# Patient Record
Sex: FEMALE | Race: WHITE | NOT HISPANIC OR LATINO | Employment: OTHER | URBAN - METROPOLITAN AREA
[De-identification: names, ages, dates, MRNs, and addresses within clinical notes are randomized per-mention and may not be internally consistent; named-entity substitution may affect disease eponyms.]

---

## 2017-01-04 ENCOUNTER — HOSPITAL ENCOUNTER (OUTPATIENT)
Dept: ULTRASOUND IMAGING | Facility: HOSPITAL | Age: 72
Discharge: HOME | End: 2017-01-04
Attending: SURGERY
Payer: MEDICARE

## 2017-01-04 DIAGNOSIS — E04.1 NONTOXIC SINGLE THYROID NODULE: ICD-10-CM

## 2017-01-04 PROCEDURE — 76536 US EXAM OF HEAD AND NECK: CPT

## 2017-01-09 ENCOUNTER — ALLSCRIPTS OFFICE VISIT (OUTPATIENT)
Dept: OTHER | Facility: OTHER | Age: 72
End: 2017-01-09

## 2017-01-09 ENCOUNTER — TRANSCRIBE ORDERS (OUTPATIENT)
Dept: ADMINISTRATIVE | Facility: HOSPITAL | Age: 72
End: 2017-01-09

## 2017-01-09 DIAGNOSIS — E04.1 NONTOXIC UNINODULAR GOITER: Primary | ICD-10-CM

## 2017-01-27 ENCOUNTER — APPOINTMENT (OUTPATIENT)
Dept: LAB | Age: 72
End: 2017-01-27
Payer: MEDICARE

## 2017-01-27 ENCOUNTER — TRANSCRIBE ORDERS (OUTPATIENT)
Dept: ADMINISTRATIVE | Age: 72
End: 2017-01-27

## 2017-01-27 DIAGNOSIS — C54.1 MALIGNANT NEOPLASM OF ENDOMETRIUM (HCC): ICD-10-CM

## 2017-01-27 LAB
BUN SERPL-MCNC: 13 MG/DL (ref 5–25)
CREAT SERPL-MCNC: 0.75 MG/DL (ref 0.6–1.3)
GFR SERPL CREATININE-BSD FRML MDRD: >60 ML/MIN/1.73SQ M

## 2017-01-27 PROCEDURE — 84520 ASSAY OF UREA NITROGEN: CPT

## 2017-01-27 PROCEDURE — 36415 COLL VENOUS BLD VENIPUNCTURE: CPT

## 2017-01-27 PROCEDURE — 82565 ASSAY OF CREATININE: CPT

## 2017-02-01 ENCOUNTER — HOSPITAL ENCOUNTER (OUTPATIENT)
Dept: CT IMAGING | Facility: HOSPITAL | Age: 72
Discharge: HOME | End: 2017-02-01
Attending: OBSTETRICS & GYNECOLOGY
Payer: MEDICARE

## 2017-02-01 DIAGNOSIS — C54.1 MALIGNANT NEOPLASM OF ENDOMETRIUM (HCC): ICD-10-CM

## 2017-02-01 PROCEDURE — 74177 CT ABD & PELVIS W/CONTRAST: CPT

## 2017-02-01 PROCEDURE — 71260 CT THORAX DX C+: CPT

## 2017-02-01 RX ADMIN — IOHEXOL 100 ML: 350 INJECTION, SOLUTION INTRAVENOUS at 10:21

## 2017-02-08 ENCOUNTER — GENERIC CONVERSION - ENCOUNTER (OUTPATIENT)
Dept: OTHER | Facility: OTHER | Age: 72
End: 2017-02-08

## 2017-02-21 ENCOUNTER — GENERIC CONVERSION - ENCOUNTER (OUTPATIENT)
Dept: OTHER | Facility: OTHER | Age: 72
End: 2017-02-21

## 2017-03-09 ENCOUNTER — ALLSCRIPTS OFFICE VISIT (OUTPATIENT)
Dept: OTHER | Facility: OTHER | Age: 72
End: 2017-03-09

## 2017-03-21 ENCOUNTER — GENERIC CONVERSION - ENCOUNTER (OUTPATIENT)
Dept: OTHER | Facility: OTHER | Age: 72
End: 2017-03-21

## 2017-03-31 ENCOUNTER — GENERIC CONVERSION - ENCOUNTER (OUTPATIENT)
Dept: OTHER | Facility: OTHER | Age: 72
End: 2017-03-31

## 2017-05-23 ENCOUNTER — APPOINTMENT (OUTPATIENT)
Dept: RADIATION ONCOLOGY | Facility: HOSPITAL | Age: 72
End: 2017-05-23
Attending: RADIOLOGY
Payer: MEDICARE

## 2017-05-23 ENCOUNTER — GENERIC CONVERSION - ENCOUNTER (OUTPATIENT)
Dept: OTHER | Facility: OTHER | Age: 72
End: 2017-05-23

## 2017-05-23 PROCEDURE — 99214 OFFICE O/P EST MOD 30 MIN: CPT | Performed by: RADIOLOGY

## 2017-08-08 ENCOUNTER — ALLSCRIPTS OFFICE VISIT (OUTPATIENT)
Dept: OTHER | Facility: OTHER | Age: 72
End: 2017-08-08

## 2017-08-08 DIAGNOSIS — E04.2 NONTOXIC MULTINODULAR GOITER: ICD-10-CM

## 2017-08-08 DIAGNOSIS — E55.9 VITAMIN D DEFICIENCY: ICD-10-CM

## 2017-08-08 DIAGNOSIS — Z00.00 ENCOUNTER FOR GENERAL ADULT MEDICAL EXAMINATION WITHOUT ABNORMAL FINDINGS: ICD-10-CM

## 2017-08-08 DIAGNOSIS — M17.10 PRIMARY OSTEOARTHRITIS OF ONE KNEE: ICD-10-CM

## 2017-08-08 DIAGNOSIS — R59.0 LOCALIZED ENLARGED LYMPH NODES: ICD-10-CM

## 2017-08-08 DIAGNOSIS — I82.409 ACUTE EMBOLISM AND THROMBOSIS OF DEEP VEIN OF LOWER EXTREMITY (HCC): ICD-10-CM

## 2017-08-08 DIAGNOSIS — Z85.42 PERSONAL HISTORY OF MALIGNANT NEOPLASM OF OTHER PARTS OF UTERUS: ICD-10-CM

## 2017-08-08 DIAGNOSIS — M16.9 OSTEOARTHRITIS OF HIP: ICD-10-CM

## 2017-08-21 ENCOUNTER — GENERIC CONVERSION - ENCOUNTER (OUTPATIENT)
Dept: OTHER | Facility: OTHER | Age: 72
End: 2017-08-21

## 2017-09-08 ENCOUNTER — ALLSCRIPTS OFFICE VISIT (OUTPATIENT)
Dept: OTHER | Facility: OTHER | Age: 72
End: 2017-09-08

## 2017-09-08 ENCOUNTER — TRANSCRIBE ORDERS (OUTPATIENT)
Dept: ADMINISTRATIVE | Facility: HOSPITAL | Age: 72
End: 2017-09-08

## 2017-09-08 DIAGNOSIS — C54.1 ENDOMETRIAL SARCOMA (HCC): Primary | ICD-10-CM

## 2017-09-20 ENCOUNTER — TRANSCRIBE ORDERS (OUTPATIENT)
Dept: ADMINISTRATIVE | Age: 72
End: 2017-09-20

## 2017-09-20 ENCOUNTER — APPOINTMENT (OUTPATIENT)
Dept: LAB | Age: 72
End: 2017-09-20
Payer: MEDICARE

## 2017-09-20 DIAGNOSIS — Z00.00 ENCOUNTER FOR GENERAL ADULT MEDICAL EXAMINATION WITHOUT ABNORMAL FINDINGS: ICD-10-CM

## 2017-09-20 DIAGNOSIS — R59.0 LOCALIZED ENLARGED LYMPH NODES: ICD-10-CM

## 2017-09-20 DIAGNOSIS — M17.10 PRIMARY OSTEOARTHRITIS OF ONE KNEE: ICD-10-CM

## 2017-09-20 DIAGNOSIS — E55.9 VITAMIN D DEFICIENCY: ICD-10-CM

## 2017-09-20 DIAGNOSIS — I82.409 ACUTE EMBOLISM AND THROMBOSIS OF DEEP VEIN OF LOWER EXTREMITY (HCC): ICD-10-CM

## 2017-09-20 DIAGNOSIS — E04.2 NONTOXIC MULTINODULAR GOITER: ICD-10-CM

## 2017-09-20 DIAGNOSIS — Z85.42 PERSONAL HISTORY OF MALIGNANT NEOPLASM OF OTHER PARTS OF UTERUS: ICD-10-CM

## 2017-09-20 DIAGNOSIS — M16.9 OSTEOARTHRITIS OF HIP: ICD-10-CM

## 2017-09-20 LAB
25(OH)D3 SERPL-MCNC: 30.4 NG/ML (ref 30–100)
ALBUMIN SERPL BCP-MCNC: 3.7 G/DL (ref 3.5–5)
ALP SERPL-CCNC: 81 U/L (ref 46–116)
ALT SERPL W P-5'-P-CCNC: 17 U/L (ref 12–78)
ANION GAP SERPL CALCULATED.3IONS-SCNC: 7 MMOL/L (ref 4–13)
AST SERPL W P-5'-P-CCNC: 18 U/L (ref 5–45)
BASOPHILS # BLD AUTO: 0.01 THOUSANDS/ΜL (ref 0–0.1)
BASOPHILS NFR BLD AUTO: 0 % (ref 0–1)
BILIRUB SERPL-MCNC: 0.66 MG/DL (ref 0.2–1)
BUN SERPL-MCNC: 16 MG/DL (ref 5–25)
CALCIUM SERPL-MCNC: 9 MG/DL (ref 8.3–10.1)
CHLORIDE SERPL-SCNC: 99 MMOL/L (ref 100–108)
CO2 SERPL-SCNC: 28 MMOL/L (ref 21–32)
CREAT SERPL-MCNC: 0.66 MG/DL (ref 0.6–1.3)
EOSINOPHIL # BLD AUTO: 0.13 THOUSAND/ΜL (ref 0–0.61)
EOSINOPHIL NFR BLD AUTO: 2 % (ref 0–6)
ERYTHROCYTE [DISTWIDTH] IN BLOOD BY AUTOMATED COUNT: 13.1 % (ref 11.6–15.1)
GFR SERPL CREATININE-BSD FRML MDRD: 87 ML/MIN/1.73SQ M
GLUCOSE P FAST SERPL-MCNC: 90 MG/DL (ref 65–99)
HCT VFR BLD AUTO: 40.9 % (ref 34.8–46.1)
HGB BLD-MCNC: 13.6 G/DL (ref 11.5–15.4)
LYMPHOCYTES # BLD AUTO: 2.01 THOUSANDS/ΜL (ref 0.6–4.47)
LYMPHOCYTES NFR BLD AUTO: 34 % (ref 14–44)
MCH RBC QN AUTO: 29.8 PG (ref 26.8–34.3)
MCHC RBC AUTO-ENTMCNC: 33.3 G/DL (ref 31.4–37.4)
MCV RBC AUTO: 90 FL (ref 82–98)
MONOCYTES # BLD AUTO: 0.63 THOUSAND/ΜL (ref 0.17–1.22)
MONOCYTES NFR BLD AUTO: 11 % (ref 4–12)
NEUTROPHILS # BLD AUTO: 3.11 THOUSANDS/ΜL (ref 1.85–7.62)
NEUTS SEG NFR BLD AUTO: 53 % (ref 43–75)
NRBC BLD AUTO-RTO: 0 /100 WBCS
PLATELET # BLD AUTO: 254 THOUSANDS/UL (ref 149–390)
PMV BLD AUTO: 11 FL (ref 8.9–12.7)
POTASSIUM SERPL-SCNC: 4.2 MMOL/L (ref 3.5–5.3)
PROT SERPL-MCNC: 7.2 G/DL (ref 6.4–8.2)
RBC # BLD AUTO: 4.57 MILLION/UL (ref 3.81–5.12)
SODIUM SERPL-SCNC: 134 MMOL/L (ref 136–145)
TSH SERPL DL<=0.05 MIU/L-ACNC: 1.18 UIU/ML (ref 0.36–3.74)
WBC # BLD AUTO: 5.9 THOUSAND/UL (ref 4.31–10.16)

## 2017-09-20 PROCEDURE — 84443 ASSAY THYROID STIM HORMONE: CPT

## 2017-09-20 PROCEDURE — 80053 COMPREHEN METABOLIC PANEL: CPT

## 2017-09-20 PROCEDURE — 82306 VITAMIN D 25 HYDROXY: CPT

## 2017-09-20 PROCEDURE — 85025 COMPLETE CBC W/AUTO DIFF WBC: CPT

## 2017-09-20 PROCEDURE — 36415 COLL VENOUS BLD VENIPUNCTURE: CPT

## 2017-09-21 ENCOUNTER — GENERIC CONVERSION - ENCOUNTER (OUTPATIENT)
Dept: OTHER | Facility: OTHER | Age: 72
End: 2017-09-21

## 2017-09-21 DIAGNOSIS — E87.1 HYPO-OSMOLALITY AND HYPONATREMIA: ICD-10-CM

## 2017-09-21 DIAGNOSIS — Z12.31 ENCOUNTER FOR SCREENING MAMMOGRAM FOR MALIGNANT NEOPLASM OF BREAST: ICD-10-CM

## 2017-10-23 ENCOUNTER — GENERIC CONVERSION - ENCOUNTER (OUTPATIENT)
Dept: OTHER | Facility: OTHER | Age: 72
End: 2017-10-23

## 2017-11-03 ENCOUNTER — GENERIC CONVERSION - ENCOUNTER (OUTPATIENT)
Dept: OTHER | Facility: OTHER | Age: 72
End: 2017-11-03

## 2018-01-10 NOTE — RESULT NOTES
Message  Shyla Osorio I have enclosed a copy of your recent labs  All results are normal except for mildly decreased sodium at 134  I would recommend repeating this in 1 month  Verified Results  (1) CBC/PLT/DIFF [id number] 09:04AM [facility]: [id number]     Test Name Result Flag Reference   WBC COUNT 5 90 Thousand/uL  4 31-10 16   RBC COUNT 4 57 Million/uL  3 81-5 12   HEMOGLOBIN 13 6 g/dL  11 5-15 4   HEMATOCRIT 40 9 %  34 8-46  1   MCV 90 fL  82-98   MCH 29 8 pg  26 8-34 3   MCHC 33 3 g/dL  31 4-37 4   RDW 13 1 %  11 6-15 1   MPV 11 0 fL  8 9-12 7   PLATELET COUNT 827 Thousands/uL  149-390   nRBC AUTOMATED 0 /100 WBCs     NEUTROPHILS RELATIVE PERCENT 53 %  43-75   LYMPHOCYTES RELATIVE PERCENT 34 %  14-44   MONOCYTES RELATIVE PERCENT 11 %  4-12   EOSINOPHILS RELATIVE PERCENT 2 %  0-6   BASOPHILS RELATIVE PERCENT 0 %  0-1   NEUTROPHILS ABSOLUTE COUNT 3 11 Thousands/? ??L  1 85-7 62   LYMPHOCYTES ABSOLUTE COUNT 2 01 Thousands/? ??L  0 60-4 47   MONOCYTES ABSOLUTE COUNT 0 63 Thousand/? ??L  0 17-1 22   EOSINOPHILS ABSOLUTE COUNT 0 13 Thousand/? ??L  0 00-0 61   BASOPHILS ABSOLUTE COUNT 0 01 Thousands/? ??L  0 00-0 10     (1) COMPREHENSIVE METABOLIC PANEL [id number] 98:10II [facility] Order Number: [id number]     Test Name Result Flag Reference   SODIUM 134 mmol/L L 136-145   POTASSIUM 4 2 mmol/L  3 5-5 3   CHLORIDE 99 mmol/L L 100-108   CARBON DIOXIDE 28 mmol/L  21-32   ANION GAP (CALC) 7 mmol/L  4-13   BLOOD UREA NITROGEN 16 mg/dL  5-25   CREATININE 0 66 mg/dL  0 60-1 30   Standardized to IDMS reference method   CALCIUM 9 0 mg/dL  8 3-10 1   BILI, TOTAL 0 66 mg/dL  0 20-1 00   ALK PHOSPHATAS 81 U/L     ALT (SGPT) 17 U/L  12-78   Specimen collection should occur prior to Sulfasalazine and/or Sulfapyridine administration due to the potential for falsely depressed results     AST(SGOT) 18 U/L  5-45   Specimen collection should occur prior to Sulfasalazine administration due to the potential for falsely depressed results  ALBUMIN 3 7 g/dL  3 5-5 0   TOTAL PROTEIN 7 2 g/dL  6 4-8 2   eGFR 87 ml/min/1 73sq m     National Kidney Disease Education Program recommendations are as follows:  GFR calculation is accurate only with a steady state creatinine  Chronic Kidney disease less than 60 ml/min/1 73 sq  meters  Kidney failure less than 15 ml/min/1 73 sq  meters  GLUCOSE FASTING 90 mg/dL  65-99   Specimen collection should occur prior to Sulfasalazine administration due to the potential for falsely depressed results  Specimen collection should occur prior to Sulfapyridine administration due to the potential for falsely elevated results  (1) TSH 20Sep2017 09:04AM Ysabel Whitaker Order Number: [id number]     Test Name Result Flag Reference   TSH 1 180 uIU/mL  0 358-3 740   Patients undergoing fluorescein dye angiography may retain small amounts of fluorescein in the body for 48-72 hours post procedure  Samples containing fluorescein can produce falsely depressed TSH values  If the patient had this procedure,a specimen should be resubmitted post fluorescein clearance  The recommended reference ranges for TSH during pregnancy are as follows:  First trimester 0 1 to 2 5 uIU/mL  Second trimester  0 2 to 3 0 uIU/mL  Third trimester 0 3 to 3 0 uIU/m     (1) VITAMIN D 25-HYDROXY 20Sep2017 09:04AM Alexpo Luis Angel    Order Number: [id number]     Test Name Result Flag Reference   VIT D 25-HYDROX 30 4 ng/mL  30 0-100 0   This assay is a certified procedure of the CDC Vitamin D Standardization Certification Program (VDSCP)     Deficiency <20ng/ml   Insufficiency 20-30ng/ml   Sufficient  ng/ml     *Patients undergoing fluorescein dye angiography may retain small amounts of fluorescein in the body for 48-72 hours post procedure  Samples containing fluorescein can produce falsely elevated Vitamin D values   If the patient had this procedure, a specimen should be resubmitted post fluorescein clearance  Plan  Hyponatremia    · (1) BASIC METABOLIC PROFILE; Status:Active;  Requested [facility]:[id number];     Signatures   Electronically signed by : ANDREIA Tran ; Sep 21 2017  5:43PM EST                       (Author)

## 2018-01-12 VITALS
HEART RATE: 75 BPM | WEIGHT: 225 LBS | DIASTOLIC BLOOD PRESSURE: 80 MMHG | HEIGHT: 65 IN | RESPIRATION RATE: 16 BRPM | OXYGEN SATURATION: 96 % | TEMPERATURE: 97.6 F | BODY MASS INDEX: 37.49 KG/M2 | SYSTOLIC BLOOD PRESSURE: 138 MMHG

## 2018-01-12 NOTE — RESULT NOTES
Message  Leon Marino I have enclosed a copy of your recent labs  genetic markers normal  I would continue with Aspirin 81 mg daily        Results/Data     (1) HOMOCYSTEINE   HOMOCYSTEINE: 5 1 umol/L Reference Range 3 7-11 2  (1) PROTHROMBIN 11894PB MUTATION ANALYSIS   PROTHROMBIN C16571U MUTATION: Comment   ADDITIONAL INFO: Comment   (1) FACTOR V LEIDEN MUTATION DNA ANALYSIS   FACTOR V LEIDEN: Comment   FACTOR V LEIDEN INTERPRETATION: Comment     Signatures   Electronically signed by : ANDREIA Kinney ; Aug 23 2016  6:50AM EST                       (Author)

## 2018-01-12 NOTE — PROGRESS NOTES
Assessment    1  Encounter for preventive health examination (V70 0) (Z00 00)   2  Thyroid nodule (241 0) (E04 1)   3  Deep vein thrombosis (453 40) (I82 409)   4  Osteoarthritis of knee (715 36) (M17 9)   5  Vitamin D deficiency (268 9) (E55 9)   6  Endometrial cancer (182 0) (C54 1)   7  Inguinal lymphadenopathy (785 6) (R59 0)    Plan  Deep vein thrombosis, Endometrial cancer, Osteoarthritis of knee, Thyroid nodule,  Vitamin D deficiency    · (1) CBC/PLT/DIFF; Status:Active; Requested BWF:[id number];    · (1) COMPREHENSIVE METABOLIC PANEL; Status:Active; Requested for:16Oof8507;    · (1) FACTOR V LEIDEN MUTATION DNA ANALYSIS; Status:Active; Requested  NAF:[id number];    · (1) HOMOCYSTEINE; Status:Active; Requested for:85Apk5459;    · (1) PROTHROMBIN 48196FI MUTATION ANALYSIS; Status:Active; Requested  for:94Zaa7997;    · (1) TSH; Status:Active; Requested for:00Mwh2916;    · (1) VITAMIN D 25-HYDROXY; Status:Active; Requested for:55Bso9269;   Need for prophylactic vaccination against Streptococcus pneumoniae (pneumococcus)    · Prevnar 13 Intramuscular Suspension  Need for shingles vaccine    · Zostavax 76475 UNT/0 65ML Subcutaneous Solution Reconstituted (Zostavax  50993 UNT/0 65ML Subcutaneous Solution Reconstituted); INJECT 0 65 ML Once  Screening for genitourinary condition    · *VB-Urinary Incontinence Screen (Dx V81 6 Screen for UI); Status:Complete;   Done:  02DXQ9416 07:59AM    Discussion/Summary    Labs  I included testing for Factor V Leiden, pro thrombin mutation gene and homocysteine with history of DVT  prescription for compression stockings  ASA 81 mg daily  Prevnar 13 today  script for Zostavax  yearly flu vaccine  Impression: Subsequent Annual Wellness Visit, with preventive exam as well as age and risk appropriate counseling completed       Cardiovascular screening and counseling: screening not indicated, counseling was given on maintaining a healthy diet, counseling was given on maintaining a healthy weight and due for a lipid panel  Diabetes screening and counseling: due for blood glucose  Colorectal cancer screening and counseling: screening is current  Breast cancer screening and counseling: screening is current  Cervical cancer screening and counseling: screening not indicated  Osteoporosis screening and counseling: screening is current  Abdominal aortic aneurysm screening and counseling: screening not indicated  Glaucoma screening and counseling: screening is current  Immunizations: influenza vaccine is up to date this year, influenza vaccination is recommended annually, the patient declines the pneumococcal vaccination, pneumococcal vaccine due today, the risks and benefits of the Zostavax vaccine were discussed with the patient and the patient declines the Zostavax vaccine  Advice and education were given regarding increasing physical activity, nutrition (non-diabetic) and weight loss  She was referred to a GYN oncology surgeon  Patient Discussion: follow-up visit needed in 1 year  History of Present Illness  HPI: follow up last seen 06/2014  active problems and PMH see chart  medications none  OTCs see chart  labs 06/2015 see note  labs 6/2013 CBC normal  Hemoglobin 13 4  Chemistry profile  FBS 98, sodium 140, potassium 4 3, chloride 105, bicarbonate 30, creatinine 0 71, calcium 9 0  LFTs normal  TSH 1 927  Lipid profile cholesterol 151, triglycerides 116, HDL 54, LDL 74  Welcome to [employer] and Wellness Visits: The patient is being seen for the subsequent annual wellness visit  Medicare Screening and Risk Factors   Hospitalizations: she has been previously hospitalizied  Once per lifetime medicare screening tests: ECG (11/2014)  Medicare Screening Tests Risk Questions   Abdominal aortic aneurysm risk assessment: none indicated  Osteoporosis risk assessment: , female gender and over 48years of age  Drug and Alcohol Use:  The patient has never smoked cigarettes  The patient reports occasional alcohol use  Diet and Physical Activity: Current diet includes well balanced meals  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  Mood Disorder and Cognitive Impairment Screening: She denies feeling down, depressed, or hopeless over the past two weeks  She denies feeling little interest or pleasure in doing things over the past two weeks  Cognitive impairment screening: denies difficulty learning/retaining new information, denies difficulty handling complex tasks, denies difficulty with reasoning, denies difficulty with spatial ability and orientation, denies difficulty with language and denies difficulty with behavior  Functional Ability/Level of Safety: Hearing is normal bilaterally  The patient is currently able to do activities of daily living without limitations and able to do instrumental activities of daily living without limitations  Fall risk factors:  mobility impairment, but no polypharmacy, no alcohol use, no antidepressant use, no deconditioning, no postural hypotension, no sedative use, no visual impairment, no urinary incontinence, no antihypertensive use, no cognitive impairment and no previous fall  Advance Directives: Advance directives: living will  Co-Managers and Medical Equipment/Suppliers: See Patient Care Team      Patient Care Team    Care Team Member Role Specialty Office Number   Dagoberto Barnes MD  Obstetrics/Gynecology [phone]   Francisco Will  Gynecological Oncology [phone]   Paulette Abad MD  Surgical Oncology [phone]   Tc PORTILLO  Radiation Oncology [phone]     Review of Systems    Constitutional: 11 lb weight gain from 06/2015  Eyes: wears glasses  current with eye exam, but no blurred vision  ENT: history of thyroid nodule with prior biopsy no cancer  , but no hoarseness  Cardiovascular: s/p DVT left leg  she completed 9 months of Xarelto  , but no chest pain, no palpitations and no lower extremity edema  Respiratory: no shortness of breath, no wheezing and no cough  Gastrointestinal: colonoscopy 09/2014  1 polyp  severe diverticulosis  , but no abdominal pain, no nausea, no diarrhea, no constipation, no bright red blood per rectum and no melena  Genitourinary: no UI  endometrial CA s/p ROSETTE, BSO  s/p chemotherapy  07/2016 CT scan chest, abdomen and pelvis no recurrence  , but no dysuria  Musculoskeletal: joint stiffness and OA knees  x rays knees 6/2013 moderately severe OA right knee  moderate OA left knee  using prn Tylenol  she completed PT with improvement  pain with prolonged standing  no swelling  no giving way or locking  Integumentary and Breasts: no rashes and no skin lesions  Neurological: no headache and no dizziness  Psychiatric: no anxiety and no depression  Endocrine: DEXA scan 10/2013 normal    Hematologic and Lymphatic: history of left inguinal adenopathy biopsy no cancer  Active Problems    1  Deep vein thrombosis (453 40) (I82 409)   2  Endometrial cancer (182 0) (C54 1)   3  H/O malignant neoplasm of endometrium (V10 42) (Z85 42)   4  Inguinal lymphadenopathy (785 6) (R59 0)   5  Insomnia (780 52) (G47 00)   6  Need for prophylactic vaccination against Streptococcus pneumoniae (pneumococcus)   (V03 82) (Z23)   7  Obesity (278 00) (E66 9)   8  Osteoarthritis of hip (715 95) (M16 9)   9  Osteoarthritis of knee (715 36) (M17 9)   10  Screening for genitourinary condition (V81 6) (Z13 89)   11  Thyroid nodule (241 0) (E04 1)   12  Tinnitus, unspecified laterality (388 30) (H93 19)   13   Vitamin D deficiency (268 9) (E55 9)    Past Medical History    · History of Acute venous embolism and thrombosis of deep vessels of distal lower  extremity (453 42) (I82 4Z9)   · History of Benign Polyps Of The Large Intestine (V12 72)   · History of Diverticulosis (562 10) (K57 90)   · History of Encounter for antineoplastic chemotherapy (V58 11) (Z51 11) · History of Fibrocystic breast (610 1) (N60 19)   · History of  3 (V22 2) (Z33 1)   · History of Impaired fasting glucose (790 21) (R73 01)   · History of Postmenopausal bleeding (627 1) (N95 0)   · History of Thrombophlebitis (V12 52)   · History of Visit for screening mammogram (V76 12) (Z12 31)    Surgical History    · History of Tonsillectomy With Adenoidectomy   · History of Total Abdominal Hysterectomy With Removal Of Both Ovaries    Family History  Mother    · Family history of Hypothyroidism  Maternal Grandfather    · Family history of Diabetes Mellitus (V18 0)    Social History    · Denied: History of Home environment domestic violence   · Never A Smoker   · No alcohol use    Current Meds   1  Centrum Silver Oral Tablet; Therapy: (Recorded:75Zae9420) to Recorded   2  Magnesium 500 MG Oral Capsule; Therapy: (Recorded:55Wub7354) to Recorded   3  Vitamin B Complex TABS; Therapy: (Recorded:48Qey5178) to Recorded   4  Vitamin D3 2000 UNIT Oral Capsule; 1 tablet daily; Therapy: (Recorded:80Vbz5219) to Recorded    Allergies    1  No Known Drug Allergies    2  No Known Environmental Allergies   3  No Known Food Allergies    Vitals  Signs    Systolic: 289  Diastolic: 76  Heart Rate: 80  Respiration: 20  Temperature: 97 8 F  Height: 5 ft 4 5 in  Weight: 224 lb 2 08 oz  BMI Calculated: 37 88  BSA Calculated: 2 06    Physical Exam    Constitutional   General appearance: No acute distress, well appearing and well nourished  Eyes   Conjunctiva and lids: No swelling, erythema or discharge  Pupils and irises: Equal, round, reactive to light   fundi not seen  Ears, Nose, Mouth, and Throat   Otoscopic examination: Tympanic membranes translucent with normal light reflex  Canals patent without erythema  Oropharynx: Normal with no erythema, edema, exudate or lesions  Neck   Neck: Supple, symmetric, trachea midline, no masses  Thyroid: Normal, no thyromegaly  There were no thyroid nodules  Pulmonary   Auscultation of lungs: Clear to auscultation  Cardiovascular   Auscultation of heart: Normal rate and rhythm, normal S1 and S2, no murmurs  Heart sounds: no gallop heard  Carotid pulses: 2+ bilaterally  no bruit heard over the right carotid and no bruit heard over the left carotid  Abdominal aorta: Normal   Abdominal aorta: no bruit heard  Examination of extremities for edema and/or varicosities: Abnormal   no edema  varicosities noted bilaterally  no calf tenderness or Homans sign  Abdomen   Abdomen: Non-tender, no masses  Liver and spleen: No hepatomegaly or splenomegaly  Lymphatic   Palpation of lymph nodes in neck: No lymphadenopathy  no anterior cervical node enlargement, no posterior cervical node enlargement, no submandibular node enlargement and no supraclavicular node enlargement  Musculoskeletal   Joints, bones, and muscles: Abnormal   + bony enlargement and crepitus knees  no joint line tenderness or effusion  Range of motion: Abnormal   Range of Motion: Left Knee: Full  restricted ROM right knee lacks 5 to 10 degrees full extension  Stability: Normal   negative anterior drawer, negative posterior drawer and negative lachman test    Skin   Skin and subcutaneous tissue: Abnormal   SKs upper back and right temple area  Psychiatric   Recent and remote memory: Intact      Mood and affect: Normal        Results/Data  *VB-Urinary Incontinence Screen (Dx V81 6 Screen for UI) 25Kha1425 07:59AM Murtaugh Starling     Test Name Result Flag Reference   Urinary Incontinence Assessment [id number]       Falls Risk Assessment (Dx V80 09 Screen for Neurologic Disorder) 96ZUS7440 07:58AM User, Ahs     Test Name Result Flag Reference   Falls Risk      No falls in the past year     * CT CHEST ABDOMEN PELVIS W CONTRAST [id number] 10:24AM Anthony Haney Order Number: [id number]   Performing Comments: Scan at [address] [place] [address] on Monday 7/18/169 at 10:45am  Arrive 15mins early to noted  These lesions are too small to accurately characterize, but are statistically most likely to represent benign cortical renal cyst(s)  According to the    guidelines published in the [facility] Paper of the ACR Incidental Findings Committee (Radiology 2010), no further workup of these lesions is recommended  STOMACH AND BOWEL: There is colonic diverticulosis without evidence of acute diverticulitis  No bowel obstruction  Small hiatal hernia  APPENDIX: No findings to suggest appendicitis  ABDOMINOPELVIC CAVITY: No ascites or free intraperitoneal air  No lymphadenopathy  VESSELS: Unremarkable for patient's age  PELVIS     REPRODUCTIVE ORGANS: Patient is status post hysterectomy  URINARY BLADDER: Unremarkable  ABDOMINAL WALL/INGUINAL REGIONS: Stable post surgical changes of the left inguinal region  Again noted are multiple prominent lymph nodes in the bilateral inguinal regions with low attenuation centrally  These measure up to 11 mm short axis diameter    in the left inguinal region and 10 mm short axis diameter in the right inguinal region and are without significant change from previous examinations  OSSEOUS STRUCTURES: No acute fracture or destructive osseous lesion  There is grade 1 anterolisthesis of L4 on L5  IMPRESSION:   1  Stable prominent bilateral inguinal lymph nodes without significant change in size or number from prior examinations  2  Stable 2 mm left upper lobe nodule for which continued CT follow-up is recommended  According to guidelines by the Fleischner Society patients with a known malignancy should be followed based on the propensity of primary malignancy a resulting    pulmonary metastatic disease  3  Stable right posterior thyroid nodule         Workstation performed: [id number]     Signed by:   Kimmie Haque MD   7/19/16     VAS LOWER LIMB VENOUS DUPLEX STUDY, COMPLETE BILATERAL [id number] 01:53PM Tremayne Luna Order Number: [id number] Test Name Result Flag Reference   VAS LOWER LIMB VENOUS DUPLEX STUDY, COMPLETE BILATERAL (Report)     [facility] REPORT   CLINICAL:   Indications: Venous Embolism and Thrombosis of Unspecified Deep Vessels of   Lower Extremity [I82 409]  Patient presents with chronic LLE swelling s/p DVT   diagnosis 1 year ago  She now c/o right leg pain x 6 weeks  Operative History:   No cardiovascular surgeries   Risk Factors: The patient has history of obesity, DVT and malignancy  The   patient current BMI is 38 45, Weight is 224 lb and Height is 64 in  Clinical: There is no edema or tenderness  FINDINGS:      Left    Impression                 Popliteal E1  Non Occlusive Thrombus (Chronic)             CONCLUSION:      Impression:   RIGHT LOWER LIMB:   No evidence of acute or chronic deep vein thrombosis  No evidence of superficial thrombophlebitis noted  Doppler evaluation shows a normal response to augmentation maneuvers  Popliteal, posterior tibial and anterior tibial arterial Doppler waveforms are   triphasic  LEFT LOWER LIMB:   There is known chronic thrombus in the popliteal vein  No evidence of acute deep vein thrombosis  No evidence of superficial thrombophlebitis noted  Doppler evaluation shows a normal response to augmentation maneuvers  Popliteal, posterior tibial and anterior tibial arterial Doppler waveforms are   triphasic  Tech note: technically difficult study secondary to patient body habitus  SIGNATURE:   Electronically Signed by: Colin Chiang on 2016-05-04 03:19:51 PM     (1) CBC/PLT/DIFF [id number] 09:01AM Mili Mosquera     Test Name Result Flag Reference   DIFFERENTIAL METHOD Automated     WBC COUNT 5 10 Thousand/uL  4 31-10 16   RBC COUNT 3 85 Million/uL  3 81-5 12   HEMOGLOBIN 12 0 g/dL  11 5-15 4   HEMATOCRIT 35 2 %  34 8-46  1   MCV 91 fL  82-98   MCH 31 2 pg  26 8-34 3   MCHC 34 1 g/dL  31 4-37 4   RDW 13 4 %  11 6-15 1   MPV 9 4 fL  8 9-12 7 PLATELET COUNT 036 Thousand/uL  149-390   nRBC AUTOMATED 0 /100 WBC     NEUTROPHILS RELATIVE PERCENT 57 %  43-75   LYMPHOCYTES RELATIVE PERCENT 29 %  14-44   MONOCYTES RELATIVE PERCENT 12 %  4-12   EOSINOPHILS RELATIVE PERCENT 2 %  0-6   NEUTROPHILS ABSOLUTE COUNT 2 91 Thousand/uL  1 85-7 62   LYMPHOCYTES ABSOLUTE COUNT 1 48 Thousand/uL  0 60-4 47   MONOCYTES ABSOLUTE COUNT 0 61 Thousand/uL  0 17-1 22   EOSINOPHILS ABSOLUTE COUNT 0 10 Thousand/uL  0 00-0 61   BASOPHILS ABSOLUTE COUNT 0 01 Thousand/uL  0 00-0 10     (1) COMPREHENSIVE METABOLIC PANEL [id number] 07:05RH [place]     Test Name Result Flag Reference   SODIUM 138 mmol/L  136-145   POTASSIUM 4 0 mmol/L  3 5-5 3   CHLORIDE 104 mmol/L  100-108   CARBON DIOXIDE 29 mmol/L  23-33   ANION GAP (CALC) 5 mmol/L  4-13   BILI, TOTAL 0 39 mg/dL  0 20-1 00   TOTAL PROTEIN 7 2 g/dL  6 4-8 2   ALK PHOSPHATAS 85 U/L     ALT (SGPT) 21 U/L  14-59   AST(SGOT) 19 U/L  0-45   GLUCOSE,RANDM 108 mg/dL     If patient is fasting, the ADA then defines impaired fasting glucose as  >100 mg/dl and diabetes as  >or equal to 126 mg/dl  ALBUMIN 3 5 g/dL  3 5-5 0   BLOOD UREA NITROGEN 12 mg/dL  5-25   CALCIUM 9 3 mg/dL  8 3-10 1   CREATININE 0 54 mg/dL L 0 60-1 30   Standardized to IDMS reference method   eGFR African American >60 ml/min/1 73sq m     [facility] Disease Education Program recommendations are as  follows:  GFR calculation is accurate only with a steady state creatinine  Chronic Kidney disease less than 60 ml/min/1 73 sq  meters  Kidney failure less than 15 ml/min/1 73 sq  meters     eGFR Non-African American >60 ml/min/1 73sq m       IR PATHOLOGY REPORT (N/C) 23Apr2015 12:49PM [place]     Test Name Result Flag Reference   IR PATHOLOGY REPORT (N/C) (Report)     [address];;[place];[address]   04/23/2015 0815   04/23/2015 0855   N/A     5/7/2015       Dear Dr Alexandro Blum-     Thank you for the referral of the above named patient for inguinal   lymph node biopsy  As you know, the procedure was performed on   4/23/2015  We have received the final pathology report  You should   have received a copy of the report, but if not, you may access it   through the Physician's Web Portal or by contacting the lab office at   [phone]  In summary, the biopsy demonstrated benign tissue negative for   malignancy  If you have any questions, please do not hesitate to contact me at   [phone]  Sincerely,     Virgilio Noel MD       Transcribed on- [id number]     - JULIANNE Hathaway MD   Reading Radiologist- JULIANNE Hathaway MD   Electronically Signed- JULIANNE Hathaway MD   Released Date Time- 05/07/15 1649   ------------------------------------------------------------------------------   39271^WILFRED SWEET   22362^WILFRED SWEET     * Dexa Scan Axial Skel (Hip, Pelvis, Spine) [id number] 08:01AM Frederick Patiño     Test Name Result Flag Reference   Dexa Scan (Report)     10/10/2013 0805  10/10/2013 0830  N/A    Central DXA Bone Densitometry    HISTORY-   77year old postmenopausal  woman  Risk factors  include a history of degenerative arthritis  Family history of  osteoporosis  History of height loss  Prior estrogen use and Actonel  use  COMPARISON-  Several, most recent May 14, 2008    RESULTS-  The patient underwent a central DXA scan on a [facility] [place]  C unit  Visual inspection of the images reveal degenerative changes of  the lumbar spine and hip  This will falsely elevate the bone mineral  densities in these regions       LUMBAR SPINE L1-4 (average) -   BMD 0 952 gm/sq-cm  T-score is -0 9   Z-score is 1 3     TOTAL LEFT HIP -    BMD 0 989 gm/sq-cm,  T-score is 0 4   Z-score is 1 9     FEMORAL NECK LEFT HIP -  BMD 0 893 gm/sq-cm,  T-score is 0 4  Z-score is 2 2    LEFT FOREARM -  BMD 0 766 gm/sq-cm,  T-score is 1 4  Z-score is 3 6          T score = number of standard deviations patient's BMD is above or below  the average BMD of young adult  gender-matched reference  population  Z score = number of SD patient's BMD is above or below age/race/gender  - matched mean reference value          IMPRESSION-    NORMAL bone mineral density based on the WHO classification  The  patient is not at increased risk for fracture  When compared to the prior examination, there has been a 5 % DECREASE  in the total bone mineral density of the lumbar spine and an 8 %  DECREASE in the total bone mineral density of the left hip  Non prescription measures, such as calcium supplementation, daily  consumption of vitamin D, and weight bearing exercise, are appropriate  as general preventive measures  WHO criteria-    Osteopenia - Bone mineral density (BMD) between 1 and 2 5 standard  deviations below young adult peak average BMD     Osteoporosis - Bone mineral density (BMD) that is 2 5 or more standard  deviations below young adult peak BMD     The WHO criteria for osteoporosis was based on post-menopausal   females and extension to other groups may not be appropriate  If Z-score is <= -2, or if clinical suspicion, further evaluation for   secondary causes contributing to bone loss may be indicated    The risk of osteoporotic fracture increases approximately 2x for each  1 0 SD decrease in T score  Low bone density is not the only risk factor for fracture& also^ also  consider factors such as patient's age, risk of falling and injury,  previous fragility fractures, and family history of osteoporosis          Transcribed on- [facility], RAD DO  Reading Radiologist- JULIANNE Fletcher DO  Releasing Radiologist- JULIANNE Fletcher DO  Released Date Time- 10/10/13 0918  ------------------------------------------------------------------------------  47133^[place] FISHER^JULIANNE DO  86883^[place] FISHER^JULIANNE SUH Future Appointments    Date/Time Provider Specialty Site   08/08/2017 09:00 AM ANDREIA Marino   Family Medicine [facility]   01/09/2017 09:30 AM Zak Lang MD Surgical Oncology [facility]   11/10/2016 09:45 AM Leslie Antoine MD Gynecological Oncology [facility] Beata Rama   Electronically signed by : ANDREIA Rinaldi ; Aug  4 2016  9:39AM EST                       (Author)

## 2018-01-14 VITALS
OXYGEN SATURATION: 98 % | TEMPERATURE: 97.7 F | HEART RATE: 59 BPM | RESPIRATION RATE: 16 BRPM | WEIGHT: 221.13 LBS | HEIGHT: 65 IN | BODY MASS INDEX: 36.84 KG/M2 | SYSTOLIC BLOOD PRESSURE: 114 MMHG | DIASTOLIC BLOOD PRESSURE: 72 MMHG

## 2018-01-14 VITALS
RESPIRATION RATE: 16 BRPM | TEMPERATURE: 97.9 F | HEIGHT: 65 IN | DIASTOLIC BLOOD PRESSURE: 80 MMHG | SYSTOLIC BLOOD PRESSURE: 126 MMHG | WEIGHT: 222.19 LBS | BODY MASS INDEX: 37.02 KG/M2 | HEART RATE: 72 BPM

## 2018-01-14 NOTE — PROGRESS NOTES
Assessment    1  Encounter for preventive health examination (V70 0) (Z00 00)   2  Thyroid nodule (241 0) (E04 1)   3  Deep vein thrombosis (453 40) (I82 409)   4  Osteoarthritis of knee (715 36) (M17 9)   5  Vitamin D deficiency (268 9) (E55 9)   6  Endometrial cancer (182 0) (C54 1)   7  Inguinal lymphadenopathy (785 6) (R59 0)    Plan  Deep vein thrombosis, Endometrial cancer, Osteoarthritis of knee, Thyroid nodule,  Vitamin D deficiency    · (1) CBC/PLT/DIFF; Status:Active; Requested DBJ:83RIV7159;    · (1) COMPREHENSIVE METABOLIC PANEL; Status:Active; Requested for:33Sqo8958;    · (1) FACTOR V LEIDEN MUTATION DNA ANALYSIS; Status:Active; Requested  GCT:85FQG0437;    · (1) HOMOCYSTEINE; Status:Active; Requested for:07Itb5672;    · (1) PROTHROMBIN 53062HS MUTATION ANALYSIS; Status:Active; Requested  for:59Lkc4115;    · (1) TSH; Status:Active; Requested for:06Gfa8678;    · (1) VITAMIN D 25-HYDROXY; Status:Active; Requested for:89Ygf7519;   Need for prophylactic vaccination against Streptococcus pneumoniae (pneumococcus)    · Prevnar 13 Intramuscular Suspension  Need for shingles vaccine    · Zostavax 73736 UNT/0 65ML Subcutaneous Solution Reconstituted (Zostavax  48459 UNT/0 65ML Subcutaneous Solution Reconstituted); INJECT 0 65 ML Once  Screening for genitourinary condition    · *VB-Urinary Incontinence Screen (Dx V81 6 Screen for UI); Status:Complete;   Done:  31VTU3115 07:59AM    Discussion/Summary    Labs  I included testing for Factor V Leiden, pro thrombin mutation gene and homocysteine with history of DVT  prescription for compression stockings  ASA 81 mg daily  Prevnar 13 today  script for Zostavax  yearly flu vaccine  History of Present Illness  follow up last seen 06/2014  active problems and PMH see chart  medications none  OTCs see chart  labs 06/2015 see note  labs 6/2013 CBC normal  Hemoglobin 13 4  Chemistry profile   FBS 98, sodium 140, potassium 4 3, chloride 105, bicarbonate 30, creatinine 0 71, calcium 9 0  LFTs normal  TSH 1 927  Lipid profile cholesterol 151, triglycerides 116, HDL 54, LDL 74  Review of Systems    Constitutional: 11 lb weight gain from 06/2015  Eyes: wears glasses  current with eye exam, but no blurred vision  ENT: history of thyroid nodule with prior biopsy no cancer  , but no hoarseness  Cardiovascular: s/p DVT left leg  she completed 9 months of Xarelto  , but no chest pain, no palpitations and no lower extremity edema  Respiratory: no shortness of breath, no wheezing and no cough  Gastrointestinal: colonoscopy 09/2014  1 polyp  severe diverticulosis  , but no abdominal pain, no nausea, no diarrhea, no constipation, no bright red blood per rectum and no melena  Genitourinary: no UI  endometrial CA s/p ROSETTE, BSO  s/p chemotherapy  07/2016 CT scan chest, abdomen and pelvis no recurrence  , but no dysuria  Musculoskeletal: joint stiffness and OA knees  x rays knees 6/2013 moderately severe OA right knee  moderate OA left knee  using prn Tylenol  she completed PT with improvement  pain with prolonged standing  no swelling  no giving way or locking  Integumentary and Breasts: no rashes and no skin lesions  Neurological: no headache and no dizziness  Psychiatric: no anxiety and no depression  Endocrine: DEXA scan 10/2013 normal    Hematologic and Lymphatic: history of left inguinal adenopathy biopsy no cancer  Active Problems    1  Deep vein thrombosis (453 40) (I82 409)   2  Endometrial cancer (182 0) (C54 1)   3  H/O malignant neoplasm of endometrium (V10 42) (Z85 42)   4  Inguinal lymphadenopathy (785 6) (R59 0)   5  Insomnia (780 52) (G47 00)   6  Need for prophylactic vaccination against Streptococcus pneumoniae (pneumococcus)   (V03 82) (Z23)   7  Need for shingles vaccine (V04 89) (Z23)   8  Obesity (278 00) (E66 9)   9  Osteoarthritis of hip (715 95) (M16 9)   10  Osteoarthritis of knee (715 36) (M17 9)   11   Screening for genitourinary condition (V81 6) (Z13 89)   12  Thyroid nodule (241 0) (E04 1)   13  Tinnitus, unspecified laterality (388 30) (H93 19)   14  Vitamin D deficiency (268 9) (E55 9)    Past Medical History    1  History of Acute venous embolism and thrombosis of deep vessels of distal lower   extremity (453 42) (I82 4Z9)   2  History of Benign Polyps Of The Large Intestine (V12 72)   3  History of Diverticulosis (562 10) (K57 90)   4  History of Encounter for antineoplastic chemotherapy (V58 11) (Z51 11)   5  History of Fibrocystic breast (610 1) (N60 19)   6  History of  3 (V22 2) (Z33 1)   7  History of Impaired fasting glucose (790 21) (R73 01)   8  History of Postmenopausal bleeding (627 1) (N95 0)   9  History of Thrombophlebitis (V12 52)   10  History of Visit for screening mammogram (V76 12) (Z12 31)    Surgical History    1  History of Tonsillectomy With Adenoidectomy   2  History of Total Abdominal Hysterectomy With Removal Of Both Ovaries    Family History  Mother    1  Family history of Hypothyroidism  Maternal Grandfather    2  Family history of Diabetes Mellitus (V18 0)    Social History    · Denied: History of Home environment domestic violence   · Never A Smoker   · Never smoked cigarettes (V49 89) (Z78 9)   · No alcohol use    Current Meds   1  Centrum Silver Oral Tablet; Therapy: (Recorded:36Qwz4026) to Recorded   2  Magnesium 500 MG Oral Capsule; Therapy: (Recorded:42Jof8066) to Recorded   3  Vitamin B Complex TABS; Therapy: (Recorded:01Vgx6650) to Recorded   4  Vitamin D3 2000 UNIT Oral Capsule; 1 tablet daily; Therapy: (Recorded:54Qij9705) to Recorded    Allergies    1  No Known Drug Allergies    2  No Known Environmental Allergies   3   No Known Food Allergies    Vitals  Vital Signs    Recorded: 95YJP2241 62:39NS   Systolic 893   Diastolic 76   Heart Rate 80   Respiration 20   Temperature 97 8 F   Height 5 ft 4 5 in   Weight 224 lb 2 08 oz   BMI Calculated 37 88   BSA Calculated 2 06     Physical Exam    Constitutional   General appearance: No acute distress, well appearing and well nourished  Eyes   Conjunctiva and lids: No swelling, erythema or discharge  Pupils and irises: Equal, round, reactive to light   fundi not seen  Ears, Nose, Mouth, and Throat   Otoscopic examination: Tympanic membranes translucent with normal light reflex  Canals patent without erythema  Oropharynx: Normal with no erythema, edema, exudate or lesions  Neck   Neck: Supple, symmetric, trachea midline, no masses  Thyroid: Normal, no thyromegaly  There were no thyroid nodules  Pulmonary   Auscultation of lungs: Clear to auscultation  Cardiovascular   Auscultation of heart: Normal rate and rhythm, normal S1 and S2, no murmurs  Heart sounds: no gallop heard  Carotid pulses: 2+ bilaterally  no bruit heard over the right carotid and no bruit heard over the left carotid  Abdominal aorta: Normal   Abdominal aorta: no bruit heard  Examination of extremities for edema and/or varicosities: Abnormal   no edema  varicosities noted bilaterally  no calf tenderness or Homans sign  Abdomen   Abdomen: Non-tender, no masses  Liver and spleen: No hepatomegaly or splenomegaly  Lymphatic   Palpation of lymph nodes in neck: No lymphadenopathy  no anterior cervical node enlargement, no posterior cervical node enlargement, no submandibular node enlargement and no supraclavicular node enlargement  Musculoskeletal   Joints, bones, and muscles: Abnormal   + bony enlargement and crepitus knees  no joint line tenderness or effusion  Range of motion: Abnormal   Range of Motion: Left Knee: Full  restricted ROM right knee lacks 5 to 10 degrees full extension  Stability: Normal   negative anterior drawer, negative posterior drawer and negative lachman test    Skin   Skin and subcutaneous tissue: Abnormal   SKs upper back and right temple area  Psychiatric   Recent and remote memory: Intact      Mood and affect: Normal  Results/Data  *VB-Urinary Incontinence Screen (Dx V81 6 Screen for UI) 86Qlc4986 07:59AM Jovannysamina Gamino     Test Name Result Flag Reference   Urinary Incontinence Assessment [id number]       Falls Risk Assessment (Dx V80 09 Screen for Neurologic Disorder) 20GPJ0292 07:58AM User, Ahs     Test Name Result Flag Reference   Falls Risk      No falls in the past year     * CT CHEST ABDOMEN PELVIS W CONTRAST 31GNS8425 10:24AM Veronica Wesley Order Number: [id number]   Performing Comments: Scan at [address] [place] [address] on Monday 7/18/169 at 10:45am  Arrive 15mins early to register  FOLLOW BARIUM INSTRUCTIONS ATTACHED TO THIS ORDER    - Patient Instructions: To schedule this appointment, please contact Central Scheduling at [phone]  Test Name Result Flag Reference   CT CHEST ABDOMEN PELVIS W CONTRAST (Report)     CT CHEST, ABDOMEN AND PELVIS WITH IV CONTRAST     INDICATION: Follow-up, cervical cancer  COMPARISON: CT abdomen pelvis 1/25/2016, CT chest abdomen pelvis 10/26/2015     TECHNIQUE: CT examination of the chest, abdomen and pelvis was performed  This examination, like all CT scans performed in the [facility], was performed utilizing techniques to minimize radiation dose exposure, including the use of    iterative reconstruction and automated exposure control  Axial, sagittal, and coronal reformatted images were submitted for interpretation  100 cc of intravenous Omnipaque 350 was administered for this examination  Enteric contrast was administered  FINDINGS:     CHEST     LUNGS: Lungs are clear  There is no tracheal or endobronchial lesion  There is bibasilar dependent atelectasis  Stable 2 mm pulmonary nodule in the lateral left upper lobe (series 3 image 23) unchanged from prior exams  No new pulmonary nodule     PLEURA: Unremarkable  HEART/GREAT VESSELS: There are atherosclerotic changes of the coronary arteries and thoracic aorta   The heart is normal in size without pericardial mass or effusion  MEDIASTINUM AND ALVAREZ: Unremarkable  CHEST WALL AND LOWER NECK: Stable heterogeneous nodule arising from the lower pole right lobe of the thyroid measuring 2 7 x 1 7 cm (stable from 4/2015 when measured in a similar fashion)  ABDOMEN     LIVER/BILIARY TREE: Unremarkable  GALLBLADDER: No calcified gallstones  No pericholecystic inflammatory change  SPLEEN: Unremarkable  PANCREAS: Unremarkable  ADRENAL GLANDS: Unremarkable  KIDNEYS/URETERS: One or more sharply circumscribed subcentimeter renal hypodensities are noted  These lesions are too small to accurately characterize, but are statistically most likely to represent benign cortical renal cyst(s)  According to the    guidelines published in the [facility] Paper of the ACR Incidental Findings Committee (Radiology 2010), no further workup of these lesions is recommended  STOMACH AND BOWEL: There is colonic diverticulosis without evidence of acute diverticulitis  No bowel obstruction  Small hiatal hernia  APPENDIX: No findings to suggest appendicitis  ABDOMINOPELVIC CAVITY: No ascites or free intraperitoneal air  No lymphadenopathy  VESSELS: Unremarkable for patient's age  PELVIS     REPRODUCTIVE ORGANS: Patient is status post hysterectomy  URINARY BLADDER: Unremarkable  ABDOMINAL WALL/INGUINAL REGIONS: Stable post surgical changes of the left inguinal region  Again noted are multiple prominent lymph nodes in the bilateral inguinal regions with low attenuation centrally  These measure up to 11 mm short axis diameter    in the left inguinal region and 10 mm short axis diameter in the right inguinal region and are without significant change from previous examinations  OSSEOUS STRUCTURES: No acute fracture or destructive osseous lesion  There is grade 1 anterolisthesis of L4 on L5         IMPRESSION:   1  Stable prominent bilateral inguinal lymph nodes without significant change in size or number from prior examinations  2  Stable 2 mm left upper lobe nodule for which continued CT follow-up is recommended  According to guidelines by the Fleischner Society patients with a known malignancy should be followed based on the propensity of primary malignancy a resulting    pulmonary metastatic disease  3  Stable right posterior thyroid nodule  Workstation performed: [id number]     Signed by:   Angie Riojas MD   7/19/16     VAS LOWER LIMB VENOUS DUPLEX STUDY, COMPLETE BILATERAL [id number] 01:53PM Tawana Fraction Order Number: [id number]     Test Name Result Flag Reference   VAS LOWER LIMB VENOUS DUPLEX STUDY, COMPLETE BILATERAL (Report)     [facility] REPORT   CLINICAL:   Indications: Venous Embolism and Thrombosis of Unspecified Deep Vessels of   Lower Extremity [I82 409]  Patient presents with chronic LLE swelling s/p DVT   diagnosis 1 year ago  She now c/o right leg pain x 6 weeks  Operative History:   No cardiovascular surgeries   Risk Factors: The patient has history of obesity, DVT and malignancy  The   patient current BMI is 38 45, Weight is 224 lb and Height is 64 in  Clinical: There is no edema or tenderness  FINDINGS:      Left    Impression                 Popliteal E1  Non Occlusive Thrombus (Chronic)             CONCLUSION:      Impression:   RIGHT LOWER LIMB:   No evidence of acute or chronic deep vein thrombosis  No evidence of superficial thrombophlebitis noted  Doppler evaluation shows a normal response to augmentation maneuvers  Popliteal, posterior tibial and anterior tibial arterial Doppler waveforms are   triphasic  LEFT LOWER LIMB:   There is known chronic thrombus in the popliteal vein  No evidence of acute deep vein thrombosis  No evidence of superficial thrombophlebitis noted  Doppler evaluation shows a normal response to augmentation maneuvers     Popliteal, posterior tibial and anterior tibial arterial Doppler waveforms are   triphasic  Tech note: technically difficult study secondary to patient body habitus  SIGNATURE:   Electronically Signed by: Dell Borden on 2016-05-04 03:19:51 PM     DIGTL Conesus Binta W/CAD [id number] 08:24AM Tabby Humphrey     Test Name Result Flag Reference   DIGTL SCREEN MAMMO W/CAD (Report)     [facility];[address];;[place];[address]   10/21/2015 0845   10/21/2015 0905   N/A     Patient History-   Patient is postmenopausal and has history of other cancer at age    68  Family history of breast cancer in maternal cousin at age 70  Took estrogen for 1 year  Took progesterone for 1 year  Patient has never smoked  Patient's BMI is 37 3  Reason for exam- screening (asymptomatic)  Digital Bilateral Screening Mammogram   Bilateral CC and MLO view(s) were taken  Technologist- Lissette Castle RT(R)(M)   Prior study comparison- October 15, 2014, digital bilateral    screening mammogram performed at [address] [place] [address]  October 10, 2013, digital bilateral screening mammogram performed   at [address] [place] [address]  September 28, 2011, digital    bilateral screening mammogram performed at [facility]  September 22, 2010, digital bilateral screening mammogram   performed at [address] [place] [address]  March 24, 2009,    bilateral SLNBIC digtl timothy scrn mammo performed at [address] [place] [address]  March 19, 2008, bilateral digital screening    mammo w/CAD performed at [address] [place] [address]  There are scattered fibroglandular densities  The parenchymal pattern appears stable  No dominant soft tissue    mass or suspicious calcifications are noted  The skin and nipple   contours are within normal limits  IMPRESSION-  No mammographic evidence of malignancy  No    significant changes when compared with prior studies  ASSESSMENT- BiRad-1 - Negative     Recommendation-   Routine screening mammogram in 1 year   A reminder letter will be   scheduled  Analyzed by CAD   8-10% of cancers will be missed on mammography  Management of a    palpable abnormality must be based on clinical grounds  Patients   will be notified of their results via letter from our facility  Accredited by [employer] of Radiology and FDA  Transcription Location- [place] [address])-   Nini-Amina 10 Year- 3 379%, Tyrer-Irajck Lifetime- 4 536%,    Myriad Table- 1 5%, BRUCE 5 Year- 1 4%, NCI Lifetime- 3 6%     - JULIANNE [place] MD   Reading Radiologist- JULIANNE Ashley MD   Electronically Signed- JULIANNE [place] MD   Released Date Time- 10/21/15 0920   ------------------------------------------------------------------------------   373^JEANIE Bridges   373^JEANIE TO     (1) CBC/PLT/DIFF [id number] 09:01AM Fosubo     Test Name Result Flag Reference   DIFFERENTIAL METHOD Automated     WBC COUNT 5 10 Thousand/uL  4 31-10 16   RBC COUNT 3 85 Million/uL  3 81-5 12   HEMOGLOBIN 12 0 g/dL  11 5-15 4   HEMATOCRIT 35 2 %  34 8-46  1   MCV 91 fL  82-98   MCH 31 2 pg  26 8-34 3   MCHC 34 1 g/dL  31 4-37 4   RDW 13 4 %  11 6-15 1   MPV 9 4 fL  8 9-12 7   PLATELET COUNT 401 Thousand/uL  149-390   nRBC AUTOMATED 0 /100 WBC     NEUTROPHILS RELATIVE PERCENT 57 %  43-75   LYMPHOCYTES RELATIVE PERCENT 29 %  14-44   MONOCYTES RELATIVE PERCENT 12 %  4-12   EOSINOPHILS RELATIVE PERCENT 2 %  0-6   NEUTROPHILS ABSOLUTE COUNT 2 91 Thousand/uL  1 85-7 62   LYMPHOCYTES ABSOLUTE COUNT 1 48 Thousand/uL  0 60-4 47   MONOCYTES ABSOLUTE COUNT 0 61 Thousand/uL  0 17-1 22   EOSINOPHILS ABSOLUTE COUNT 0 10 Thousand/uL  0 00-0 61   BASOPHILS ABSOLUTE COUNT 0 01 Thousand/uL  0 00-0 10     (1) COMPREHENSIVE METABOLIC PANEL [id number] 46:19XH [facility]     Test Name Result Flag Reference   SODIUM 138 mmol/L  136-145   POTASSIUM 4 0 mmol/L  3 5-5 3   CHLORIDE 104 mmol/L  100-108   CARBON DIOXIDE 29 mmol/L  23-33   ANION GAP (CALC) 5 mmol/L  4-13   BILI, TOTAL 0 39 mg/dL  0 20-1 00   TOTAL PROTEIN 7 2 g/dL  6 4-8 2   ALK PHOSPHATAS 85 U/L     ALT (SGPT) 21 U/L  14-59   AST(SGOT) 19 U/L  0-45   GLUCOSE,RANDM 108 mg/dL     If patient is fasting, the ADA then defines impaired fasting glucose as  >100 mg/dl and diabetes as  >or equal to 126 mg/dl  ALBUMIN 3 5 g/dL  3 5-5 0   BLOOD UREA NITROGEN 12 mg/dL  5-25   CALCIUM 9 3 mg/dL  8 3-10 1   CREATININE 0 54 mg/dL L 0 60-1 30   Standardized to IDMS reference method   eGFR African American >60 ml/min/1 73sq m     [facility] Disease Education Program recommendations are as  follows:  GFR calculation is accurate only with a steady state creatinine  Chronic Kidney disease less than 60 ml/min/1 73 sq  meters  Kidney failure less than 15 ml/min/1 73 sq  meters  eGFR Non-African American >60 ml/min/1 73sq m       IR PATHOLOGY REPORT (N/C) 23Apr2015 12:49PM Elfrieda Oas     Test Name Result Flag Reference   IR PATHOLOGY REPORT (N/C) (Report)     [address] [place];;[place];[address]   04/23/2015 0815   04/23/2015 0855   N/A     5/7/2015       Dear Dr Phoebe Loomis-     Thank you for the referral of the above named patient for inguinal   lymph node biopsy  As you know, the procedure was performed on   4/23/2015  We have received the final pathology report  You should   have received a copy of the report, but if not, you may access it   through the Physician's Web Portal or by contacting the lab office at   [phone]  In summary, the biopsy demonstrated benign tissue negative for   malignancy  If you have any questions, please do not hesitate to contact me at   [phone]       Sincerely,     Joao Jewell MD       Transcribed on- [id number]     - JULIANNE Barakat MD   Reading Radiologist- JULIANNE Barakat MD   Electronically Signed- JULIANNE Barakat MD   Released Date Time- 05/07/15 1649   ------------------------------------------------------------------------------   [address]^WILFRED SWEET   [address]^WILFRED SWEET     US PATHOLOGY REPORT (N/C) [id number] 11:20AM Dominic Richmond     Test Name Result Flag Reference   US  PATHOLOGY REPORT (N/C) (Report)     [address] [facility] [address] [place] [address];;[place];[address]  12/31/2014 1428  12/31/2014 6650      Dear Dr Nacho Morales -    Thank you for referring [facility]ort[facility] for ultrasound guided fine  needle aspiration biopsy of the thyroid   I have reviewed the  cytology report provided by Dr Swathi Garland   You should have received a  copy of the report, but if not, you may access the report through the  [de-identified] Web Portal or by contacting the lab office at [phone]  The biopsy samples are reported as benign  The following is an excerpt from the cytology report- Findings  consistent with benign follicular nodule  Benign (Patterson category 2)    These results are concordant with imaging  If there are any questions or concerns, please do not hesitate to  contact me at [phone]  Sincerely,    LINDSEY Navarrete  Transcribed on- [id number]    [address] [place] [address], RAD DO  Reading Radiologist- JULIANNE Olivo DO  Electronically Signed- JULIANNE Olivo DO  Released Date Time- 01/02/15 1349  ------------------------------------------------------------------------------  [address]^[place] A PAPARO  [address]^[place] A PAPARO     U/S Head and Neck ( Soft Tissue) [id number] 01:52PM Khadijahla Seton     Test Name Result Flag Reference   U/S Head and Neck (Report)     [address] [place];;[place];[address]  11/25/2014 1347  11/25/2014 1417  N/A    THYROID ULTRASOUND    INDICATION- Right thyroid nodule      COMPARISON- CT chest 11/18/2014    TECHNIQUE-  Ultrasound of the thyroid was performed with a high  frequency linear transducer in transverse and sagittal planes including  volumetric imaging sweeps as well as traditional still imaging  technique  FINDINGS-    Right gland- 4 6 x 2 4 x 2 2 cm  Left gland- 4 5 x 1 6 x 1 5 cm  The isthmus is 0 3 cm in AP dimension  Mildly heterogeneous parenchymal echotexture  RIGHT-  5 x 3 x 3 mm upper pole nodule  In the lower pole there is a 2 8 x 2 5 x 1 7 cm nodule with solid and  cystic components  LEFT-  No nodules  ISTHMUS-   No nodules  IMPRESSION-    Dominant 2 8 cm lower pole right thyroid nodule  FNA biopsy may be  considered  Transcribed on- [id number]    [address] [place] [address], RAD DO  Electronically Signed- R Shawna Ferris 75, RAD DO  Released Date Time- 11/25/14 1500  ------------------------------------------------------------------------------  [address]^KELSEY [place]  [address]^KELSEY MA     (1) TSH [id number] 12:05PM Charmaine Cotto     Test Name Result Flag Reference   TSH 1 180 uIU/ML  0 550-4 78   *Patients undergoing fluorescein dye angiography may retain small  amounts of fluorescein in the body for 48-72 hours post procedure  Samples containing fluorescein can produce falsely depressed TSH   values  If the patient had this procedure, a specimen should be  resubmitted post fluorescein clearance  The recommended reference ranges for TSH during pregnancy are as  follows:  First trimester 0 1 to 2 5 uIU/mL  Second trimester  0 2 to 3 0 uIU/mL  Third trimester 0 3 to 3 0 uIU/mL     * Dexa Scan Axial Skel (Hip, Pelvis, Spine) [id number] 08:01AM Tabby Humphrey     Test Name Result Flag Reference   Dexa Scan (Report)     10/10/2013 0805  10/10/2013 0830  N/A    Central DXA Bone Densitometry    HISTORY-   77year old postmenopausal  woman  Risk factors  include a history of degenerative arthritis  Family history of  osteoporosis  History of height loss  Prior estrogen use and Actonel  use      COMPARISON-  Several, most recent May 14, 2008    RESULTS-  The patient underwent a central DXA scan on a [employer]  C unit  Visual inspection of the images reveal degenerative changes of  the lumbar spine and hip  This will falsely elevate the bone mineral  densities in these regions  LUMBAR SPINE L1-4 (average) -   BMD 0 952 gm/sq-cm  T-score is -0 9   Z-score is 1 3     TOTAL LEFT HIP -    BMD 0 989 gm/sq-cm,  T-score is 0 4   Z-score is 1 9     FEMORAL NECK LEFT HIP -  BMD 0 893 gm/sq-cm,  T-score is 0 4  Z-score is 2 2    LEFT FOREARM -  BMD 0 766 gm/sq-cm,  T-score is 1 4  Z-score is 3 6          T score = number of standard deviations patient's BMD is above or below  the average BMD of young adult  gender-matched reference  population  Z score = number of SD patient's BMD is above or below age/race/gender  - matched mean reference value          IMPRESSION-    NORMAL bone mineral density based on the WHO classification  The  patient is not at increased risk for fracture  When compared to the prior examination, there has been a 5 % DECREASE  in the total bone mineral density of the lumbar spine and an 8 %  DECREASE in the total bone mineral density of the left hip  Non prescription measures, such as calcium supplementation, daily  consumption of vitamin D, and weight bearing exercise, are appropriate  as general preventive measures  WHO criteria-    Osteopenia - Bone mineral density (BMD) between 1 and 2 5 standard  deviations below young adult peak average BMD     Osteoporosis - Bone mineral density (BMD) that is 2 5 or more standard  deviations below young adult peak BMD     The WHO criteria for osteoporosis was based on post-menopausal   females and extension to other groups may not be appropriate      If Z-score is <= -2, or if clinical suspicion, further evaluation for   secondary causes contributing to bone loss may be indicated    The risk of osteoporotic fracture increases approximately 2x for each  1 0 SD decrease in T score  Low bone density is not the only risk factor for fracture& also^ also  consider factors such as patient's age, risk of falling and injury,  previous fragility fractures, and family history of osteoporosis  Transcribed on- [facility], RAD DO  Reading Radiologist- Sabiha Kiran, RAD DO  Releasing Radiologist- Sabiha Kiran, RAD DO  Released Date Time- 10/10/13 3255  ------------------------------------------------------------------------------  [id number]^[place] FISHER^RAD DO  [id number]^[place] FISHER^RAD DO     Future Appointments    Date/Time Provider Specialty Site   08/08/2017 09:00 AM ANDREIA Moreira   Family [facility]3]   01/09/2017 09:30 AM Moira Mccain MD Surgical Oncology [facility]   11/10/2016 09:45 AM Eliu Jin MD Gynecological Oncology [facility] [place]   Electronically signed by : ANDREIA Ray ; Aug  4 2016  9:42AM EST                       (Author)

## 2018-01-15 VITALS
DIASTOLIC BLOOD PRESSURE: 72 MMHG | HEIGHT: 65 IN | BODY MASS INDEX: 36.74 KG/M2 | RESPIRATION RATE: 14 BRPM | WEIGHT: 220.5 LBS | SYSTOLIC BLOOD PRESSURE: 128 MMHG | TEMPERATURE: 98.7 F | HEART RATE: 74 BPM

## 2018-01-15 NOTE — RESULT NOTES
Message  Monica Haney I have enclosed a copy of your recent labs  all results are NORMAL except for vitamin D  you should be on vitamin D at least 2,000 IU daily  Results/Data     (1) HOMOCYSTEINE   HOMOCYSTEINE: 5 1 umol/L Reference Range 3 7-11 2  (1) COMPREHENSIVE METABOLIC PANEL   SODIUM: 357 mmol/L Reference Range 136-145  POTASSIUM: 4 1 mmol/L Reference Range 3 5-5 3  CHLORIDE: 103 mmol/L Reference Range 100-108  CARBON DIOXIDE: 28 mmol/L Reference Range 21-32  ANION GAP (CALC): 8 mmol/L Reference Range 4-13  BLOOD UREA NITROGEN: 13 mg/dL Reference Range 5-25  CREATININE: 0 66 mg/dL Reference Range 0 60-1 30  GLUCOSE,RANDM: 88 mg/dL Reference Range   CALCIUM: 8 9 mg/dL Reference Range 8 3-10 1  AST(SGOT): 15 U/L Reference Range 5-45  ALT (SGPT): 20 U/L Reference Range 12-78  ALK PHOSPHATAS: 75 U/L Reference Range   TOTAL PROTEIN: 6 9 g/dL Reference Range 6 4-8 2  ALBUMIN: 3 4 g/dL Abnormal Low Reference Range 3 5-5 0  BILI, TOTAL: 0 57 mg/dL Reference Range 0 20-1 00  eGFR Non-: >60 0 ml/min/1 73sq m  (1) CBC/PLT/DIFF   WBC COUNT: 5 45 Thousand/uL Reference Range 4 31-10 16  RBC COUNT: 4 51 Million/uL Reference Range 3 81-5 12  HEMOGLOBIN: 13 6 g/dL Reference Range 11 5-15 4  HEMATOCRIT: 39 8 % Reference Range 34 8-46  1  MCV: 88 fL Reference Range 82-98  MCH: 30 2 pg Reference Range 26 8-34 3  MCHC: 34 2 g/dL Reference Range 31 4-37 4  RDW: 12 9 % Reference Range 11 6-15 1  MPV: 10 4 fL Reference Range 8 9-12 7  PLATELET COUNT: 805 Thousands/uL Reference Range 149-390  nRBC AUTOMATED: 0 /100 WBCs  NEUTROPHILS RELATIVE PERCENT: 55 % Reference Range 43-75  LYMPHOCYTES RELATIVE PERCENT: 30 % Reference Range 14-44  MONOCYTES RELATIVE PERCENT: 11 % Reference Range 4-12  EOSINOPHILS RELATIVE PERCENT: 4 % Reference Range 0-6  BASOPHILS RELATIVE PERCENT: 0 % Reference Range 0-1  NEUTROPHILS ABSOLUTE COUNT: 2 97 Thousands/?L Reference Range 1 85-7 62  LYMPHOCYTES ABSOLUTE COUNT: 1 65 Thousands/?L Reference Range 0 60-4 47  MONOCYTES ABSOLUTE COUNT: 0 60 Thousand/?L Reference Range 0 17-1 22  EOSINOPHILS ABSOLUTE COUNT: 0 22 Thousand/?L Reference Range 0 00-0 61  BASOPHILS ABSOLUTE COUNT: 0 01 Thousands/?L Reference Range 0 00-0 10  (1) TSH   TSH: 1 480 uIU/mL Reference Range 0 358-3 740  (1) VITAMIN D 25-HYDROXY   VIT D 25-HYDROX: 28 6 ng/mL Abnormal Low Reference Range 30 0-100 0    Signatures   Electronically signed by : Emilia Fothergill, M D ; Aug 19 2016  7:21AM EST                       (Author)

## 2018-01-16 NOTE — PROGRESS NOTES
Assessment    1  Endometrial cancer (182 0) (C54 1)    This is a 15-year-old with a history of stage IA, grade 3 endometrial cancer  She has persistent and stable inguinal lymphadenopathy, as identified on recent CT imaging in February  She is clinically without evidence of disease recurrence  She has a chronic nonocclusive left lower extremity thrombus and is off therapeutic anticoagulation since April 2016  Her performance status is zero  Plan   The patient is 2 years from completion of chemotherapy  She will therefore return to the office in 6 months for continued endometrial cancer surveillance  Follow-up visit in 6 months Evaluation and Treatment  Follow-up  Status: Hold For - Scheduling  Requested for: [id number]  Ordered; For: Endometrial cancer;  Ordered By: Cathie Cloud  Performed:   Due: [id number]     Chief Complaint  Chief Complaint Free Text Note Form: Endometrial Cancer Surveillance  History of Present Illness  Problem [facility]:   Stage IA, Grade 3 Endometrial Cancer  Inguinal lymphadenopathy  DVT      Previous Therapy:   1  Robotic-assisted total laparoscopic hysterectomy, bilateral salpingo-oophorectomy, pelvic and periaortic lymph node dissections on December 4, 2014 (pathology reviewed at [facility])  A  FIGO grade 3, 4 5 cm tumor  B  Less than 50% myometrial invasion, 7 5 of 16 mm  C  Foci suspicious for LVSI, 19 negative lymph nodes  D  IHC for Epstein- all proteins expressed  2  Chemotherapy with paclitaxel 175 mg/m2 and carboplatin AUC 6  She received 4 cycles completed on March 12, 2015  3  Vaginal brachytherapy  4  Needle biopsy of left inguinal lymph node negative for malignancy on April 23, 2015  5  Left superficial inguinal lymphadenectomy on May 22nd 2015  A  Negative lymph nodes  6  Xarelto, started 4/2015 for DVT, stopped April 2016        Free Text HPI: This is a 15-year-old who was last evaluated on November 10, 2016   She presents to the office for continued endometrial cancer surveillance  The patient is without acute complaints  She is voiding and moving her bowels without difficulty  She denies abdominal or pelvic pain  Her appetite is appropriate  She denies vaginal bleeding or discharge  Her lower extremity edema is stable  She underwent CT imaging in February without evidence of disease recurrence  She is ambulatory  Review of Systems  Complete-Female Gyn Onc:   Constitutional: No fever, no recent weight gain, no chills, not feeling tired and no recent weight loss  Eyes: No complaints of eye pain, no red eyes, no eyesight problems, no discharge, no dry eyes, no itching of eyes  ENT: no nose bleeds  Cardiovascular: as noted in HPI  Respiratory: No shortness of breath  Gastrointestinal: No abdominal pain, no constipation, no nausea or vomiting, no bloody stools  Genitourinary: No complaints of dysuria, no incontinence, no pelvic pain, no dysmenorrhea, no vaginal discharge or bleeding  Musculoskeletal: No limb swelling  Integumentary: No skin lesions  Neurological: No headache, no neuropathy  Psychiatric: Not suicidal, no sleep disturbance, no anxiety or depression, no change in personality, no emotional problems  Endocrine: No complaints of proptosis, no hot flashes, no muscle weakness, no deepening of the voice, no feelings of weakness  Hematologic/Lymphatic: No complaints of swollen glands, no swollen glands in the neck, does not bleed easily, does not bruise easily  Active Problems    1  Endometrial cancer (182 0) (C54 1)   2  Inguinal lymphadenopathy (785 6) (R59 0)   3  Insomnia (780 52) (G47 00)   4  Multinodular thyroid (241 1) (E04 2)   5  Obesity (278 00) (E66 9)   6  Osteoarthritis of hip (715 95) (M16 9)   7  Osteoarthritis of knee (715 36) (M17 9)   8  Thyroid nodule (241 0) (E04 1)   9  Vitamin D deficiency (268 9) (E55 9)    Past Medical History    1   History of Acute venous embolism and thrombosis of deep vessels of distal lower   extremity (453 42) (I82 4Z9)   2  History of Benign Polyps Of The Large Intestine (V12 72)   3  History of Diverticulosis (562 10) (K57 90)   4  History of Encounter for antineoplastic chemotherapy (V58 11) (Z51 11)   5  History of Fibrocystic breast (610 1) (N60 19)   6  History of  3 (V22 2) (Z33 1)   7  History of Impaired fasting glucose (790 21) (R73 01)   8  History of Postmenopausal bleeding (627 1) (N95 0)   9  History of Thrombophlebitis (V12 52)   10  History of Visit for screening mammogram (V76 12) (Z12 31)    Surgical History    1  History of Tonsillectomy With Adenoidectomy   2  History of Total Abdominal Hysterectomy With Removal Of Both Ovaries    Family History  Mother    1  Family history of Hypothyroidism  Maternal Grandfather    2  Family history of Diabetes Mellitus (V18 0)    Social History    · Denied: History of Home environment domestic violence   · Never A Smoker   · Never smoked cigarettes (V49 89) (Z78 9)   · No alcohol use    Current Meds   1  Aspirin 81 MG TABS; TAKE 1 TABLET DAILY; Therapy: (Recorded:45Hbs1411) to Recorded   2  Centrum Silver Oral Tablet; TAKE 1 TABLET DAILY; Therapy: (Recorded:26Frw0497) to Recorded   3  Vitamin D3 2000 UNIT Oral Capsule; 1 tablet daily; Therapy: (Recorded:12Wgv0372) to Recorded  Medication List Reviewed: The medication list was reviewed and updated today  Allergies    1  No Known Drug Allergies    2  No Known Environmental Allergies   3  No Known Food Allergies    Vitals  Vital Signs    Recorded: 95FZC6756 08:22AM   Temperature 97 7 F, Oral   Heart Rate 68, R Radial   Pulse Quality Norm   Respiration Quality Norm   Respiration 14   Systolic 672, RUE, Sitting   Diastolic 82, RUE, Sitting   Height 5 ft 4 5 in   Weight 221 lb 4 96 oz   BMI Calculated 37 4   BSA Calculated 2 05     Physical Exam    Constitutional   General appearance: No acute distress, well appearing and well nourished      Neck   Neck: Normal, supple, trachea midline, no masses  Thyroid: Normal, no thyromegaly  Pulmonary   Respiratory effort: No increased work of breathing or signs of respiratory distress  Genitourinary   External genitalia: Normal and no lesions appreciated  Vagina: Normal, no lesions or dryness appreciated  No masses, lesions, bleeding or discharge  Radiation changes present  Urethra: Normal     Urethral meatus: Normal     Bladder: Normal, soft, non-tender and no prolapse or masses appreciated  Cervix: Surgically absent  Uterus: Surgically absent  Adnexa/parametria: Surgically absent  No masses, fullness or parametrial disease  Anus, perineum, and rectum: Normal sphincter tone, no masses, and no prolapse  No disease in the rectovaginal septum  Abdomen   Abdomen: Normal, soft, non-tender, and no organomegaly noted  Examination for hernias: No hernias appreciated  Lymphatic   Palpation of lymph nodes in neck, axillae, groin and/or other locations: No lymphadenopathy or masses noted  Skin   Skin and subcutaneous tissue: Normal skin turgor and no rashes  Psychiatric   Orientation to person, place, and time: Normal     Mood and affect: Normal     GOG performance status is: 0      Results/Data  * CT CHEST ABDOMEN PELVIS W CONTRAST [id number] 10:05AM "[facility]" Order Number: [id number]    - Patient Instructions: To be done at [address] [place] [address] on 2/2/17 at 9:45am      Test Name Result Flag Reference   CT CHEST ABDOMEN PELVIS W CONTRAST (Report)     CT CHEST, ABDOMEN AND PELVIS WITH IV CONTRAST     INDICATION: History of endometrial cancer, evaluate for metastasis     COMPARISON: 7/18/2016     TECHNIQUE: CT examination of the chest, abdomen and pelvis was performed  Axial, sagittal and coronal reformatted projections were created   This examination, like all CT scans performed in the [facility], was performed utilizing    techniques to minimize radiation dose exposure, including the use of iterative reconstruction and automated exposure control  IV Contrast: iohexol (OMNIPAQUE) 350 MG/ML injection (MULTI-DOSE) 100 mL Note: (SINGLE DOSE/MULTI DOSE) information refers to the container from which the contrast was acquired  Contrast was injected one time intravenously without immediate    complication  Enteric Contrast: Enteric contrast was administered  FINDINGS:     CHEST     LUNGS: 2 mm left upper lobe lung nodule is stable on series 4, image 21  There is no tracheal or endobronchial lesion  PLEURA: Unremarkable  HEART/GREAT VESSELS: Unremarkable for patient's age  MEDIASTINUM AND ALVAREZ: Unremarkable  CHEST WALL AND LOWER NECK: Again seen is a right thyroid nodule measuring 1 4 x 2 4 cm, stable  ABDOMEN     LIVER/BILIARY TREE: Unremarkable  GALLBLADDER: No calcified gallstones  No pericholecystic inflammatory change  SPLEEN: Unremarkable  PANCREAS: Unremarkable  ADRENAL GLANDS: Unremarkable  KIDNEYS/URETERS: Unremarkable  No hydronephrosis  STOMACH AND BOWEL: There is diverticulosis coli  APPENDIX: No findings to suggest appendicitis  ABDOMINOPELVIC CAVITY: No ascites or free intraperitoneal air  No lymphadenopathy  VESSELS: Unremarkable for patient's age  PELVIS     REPRODUCTIVE ORGANS: Patient is status post hysterectomy  URINARY BLADDER: Unremarkable  ABDOMINAL WALL/INGUINAL REGIONS: Again seen is bilateral inguinal lymphadenopathy  A left-sided inguinal node measures 1 0 x 1 5 cm, previously 1 1 x 1 4 cm  A left-sided node more medially measures 1 3 x 1 2 cm, previously 1 1 x 1 4 cm  A    right-sided node measures 1 2 x 1 3 cm, previously 1 1 x 1 2 cm  A more medial right-sided node measures 1 0 x 1 1 cm, previously 1 0 x 1 1 cm      OSSEOUS STRUCTURES: No acute fracture or destructive osseous lesion  IMPRESSION:     1  Stable bilateral inguinal lymphadenopathy       2  Otherwise no evidence of metastatic disease in the chest, abdomen, or pelvis  3  2 mm left lung nodule is stable  Workstation performed: [id number]     Signed by:   Jeffery Falcon MD   2/2/17     Future Appointments    Date/Time Provider Specialty Site   08/08/2017 09:00 AM ANDREIA Celeste   Family Medicine [facility] FAMILY PRACTICE   09/08/2017 08:30 AM Cathie Cloud, [address] [place] [address] GYN ONCOLOGY     Signatures   Electronically signed by : Mini Cheney [facility]; Mar  9 2017  8:37AM EST                       (Author)    Electronically signed by : Shania Partida MD; Mar 12 2017  2:25PM EST                       (Author)

## 2018-01-22 VITALS
BODY MASS INDEX: 36.87 KG/M2 | TEMPERATURE: 97.7 F | SYSTOLIC BLOOD PRESSURE: 134 MMHG | HEIGHT: 65 IN | DIASTOLIC BLOOD PRESSURE: 82 MMHG | RESPIRATION RATE: 14 BRPM | HEART RATE: 68 BPM | WEIGHT: 221.31 LBS

## 2018-02-21 ENCOUNTER — TRANSCRIBE ORDERS (OUTPATIENT)
Dept: ADMINISTRATIVE | Age: 73
End: 2018-02-21

## 2018-02-21 ENCOUNTER — APPOINTMENT (OUTPATIENT)
Dept: LAB | Age: 73
End: 2018-02-21
Payer: MEDICARE

## 2018-02-21 DIAGNOSIS — E87.1 HYPO-OSMOLALITY AND HYPONATREMIA: ICD-10-CM

## 2018-02-21 DIAGNOSIS — C54.1 MALIGNANT NEOPLASM OF ENDOMETRIUM (HCC): ICD-10-CM

## 2018-02-21 LAB
BUN SERPL-MCNC: 15 MG/DL (ref 5–25)
CREAT SERPL-MCNC: 0.62 MG/DL (ref 0.6–1.3)
GFR SERPL CREATININE-BSD FRML MDRD: 89 ML/MIN/1.73SQ M

## 2018-02-21 PROCEDURE — 84520 ASSAY OF UREA NITROGEN: CPT

## 2018-02-21 PROCEDURE — 82565 ASSAY OF CREATININE: CPT

## 2018-02-21 PROCEDURE — 36415 COLL VENOUS BLD VENIPUNCTURE: CPT

## 2018-02-26 ENCOUNTER — HOSPITAL ENCOUNTER (OUTPATIENT)
Dept: CT IMAGING | Facility: HOSPITAL | Age: 73
Discharge: HOME | End: 2018-02-26
Payer: MEDICARE

## 2018-02-26 DIAGNOSIS — C54.1 MALIGNANT NEOPLASM OF ENDOMETRIUM (HCC): ICD-10-CM

## 2018-02-26 PROCEDURE — 74177 CT ABD & PELVIS W/CONTRAST: CPT

## 2018-02-26 PROCEDURE — 71260 CT THORAX DX C+: CPT

## 2018-02-26 RX ADMIN — IOHEXOL 100 ML: 350 INJECTION, SOLUTION INTRAVENOUS at 09:41

## 2018-02-27 DIAGNOSIS — C54.1 MALIGNANT NEOPLASM OF ENDOMETRIUM (HCC): ICD-10-CM

## 2018-03-14 PROBLEM — C54.1 ENDOMETRIAL CANCER (HCC): Status: ACTIVE | Noted: 2018-03-14

## 2018-03-15 ENCOUNTER — OFFICE VISIT (OUTPATIENT)
Dept: GYNECOLOGIC ONCOLOGY | Facility: CLINIC | Age: 73
End: 2018-03-15
Payer: MEDICARE

## 2018-03-15 VITALS
SYSTOLIC BLOOD PRESSURE: 126 MMHG | BODY MASS INDEX: 36.28 KG/M2 | WEIGHT: 212.5 LBS | DIASTOLIC BLOOD PRESSURE: 82 MMHG | HEART RATE: 78 BPM | HEIGHT: 64 IN | RESPIRATION RATE: 14 BRPM

## 2018-03-15 DIAGNOSIS — C54.1 ENDOMETRIAL CANCER (HCC): Primary | ICD-10-CM

## 2018-03-15 DIAGNOSIS — Z13.820 ENCOUNTER FOR SCREENING FOR OSTEOPOROSIS: ICD-10-CM

## 2018-03-15 DIAGNOSIS — Z78.0 ASYMPTOMATIC MENOPAUSAL STATE: ICD-10-CM

## 2018-03-15 PROCEDURE — 99214 OFFICE O/P EST MOD 30 MIN: CPT | Performed by: PHYSICIAN ASSISTANT

## 2018-03-15 RX ORDER — ACETAMINOPHEN 160 MG
1 TABLET,DISINTEGRATING ORAL DAILY
Status: ON HOLD | COMMUNITY

## 2018-03-15 RX ORDER — MULTIVIT-MIN/IRON/FOLIC ACID/K 18-600-40
1 CAPSULE ORAL DAILY
Status: ON HOLD | COMMUNITY

## 2018-03-15 NOTE — PROGRESS NOTES
Assessment/Plan:    Problem List Items Addressed This Visit        Genitourinary    Endometrial cancer ([facility] [address] ) - Primary     Clinically and radiologically without evidence of disease recurrence  Return to the office in 6 months for continued cancer surveillance  Other Visit Diagnoses     Encounter for screening for osteoporosis        Relevant Orders    DXA bone density spine hip and pelvis    Asymptomatic menopausal state         Relevant Orders    DXA bone density spine hip and pelvis            CHIEF COMPLAINT:   Endometrial cancer surveillance    Problem:  Endometrial cancer ([address] ), stage IA, grade 3    Staging form: Corpus Uteri - Carcinoma, AJCC 8th Edition    - Clinical: FIGO Stage IA - Signed by Ana Luisa Duarte PA-C on 3/14/2018      Previous therapy:     Endometrial cancer [facility])     Initial Diagnosis     Endometrial cancer ([facility] [address] )         12/4/2014 Surgery     Robotic-assisted total laparoscopic hysterectomy, bilateral salpingo-oophorectomy, pelvic and periaortic lymph node dissections  A  FIGO grade 3, 4 5 cm tumor  B  Less than 50% myometrial invasion, 7 5 of 16 mm  C  Foci suspicious for LVSI, 19 negative lymph nodes  D  IHC for Epstein- all proteins expressed  E  Reviewed at [facility]          - 3/12/2015 Chemotherapy     Paclitaxel 175 mg/m2 and carboplatin AUC 6  She received 4 cycles completed          Radiation     Vaginal brachytherapy         4/23/2015 Biopsy     Needle biopsy of left inguinal lymph node   A  Negative for malignancy          5/22/2015 Surgery     Left superficial inguinal lymphadenectomy  A  Negative lymph nodes              Patient ID: Marylene Maryland is a 74 y o  female  who has no new complaints today  No vaginal bleeding, abdominal/pelvic pain  Normal bowel and bladder function  Recent CT imaging was without evidence of disease recurrence  Stable inguinal adenopathy was noted  No interval change in medical history since last visit  Quality of life is good            The following portions of the patient's history were reviewed and updated as appropriate: allergies, current medications, past medical history and problem list     Review of Systems   Constitutional: Negative  HENT: Negative  Eyes: Negative  Respiratory: Negative  Cardiovascular: Negative  Gastrointestinal: Negative  Genitourinary: Negative  Musculoskeletal: Negative  Skin: Negative  Neurological: Negative  Psychiatric/Behavioral: Negative  Current Outpatient Prescriptions   Medication Sig Dispense Refill    Ascorbic Acid (VITAMIN C) 500 MG CAPS Take 1 tablet by mouth daily      aspirin 81 MG tablet Take 1 tablet by mouth daily      Cholecalciferol (VITAMIN D3) 2000 units capsule Take 1 tablet by mouth daily      Multiple Vitamins-Minerals (CENTRUM SILVER 50+WOMEN PO) Take 1 tablet by mouth daily       No current facility-administered medications for this visit  Objective:    Blood pressure 126/82, pulse 78, resp  rate 14, height 5' 4" (1 626 m), weight 96 4 kg (212 lb 8 oz)  Body mass index is 36 48 kg/m²  Body surface area is 2 01 meters squared  Physical Exam   Constitutional: She is oriented to person, place, and time  She appears well-developed and well-nourished  HENT:   Head: Normocephalic and atraumatic  Neck: Normal range of motion  Neck supple  No thyromegaly present  Pulmonary/Chest: Effort normal    Abdominal: Soft  She exhibits no distension and no mass  There is no rebound  Genitourinary:   Genitourinary Comments: The external female genitalia is normal  The bartholin's, uretheral and skenes glands are normal  The urethral meatus is normal  Speculum exam reveals a grossly normal vagina  No masses, lesions or bleeding  Manual exam notes a surgical absent cervix, uterus and adnexal structures  No masses or fullness  Musculoskeletal: Normal range of motion  She exhibits no edema  Lymphadenopathy:     She has no cervical adenopathy  Neurological: She is alert and oriented to person, place, and time  Skin: Skin is warm and dry  No rash noted  Psychiatric: She has a normal mood and affect  Her behavior is normal    Vitals reviewed  Study Result     CT CHEST, ABDOMEN AND PELVIS WITH IV CONTRAST     INDICATION:  "f/u endometrial ca, no complaints"  C54 1: Malignant neoplasm of endometrium     COMPARISON: CT chest abdomen pelvis 2/1/2017      TECHNIQUE: CT examination of the chest, abdomen and pelvis was performed  Axial, sagittal, and coronal 2D reformatted images were created from the source data and submitted for interpretation      Radiation dose length product (DLP) for this visit:  952 mGy-cm   This examination, like all CT scans performed in the [facility], was performed utilizing techniques to minimize radiation dose exposure, including the use of iterative   reconstruction and automated exposure control      IV Contrast:  100 mL of iohexol (OMNIPAQUE)   350 Multi-dose  Enteric Contrast: Enteric contrast was administered      FINDINGS:     CHEST     LUNGS:  No definite pulmonary metastatic disease  Previously described 2 mm left upper lobe nodule is not currently well visualized  No new pulmonary findings  No endotracheal or endobronchial lesion      PLEURA:  Unremarkable      HEART/GREAT VESSELS:  Unremarkable for patient's age      MEDIASTINUM AND ALVAREZ:  Unremarkable      CHEST WALL AND LOWER NECK:   Stable 24 mm right thyroid nodule  Please see thyroid ultrasound report from 1/6/2016      ABDOMEN     LIVER/BILIARY TREE:  Unremarkable      GALLBLADDER:  No calcified gallstones  No pericholecystic inflammatory change      SPLEEN:  Unremarkable      PANCREAS:  Unremarkable      ADRENAL GLANDS:  Unremarkable      KIDNEYS/URETERS:  Unremarkable  No hydronephrosis      STOMACH AND BOWEL:  Prominent colonic stool    Distal colonic diverticulosis without acute diverticulitis      APPENDIX: Noninflamed      ABDOMINOPELVIC CAVITY:  Stable small bilateral inguinal lymph nodes  A dominant left inguinal lymph node is unchanged in appearance measuring 13 x 13 mm      No free fluid or air within the abdomen or pelvis      VESSELS:  Unremarkable for patient's age      PELVIS     REPRODUCTIVE ORGANS:  Hysterectomy      URINARY BLADDER:  Unremarkable      ABDOMINAL WALL/INGUINAL REGIONS:  Unremarkable      OSSEOUS STRUCTURES:  No acute fracture or destructive osseous lesion      IMPRESSION:     Small stable bilateral inguinal lymph nodes    A dominant left inguinal lymph node is unchanged in size measuring 13 x 13 mm      No other potential metastatic disease to the chest, abdomen or pelvis               Workstation performed: [id number]

## 2018-03-15 NOTE — ASSESSMENT & PLAN NOTE
Clinically and radiologically without evidence of disease recurrence  Return to the office in 6 months for continued cancer surveillance

## 2018-03-17 ENCOUNTER — HOSPITAL ENCOUNTER (OUTPATIENT)
Dept: RADIOLOGY | Age: 73
Discharge: HOME | End: 2018-03-17
Payer: MEDICARE

## 2018-03-17 DIAGNOSIS — Z78.0 ASYMPTOMATIC MENOPAUSAL STATE: ICD-10-CM

## 2018-03-17 DIAGNOSIS — Z13.820 ENCOUNTER FOR SCREENING FOR OSTEOPOROSIS: ICD-10-CM

## 2018-03-17 PROCEDURE — 77080 DXA BONE DENSITY AXIAL: CPT

## 2018-05-18 ENCOUNTER — RADIATION ONCOLOGY FOLLOW-UP (OUTPATIENT)
Dept: RADIATION ONCOLOGY | Facility: HOSPITAL | Age: 73
End: 2018-05-18

## 2018-05-18 ENCOUNTER — APPOINTMENT (OUTPATIENT)
Dept: RADIATION ONCOLOGY | Facility: HOSPITAL | Age: 73
End: 2018-05-18
Attending: RADIOLOGY
Payer: MEDICARE

## 2018-05-18 VITALS
RESPIRATION RATE: 18 BRPM | BODY MASS INDEX: 37.25 KG/M2 | OXYGEN SATURATION: 98 % | HEART RATE: 68 BPM | DIASTOLIC BLOOD PRESSURE: 82 MMHG | WEIGHT: 217 LBS | TEMPERATURE: 98.2 F | SYSTOLIC BLOOD PRESSURE: 128 MMHG

## 2018-05-18 DIAGNOSIS — C54.1 ENDOMETRIAL CANCER (HCC): Primary | ICD-10-CM

## 2018-05-18 PROCEDURE — 99214 OFFICE O/P EST MOD 30 MIN: CPT | Performed by: RADIOLOGY

## 2018-05-18 NOTE — PROGRESS NOTES
Follow-up - Radiation Oncology   Mk Nam 1945 74 y o  female [id number]      History of Present Illness   Cancer Staging  Endometrial cancer [facility])  Staging form: Corpus Uteri - Carcinoma, AJCC 8th Edition  - Clinical: FIGO Stage IA - Signed by Stefan Strickland PA-C on 3/14/2018      Mk Nam returns today for a routine scheduled follow-up visit  She is without complaints at this time  She continues to follow closely with gyn Oncology and recently had a pelvic exam approximately 1 month ago which was negative for evidence of disease  She did have repeat imaging with a CT chest abdomen and pelvis on 2/28/18 which was negative  She denies any abdominal pain nausea vomiting diarrhea or rectal bleeding dysuria or hematuria  Historical Information      Endometrial cancer [facility])     Initial Diagnosis     Endometrial cancer ([facility] [address] )         12/4/2014 Surgery     Robotic-assisted total laparoscopic hysterectomy, bilateral salpingo-oophorectomy, pelvic and periaortic lymph node dissections  A  FIGO grade 3, 4 5 cm tumor  B  Less than 50% myometrial invasion, 7 5 of 16 mm  C  Foci suspicious for LVSI, 19 negative lymph nodes  D  IHC for Epstein- all proteins expressed  E  Reviewed at [facility]          - 3/12/2015 Chemotherapy     Paclitaxel 175 mg/m2 and carboplatin AUC 6  She received 4 cycles completed         3/19/2015 - 4/2/2015 Radiation     Vaginal brachytherapy  adjuvant vaginal cuff brachytherapy  A 3 0 cm vaginal cylinder was utilized to deliver a dose of 3000 cGy in 5 fractions of 600 cGy per fraction prescribed to the surface of the vagina  4/23/2015 Biopsy     Needle biopsy of left inguinal lymph node   A  Negative for malignancy          5/22/2015 Surgery     Left superficial inguinal lymphadenectomy  A   Negative lymph nodes            Past Medical History:   Diagnosis Date    Diverticulosis     last assessed 05/29/13    Fibrocystic breast     History of DVT (deep vein thrombosis)     Xarelto, 4/2015 thru April 2016    Impaired fasting glucose     last assessed 06/04/14    Inguinal lymphadenopathy     Multiple benign polyps of large intestine     Obesity     Uterine cancer (HCC)      Past Surgical History:   Procedure Laterality Date    HYSTERECTOMY      LYMPH NODE BIOPSY      OOPHORECTOMY      PELVIC LAPAROSCOPY      TONSILLECTOMY      with adenoidectomy       Social History   History   Alcohol Use No     History   Drug Use No     History   Smoking Status    Never Smoker   Smokeless Tobacco    Never Used         Meds/Allergies     Current Outpatient Prescriptions:     Ascorbic Acid (VITAMIN C) 500 MG CAPS, Take 1 tablet by mouth daily, Disp: , Rfl:     aspirin 81 MG tablet, Take 1 tablet by mouth daily, Disp: , Rfl:     Cholecalciferol (VITAMIN D3) 2000 units capsule, Take 1 tablet by mouth daily, Disp: , Rfl:     Multiple Vitamins-Minerals (CENTRUM SILVER 50+WOMEN PO), Take 1 tablet by mouth daily, Disp: , Rfl:   No Known Allergies      Review of Systems Review of Systems   Constitutional: Negative  HENT: Negative  Eyes: Negative  Respiratory: Negative  Cardiovascular: Negative  Gastrointestinal: Negative  Genitourinary:        Mild incontinence, wears pad    Musculoskeletal: Positive for arthralgias (osteoarthritis in right knee)  Skin: Negative  Allergic/Immunologic: Negative  Neurological: Negative  Hematological: Negative  Psychiatric/Behavioral: Negative  Screening  Tobacco  Current tobacco user: no  If yes, brief counseling provided: No    Hypertension  Hypertension screening performed: yes  Normotensive:  no  If no, referred to PCP: yes    Depression Screening  Screened for depression using PHQ-2: yes    Screened for depression using PHQ-9:  yes  Screening positive or negative:  negative  If score >4, was any of the following actions taken?    Additional evaluation for depression, suicide risk assesment, referral to PCP or psychiatry, medication started:  no    Advanced Care Planning for Patients >65 years  Advanced Care Planning Discussed:  yes  Patient named surrogate decision maker or care plan in chart: yes        OBJECTIVE:   /82 (BP Location: Right arm)   Pulse 68   Temp 98 2 °F (36 8 °C) (Temporal)   Resp 18   Wt 98 4 kg (217 lb)   SpO2 98%   BMI 37 25 kg/m²   Pain Assessment:  0  Karnofsky: 90 - Able to carry on normal activity; minor signs or symptoms of disease     Physical Exam   The patient presents today no apparent distress  Sclera anicteric  No cervical supraclavicular lymphadenopathy  Normal S1-S2 regular rate and rhythm  Lungs clear to auscultation bilaterally  Normal speech  Normal affect  She is wearing compression stockings  RESULTS    Lab Results: No results found for this or any previous visit (from the past 672 hour(s))  Imaging Studies:No results found  Assessment/Plan:  No orders of the defined types were placed in this encounter  Jaylyn Pedroza has done well in follow-up, now over 3 years out from completion of treatment  She has no clinical evidence of disease  She will continue follow closely with gyn Oncology and will return to our department on an as-needed basis  Rachelle Maldonado MD  5/18/2018,12:30 PM    Portions of the record may have been created with voice recognition software   Occasional wrong word or "sound a like" substitutions may have occurred due to the inherent limitations of voice recognition software   Read the chart carefully and recognize, using context, where substitutions have occurred
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
## 2018-09-12 ENCOUNTER — OFFICE VISIT (OUTPATIENT)
Dept: FAMILY MEDICINE CLINIC | Facility: CLINIC | Age: 73
End: 2018-09-12
Payer: MEDICARE

## 2018-09-12 ENCOUNTER — TELEPHONE (OUTPATIENT)
Dept: FAMILY MEDICINE CLINIC | Facility: CLINIC | Age: 73
End: 2018-09-12

## 2018-09-12 VITALS
HEIGHT: 64 IN | BODY MASS INDEX: 36.19 KG/M2 | TEMPERATURE: 97.3 F | RESPIRATION RATE: 16 BRPM | SYSTOLIC BLOOD PRESSURE: 118 MMHG | WEIGHT: 212 LBS | DIASTOLIC BLOOD PRESSURE: 66 MMHG | HEART RATE: 80 BPM

## 2018-09-12 DIAGNOSIS — Z00.00 MEDICARE ANNUAL WELLNESS VISIT, SUBSEQUENT: ICD-10-CM

## 2018-09-12 DIAGNOSIS — M17.0 PRIMARY OSTEOARTHRITIS OF BOTH KNEES: ICD-10-CM

## 2018-09-12 DIAGNOSIS — M85.852 OSTEOPENIA OF LEFT HIP: ICD-10-CM

## 2018-09-12 DIAGNOSIS — E55.9 VITAMIN D DEFICIENCY: ICD-10-CM

## 2018-09-12 DIAGNOSIS — E04.1 THYROID NODULE: ICD-10-CM

## 2018-09-12 DIAGNOSIS — C54.1 ENDOMETRIAL CANCER (HCC): ICD-10-CM

## 2018-09-12 DIAGNOSIS — E87.1 HYPONATREMIA: Primary | ICD-10-CM

## 2018-09-12 DIAGNOSIS — Z23 NEED FOR SHINGLES VACCINE: ICD-10-CM

## 2018-09-12 PROCEDURE — 99214 OFFICE O/P EST MOD 30 MIN: CPT | Performed by: FAMILY MEDICINE

## 2018-09-12 PROCEDURE — G0439 PPPS, SUBSEQ VISIT: HCPCS | Performed by: FAMILY MEDICINE

## 2018-09-12 NOTE — PROGRESS NOTES
Assessment and Plan:    Problem List Items Addressed This Visit     Endometrial cancer ([facility] [address] )    Relevant Orders    CBC and differential    Comprehensive metabolic panel      Other Visit Diagnoses     Hyponatremia    -  Primary    Primary osteoarthritis of both knees        Thyroid nodule        Relevant Orders    TSH, 3rd generation with Free T4 reflex    Osteopenia of left hip        Vitamin D deficiency        Relevant Orders    Vitamin D 25 hydroxy    Medicare annual wellness visit, subsequent        Need for shingles vaccine        Relevant Medications    Zoster Vac Recomb Adjuvanted 50 [place] Maintenance Due   Topic Date Due    Depression Screening PHQ  1945    CRC Screening: Colonoscopy  1945    DTaP,Tdap,and Td Vaccines (1 - Tdap) 04/28/1964    Fall Risk  04/28/2008    INFLUENZA VACCINE  09/01/2018         HPI:  Denisa Barton is a 74 y o  female here for her Subsequent Wellness Visit      Patient Active Problem List   Diagnosis    Endometrial cancer [facility])     Past Medical History:   Diagnosis Date    Diverticulosis     last assessed 05/29/13    Fibrocystic breast     History of DVT (deep vein thrombosis)     Xarelto, 4/2015 thru April 2016    Impaired fasting glucose     last assessed 06/04/14    Inguinal lymphadenopathy     Multiple benign polyps of large intestine     Obesity     Uterine cancer ([facility] [address] )      Past Surgical History:   Procedure Laterality Date    HYSTERECTOMY      LYMPH NODE BIOPSY      OOPHORECTOMY      PELVIC LAPAROSCOPY      TONSILLECTOMY      with adenoidectomy     Family History   Problem Relation Age of Onset    Hypothyroidism Mother     Diabetes Maternal Grandfather      History   Smoking Status    Never Smoker   Smokeless Tobacco    Never Used     History   Alcohol Use No      History   Drug Use No       Current Outpatient Prescriptions   Medication Sig Dispense Refill    Ascorbic Acid (VITAMIN C) 500 MG CAPS Take 1 tablet by mouth daily      aspirin 81 MG tablet Take 1 tablet by mouth daily      Calcium Carb-Cholecalciferol (CALCIUM 500/D) 500-400 MG-UNIT CHEW Chew 1 tablet daily      Cholecalciferol (VITAMIN D3) 2000 units capsule Take 1 tablet by mouth daily      Multiple Vitamins-Minerals (CENTRUM SILVER 50+WOMEN PO) Take 1 tablet by mouth daily      Zoster Vac Recomb Adjuvanted 50 MCG SUSR Inject 50 mcg into a muscle once for 1 dose 1 each 1     No current facility-administered medications for this visit  No Known Allergies  Immunization History   Administered Date(s) Administered    Influenza Split High Dose Preservative Free IM 11/04/2016    Pneumococcal Conjugate 13-Valent 08/04/2016    Pneumococcal Polysaccharide PPV23 08/08/2017       Patient Care Team:  Harpreet Holder MD as PCP - General  Donald Mays MD (Obstetrics and Gynecology)  PRISCA Choudhary MD (Surgical Oncology)  Lili Castaneda MD (Radiation Oncology)    Medicare Screening Tests and Risk Assessments:  Huyen Taveras is here for her Subsequent Wellness visit  Last Medicare Wellness visit information reviewed, patient interviewed, no change since last AWV  Health Risk Assessment:  Patient rates overall health as very good  Patient feels that their physical health rating is Slightly better  Eyesight was rated as Slightly worse  Hearing was rated as Same  Patient feels that their emotional and mental health rating is Same  Pain experienced by patient in the last 7 days has been None  Patient states that she has experienced no weight loss or gain in last 6 months  Emotional/Mental Health:  Patient has been feeling nervous/anxious  PHQ-9 Depression Screening:    Frequency of the following problems over the past two weeks:      1  Little interest or pleasure in doing things: 0 - not at all      2  Feeling down, depressed, or hopeless: 0 - not at all  PHQ-2 Score: 0          Broken Bones/Falls:     Fall Risk Assessment:    In the past year, patient has experienced: No history of falling in past year          Bladder/Bowel:  Patient has leaked urine accidently in the last six months  Patient reports no loss of bowel control  Immunizations:  Patient has had a flu vaccination within the last year  Patient has received a pneumonia shot  Patient has not received a shingles shot  Patient has not received tetanus/diphtheria shot  Home Safety:  Patient does not have trouble with stairs inside or outside of their home  Patient currently reports that there are no safety hazards present in home,     Preventative Screenings:   Breast cancer screening performed, colon cancer screen completed, cholesterol screen completed, glaucoma eye exam completed,     Nutrition:  Current diet: Regular with servings of the following:    Medications:  Patient is currently taking over-the-counter supplements  Patient is able to manage medications  (Additional Comments: Ca++ with vitamin D, MVI, vitamin C and ASA 81 mg daily )Lifestyle Choices:  Patient reports no tobacco use  Patient has not smoked or used tobacco in the past   Patient reports no alcohol use  Patient drives a vehicle  Patient wears seat belt  Activities of Daily Living:  Can get out of bed by his or her self, able to dress self, able to make own meals, able to do own shopping, able to bathe self, can do own laundry/housekeeping, can manage own money, pay bills and track expenses    Previous Hospitalizations:  No hospitalization or ED visit in past 12 months        Advanced Directives:  Patient has decided on a power of   Patient has spoken to designated power of     Social Support: [employer] [place]     Preventative Screening/Counseling:      Cardiovascular:      General: Screening Not Indicated      Counseling: Healthy Diet and Healthy Weight          Diabetes:      Due for labs: Blood Glucose          Colorectal Cancer:      General: Screening Current Breast Cancer:      General: Screening Current          Cervical Cancer:      General: Screening Not Indicated          Osteoporosis:      General: Screening Current          AAA:      General: Screening Not Indicated          Glaucoma:      General: Screening Current          HIV:      General: Screening Not Indicated          Hepatitis C:      General: Risks and Benefits Discussed        Advanced Directives:   Patient has living will for healthcare, has durable POA for healthcare, patient has an advanced directive  Immunizations:      Influenza: Influenza Recommended Annually      Pneumococcal: Lifetime Vaccine Completed      Shingrix: Risks & Benefits Discussed      TD: Risks & Benefits Discussed and Patient Declines      Other Preventative Counseling (Non-Medicare): Fall Prevention, Increase physical activity, Nutrition Counseling and Weight reduction discussed      Referrals:   Additional Comments: GYN oncology

## 2018-09-12 NOTE — PROGRESS NOTES
Assessment/Plan:       Diagnoses and all orders for this visit:    Hyponatremia    Primary osteoarthritis of both knees     Thyroid nodule  -     TSH, 3rd generation with Free T4 reflex    Endometrial cancer (HCC)  -     CBC and differential  -     Comprehensive metabolic panel    Osteopenia of left hip    Vitamin D deficiency  -     Vitamin D 25 hydroxy    Medicare annual wellness visit, subsequent    Need for shingles vaccine  -     Zoster Vac Recomb Adjuvanted 50 MCG SUSR; Inject 50 mcg into a muscle once for 1 dose    Other orders  -     Calcium Carb-Cholecalciferol (CALCIUM 500/D) 500-400 MG-UNIT CHEW; Chew 1 tablet daily        Repeat labs  Diet, weight loss and exercise  Follow up visit with GYN oncology  Flu vaccine in the fall  Up to date with pneumococcal vaccines  Script for shingles vaccine  Continue with Ca++ and vitamin D supplement  OV one year      Patient ID: Kylee Batista is a 74 y o  female  Follow-up visit  Active problems and past medical history see chart  History of endometrial CA stage I grade 3 status post total hysterectomy 12/2014 she completed chemotherapy and radiation therapy  Left inguinal lymph node biopsy 04/2015 no cancer  02/2018 no evidence of metastatic disease  Stable 24 mm right thyroid nodule  Distal colonic diverticulosis  Stable small bilateral inguinal lymph nodes  A dominant left inguinal lymph node is unchanged  Labs 09/2017 TSH 1  180 chemistry profile  FBS 90  Electrolytes normal except for sodium 134 and chloride 99  creatinine 0 66  LFTs normal   CBC normal   Hemoglobin 13 6  The following portions of the patient's history were reviewed and updated as appropriate: allergies, current medications, past family history, past medical history, past social history, past surgical history and problem list     Review of Systems   Constitutional: Negative for appetite change, chills, fatigue, fever and unexpected weight change          12 lb weight loss from 2016 with dieting    HENT: Negative for congestion, ear pain, hearing loss, postnasal drip, rhinorrhea, sore throat, trouble swallowing and voice change  History of thyroid nodule with previous biopsies no cancer   Eyes: Negative for visual disturbance  Respiratory: Negative for cough, shortness of breath and wheezing  Cardiovascular: Negative for chest pain, palpitations and leg swelling  Status post DVT left leg patient completed 9 months of Xarelto  08/2016 prothrombin mutation gene, factor 5 Leiden and homocystine levels normal   She is currently on aspirin 81 mg daily  Gastrointestinal: Negative for abdominal pain, blood in stool, constipation, diarrhea, nausea and vomiting          09/2014 colonoscopy 1 polyp  Severe diverticulosis   Endocrine:         03/2018 DEXA scan T-score -1 5 lumbar spine consistent with low bone mineral density  Compared to prior examination has been a 5% decrease in the BMD of lumbar spine at 12% decrease in the total BMD of left hip   09/2017 vitamin-D 30 4   Genitourinary: Negative for difficulty urinating, dysuria, frequency and hematuria  Musculoskeletal: Positive for arthralgias  Negative for myalgias  OA knees x-rays knees 06/2013 moderately severe osteoarthritis right knee  Moderate osteoarthritis left knee  She completed a course of physical therapy with improvement  Asymptomatic at present  On no pain medications  Skin: Negative for rash  Neurological: Negative for dizziness and headaches  Hematological: Negative for adenopathy  Does not bruise/bleed easily  Psychiatric/Behavioral: Negative for dysphoric mood and sleep disturbance  Objective:      /66   Pulse 80   Temp (!) 97 3 °F (36 3 °C)   Resp 16   Ht 5' 4" (1 626 m)   Wt 96 2 kg (212 lb)   BMI 36 39 kg/m²          Physical Exam   Constitutional: She is oriented to person, place, and time  She appears well-developed and well-nourished     HENT: Right Ear: External ear normal    Left Ear: External ear normal    Mouth/Throat: Oropharynx is clear and moist  No oropharyngeal exudate  Eyes: Conjunctivae and EOM are normal  Pupils are equal, round, and reactive to light  No scleral icterus  Neck: Normal range of motion  No JVD present  No tracheal deviation present  No thyromegaly present  Cardiovascular: Normal rate, regular rhythm, normal heart sounds and intact distal pulses  Exam reveals no gallop  No murmur heard  Pulmonary/Chest: Effort normal and breath sounds normal  No respiratory distress  She has no wheezes  She has no rales  Abdominal: Soft  Bowel sounds are normal  She exhibits no distension and no mass  There is no tenderness  There is no rebound and no guarding  Musculoskeletal: Normal range of motion  She exhibits no edema, tenderness or deformity  Lymphadenopathy:     She has no cervical adenopathy  Neurological: She is alert and oriented to person, place, and time  She has normal reflexes  No cranial nerve deficit  Skin: Skin is warm and dry  No rash noted  Psychiatric: She has a normal mood and affect  Nursing note and vitals reviewed

## 2018-09-12 NOTE — TELEPHONE ENCOUNTER
Patient called  She saw you earlier today, she had asked for a script for compression pantyhose and by the time she left, she forgot about it   Can you please order and I will mail them to her

## 2018-09-12 NOTE — PROGRESS NOTES
Assessment/Plan:    No problem-specific Assessment & Plan notes found for this encounter  {Assess/PlanSmartLinks:66408}      Subjective:      Patient ID: Noel Stanford is a 74 y o  female      HPI    {Common ambulatory SmartLinks:82918}    Review of Systems      Objective:      /66   Pulse 80   Temp (!) 97 3 °F (36 3 °C)   Resp 16   Ht 5' 4" (1 626 m)   Wt 96 2 kg (212 lb)   BMI 36 39 kg/m²          Physical Exam

## 2018-09-20 ENCOUNTER — OFFICE VISIT (OUTPATIENT)
Dept: GYNECOLOGIC ONCOLOGY | Facility: CLINIC | Age: 73
End: 2018-09-20
Payer: MEDICARE

## 2018-09-20 VITALS
HEIGHT: 64 IN | HEART RATE: 64 BPM | DIASTOLIC BLOOD PRESSURE: 78 MMHG | BODY MASS INDEX: 36.19 KG/M2 | RESPIRATION RATE: 16 BRPM | TEMPERATURE: 97.9 F | WEIGHT: 212 LBS | SYSTOLIC BLOOD PRESSURE: 142 MMHG

## 2018-09-20 DIAGNOSIS — C54.1 ENDOMETRIAL CANCER (HCC): Primary | ICD-10-CM

## 2018-09-20 PROCEDURE — 99214 OFFICE O/P EST MOD 30 MIN: CPT | Performed by: PHYSICIAN ASSISTANT

## 2018-09-20 NOTE — ASSESSMENT & PLAN NOTE
Stage IA, grade 3, who completed adjuvant treatment in April 2015  She is clinically without evidence of disease recurrence  Return to the office in 6 months for continued surveillance

## 2018-09-20 NOTE — PROGRESS NOTES
Assessment/Plan:    Problem List Items Addressed This Visit        Genitourinary    Endometrial cancer ([facility] [address] ) - Primary     Stage IA, grade 3, who completed adjuvant treatment in April 2015  She is clinically without evidence of disease recurrence  Return to the office in 6 months for continued surveillance  CHIEF COMPLAINT:   Endometrial cancer surveillance    Problem:  Cancer Staging  Endometrial cancer [facility])  Staging form: Corpus Uteri - Carcinoma, AJCC 8th Edition  - Clinical: FIGO Stage IA - Signed by Won Smallwood PA-C on 3/14/2018        Previous therapy:     Endometrial cancer [facility])     Initial Diagnosis     Endometrial cancer ([facility] [address] )         12/4/2014 Surgery     Robotic-assisted total laparoscopic hysterectomy, bilateral salpingo-oophorectomy, pelvic and periaortic lymph node dissections  A  FIGO grade 3, 4 5 cm tumor  B  Less than 50% myometrial invasion, 7 5 of 16 mm  C  Foci suspicious for LVSI, 19 negative lymph nodes  D  IHC for Epstein- all proteins expressed  E  Reviewed at [facility]          - 3/12/2015 Chemotherapy     Paclitaxel 175 mg/m2 and carboplatin AUC 6  She received 4 cycles completed         3/19/2015 - 4/2/2015 Radiation     Vaginal brachytherapy  adjuvant vaginal cuff brachytherapy  A 3 0 cm vaginal cylinder was utilized to deliver a dose of 3000 cGy in 5 fractions of 600 cGy per fraction prescribed to the surface of the vagina  4/23/2015 Biopsy     Needle biopsy of left inguinal lymph node   A  Negative for malignancy          5/22/2015 Surgery     Left superficial inguinal lymphadenectomy  A  Negative lymph nodes              Patient ID: Derian Smith is a 74 y o  female  who has no new complaints today  No vaginal bleeding, abdominal/pelvic pain  Normal bowel and bladder function  No interval change in medical history since last visit  Quality of life is good          The following portions of the patient's history were reviewed and updated as appropriate: allergies, current medications, past medical history and problem list     Review of Systems   Constitutional: Negative  HENT: Negative  Eyes: Negative  Respiratory: Negative  Cardiovascular: Negative  Gastrointestinal: Negative  Genitourinary: Negative  Musculoskeletal: Negative  Skin: Negative  Neurological: Negative  Psychiatric/Behavioral: Negative  Current Outpatient Prescriptions   Medication Sig Dispense Refill    Ascorbic Acid (VITAMIN C) 500 MG CAPS Take 1 tablet by mouth daily      aspirin 81 MG tablet Take 1 tablet by mouth daily      Calcium Carb-Cholecalciferol (CALCIUM 500/D) 500-400 MG-UNIT CHEW Chew 1 tablet daily      Cholecalciferol (VITAMIN D3) 2000 units capsule Take 1 tablet by mouth daily      Multiple Vitamins-Minerals (CENTRUM SILVER 50+WOMEN PO) Take 1 tablet by mouth daily      vitamin B-12 (CYANOCOBALAMIN) 250 MCG tablet Take 250 mcg by mouth daily       No current facility-administered medications for this visit  Objective:    Blood pressure 142/78, pulse 64, temperature 97 9 °F (36 6 °C), temperature source Oral, resp  rate 16, height 5' 4" (1 626 m), weight 96 2 kg (212 lb)  Body mass index is 36 39 kg/m²  Body surface area is 2 01 meters squared  Physical Exam   Constitutional: She is oriented to person, place, and time  She appears well-developed and well-nourished  HENT:   Head: Normocephalic and atraumatic  Neck: Normal range of motion  Neck supple  No thyromegaly present  Pulmonary/Chest: Effort normal    Abdominal: Soft  She exhibits no distension and no mass  There is no rebound  Genitourinary:   Genitourinary Comments: The external female genitalia is normal  The bartholin's, uretheral and skenes glands are normal  The urethral meatus is normal  Speculum exam reveals a grossly normal vagina  No masses, lesions or bleeding  Manual exam notes a surgical absent cervix, uterus and adnexal structures   No masses or fullness  Musculoskeletal: Normal range of motion  She exhibits no edema  Lymphadenopathy:     She has no cervical adenopathy  Neurological: She is alert and oriented to person, place, and time  Skin: Skin is warm and dry  No rash noted  Psychiatric: She has a normal mood and affect  Her behavior is normal    Vitals reviewed

## 2018-10-02 ENCOUNTER — APPOINTMENT (OUTPATIENT)
Dept: LAB | Age: 73
End: 2018-10-02
Payer: MEDICARE

## 2018-10-02 LAB
25(OH)D3 SERPL-MCNC: 25 NG/ML (ref 30–100)
ALBUMIN SERPL BCP-MCNC: 3.6 G/DL (ref 3.5–5)
ALP SERPL-CCNC: 75 U/L (ref 46–116)
ALT SERPL W P-5'-P-CCNC: 21 U/L (ref 12–78)
ANION GAP SERPL CALCULATED.3IONS-SCNC: 7 MMOL/L (ref 4–13)
AST SERPL W P-5'-P-CCNC: 21 U/L (ref 5–45)
BASOPHILS # BLD AUTO: 0.02 THOUSANDS/ΜL (ref 0–0.1)
BASOPHILS NFR BLD AUTO: 0 % (ref 0–1)
BILIRUB SERPL-MCNC: 0.66 MG/DL (ref 0.2–1)
BUN SERPL-MCNC: 14 MG/DL (ref 5–25)
CALCIUM SERPL-MCNC: 9 MG/DL (ref 8.3–10.1)
CHLORIDE SERPL-SCNC: 103 MMOL/L (ref 100–108)
CO2 SERPL-SCNC: 29 MMOL/L (ref 21–32)
CREAT SERPL-MCNC: 0.62 MG/DL (ref 0.6–1.3)
EOSINOPHIL # BLD AUTO: 0.14 THOUSAND/ΜL (ref 0–0.61)
EOSINOPHIL NFR BLD AUTO: 2 % (ref 0–6)
ERYTHROCYTE [DISTWIDTH] IN BLOOD BY AUTOMATED COUNT: 12.9 % (ref 11.6–15.1)
GFR SERPL CREATININE-BSD FRML MDRD: 88 ML/MIN/1.73SQ M
GLUCOSE P FAST SERPL-MCNC: 92 MG/DL (ref 65–99)
HCT VFR BLD AUTO: 39.9 % (ref 34.8–46.1)
HGB BLD-MCNC: 13.1 G/DL (ref 11.5–15.4)
IMM GRANULOCYTES # BLD AUTO: 0.02 THOUSAND/UL (ref 0–0.2)
IMM GRANULOCYTES NFR BLD AUTO: 0 % (ref 0–2)
LYMPHOCYTES # BLD AUTO: 2 THOUSANDS/ΜL (ref 0.6–4.47)
LYMPHOCYTES NFR BLD AUTO: 32 % (ref 14–44)
MCH RBC QN AUTO: 30.2 PG (ref 26.8–34.3)
MCHC RBC AUTO-ENTMCNC: 32.8 G/DL (ref 31.4–37.4)
MCV RBC AUTO: 92 FL (ref 82–98)
MONOCYTES # BLD AUTO: 0.56 THOUSAND/ΜL (ref 0.17–1.22)
MONOCYTES NFR BLD AUTO: 9 % (ref 4–12)
NEUTROPHILS # BLD AUTO: 3.54 THOUSANDS/ΜL (ref 1.85–7.62)
NEUTS SEG NFR BLD AUTO: 57 % (ref 43–75)
NRBC BLD AUTO-RTO: 0 /100 WBCS
PLATELET # BLD AUTO: 244 THOUSANDS/UL (ref 149–390)
PMV BLD AUTO: 11.1 FL (ref 8.9–12.7)
POTASSIUM SERPL-SCNC: 4.3 MMOL/L (ref 3.5–5.3)
PROT SERPL-MCNC: 6.9 G/DL (ref 6.4–8.2)
RBC # BLD AUTO: 4.34 MILLION/UL (ref 3.81–5.12)
SODIUM SERPL-SCNC: 139 MMOL/L (ref 136–145)
TSH SERPL DL<=0.05 MIU/L-ACNC: 1.32 UIU/ML (ref 0.36–3.74)
WBC # BLD AUTO: 6.28 THOUSAND/UL (ref 4.31–10.16)

## 2018-10-02 PROCEDURE — 36415 COLL VENOUS BLD VENIPUNCTURE: CPT | Performed by: FAMILY MEDICINE

## 2018-10-02 PROCEDURE — 80053 COMPREHEN METABOLIC PANEL: CPT | Performed by: FAMILY MEDICINE

## 2018-10-02 PROCEDURE — 82306 VITAMIN D 25 HYDROXY: CPT | Performed by: FAMILY MEDICINE

## 2018-10-02 PROCEDURE — 85025 COMPLETE CBC W/AUTO DIFF WBC: CPT | Performed by: FAMILY MEDICINE

## 2018-10-02 PROCEDURE — 84443 ASSAY THYROID STIM HORMONE: CPT | Performed by: FAMILY MEDICINE

## 2019-01-02 ENCOUNTER — HOSPITAL ENCOUNTER (OUTPATIENT)
Dept: ULTRASOUND IMAGING | Facility: HOSPITAL | Age: 74
Discharge: HOME | End: 2019-01-02
Attending: SURGERY
Payer: MEDICARE

## 2019-01-02 DIAGNOSIS — E04.1 NONTOXIC SINGLE THYROID NODULE: ICD-10-CM

## 2019-01-02 PROCEDURE — 76536 US EXAM OF HEAD AND NECK: CPT

## 2019-03-21 ENCOUNTER — OFFICE VISIT (OUTPATIENT)
Dept: GYNECOLOGIC ONCOLOGY | Facility: CLINIC | Age: 74
End: 2019-03-21
Payer: MEDICARE

## 2019-03-21 VITALS
HEART RATE: 66 BPM | RESPIRATION RATE: 16 BRPM | DIASTOLIC BLOOD PRESSURE: 88 MMHG | SYSTOLIC BLOOD PRESSURE: 142 MMHG | WEIGHT: 206 LBS | BODY MASS INDEX: 34.32 KG/M2 | HEIGHT: 65 IN

## 2019-03-21 DIAGNOSIS — Z85.42 ENCOUNTER FOR FOLLOW-UP SURVEILLANCE OF ENDOMETRIAL CANCER: Primary | ICD-10-CM

## 2019-03-21 DIAGNOSIS — Z12.31 ENCOUNTER FOR SCREENING MAMMOGRAM FOR BREAST CANCER: ICD-10-CM

## 2019-03-21 DIAGNOSIS — Z08 ENCOUNTER FOR FOLLOW-UP SURVEILLANCE OF ENDOMETRIAL CANCER: Primary | ICD-10-CM

## 2019-03-21 DIAGNOSIS — Z85.42 HISTORY OF ENDOMETRIAL CANCER: ICD-10-CM

## 2019-03-21 PROCEDURE — 99213 OFFICE O/P EST LOW 20 MIN: CPT | Performed by: PHYSICIAN ASSISTANT

## 2019-03-21 NOTE — PROGRESS NOTES
Assessment/Plan:    Problem List Items Addressed This Visit        Genitourinary    Encounter for follow-up surveillance of endometrial cancer - Primary       Other    History of endometrial cancer     Stage IA, grade 3 s/p completion of adjuvant chemotherapy and radiation in April 2015  She is clinically without evidence of disease recurrence  Return to the office in 1 year for continued cancer surveillance  Other Visit Diagnoses     Encounter for screening mammogram for breast cancer        Relevant Orders    Mammo screening bilateral w cad            CHIEF COMPLAINT:   Endometrial cancer surveillance    Problem:  Cancer Staging  History of endometrial cancer  Staging form: Corpus Uteri - Carcinoma, AJCC 8th Edition  - Clinical: FIGO Stage IA - Signed by Tom Mondragon PA-C on 3/14/2018        Previous therapy:     History of endometrial cancer     Initial Diagnosis     Endometrial cancer ([facility] [address] )         12/4/2014 Surgery     Robotic-assisted total laparoscopic hysterectomy, bilateral salpingo-oophorectomy, pelvic and periaortic lymph node dissections  A  FIGO grade 3, 4 5 cm tumor  B  Less than 50% myometrial invasion, 7 5 of 16 mm  C  Foci suspicious for LVSI, 19 negative lymph nodes  D  IHC for Epstein- all proteins expressed  E  Reviewed at [facility]          - 3/12/2015 Chemotherapy     Paclitaxel 175 mg/m2 and carboplatin AUC 6  She received 4 cycles completed         3/19/2015 - 4/2/2015 Radiation     Vaginal brachytherapy  adjuvant vaginal cuff brachytherapy  A 3 0 cm vaginal cylinder was utilized to deliver a dose of 3000 cGy in 5 fractions of 600 cGy per fraction prescribed to the surface of the vagina  4/23/2015 Biopsy     Needle biopsy of left inguinal lymph node   A  Negative for malignancy          5/22/2015 Surgery     Left superficial inguinal lymphadenectomy  A  Negative lymph nodes              Patient ID: Kareem Martinez is a 74 y o  female  who has no new complaints today  No vaginal bleeding, abdominal/pelvic pain  Normal bowel and bladder function  No interval change in medical history since last visit  Quality of life is good  The following portions of the patient's history were reviewed and updated as appropriate: allergies, current medications, past medical history and problem list     Review of Systems   Constitutional: Negative  HENT: Negative  Eyes: Negative  Respiratory: Negative  Cardiovascular: Negative  Gastrointestinal: Negative  Genitourinary: Negative  Musculoskeletal: Negative  Skin: Negative  Neurological: Negative  Psychiatric/Behavioral: Negative  Current Outpatient Medications   Medication Sig Dispense Refill    Ascorbic Acid (VITAMIN C) 500 MG CAPS Take 1 tablet by mouth daily      aspirin 81 MG tablet Take 1 tablet by mouth daily      Calcium Carb-Cholecalciferol (CALCIUM 500/D) 500-400 MG-UNIT CHEW Chew 1 tablet daily      Cholecalciferol (VITAMIN D3) 2000 units capsule Take 1 tablet by mouth daily      Multiple Vitamins-Minerals (CENTRUM SILVER 50+WOMEN PO) Take 1 tablet by mouth daily      vitamin B-12 (CYANOCOBALAMIN) 250 MCG tablet Take 250 mcg by mouth daily       No current facility-administered medications for this visit  Objective:    Blood pressure 142/88, pulse 66, resp  rate 16, height 5' 4 75" (1 645 m), weight 93 4 kg (206 lb)  Body mass index is 34 55 kg/m²  Body surface area is 2 meters squared  Physical Exam   Constitutional: She is oriented to person, place, and time  She appears well-developed and well-nourished  HENT:   Head: Normocephalic and atraumatic  Neck: Normal range of motion  Neck supple  No thyromegaly present  Pulmonary/Chest: Effort normal    Abdominal: Soft  She exhibits no distension and no mass  There is no rebound  Genitourinary:   Genitourinary Comments:  The external female genitalia is normal  The bartholin's, uretheral and skenes glands are normal  The urethral meatus is normal (midline with no lesions)  Anus without fissure or lesion  Speculum exam reveals a grossly normal vagina  No masses, lesions, discharge or bleeding  No significant cystocele or rectocele noted  Bimanual exam notes a surgical absent cervix, uterus and adnexal structures  No masses or fullness  Bladder is without fullness, mass or tenderness  Musculoskeletal: Normal range of motion  She exhibits no edema  Lymphadenopathy:     She has no cervical adenopathy  Neurological: She is alert and oriented to person, place, and time  Skin: Skin is warm and dry  No rash noted  Psychiatric: She has a normal mood and affect  Her behavior is normal    Vitals reviewed

## 2019-03-21 NOTE — ASSESSMENT & PLAN NOTE
Stage IA, grade 3 s/p completion of adjuvant chemotherapy and radiation in April 2015  She is clinically without evidence of disease recurrence  Return to the office in 1 year for continued cancer surveillance

## 2019-05-22 ENCOUNTER — OFFICE VISIT (OUTPATIENT)
Dept: FAMILY MEDICINE CLINIC | Facility: CLINIC | Age: 74
End: 2019-05-22
Payer: MEDICARE

## 2019-05-22 VITALS
TEMPERATURE: 98 F | BODY MASS INDEX: 33.11 KG/M2 | DIASTOLIC BLOOD PRESSURE: 82 MMHG | WEIGHT: 206 LBS | HEIGHT: 66 IN | HEART RATE: 70 BPM | SYSTOLIC BLOOD PRESSURE: 122 MMHG

## 2019-05-22 DIAGNOSIS — F32.0 CURRENT MILD EPISODE OF MAJOR DEPRESSIVE DISORDER WITHOUT PRIOR EPISODE (HCC): Primary | ICD-10-CM

## 2019-05-22 PROBLEM — E04.2 MULTINODULAR THYROID: Status: ACTIVE | Noted: 2017-01-09

## 2019-05-22 PROCEDURE — 99213 OFFICE O/P EST LOW 20 MIN: CPT | Performed by: FAMILY MEDICINE

## 2019-07-02 ENCOUNTER — OFFICE VISIT (OUTPATIENT)
Dept: FAMILY MEDICINE CLINIC | Facility: CLINIC | Age: 74
End: 2019-07-02
Payer: MEDICARE

## 2019-07-02 VITALS
DIASTOLIC BLOOD PRESSURE: 74 MMHG | HEIGHT: 66 IN | BODY MASS INDEX: 32.47 KG/M2 | TEMPERATURE: 99.6 F | HEART RATE: 72 BPM | SYSTOLIC BLOOD PRESSURE: 122 MMHG | RESPIRATION RATE: 16 BRPM | WEIGHT: 202 LBS

## 2019-07-02 DIAGNOSIS — F33.42 RECURRENT MAJOR DEPRESSIVE DISORDER, IN FULL REMISSION (HCC): Primary | ICD-10-CM

## 2019-07-02 PROCEDURE — 99213 OFFICE O/P EST LOW 20 MIN: CPT | Performed by: FAMILY MEDICINE

## 2019-07-02 NOTE — PROGRESS NOTES
Assessment/Plan:         Diagnoses and all orders for this visit:    Recurrent major depressive disorder, in full remission ([address] )        Continue with current dose of Zoloft  Advised to call if any changes  Patient ID: Jennifer Pedraza is a 66 y o  female  Follow up visit for depression  Patient was started on Zoloft 50 mg daily 05/2019 Overall she reports improvement  No side effects  No sleep problems  Appetite normal  4 lb weight loss from 05/2019 with dieting  No suicidal ideations  On vitamin D supplement  Labs 10/2018 see note  Multiple stressors  being treated for metastatic prostate CA  He has problems with urinary incontinence  This limits their ability to travel or go on vacation  Daughter going through a divorce  Patient tries to stay busy  Volunteers one a day a week  Attends a cancer support group  FH bipolar disorder daughter  SH non smoker  No alcohol  The following portions of the patient's history were reviewed and updated as appropriate: allergies, current medications, past family history, past medical history, past social history, past surgical history and problem list     Review of Systems   Constitutional: Negative for appetite change and fatigue  See HPI   Gastrointestinal: Negative for constipation, diarrhea and nausea  Skin: Negative for rash  Neurological: Negative for dizziness and headaches  Objective:      /74   Pulse 72   Temp 99 6 °F (37 6 °C)   Resp 16   Ht 5' 6" (1 676 m)   Wt 91 6 kg (202 lb)   BMI 32 60 kg/m²          Physical Exam   Constitutional: She appears well-developed and well-nourished  No distress  Psychiatric: She has a normal mood and affect   Her speech is normal and behavior is normal  Judgment and thought content normal  Cognition and memory are normal          Recent Results (from the past 6720 hour(s))   CBC and differential    Collection Time: 10/02/18  9:13 AM   Result Value Ref Range    WBC 6 28 4 31 - 10 16 Thousand/uL    RBC 4 34 3 81 - 5 12 Million/uL    Hemoglobin 13 1 11 5 - 15 4 g/dL    Hematocrit 39 9 34 8 - 46 1 %    MCV 92 82 - 98 fL    MCH 30 2 26 8 - 34 3 pg    MCHC 32 8 31 4 - 37 4 g/dL    RDW 12 9 11 6 - 15 1 %    MPV 11 1 8 9 - 12 7 fL    Platelets 583 913 - 799 Thousands/uL    nRBC 0 /100 WBCs    Neutrophils Relative 57 43 - 75 %    Immat GRANS % 0 0 - 2 %    Lymphocytes Relative 32 14 - 44 %    Monocytes Relative 9 4 - 12 %    Eosinophils Relative 2 0 - 6 %    Basophils Relative 0 0 - 1 %    Neutrophils Absolute 3 54 1 85 - 7 62 Thousands/µL    Immature Grans Absolute 0 02 0 00 - 0 20 Thousand/uL    Lymphocytes Absolute 2 00 0 60 - 4 47 Thousands/µL    Monocytes Absolute 0 56 0 17 - 1 22 Thousand/µL    Eosinophils Absolute 0 14 0 00 - 0 61 Thousand/µL    Basophils Absolute 0 02 0 00 - 0 10 Thousands/µL   Comprehensive metabolic panel    Collection Time: 10/02/18  9:13 AM   Result Value Ref Range    Sodium 139 136 - 145 mmol/L    Potassium 4 3 3 5 - 5 3 mmol/L    Chloride 103 100 - 108 mmol/L    CO2 29 21 - 32 mmol/L    ANION GAP 7 4 - 13 mmol/L    BUN 14 5 - 25 mg/dL    Creatinine 0 62 0 60 - 1 30 mg/dL    Glucose, Fasting 92 65 - 99 mg/dL    Calcium 9 0 8 3 - 10 1 mg/dL    AST 21 5 - 45 U/L    ALT 21 12 - 78 U/L    Alkaline Phosphatase 75 46 - 116 U/L    Total Protein 6 9 6 4 - 8 2 g/dL    Albumin 3 6 3 5 - 5 0 g/dL    Total Bilirubin 0 66 0 20 - 1 00 mg/dL    eGFR 88 ml/min/1 73sq m   TSH, 3rd generation with Free T4 reflex    Collection Time: 10/02/18  9:13 AM   Result Value Ref Range    TSH 3RD GENERATON 1 320 0 358 - 3 740 uIU/mL   Vitamin D 25 hydroxy    Collection Time: 10/02/18  9:13 AM   Result Value Ref Range    Vit D, 25-Hydroxy 25 0 (L) 30 0 - 100 0 ng/mL

## 2019-07-27 DIAGNOSIS — F32.0 CURRENT MILD EPISODE OF MAJOR DEPRESSIVE DISORDER WITHOUT PRIOR EPISODE (HCC): ICD-10-CM

## 2019-09-16 NOTE — PROGRESS NOTES
Assessment/Plan:     Diagnoses and all orders for this visit:    Elevated blood pressure reading    Multinodular thyroid    Endometrial cancer ([address] )  -     CBC and differential  -     Comprehensive metabolic panel    Primary osteoarthritis of both knees    Osteopenia of lumbar spine    Vitamin D deficiency  -     Vitamin D 25 hydroxy    Medicare annual wellness visit, subsequent    Need for influenza vaccination  -     influenza vaccine, 1158-2309, high-dose, PF 0 5 mL (FLUZONE HIGH-DOSE)        Repeat labs  Flu vaccine today  She is not interested in a shingles vaccine  Monitor blood pressures at home  OV one year  Patient ID: Jose L Noel is a 66 y o  female  Follow-up visit  Active problems and past medical history see chart  Labs 10/2018 chemistry profile  FBS 92  Electrolytes normal creatinine 0 62  LFTs normal   CBC normal   Hemoglobin 13 1       The following portions of the patient's history were reviewed and updated as appropriate: allergies, current medications, past family history, past medical history, past social history, past surgical history and problem list     Review of Systems   Constitutional: Negative for appetite change, chills, fatigue, fever and unexpected weight change  HENT: Negative for congestion, ear pain, hearing loss, postnasal drip, rhinorrhea, sore throat, trouble swallowing and voice change  History of thyroid nodule with previous biopsies no cancer  01/2019 thyroid ultrasound stable right lower pole nodule with previous non malignant biopsy results  Nodule has been stable for greater than 5 years 10/2018 TSH 1 320   Eyes: Negative for visual disturbance  Respiratory: Negative for cough, shortness of breath and wheezing  Cardiovascular: Negative for chest pain, palpitations and leg swelling  Status post DVT left leg patient completed 9 months of Xarelto    08/2016 prothrombin mutation gene, factor 5 Leiden and homocystine levels normal   She is currently on aspirin 81 mg daily  Gastrointestinal: Negative for abdominal pain, blood in stool, constipation, diarrhea, nausea and vomiting          09/2014 colonoscopy 1 polyp  Severe diverticulosis   Endocrine:         03/2018 DEXA scan T-score -1 5 lumbar spine consistent with low bone mineral density  Compared to prior examination has been a 5% decrease in the BMD of lumbar spine at 12% decrease in the total BMD of left hip   10/2018 vitamin-D 25   Genitourinary: Negative for difficulty urinating, dysuria, frequency and hematuria  History of endometrial CA stage I grade 3 status post total hysterectomy 12/2014 she completed chemotherapy and radiation therapy  Left inguinal lymph node biopsy 04/2015 no cancer  02/2018 no evidence of metastatic disease  Stable 24 mm right thyroid nodule  Distal colonic diverticulosis  Stable small bilateral inguinal lymph nodes  A dominant left inguinal lymph node is unchanged  Musculoskeletal: Negative for arthralgias and myalgias  OA knees x-rays knees 06/2013 moderately severe osteoarthritis right knee  Moderate osteoarthritis left knee  She completed a course of physical therapy with improvement  Asymptomatic at present  On no pain medications  Skin: Negative for rash  Neurological: Negative for dizziness and headaches  Hematological: Negative for adenopathy  Does not bruise/bleed easily  Psychiatric/Behavioral: Negative for dysphoric mood and sleep disturbance  Objective:      /82   Pulse 68   Temp (!) 97 °F (36 1 °C)   Resp 16   Ht 5' 6" (1 676 m)   Wt 90 7 kg (200 lb)   BMI 32 28 kg/m²          Physical Exam   Constitutional: She is oriented to person, place, and time  She appears well-developed and well-nourished  No distress  HENT:   Mouth/Throat: Oropharynx is clear and moist and mucous membranes are normal  No oral lesions  Normal dentition  Eyes: Pupils are equal, round, and reactive to light  Conjunctivae and EOM are normal  No scleral icterus  Neck: No JVD present  Carotid bruit is not present  No tracheal deviation present  No thyroid mass and no thyromegaly present  Cardiovascular: Normal rate, regular rhythm and normal heart sounds  Exam reveals no gallop  No murmur heard  Pulmonary/Chest: Effort normal and breath sounds normal  No respiratory distress  She has no wheezes  She has no rales  Abdominal: Soft  Bowel sounds are normal  She exhibits no distension, no abdominal bruit and no mass  There is no hepatosplenomegaly  There is no tenderness  There is no rebound and no guarding  Musculoskeletal: Normal range of motion  She exhibits no edema  Lymphadenopathy:     She has no cervical adenopathy  Neurological: She is alert and oriented to person, place, and time  No cranial nerve deficit  Skin: No rash noted  No cyanosis  Nails show no clubbing  Psychiatric: She has a normal mood and affect  Nursing note and vitals reviewed          Recent Results (from the past 42257 hour(s))   CBC and differential    Collection Time: 10/02/18  9:13 AM   Result Value Ref Range    WBC 6 28 4 31 - 10 16 Thousand/uL    RBC 4 34 3 81 - 5 12 Million/uL    Hemoglobin 13 1 11 5 - 15 4 g/dL    Hematocrit 39 9 34 8 - 46 1 %    MCV 92 82 - 98 fL    MCH 30 2 26 8 - 34 3 pg    MCHC 32 8 31 4 - 37 4 g/dL    RDW 12 9 11 6 - 15 1 %    MPV 11 1 8 9 - 12 7 fL    Platelets 078 130 - 532 Thousands/uL    nRBC 0 /100 WBCs    Neutrophils Relative 57 43 - 75 %    Immat GRANS % 0 0 - 2 %    Lymphocytes Relative 32 14 - 44 %    Monocytes Relative 9 4 - 12 %    Eosinophils Relative 2 0 - 6 %    Basophils Relative 0 0 - 1 %    Neutrophils Absolute 3 54 1 85 - 7 62 Thousands/µL    Immature Grans Absolute 0 02 0 00 - 0 20 Thousand/uL    Lymphocytes Absolute 2 00 0 60 - 4 47 Thousands/µL    Monocytes Absolute 0 56 0 17 - 1 22 Thousand/µL    Eosinophils Absolute 0 14 0 00 - 0 61 Thousand/µL    Basophils Absolute 0 02 0 00 - 0 10 Thousands/µL   Comprehensive metabolic panel    Collection Time: 10/02/18  9:13 AM   Result Value Ref Range    Sodium 139 136 - 145 mmol/L    Potassium 4 3 3 5 - 5 3 mmol/L    Chloride 103 100 - 108 mmol/L    CO2 29 21 - 32 mmol/L    ANION GAP 7 4 - 13 mmol/L    BUN 14 5 - 25 mg/dL    Creatinine 0 62 0 60 - 1 30 mg/dL    Glucose, Fasting 92 65 - 99 mg/dL    Calcium 9 0 8 3 - 10 1 mg/dL    AST 21 5 - 45 U/L    ALT 21 12 - 78 U/L    Alkaline Phosphatase 75 46 - 116 U/L    Total Protein 6 9 6 4 - 8 2 g/dL    Albumin 3 6 3 5 - 5 0 g/dL    Total Bilirubin 0 66 0 20 - 1 00 mg/dL    eGFR 88 ml/min/1 73sq m   TSH, 3rd generation with Free T4 reflex    Collection Time: 10/02/18  9:13 AM   Result Value Ref Range    TSH 3RD GENERATON 1 320 0 358 - 3 740 uIU/mL   Vitamin D 25 hydroxy    Collection Time: 10/02/18  9:13 AM   Result Value Ref Range    Vit D, 25-Hydroxy 25 0 (L) 30 0 - 100 0 ng/mL

## 2019-09-17 ENCOUNTER — OFFICE VISIT (OUTPATIENT)
Dept: FAMILY MEDICINE CLINIC | Facility: CLINIC | Age: 74
End: 2019-09-17
Payer: MEDICARE

## 2019-09-17 VITALS
HEIGHT: 66 IN | WEIGHT: 200 LBS | TEMPERATURE: 97 F | RESPIRATION RATE: 16 BRPM | HEART RATE: 68 BPM | SYSTOLIC BLOOD PRESSURE: 134 MMHG | DIASTOLIC BLOOD PRESSURE: 82 MMHG | BODY MASS INDEX: 32.14 KG/M2

## 2019-09-17 DIAGNOSIS — E55.9 VITAMIN D DEFICIENCY: ICD-10-CM

## 2019-09-17 DIAGNOSIS — R03.0 ELEVATED BLOOD PRESSURE READING: Primary | ICD-10-CM

## 2019-09-17 DIAGNOSIS — M17.0 PRIMARY OSTEOARTHRITIS OF BOTH KNEES: ICD-10-CM

## 2019-09-17 DIAGNOSIS — Z00.00 MEDICARE ANNUAL WELLNESS VISIT, SUBSEQUENT: ICD-10-CM

## 2019-09-17 DIAGNOSIS — Z23 NEED FOR INFLUENZA VACCINATION: ICD-10-CM

## 2019-09-17 DIAGNOSIS — M85.88 OSTEOPENIA OF LUMBAR SPINE: ICD-10-CM

## 2019-09-17 DIAGNOSIS — C54.1 ENDOMETRIAL CANCER (HCC): ICD-10-CM

## 2019-09-17 DIAGNOSIS — E04.2 MULTINODULAR THYROID: ICD-10-CM

## 2019-09-17 PROBLEM — Z08 ENCOUNTER FOR FOLLOW-UP SURVEILLANCE OF ENDOMETRIAL CANCER: Status: RESOLVED | Noted: 2019-03-21 | Resolved: 2019-09-17

## 2019-09-17 PROBLEM — Z85.42 ENCOUNTER FOR FOLLOW-UP SURVEILLANCE OF ENDOMETRIAL CANCER: Status: RESOLVED | Noted: 2019-03-21 | Resolved: 2019-09-17

## 2019-09-17 PROCEDURE — 90662 IIV NO PRSV INCREASED AG IM: CPT

## 2019-09-17 PROCEDURE — G0439 PPPS, SUBSEQ VISIT: HCPCS | Performed by: FAMILY MEDICINE

## 2019-09-17 PROCEDURE — G0008 ADMIN INFLUENZA VIRUS VAC: HCPCS

## 2019-09-17 PROCEDURE — 99214 OFFICE O/P EST MOD 30 MIN: CPT | Performed by: FAMILY MEDICINE

## 2019-09-17 NOTE — PROGRESS NOTES
Assessment and Plan:     Problem List Items Addressed This Visit        Endocrine    Multinodular thyroid       Musculoskeletal and Integument    Osteoarthritis of knee    Osteopenia of lumbar spine       Genitourinary    Endometrial cancer ([facility] [address] )    Relevant Orders    CBC and differential    Comprehensive metabolic panel       Other    Vitamin D deficiency    Relevant Orders    Vitamin D 25 hydroxy      Other Visit Diagnoses     Elevated blood pressure reading    -  Primary    Medicare annual wellness visit, subsequent        Need for influenza vaccination        Relevant Orders    influenza vaccine, 9813-1281, high-dose, PF 0 5 mL (FLUZONE HIGH-DOSE) (Completed)        BMI Counseling: Body mass index is 32 28 kg/m²  The BMI is above normal  Nutrition recommendations include decreasing portion sizes, consuming healthier snacks, moderation in carbohydrate intake, reducing intake of saturated and trans fat and reducing intake of cholesterol  Exercise recommendations include exercising 3-5 times per week  No pharmacotherapy was ordered  Preventive health issues were discussed with patient, and age appropriate screening tests were ordered as noted in patient's After Visit Summary  Personalized health advice and appropriate referrals for health education or preventive services given if needed, as noted in patient's After Visit Summary             History of Present Illness:     Patient presents for Medicare Annual Wellness visit    Patient Care Team:  Eladio Verdugo MD as PCP - Reji Edouard MD (Obstetrics and Gynecology)  PRISCA Musa MD (Surgical Oncology)  Angélica Shah MD (Radiation Oncology)     Problem List:     Patient Active Problem List   Diagnosis    History of endometrial cancer    Endometrial cancer [facility])    Inguinal lymphadenopathy    Multinodular thyroid    Obesity    Osteoarthritis of hip    Osteoarthritis of knee    Vitamin D deficiency    Osteopenia of lumbar spine      Past Medical and Surgical History:     Past Medical History:   Diagnosis Date    Deep vein thrombosis ([facility] ) 1/28/2016    Diverticulosis     last assessed 05/29/13    Fibrocystic breast     History of DVT (deep vein thrombosis)     Xarelto, 4/2015 thru April 2016    Impaired fasting glucose     last assessed 06/04/14    Inguinal lymphadenopathy     Multiple benign polyps of large intestine     Obesity     Uterine cancer ([facility] )      Past Surgical History:   Procedure Laterality Date    HYSTERECTOMY      LYMPH NODE BIOPSY      OOPHORECTOMY      PELVIC LAPAROSCOPY      TONSILLECTOMY      with adenoidectomy      Family History:     Family History   Problem Relation Age of Onset    Hypothyroidism Mother     Diabetes Maternal Grandfather       Social History:     Social History     Socioeconomic History    Marital status: /Civil Union     Spouse name: None    Number of children: None    Years of education: None    Highest education level: None   Occupational History    None   Social Needs    Financial resource strain: None    Food insecurity:     Worry: None     Inability: None    Transportation needs:     Medical: None     Non-medical: None   Tobacco Use    Smoking status: Never Smoker    Smokeless tobacco: Never Used   Substance and Sexual Activity    Alcohol use: No    Drug use: No    Sexual activity: None   Lifestyle    Physical activity:     Days per week: None     Minutes per session: None    Stress: None   Relationships    Social connections:     Talks on phone: None     Gets together: None     Attends [employer] service: None     Active member of club or organization: None     Attends meetings of clubs or organizations: None     Relationship status: None    Intimate partner violence:     Fear of current or ex partner: None     Emotionally abused: None     Physically abused: None     Forced sexual activity: None   Other Topics Concern    None Social History Narrative    Denied: Home environment Domestic violence       Medications and Allergies:     Current Outpatient Medications   Medication Sig Dispense Refill    Ascorbic Acid (VITAMIN C) 500 MG CAPS Take 1 tablet by mouth daily      Calcium Carb-Cholecalciferol (CALCIUM 500/D) 500-400 MG-UNIT CHEW Chew 1 tablet daily      Cholecalciferol (VITAMIN D3) 2000 units capsule Take 1 tablet by mouth daily      Multiple Vitamins-Minerals (CENTRUM SILVER 50+WOMEN PO) Take 1 tablet by mouth daily      sertraline (ZOLOFT) 50 mg tablet TAKE 1/2 (ONE-HALF) TABLET BY MOUTH IN THE MORNING WITH FOOD FOR 7 DAYS, THEN TAKE 1 TABLET ONCE DAILY 30 tablet 3     No current facility-administered medications for this visit  No Known Allergies   Immunizations:     Immunization History   Administered Date(s) Administered    Influenza Split High Dose Preservative Free IM 11/04/2016    Influenza, high dose seasonal 0 5 mL 09/17/2019    Pneumococcal Conjugate 13-Valent 08/04/2016    Pneumococcal Polysaccharide PPV23 08/08/2017      Health Maintenance:         Topic Date Due    CRC Screening: Colonoscopy  09/03/2019     There are no preventive care reminders to display for this patient  Medicare Health Risk Assessment:     /82   Pulse 68   Temp (!) 97 °F (36 1 °C)   Resp 16   Ht 5' 6" (1 676 m)   Wt 90 7 kg (200 lb)   BMI 32 28 kg/m²      Alie Romero is here for her Subsequent Wellness visit  Last Medicare Wellness visit information reviewed, patient interviewed and updates made to the record today  Health Risk Assessment:   Patient rates overall health as good  Patient feels that their physical health rating is same  Eyesight was rated as slightly worse  Hearing was rated as slightly worse  Patient feels that their emotional and mental health rating is same  Pain experienced in the last 7 days has been none  Patient states that she has experienced no weight loss or gain in last 6 months       Fall Risk Screening: In the past year, patient has experienced: no history of falling in past year      Urinary Incontinence Screening:   Patient has leaked urine accidently in the last six months  Home Safety:  Patient does not have trouble with stairs inside or outside of their home  Patient has working smoke alarms and has working carbon monoxide detector  Home safety hazards include: none  Nutrition:   Current diet is Regular and Limited junk food  Medications:   Patient is currently taking over-the-counter supplements  OTC medications include: see medication list  Patient is able to manage medications  Activities of Daily Living (ADLs)/Instrumental Activities of Daily Living (IADLs):   Walk and transfer into and out of bed and chair?: Yes  Dress and groom yourself?: Yes    Bathe or shower yourself?: Yes    Feed yourself? Yes  Do your laundry/housekeeping?: Yes  Manage your money, pay your bills and track your expenses?: Yes  Make your own meals?: Yes    Do your own shopping?: Yes    Previous Hospitalizations:   Any hospitalizations or ED visits within the last 12 months?: No      Advance Care Planning:   Living will: Yes    Durable POA for healthcare:  Yes    Advanced directive: Yes      Cognitive Screening:   Provider or family/friend/caregiver concerned regarding cognition?: No    PREVENTIVE SCREENINGS      Cardiovascular Screening:    General: Screening Not Indicated      Diabetes Screening:     General: Screening Current      Colorectal Cancer Screening:     General: Screening Current      Breast Cancer Screening:     General: Screening Current      Cervical Cancer Screening:    General: Screening Not Indicated      Abdominal Aortic Aneurysm (AAA) Screening:        General: Screening Not Indicated      Lung Cancer Screening:     General: Screening Not Indicated      Hepatitis C Screening:    General: Screening Not Indicated    Other Counseling Topics:   Regular weightbearing exercise and calcium and vitamin D intake         Sherry Beck MD

## 2019-09-17 NOTE — PATIENT INSTRUCTIONS
Medicare Preventive Visit Patient Instructions  Thank you for completing your Welcome to Medicare Visit or Medicare Annual Wellness Visit today  Your next wellness visit will be due in one year (9/17/2020)  The screening/preventive services that you may require over the next 5-10 years are detailed below  Some tests may not apply to you based off risk factors and/or age  Screening tests ordered at today's visit but not completed yet may show as past due  Also, please note that scanned in results may not display below  Preventive Screenings:  Service Recommendations Previous Testing/Comments   Colorectal Cancer Screening  * Colonoscopy    * Fecal Occult Blood Test (FOBT)/Fecal Immunochemical Test (FIT)  * Fecal DNA/Cologuard Test  * Flexible Sigmoidoscopy Age: 54-65 years old   Colonoscopy: every 10 years (may be performed more frequently if at higher risk)  OR  FOBT/FIT: every 1 year  OR  Cologuard: every 3 years  OR  Sigmoidoscopy: every 5 years  Screening may be recommended earlier than age 48 if at higher risk for colorectal cancer  Also, an individualized decision between you and your healthcare provider will decide whether screening between the ages of 74-80 would be appropriate  Colonoscopy: 09/03/2014  FOBT/FIT: Not on file  Cologuard: Not on file  Sigmoidoscopy: Not on file    Screening Current     Breast Cancer Screening Age: 36 years old  Frequency: every 1-2 years  Not required if history of left and right mastectomy Mammogram: 11/07/2018    Screening Current   Cervical Cancer Screening Between the ages of 21-29, pap smear recommended once every 3 years  Between the ages of 33-67, can perform pap smear with HPV co-testing every 5 years     Recommendations may differ for women with a history of total hysterectomy, cervical cancer, or abnormal pap smears in past  Pap Smear: Not on file    Screening Not Indicated   Hepatitis C Screening Once for adults born between 1945 and 1965  More frequently in patients at high risk for Hepatitis C Hep C Antibody: Not on file       Diabetes Screening 1-2 times per year if you're at risk for diabetes or have pre-diabetes Fasting glucose: 92 mg/dL   A1C: No results in last 5 years    Screening Current   Cholesterol Screening Once every 5 years if you don't have a lipid disorder  May order more often based on risk factors  Lipid panel: Not on file         Other Preventive Screenings Covered by Medicare:  1  Abdominal Aortic Aneurysm (AAA) Screening: covered once if your at risk  You're considered to be at risk if you have a family history of AAA  2  Lung Cancer Screening: covers low dose CT scan once per year if you meet all of the following conditions: (1) Age 50-69; (2) No signs or symptoms of lung cancer; (3) Current smoker or have quit smoking within the last 15 years; (4) You have a tobacco smoking history of at least 30 pack years (packs per day multiplied by number of years you smoked); (5) You get a written order from a healthcare provider  3  Glaucoma Screening: covered annually if you're considered high risk: (1) You have diabetes OR (2) Family history of glaucoma OR (3)  aged 48 and older OR (3)  American aged 72 and older  3  Osteoporosis Screening: covered every 2 years if you meet one of the following conditions: (1) You're estrogen deficient and at risk for osteoporosis based off medical history and other findings; (2) Have a vertebral abnormality; (3) On glucocorticoid therapy for more than 3 months; (4) Have primary hyperparathyroidism; (5) On osteoporosis medications and need to assess response to drug therapy  · Last bone density test (DXA Scan): 03/17/2018  5  HIV Screening: covered annually if you're between the age of 12-76  Also covered annually if you are younger than 13 and older than 72 with risk factors for HIV infection  For pregnant patients, it is covered up to 3 times per pregnancy      Immunizations:  Immunization Recommendations   Influenza Vaccine Annual influenza vaccination during flu season is recommended for all persons aged >= 6 months who do not have contraindications   Pneumococcal Vaccine (Prevnar and Pneumovax)  * Prevnar = PCV13  * Pneumovax = PPSV23   Adults 25-60 years old: 1-3 doses may be recommended based on certain risk factors  Adults 72 years old: Prevnar (PCV13) vaccine recommended followed by Pneumovax (PPSV23) vaccine  If already received PPSV23 since turning 65, then PCV13 recommended at least one year after PPSV23 dose  Hepatitis B Vaccine 3 dose series if at intermediate or high risk (ex: diabetes, end stage renal disease, liver disease)   Tetanus (Td) Vaccine - COST NOT COVERED BY MEDICARE PART B Following completion of primary series, a booster dose should be given every 10 years to maintain immunity against tetanus  Td may also be given as tetanus wound prophylaxis  Tdap Vaccine - COST NOT COVERED BY MEDICARE PART B Recommended at least once for all adults  For pregnant patients, recommended with each pregnancy  Shingles Vaccine (Shingrix) - COST NOT COVERED BY MEDICARE PART B  2 shot series recommended in those aged 48 and above     Health Maintenance Due:      Topic Date Due    CRC Screening: Colonoscopy  09/03/2019     Immunizations Due:  There are no preventive care reminders to display for this patient  Advance Directives   What are advance directives? Advance directives are legal documents that state your wishes and plans for medical care  These plans are made ahead of time in case you lose your ability to make decisions for yourself  Advance directives can apply to any medical decision, such as the treatments you want, and if you want to donate organs  What are the types of advance directives? There are many types of advance directives, and each state has rules about how to use them  You may choose a combination of any of the following:  · Living will:   This is a written record of the treatment you want  You can also choose which treatments you do not want, which to limit, and which to stop at a certain time  This includes surgery, medicine, IV fluid, and tube feedings  · Durable power of  for healthcare [facility]): This is a written record that states who you want to make healthcare choices for you when you are unable to make them for yourself  This person, called a proxy, is usually a family member or a friend  You may choose more than 1 proxy  · Do not resuscitate (DNR) order:  A DNR order is used in case your heart stops beating or you stop breathing  It is a request not to have certain forms of treatment, such as CPR  A DNR order may be included in other types of advance directives  · Medical directive: This covers the care that you want if you are in a coma, near death, or unable to make decisions for yourself  You can list the treatments you want for each condition  Treatment may include pain medicine, surgery, blood transfusions, dialysis, IV or tube feedings, and a ventilator (breathing machine)  · Values history: This document has questions about your views, beliefs, and how you feel and think about life  This information can help others choose the care that you would choose  Why are advance directives important? An advance directive helps you control your care  Although spoken wishes may be used, it is better to have your wishes written down  Spoken wishes can be misunderstood, or not followed  Treatments may be given even if you do not want them  An advance directive may make it easier for your family to make difficult choices about your care  Urinary Incontinence   Urinary incontinence (UI)  is when you lose control of your bladder  UI develops because your bladder cannot store or empty urine properly  The 3 most common types of UI are stress incontinence, urge incontinence, or both  Medicines:   · May be given to help strengthen your bladder control   Report any side effects of medication to your healthcare provider  Do pelvic muscle exercises often:  Your pelvic muscles help you stop urinating  Squeeze these muscles tight for 5 seconds, then relax for 5 seconds  Gradually work up to squeezing for 10 seconds  Do 3 sets of 15 repetitions a day, or as directed  This will help strengthen your pelvic muscles and improve bladder control  Train your bladder:  Go to the bathroom at set times, such as every 2 hours, even if you do not feel the urge to go  You can also try to hold your urine when you feel the urge to go  For example, hold your urine for 5 minutes when you feel the urge to go  As that becomes easier, hold your urine for 10 minutes  Self-care:   · Keep a UI record  Write down how often you leak urine and how much you leak  Make a note of what you were doing when you leaked urine  · Drink liquids as directed  You may need to limit the amount of liquid you drink to help control your urine leakage  Do not drink any liquid right before you go to bed  Limit or do not have drinks that contain caffeine or alcohol  · Prevent constipation  Eat a variety of high-fiber foods  Good examples are high-fiber cereals, beans, vegetables, and whole-grain breads  Walking is the best way to trigger your intestines to have a bowel movement  · Exercise regularly and maintain a healthy weight  Weight loss and exercise will decrease pressure on your bladder and help you control your leakage  · Use a catheter as directed  to help empty your bladder  A catheter is a tiny, plastic tube that is put into your bladder to drain your urine  · Go to behavior therapy as directed  Behavior therapy may be used to help you learn to control your urge to urinate  Weight Management   Why it is important to manage your weight:  Being overweight increases your risk of health conditions such as heart disease, high blood pressure, type 2 diabetes, and certain types of cancer   It can also increase your risk for osteoarthritis, sleep apnea, and other respiratory problems  Aim for a slow, steady weight loss  Even a small amount of weight loss can lower your risk of health problems  How to lose weight safely:  A safe and healthy way to lose weight is to eat fewer calories and get regular exercise  You can lose up about 1 pound a week by decreasing the number of calories you eat by 500 calories each day  Healthy meal plan for weight management:  A healthy meal plan includes a variety of foods, contains fewer calories, and helps you stay healthy  A healthy meal plan includes the following:  · Eat whole-grain foods more often  A healthy meal plan should contain fiber  Fiber is the part of grains, fruits, and vegetables that is not broken down by your body  Whole-grain foods are healthy and provide extra fiber in your diet  Some examples of whole-grain foods are whole-wheat breads and pastas, oatmeal, brown rice, and bulgur  · Eat a variety of vegetables every day  Include dark, leafy greens such as spinach, kale, chato greens, and mustard greens  Eat yellow and orange vegetables such as carrots, sweet potatoes, and winter squash  · Eat a variety of fruits every day  Choose fresh or canned fruit (canned in its own juice or light syrup) instead of juice  Fruit juice has very little or no fiber  · Eat low-fat dairy foods  Drink fat-free (skim) milk or 1% milk  Eat fat-free yogurt and low-fat cottage cheese  Try low-fat cheeses such as mozzarella and other reduced-fat cheeses  · Choose meat and other protein foods that are low in fat  Choose beans or other legumes such as split peas or lentils  Choose fish, skinless poultry (chicken or turkey), or lean cuts of red meat (beef or pork)  Before you cook meat or poultry, cut off any visible fat  · Use less fat and oil  Try baking foods instead of frying them   Add less fat, such as margarine, sour cream, regular salad dressing and mayonnaise to foods  Eat fewer high-fat foods  Some examples of high-fat foods include french fries, doughnuts, ice cream, and cakes  · Eat fewer sweets  Limit foods and drinks that are high in sugar  This includes candy, cookies, regular soda, and sweetened drinks  Exercise:  Exercise at least 30 minutes per day on most days of the week  Some examples of exercise include walking, biking, dancing, and swimming  You can also fit in more physical activity by taking the stairs instead of the elevator or parking farther away from stores  Ask your healthcare provider about the best exercise plan for you  © Copyright [employer] 2018 Information is for End User's use only and may not be sold, redistributed or otherwise used for commercial purposes   All illustrations and images included in CareNotes® are the copyrighted property of [employer]  or [address] [place], [place] [address]pa[facility]

## 2019-09-27 ENCOUNTER — APPOINTMENT (OUTPATIENT)
Dept: LAB | Age: 74
End: 2019-09-27
Payer: MEDICARE

## 2019-09-27 ENCOUNTER — TRANSCRIBE ORDERS (OUTPATIENT)
Dept: ADMINISTRATIVE | Age: 74
End: 2019-09-27

## 2019-09-27 LAB
25(OH)D3 SERPL-MCNC: 28.3 NG/ML (ref 30–100)
ALBUMIN SERPL BCP-MCNC: 4.1 G/DL (ref 3.5–5)
ALP SERPL-CCNC: 80 U/L (ref 46–116)
ALT SERPL W P-5'-P-CCNC: 20 U/L (ref 12–78)
ANION GAP SERPL CALCULATED.3IONS-SCNC: 6 MMOL/L (ref 4–13)
AST SERPL W P-5'-P-CCNC: 19 U/L (ref 5–45)
BASOPHILS # BLD AUTO: 0.04 THOUSANDS/ΜL (ref 0–0.1)
BASOPHILS NFR BLD AUTO: 1 % (ref 0–1)
BILIRUB SERPL-MCNC: 0.64 MG/DL (ref 0.2–1)
BUN SERPL-MCNC: 12 MG/DL (ref 5–25)
CALCIUM SERPL-MCNC: 9.2 MG/DL (ref 8.3–10.1)
CHLORIDE SERPL-SCNC: 106 MMOL/L (ref 100–108)
CO2 SERPL-SCNC: 27 MMOL/L (ref 21–32)
CREAT SERPL-MCNC: 0.69 MG/DL (ref 0.6–1.3)
EOSINOPHIL # BLD AUTO: 0.19 THOUSAND/ΜL (ref 0–0.61)
EOSINOPHIL NFR BLD AUTO: 3 % (ref 0–6)
ERYTHROCYTE [DISTWIDTH] IN BLOOD BY AUTOMATED COUNT: 13 % (ref 11.6–15.1)
GFR SERPL CREATININE-BSD FRML MDRD: 85 ML/MIN/1.73SQ M
GLUCOSE P FAST SERPL-MCNC: 91 MG/DL (ref 65–99)
HCT VFR BLD AUTO: 42.8 % (ref 34.8–46.1)
HGB BLD-MCNC: 13.6 G/DL (ref 11.5–15.4)
IMM GRANULOCYTES # BLD AUTO: 0.01 THOUSAND/UL (ref 0–0.2)
IMM GRANULOCYTES NFR BLD AUTO: 0 % (ref 0–2)
LYMPHOCYTES # BLD AUTO: 1.92 THOUSANDS/ΜL (ref 0.6–4.47)
LYMPHOCYTES NFR BLD AUTO: 30 % (ref 14–44)
MCH RBC QN AUTO: 28.9 PG (ref 26.8–34.3)
MCHC RBC AUTO-ENTMCNC: 31.8 G/DL (ref 31.4–37.4)
MCV RBC AUTO: 91 FL (ref 82–98)
MONOCYTES # BLD AUTO: 0.57 THOUSAND/ΜL (ref 0.17–1.22)
MONOCYTES NFR BLD AUTO: 9 % (ref 4–12)
NEUTROPHILS # BLD AUTO: 3.75 THOUSANDS/ΜL (ref 1.85–7.62)
NEUTS SEG NFR BLD AUTO: 57 % (ref 43–75)
NRBC BLD AUTO-RTO: 0 /100 WBCS
PLATELET # BLD AUTO: 218 THOUSANDS/UL (ref 149–390)
PMV BLD AUTO: 10.8 FL (ref 8.9–12.7)
POTASSIUM SERPL-SCNC: 4.2 MMOL/L (ref 3.5–5.3)
PROT SERPL-MCNC: 7.3 G/DL (ref 6.4–8.2)
RBC # BLD AUTO: 4.7 MILLION/UL (ref 3.81–5.12)
SODIUM SERPL-SCNC: 139 MMOL/L (ref 136–145)
WBC # BLD AUTO: 6.48 THOUSAND/UL (ref 4.31–10.16)

## 2019-09-27 PROCEDURE — 80053 COMPREHEN METABOLIC PANEL: CPT | Performed by: FAMILY MEDICINE

## 2019-09-27 PROCEDURE — 36415 COLL VENOUS BLD VENIPUNCTURE: CPT | Performed by: FAMILY MEDICINE

## 2019-09-27 PROCEDURE — 85025 COMPLETE CBC W/AUTO DIFF WBC: CPT | Performed by: FAMILY MEDICINE

## 2019-09-27 PROCEDURE — 82306 VITAMIN D 25 HYDROXY: CPT | Performed by: FAMILY MEDICINE

## 2019-10-03 NOTE — RESULT ENCOUNTER NOTE
Charito Villa, all of your labs are normal except for a low vitamin-D level at 28 3  Normal range 30 to 100   Dose of vitamin-D 9222-3001 units daily

## 2019-10-16 ENCOUNTER — TELEPHONE (OUTPATIENT)
Dept: FAMILY MEDICINE CLINIC | Facility: CLINIC | Age: 74
End: 2019-10-16

## 2019-10-16 DIAGNOSIS — F32.0 CURRENT MILD EPISODE OF MAJOR DEPRESSIVE DISORDER WITHOUT PRIOR EPISODE (HCC): ICD-10-CM

## 2019-10-16 NOTE — TELEPHONE ENCOUNTER
Call she can increase her dose of Sertraline to 50 mg 1 and 1/2 tablet daily ( 75 mg/day) I sent in a new script

## 2019-10-16 NOTE — TELEPHONE ENCOUNTER
Patient is currently taking SERTRALINE 50 mg & is requesting an increase to  her dose  Her  was recently diagnosed with cancer & she has increased stress & anxiety  She needs something to help get her through  Please advise if you are agreeable to increasing dose      [address] [phone]

## 2020-03-23 DIAGNOSIS — F32.0 CURRENT MILD EPISODE OF MAJOR DEPRESSIVE DISORDER WITHOUT PRIOR EPISODE (HCC): ICD-10-CM

## 2020-06-05 ENCOUNTER — OFFICE VISIT (OUTPATIENT)
Dept: GYNECOLOGIC ONCOLOGY | Facility: CLINIC | Age: 75
End: 2020-06-05

## 2020-06-05 VITALS
SYSTOLIC BLOOD PRESSURE: 102 MMHG | RESPIRATION RATE: 16 BRPM | HEIGHT: 66 IN | HEART RATE: 80 BPM | WEIGHT: 203 LBS | BODY MASS INDEX: 32.62 KG/M2 | DIASTOLIC BLOOD PRESSURE: 74 MMHG | TEMPERATURE: 98.7 F

## 2020-06-05 DIAGNOSIS — Z85.42 HISTORY OF ENDOMETRIAL CANCER: Primary | ICD-10-CM

## 2020-06-05 DIAGNOSIS — Z08 ENCOUNTER FOR FOLLOW-UP SURVEILLANCE OF ENDOMETRIAL CANCER: ICD-10-CM

## 2020-06-05 DIAGNOSIS — Z85.42 ENCOUNTER FOR FOLLOW-UP SURVEILLANCE OF ENDOMETRIAL CANCER: ICD-10-CM

## 2020-06-05 PROCEDURE — 4040F PNEUMOC VAC/ADMIN/RCVD: CPT | Performed by: PHYSICIAN ASSISTANT

## 2020-06-05 PROCEDURE — 1160F RVW MEDS BY RX/DR IN RCRD: CPT | Performed by: PHYSICIAN ASSISTANT

## 2020-06-05 PROCEDURE — 3008F BODY MASS INDEX DOCD: CPT | Performed by: PHYSICIAN ASSISTANT

## 2020-06-05 PROCEDURE — 99212 OFFICE O/P EST SF 10 MIN: CPT | Performed by: PHYSICIAN ASSISTANT

## 2020-06-05 PROCEDURE — 1036F TOBACCO NON-USER: CPT | Performed by: PHYSICIAN ASSISTANT

## 2020-09-30 ENCOUNTER — OFFICE VISIT (OUTPATIENT)
Dept: FAMILY MEDICINE CLINIC | Facility: CLINIC | Age: 75
End: 2020-09-30
Payer: MEDICARE

## 2020-09-30 VITALS
WEIGHT: 210 LBS | DIASTOLIC BLOOD PRESSURE: 86 MMHG | RESPIRATION RATE: 16 BRPM | SYSTOLIC BLOOD PRESSURE: 138 MMHG | HEART RATE: 66 BPM | BODY MASS INDEX: 33.75 KG/M2 | HEIGHT: 66 IN | TEMPERATURE: 97.8 F

## 2020-09-30 DIAGNOSIS — R41.3 MEMORY CHANGES: ICD-10-CM

## 2020-09-30 DIAGNOSIS — M85.88 OSTEOPENIA OF LUMBAR SPINE: ICD-10-CM

## 2020-09-30 DIAGNOSIS — E55.9 VITAMIN D DEFICIENCY: ICD-10-CM

## 2020-09-30 DIAGNOSIS — Z00.00 MEDICARE ANNUAL WELLNESS VISIT, SUBSEQUENT: ICD-10-CM

## 2020-09-30 DIAGNOSIS — M17.0 PRIMARY OSTEOARTHRITIS OF BOTH KNEES: ICD-10-CM

## 2020-09-30 DIAGNOSIS — C54.1 ENDOMETRIAL CANCER (HCC): ICD-10-CM

## 2020-09-30 DIAGNOSIS — E78.5 DYSLIPIDEMIA: ICD-10-CM

## 2020-09-30 DIAGNOSIS — E04.2 MULTINODULAR THYROID: Primary | ICD-10-CM

## 2020-09-30 DIAGNOSIS — F33.42 RECURRENT MAJOR DEPRESSIVE DISORDER, IN FULL REMISSION (HCC): ICD-10-CM

## 2020-09-30 DIAGNOSIS — Z23 NEED FOR INFLUENZA VACCINATION: ICD-10-CM

## 2020-09-30 PROBLEM — Z85.42 ENCOUNTER FOR FOLLOW-UP SURVEILLANCE OF ENDOMETRIAL CANCER: Status: RESOLVED | Noted: 2019-03-21 | Resolved: 2020-09-30

## 2020-09-30 PROBLEM — Z08 ENCOUNTER FOR FOLLOW-UP SURVEILLANCE OF ENDOMETRIAL CANCER: Status: RESOLVED | Noted: 2019-03-21 | Resolved: 2020-09-30

## 2020-09-30 PROCEDURE — 90662 IIV NO PRSV INCREASED AG IM: CPT | Performed by: FAMILY MEDICINE

## 2020-09-30 PROCEDURE — G0438 PPPS, INITIAL VISIT: HCPCS | Performed by: FAMILY MEDICINE

## 2020-09-30 PROCEDURE — 99214 OFFICE O/P EST MOD 30 MIN: CPT | Performed by: FAMILY MEDICINE

## 2020-09-30 PROCEDURE — G0008 ADMIN INFLUENZA VIRUS VAC: HCPCS | Performed by: FAMILY MEDICINE

## 2020-09-30 RX ORDER — SERTRALINE HYDROCHLORIDE 100 MG/1
100 TABLET, FILM COATED ORAL DAILY
Qty: 90 TABLET | Refills: 1 | Status: SHIPPED | OUTPATIENT
Start: 2020-09-30 | End: 2021-03-18 | Stop reason: SDUPTHER

## 2020-09-30 NOTE — PATIENT INSTRUCTIONS
Medicare Preventive Visit Patient Instructions  Thank you for completing your Welcome to Medicare Visit or Medicare Annual Wellness Visit today  Your next wellness visit will be due in one year (9/30/2021)  The screening/preventive services that you may require over the next 5-10 years are detailed below  Some tests may not apply to you based off risk factors and/or age  Screening tests ordered at today's visit but not completed yet may show as past due  Also, please note that scanned in results may not display below  Preventive Screenings:  Service Recommendations Previous Testing/Comments   Colorectal Cancer Screening  * Colonoscopy    * Fecal Occult Blood Test (FOBT)/Fecal Immunochemical Test (FIT)  * Fecal DNA/Cologuard Test  * Flexible Sigmoidoscopy Age: 54-65 years old   Colonoscopy: every 10 years (may be performed more frequently if at higher risk)  OR  FOBT/FIT: every 1 year  OR  Cologuard: every 3 years  OR  Sigmoidoscopy: every 5 years  Screening may be recommended earlier than age 48 if at higher risk for colorectal cancer  Also, an individualized decision between you and your healthcare provider will decide whether screening between the ages of 74-80 would be appropriate  Colonoscopy: 09/03/2014  FOBT/FIT: Not on file  Cologuard: Not on file  Sigmoidoscopy: Not on file         Breast Cancer Screening Age: 36 years old  Frequency: every 1-2 years  Not required if history of left and right mastectomy Mammogram: 11/08/2019    Screening Current   Cervical Cancer Screening Between the ages of 21-29, pap smear recommended once every 3 years  Between the ages of 33-67, can perform pap smear with HPV co-testing every 5 years     Recommendations may differ for women with a history of total hysterectomy, cervical cancer, or abnormal pap smears in past  Pap Smear: Not on file    Screening Not Indicated   Hepatitis C Screening Once for adults born between 1945 and 1965  More frequently in patients at high risk for Hepatitis C Hep C Antibody: Not on file       Diabetes Screening 1-2 times per year if you're at risk for diabetes or have pre-diabetes Fasting glucose: 91 mg/dL   A1C: No results in last 5 years       Cholesterol Screening Once every 5 years if you don't have a lipid disorder  May order more often based on risk factors  Lipid panel: Not on file         Other Preventive Screenings Covered by Medicare:  1  Abdominal Aortic Aneurysm (AAA) Screening: covered once if your at risk  You're considered to be at risk if you have a family history of AAA  2  Lung Cancer Screening: covers low dose CT scan once per year if you meet all of the following conditions: (1) Age 50-69; (2) No signs or symptoms of lung cancer; (3) Current smoker or have quit smoking within the last 15 years; (4) You have a tobacco smoking history of at least 30 pack years (packs per day multiplied by number of years you smoked); (5) You get a written order from a healthcare provider  3  Glaucoma Screening: covered annually if you're considered high risk: (1) You have diabetes OR (2) Family history of glaucoma OR (3)  aged 48 and older OR (3)  American aged 72 and older  3  Osteoporosis Screening: covered every 2 years if you meet one of the following conditions: (1) You're estrogen deficient and at risk for osteoporosis based off medical history and other findings; (2) Have a vertebral abnormality; (3) On glucocorticoid therapy for more than 3 months; (4) Have primary hyperparathyroidism; (5) On osteoporosis medications and need to assess response to drug therapy  · Last bone density test (DXA Scan): 03/17/2018  5  HIV Screening: covered annually if you're between the age of 12-76  Also covered annually if you are younger than 13 and older than 72 with risk factors for HIV infection  For pregnant patients, it is covered up to 3 times per pregnancy      Immunizations:  Immunization Recommendations   Influenza Vaccine Annual influenza vaccination during flu season is recommended for all persons aged >= 6 months who do not have contraindications   Pneumococcal Vaccine (Prevnar and Pneumovax)  * Prevnar = PCV13  * Pneumovax = PPSV23   Adults 25-60 years old: 1-3 doses may be recommended based on certain risk factors  Adults 72 years old: Prevnar (PCV13) vaccine recommended followed by Pneumovax (PPSV23) vaccine  If already received PPSV23 since turning 65, then PCV13 recommended at least one year after PPSV23 dose  Hepatitis B Vaccine 3 dose series if at intermediate or high risk (ex: diabetes, end stage renal disease, liver disease)   Tetanus (Td) Vaccine - COST NOT COVERED BY MEDICARE PART B Following completion of primary series, a booster dose should be given every 10 years to maintain immunity against tetanus  Td may also be given as tetanus wound prophylaxis  Tdap Vaccine - COST NOT COVERED BY MEDICARE PART B Recommended at least once for all adults  For pregnant patients, recommended with each pregnancy  Shingles Vaccine (Shingrix) - COST NOT COVERED BY MEDICARE PART B  2 shot series recommended in those aged 48 and above     Health Maintenance Due:  There are no preventive care reminders to display for this patient  Immunizations Due:      Topic Date Due    DTaP,Tdap,and Td Vaccines (1 - Tdap) 04/28/1966    Influenza Vaccine  07/01/2020     Advance Directives   What are advance directives? Advance directives are legal documents that state your wishes and plans for medical care  These plans are made ahead of time in case you lose your ability to make decisions for yourself  Advance directives can apply to any medical decision, such as the treatments you want, and if you want to donate organs  What are the types of advance directives? There are many types of advance directives, and each state has rules about how to use them  You may choose a combination of any of the following:  · Living will:   This is a written record of the treatment you want  You can also choose which treatments you do not want, which to limit, and which to stop at a certain time  This includes surgery, medicine, IV fluid, and tube feedings  · Durable power of  for healthcare [facility]): This is a written record that states who you want to make healthcare choices for you when you are unable to make them for yourself  This person, called a proxy, is usually a family member or a friend  You may choose more than 1 proxy  · Do not resuscitate (DNR) order:  A DNR order is used in case your heart stops beating or you stop breathing  It is a request not to have certain forms of treatment, such as CPR  A DNR order may be included in other types of advance directives  · Medical directive: This covers the care that you want if you are in a coma, near death, or unable to make decisions for yourself  You can list the treatments you want for each condition  Treatment may include pain medicine, surgery, blood transfusions, dialysis, IV or tube feedings, and a ventilator (breathing machine)  · Values history: This document has questions about your views, beliefs, and how you feel and think about life  This information can help others choose the care that you would choose  Why are advance directives important? An advance directive helps you control your care  Although spoken wishes may be used, it is better to have your wishes written down  Spoken wishes can be misunderstood, or not followed  Treatments may be given even if you do not want them  An advance directive may make it easier for your family to make difficult choices about your care  Weight Management   Why it is important to manage your weight:  Being overweight increases your risk of health conditions such as heart disease, high blood pressure, type 2 diabetes, and certain types of cancer  It can also increase your risk for osteoarthritis, sleep apnea, and other respiratory problems   Aim for a slow, steady weight loss  Even a small amount of weight loss can lower your risk of health problems  How to lose weight safely:  A safe and healthy way to lose weight is to eat fewer calories and get regular exercise  You can lose up about 1 pound a week by decreasing the number of calories you eat by 500 calories each day  Healthy meal plan for weight management:  A healthy meal plan includes a variety of foods, contains fewer calories, and helps you stay healthy  A healthy meal plan includes the following:  · Eat whole-grain foods more often  A healthy meal plan should contain fiber  Fiber is the part of grains, fruits, and vegetables that is not broken down by your body  Whole-grain foods are healthy and provide extra fiber in your diet  Some examples of whole-grain foods are whole-wheat breads and pastas, oatmeal, brown rice, and bulgur  · Eat a variety of vegetables every day  Include dark, leafy greens such as spinach, kale, chato greens, and mustard greens  Eat yellow and orange vegetables such as carrots, sweet potatoes, and winter squash  · Eat a variety of fruits every day  Choose fresh or canned fruit (canned in its own juice or light syrup) instead of juice  Fruit juice has very little or no fiber  · Eat low-fat dairy foods  Drink fat-free (skim) milk or 1% milk  Eat fat-free yogurt and low-fat cottage cheese  Try low-fat cheeses such as mozzarella and other reduced-fat cheeses  · Choose meat and other protein foods that are low in fat  Choose beans or other legumes such as split peas or lentils  Choose fish, skinless poultry (chicken or turkey), or lean cuts of red meat (beef or pork)  Before you cook meat or poultry, cut off any visible fat  · Use less fat and oil  Try baking foods instead of frying them  Add less fat, such as margarine, sour cream, regular salad dressing and mayonnaise to foods  Eat fewer high-fat foods   Some examples of high-fat foods include french fries, doughnuts, ice cream, and cakes  · Eat fewer sweets  Limit foods and drinks that are high in sugar  This includes candy, cookies, regular soda, and sweetened drinks  Exercise:  Exercise at least 30 minutes per day on most days of the week  Some examples of exercise include walking, biking, dancing, and swimming  You can also fit in more physical activity by taking the stairs instead of the elevator or parking farther away from stores  Ask your healthcare provider about the best exercise plan for you  © Copyright [employer] 2018 Information is for End User's use only and may not be sold, redistributed or otherwise used for commercial purposes   All illustrations and images included in CareNotes® are the copyrighted property of [employer]  or [address] [place], [place] [address]pe

## 2020-09-30 NOTE — PROGRESS NOTES
Assessment/Plan:     Diagnoses and all orders for this visit:    Multinodular thyroid  -     TSH, 3rd generation with Free T4 reflex    Dyslipidemia  -     Lipid panel    Osteopenia of lumbar spine  -     DXA bone density spine hip and pelvis; Future    Vitamin D deficiency  -     Vitamin D 25 hydroxy    Endometrial cancer (HCC)  -     Comprehensive metabolic panel  -     CBC and differential    Primary osteoarthritis of both knees    Memory changes  -     Vitamin B12  -     Folate    Recurrent major depressive disorder, in full remission (HCC)  -     sertraline (ZOLOFT) 100 mg tablet; Take 1 tablet (100 mg total) by mouth daily    Medicare annual wellness visit, subsequent    Need for influenza vaccination  -     influenza vaccine, high-dose, PF 0 7 mL (FLUZONE HIGH-DOSE)          Repeat labs  Monitor BP at home  Patient is requesting an increase in dose of Sertraline  Dose changed to 100 mg daily  No treatment needed at this time for OA knees  Could consider visco supplementation  Repeat DEXA scan  BMI Counseling: Body mass index is 33 89 kg/m²  The BMI is above normal  Nutrition recommendations include reducing portion sizes, decreasing overall calorie intake, consuming healthier snacks, moderation in carbohydrate intake, reducing intake of saturated fat and trans fat and reducing intake of cholesterol  Exercise recommendations include exercising 3-5 times per week  Patient ID: Roshan Hoyt is a 66 y o  female  Follow-up visit  Medications reviewed  History of thyroid nodule with previous biopsies no cancer  01/2019 thyroid ultrasound stable right lower pole nodule with previous non malignant biopsy results  Nodule has been stable for greater than 5 years  10/2018 TSH 1 320  Labs  09/2019  FBS 91  Electrolytes normal  Creatinine 0 69  LFTs normal    Hemoglobin 13 6   Mammogram 11/2019     Recent Results (from the past 30295 hour(s))   CBC and differential    Collection Time: 09/27/19  8:41 AM Result Value Ref Range    WBC 6 48 4 31 - 10 16 Thousand/uL    RBC 4 70 3 81 - 5 12 Million/uL    Hemoglobin 13 6 11 5 - 15 4 g/dL    Hematocrit 42 8 34 8 - 46 1 %    MCV 91 82 - 98 fL    MCH 28 9 26 8 - 34 3 pg    MCHC 31 8 31 4 - 37 4 g/dL    RDW 13 0 11 6 - 15 1 %    MPV 10 8 8 9 - 12 7 fL    Platelets 840 559 - 950 Thousands/uL    nRBC 0 /100 WBCs    Neutrophils Relative 57 43 - 75 %    Immat GRANS % 0 0 - 2 %    Lymphocytes Relative 30 14 - 44 %    Monocytes Relative 9 4 - 12 %    Eosinophils Relative 3 0 - 6 %    Basophils Relative 1 0 - 1 %    Neutrophils Absolute 3 75 1 85 - 7 62 Thousands/µL    Immature Grans Absolute 0 01 0 00 - 0 20 Thousand/uL    Lymphocytes Absolute 1 92 0 60 - 4 47 Thousands/µL    Monocytes Absolute 0 57 0 17 - 1 22 Thousand/µL    Eosinophils Absolute 0 19 0 00 - 0 61 Thousand/µL    Basophils Absolute 0 04 0 00 - 0 10 Thousands/µL   Comprehensive metabolic panel    Collection Time: 09/27/19  8:41 AM   Result Value Ref Range    Sodium 139 136 - 145 mmol/L    Potassium 4 2 3 5 - 5 3 mmol/L    Chloride 106 100 - 108 mmol/L    CO2 27 21 - 32 mmol/L    ANION GAP 6 4 - 13 mmol/L    BUN 12 5 - 25 mg/dL    Creatinine 0 69 0 60 - 1 30 mg/dL    Glucose, Fasting 91 65 - 99 mg/dL    Calcium 9 2 8 3 - 10 1 mg/dL    AST 19 5 - 45 U/L    ALT 20 12 - 78 U/L    Alkaline Phosphatase 80 46 - 116 U/L    Total Protein 7 3 6 4 - 8 2 g/dL    Albumin 4 1 3 5 - 5 0 g/dL    Total Bilirubin 0 64 0 20 - 1 00 mg/dL    eGFR 85 ml/min/1 73sq m   Vitamin D 25 hydroxy    Collection Time: 09/27/19  8:41 AM   Result Value Ref Range    Vit D, 25-Hydroxy 28 3 (L) 30 0 - 100 0 ng/mL       Lab Results   Component Value Date    [id number] 1 320 10/02/2018         The following portions of the patient's history were reviewed and updated as appropriate: allergies, current medications, past family history, past medical history, past social history, past surgical history and problem list     Review of Systems Constitutional: Positive for unexpected weight change (10 lb weight gain from 09/2019  )  Negative for appetite change, chills, fatigue and fever  HENT: Negative for congestion, ear pain, hearing loss, rhinorrhea, sore throat, trouble swallowing and voice change  See HPI  Eyes: Negative for visual disturbance  Respiratory: Negative for cough, shortness of breath and wheezing  Cardiovascular: Negative for chest pain, palpitations and leg swelling  Status post DVT left leg patient completed 9 months of Xarelto  08/2016 prothrombin mutation gene, factor 5 Leiden and homocystine levels normal   She is currently on Aspirin 81 mg daily  Gastrointestinal: Negative for abdominal pain, blood in stool, constipation, diarrhea, nausea and vomiting          09/2014 colonoscopy 1 polyp  Severe diverticulosis   Endocrine:         03/2018 DEXA scan T-score -1 5 lumbar spine consistent with low bone mineral density  Compared to prior examination has been a 5% decrease in the BMD of lumbar spine at 12% decrease in the total BMD of left hip   10/2018 vitamin-D 25   Genitourinary: Negative for difficulty urinating, dysuria, frequency and hematuria  History of endometrial CA stage I grade 3 status post total hysterectomy 12/2014 she completed chemotherapy and radiation therapy  Left inguinal lymph node biopsy 04/2015 no cancer  02/2018 CT scan chest,abdomen/pelvis- no evidence of metastatic disease  Stable 24 mm right thyroid nodule  Distal colonic diverticulosis  Stable small bilateral inguinal lymph nodes  A dominant left inguinal lymph node is unchanged  Musculoskeletal: Negative for arthralgias and myalgias  OA knees x-rays knees 06/2013 moderately severe osteoarthritis right knee  Moderate osteoarthritis left knee  She completed a course of physical therapy with improvement  Asymptomatic at present  On no pain medications  Skin: Negative for rash     Neurological: Negative for dizziness and headaches  At times forgetful  Increase stress at home  Her  has been having treatment for metastatic prostate CA    Hematological: Negative for adenopathy  Does not bruise/bleed easily  Psychiatric/Behavioral: Positive for dysphoric mood  Negative for sleep disturbance and suicidal ideas  On Sertraline 50 mg 1 and 1/2 tablets daily  Objective:      /86 (BP Location: Left arm, Patient Position: Sitting, Cuff Size: Large)   Pulse 66   Temp 97 8 °F (36 6 °C)   Resp 16   Ht 5' 6" (1 676 m)   Wt 95 3 kg (210 lb)   BMI 33 89 kg/m²       BP Readings from Last 3 Encounters:   09/30/20 138/86   06/05/20 102/74   09/17/19 134/82       Wt Readings from Last 3 Encounters:   09/30/20 95 3 kg (210 lb)   06/05/20 92 1 kg (203 lb)   09/17/19 90 7 kg (200 lb)        Physical Exam  Vitals signs and nursing note reviewed  Constitutional:       General: She is not in acute distress  Appearance: She is well-developed  HENT:      Mouth/Throat:      Mouth: No oral lesions  Dentition: Normal dentition  Eyes:      General: No scleral icterus  Conjunctiva/sclera: Conjunctivae normal       Pupils: Pupils are equal, round, and reactive to light  Neck:      Thyroid: No thyroid mass or thyromegaly  Vascular: No carotid bruit or JVD  Trachea: No tracheal deviation  Cardiovascular:      Rate and Rhythm: Normal rate and regular rhythm  Heart sounds: Normal heart sounds  No murmur  No gallop  Pulmonary:      Effort: Pulmonary effort is normal  No respiratory distress  Breath sounds: Normal breath sounds  No wheezing or rales  Abdominal:      General: Bowel sounds are normal  There is no distension or abdominal bruit  Palpations: Abdomen is soft  There is no mass  Tenderness: There is no abdominal tenderness  There is no guarding or rebound  Musculoskeletal: Normal range of motion  General: No deformity  Lymphadenopathy:      Cervical: No cervical adenopathy  Skin:     Findings: No rash  Nails: There is no clubbing  Neurological:      Mental Status: She is alert and oriented to person, place, and time  Cranial Nerves: No cranial nerve deficit        Deep Tendon Reflexes: Reflexes normal

## 2020-09-30 NOTE — PROGRESS NOTES
Assessment and Plan:     Problem List Items Addressed This Visit        Endocrine    Multinodular thyroid - Primary    Relevant Orders    TSH, 3rd generation with Free T4 reflex       Musculoskeletal and Integument    Osteoarthritis of knee    Osteopenia of lumbar spine    Relevant Orders    DXA bone density spine hip and pelvis       Genitourinary    Endometrial cancer (HCC)    Relevant Orders    Comprehensive metabolic panel    CBC and differential       Other    Vitamin D deficiency    Relevant Orders    Vitamin D 25 hydroxy      Other Visit Diagnoses     Dyslipidemia        Relevant Orders    Lipid panel    Memory changes        Relevant Orders    Vitamin B12    Folate    Recurrent major depressive disorder, in full remission ([facility] [address] )        Relevant Medications    sertraline (ZOLOFT) 100 mg tablet    Medicare annual wellness visit, subsequent        Need for influenza vaccination        Relevant Orders    influenza vaccine, high-dose, PF 0 7 mL (FLUZONE HIGH-DOSE) (Completed)        BMI Counseling: Body mass index is 33 89 kg/m²  The BMI is above normal  Nutrition recommendations include decreasing portion sizes, consuming healthier snacks, moderation in carbohydrate intake, reducing intake of saturated and trans fat and reducing intake of cholesterol  Exercise recommendations include exercising 3-5 times per week  No pharmacotherapy was ordered  Preventive health issues were discussed with patient, and age appropriate screening tests were ordered as noted in patient's After Visit Summary  Personalized health advice and appropriate referrals for health education or preventive services given if needed, as noted in patient's After Visit Summary       History of Present Illness:     Patient presents for Medicare Annual Wellness visit    Patient Care Team:  Robert Hernandez MD as PCP - General  Paula Rice MD (Obstetrics and Gynecology)  PRISCA Anderson MD (Surgical Oncology)  Regina Bland Dayana Gusman MD (Radiation Oncology)     Problem List:     Patient Active Problem List   Diagnosis    History of endometrial cancer    Endometrial cancer ([facility] )    Inguinal lymphadenopathy    Multinodular thyroid    Obesity    Osteoarthritis of hip    Osteoarthritis of knee    Vitamin D deficiency    Osteopenia of lumbar spine      Past Medical and Surgical History:     Past Medical History:   Diagnosis Date    Deep vein thrombosis ([facility] ) 1/28/2016    Diverticulosis     last assessed 05/29/13    Fibrocystic breast     History of DVT (deep vein thrombosis)     Xarelto, 4/2015 thru April 2016    Impaired fasting glucose     last assessed 06/04/14    Inguinal lymphadenopathy     Multiple benign polyps of large intestine     Obesity     Uterine cancer ([place] [id number] )      Past Surgical History:   Procedure Laterality Date    HYSTERECTOMY      LYMPH NODE BIOPSY      OOPHORECTOMY      PELVIC LAPAROSCOPY      TONSILLECTOMY      with adenoidectomy      Family History:     Family History   Problem Relation Age of Onset    Hypothyroidism Mother     Diabetes Maternal Grandfather       Social History:        Social History     Socioeconomic History    Marital status: /Civil Union     Spouse name: None    Number of children: None    Years of education: None    Highest education level: None   Occupational History    None   Social Needs    Financial resource strain: None    Food insecurity     Worry: None     Inability: None    Transportation needs     Medical: None     Non-medical: None   Tobacco Use    Smoking status: Never Smoker    Smokeless tobacco: Never Used   Substance and Sexual Activity    Alcohol use: No    Drug use: No    Sexual activity: None   Lifestyle    Physical activity     Days per week: None     Minutes per session: None    Stress: None   Relationships    Social connections     Talks on phone: None     Gets together: None     Attends Christian service: None     Active member of club or organization: None     Attends meetings of clubs or organizations: None     Relationship status: None    Intimate partner violence     Fear of current or ex partner: None     Emotionally abused: None     Physically abused: None     Forced sexual activity: None   Other Topics Concern    None   Social History Narrative    Denied: Home environment Domestic violence      Medications and Allergies:     Current Outpatient Medications   Medication Sig Dispense Refill    Ascorbic Acid (VITAMIN C) 500 MG CAPS Take 1 tablet by mouth daily      Calcium Carb-Cholecalciferol (CALCIUM 500/D) 500-400 MG-UNIT CHEW Chew 1 tablet daily      Cholecalciferol (VITAMIN D3) 2000 units capsule Take 1 tablet by mouth daily      Multiple Vitamins-Minerals (CENTRUM SILVER 50+WOMEN PO) Take 1 tablet by mouth daily      sertraline (ZOLOFT) 100 mg tablet Take 1 tablet (100 mg total) by mouth daily 90 tablet 1     No current facility-administered medications for this visit  No Known Allergies   Immunizations:     Immunization History   Administered Date(s) Administered    Influenza Split High Dose Preservative Free IM 11/04/2016    Influenza, high dose seasonal 0 7 mL 09/17/2019, 09/30/2020    Pneumococcal Conjugate 13-Valent 08/04/2016    Pneumococcal Polysaccharide PPV23 08/08/2017      Health Maintenance: There are no preventive care reminders to display for this patient  There are no preventive care reminders to display for this patient  Medicare Health Risk Assessment:     /86 (BP Location: Left arm, Patient Position: Sitting, Cuff Size: Large)   Pulse 66   Temp 97 8 °F (36 6 °C)   Resp 16   Ht 5' 6" (1 676 m)   Wt 95 3 kg (210 lb)   BMI 33 89 kg/m²      Alban Huynh is here for her Subsequent Wellness visit  Last Medicare Wellness visit information reviewed, patient interviewed and updates made to the record today  Health Risk Assessment:   Patient rates overall health as good   Patient feels that their physical health rating is same  Eyesight was rated as same  Hearing was rated as slightly worse  Patient feels that their emotional and mental health rating is slightly worse  Pain experienced in the last 7 days has been none  Patient states that she has experienced no weight loss or gain in last 6 months  Depression Screening:   PHQ-2 Score: 2      Fall Risk Screening: In the past year, patient has experienced: no history of falling in past year      Urinary Incontinence Screening:   Patient has not leaked urine accidently in the last six months  Home Safety:  Patient does not have trouble with stairs inside or outside of their home  Patient has working smoke alarms and has working carbon monoxide detector  Home safety hazards include: none  Nutrition:   Current diet is Regular and Limited junk food  Medications:   Patient is currently taking over-the-counter supplements  OTC medications include: see medication list  Patient is able to manage medications  Activities of Daily Living (ADLs)/Instrumental Activities of Daily Living (IADLs):   Walk and transfer into and out of bed and chair?: Yes  Dress and groom yourself?: Yes    Bathe or shower yourself?: Yes    Feed yourself?  Yes  Do your laundry/housekeeping?: Yes  Manage your money, pay your bills and track your expenses?: Yes  Make your own meals?: Yes    Do your own shopping?: Yes    Previous Hospitalizations:   Any hospitalizations or ED visits within the last 12 months?: No      Advance Care Planning:   Living will: Yes    Advanced directive: Yes      Cognitive Screening:   Provider or family/friend/caregiver concerned regarding cognition?: No    PREVENTIVE SCREENINGS      Cardiovascular Screening:      Due for: Lipid Panel      Diabetes Screening:       Due for: Blood Glucose      Colorectal Cancer Screening:     General: Risks and Benefits Discussed and Patient Declines      Breast Cancer Screening:     General: Risks and Benefits Discussed    Due for: Mammogram        Cervical Cancer Screening:    General: Screening Not Indicated      Osteoporosis Screening:    General: Screening Current      Lung Cancer Screening:     General: Screening Not Indicated      Hepatitis C Screening:    General: Screening Not Indicated    Other Counseling Topics:   Calcium and vitamin D intake and regular weightbearing exercise         Berlin Krabbe, MD

## 2020-10-29 ENCOUNTER — TELEPHONE (OUTPATIENT)
Dept: FAMILY MEDICINE CLINIC | Facility: CLINIC | Age: 75
End: 2020-10-29

## 2020-10-30 DIAGNOSIS — Z83.3 FH: TYPE 2 DIABETES: ICD-10-CM

## 2020-10-30 DIAGNOSIS — E78.5 DYSLIPIDEMIA: Primary | ICD-10-CM

## 2020-11-11 ENCOUNTER — LAB (OUTPATIENT)
Dept: LAB | Age: 75
End: 2020-11-11
Payer: MEDICARE

## 2020-11-11 LAB
25(OH)D3 SERPL-MCNC: 27.1 NG/ML (ref 30–100)
ALBUMIN SERPL BCP-MCNC: 3.9 G/DL (ref 3.5–5)
ALP SERPL-CCNC: 86 U/L (ref 46–116)
ALT SERPL W P-5'-P-CCNC: 22 U/L (ref 12–78)
ANION GAP SERPL CALCULATED.3IONS-SCNC: 6 MMOL/L (ref 4–13)
AST SERPL W P-5'-P-CCNC: 16 U/L (ref 5–45)
BASOPHILS # BLD AUTO: 0.03 THOUSANDS/ΜL (ref 0–0.1)
BASOPHILS NFR BLD AUTO: 0 % (ref 0–1)
BILIRUB SERPL-MCNC: 0.59 MG/DL (ref 0.2–1)
BUN SERPL-MCNC: 15 MG/DL (ref 5–25)
CALCIUM SERPL-MCNC: 9.5 MG/DL (ref 8.3–10.1)
CHLORIDE SERPL-SCNC: 109 MMOL/L (ref 100–108)
CHOLEST SERPL-MCNC: 186 MG/DL (ref 50–200)
CO2 SERPL-SCNC: 28 MMOL/L (ref 21–32)
CREAT SERPL-MCNC: 0.63 MG/DL (ref 0.6–1.3)
EOSINOPHIL # BLD AUTO: 0.28 THOUSAND/ΜL (ref 0–0.61)
EOSINOPHIL NFR BLD AUTO: 4 % (ref 0–6)
ERYTHROCYTE [DISTWIDTH] IN BLOOD BY AUTOMATED COUNT: 13.2 % (ref 11.6–15.1)
EST. AVERAGE GLUCOSE BLD GHB EST-MCNC: 108 MG/DL
FOLATE SERPL-MCNC: 12 NG/ML (ref 3.1–17.5)
GFR SERPL CREATININE-BSD FRML MDRD: 87 ML/MIN/1.73SQ M
GLUCOSE P FAST SERPL-MCNC: 90 MG/DL (ref 65–99)
HBA1C MFR BLD: 5.4 %
HCT VFR BLD AUTO: 43.1 % (ref 34.8–46.1)
HDLC SERPL-MCNC: 72 MG/DL
HGB BLD-MCNC: 14 G/DL (ref 11.5–15.4)
IMM GRANULOCYTES # BLD AUTO: 0.01 THOUSAND/UL (ref 0–0.2)
IMM GRANULOCYTES NFR BLD AUTO: 0 % (ref 0–2)
LDLC SERPL CALC-MCNC: 99 MG/DL (ref 0–100)
LYMPHOCYTES # BLD AUTO: 2.07 THOUSANDS/ΜL (ref 0.6–4.47)
LYMPHOCYTES NFR BLD AUTO: 29 % (ref 14–44)
MCH RBC QN AUTO: 29.4 PG (ref 26.8–34.3)
MCHC RBC AUTO-ENTMCNC: 32.5 G/DL (ref 31.4–37.4)
MCV RBC AUTO: 90 FL (ref 82–98)
MONOCYTES # BLD AUTO: 0.65 THOUSAND/ΜL (ref 0.17–1.22)
MONOCYTES NFR BLD AUTO: 9 % (ref 4–12)
NEUTROPHILS # BLD AUTO: 4.17 THOUSANDS/ΜL (ref 1.85–7.62)
NEUTS SEG NFR BLD AUTO: 58 % (ref 43–75)
NONHDLC SERPL-MCNC: 114 MG/DL
NRBC BLD AUTO-RTO: 0 /100 WBCS
PLATELET # BLD AUTO: 242 THOUSANDS/UL (ref 149–390)
PMV BLD AUTO: 10.5 FL (ref 8.9–12.7)
POTASSIUM SERPL-SCNC: 4.3 MMOL/L (ref 3.5–5.3)
PROT SERPL-MCNC: 7.2 G/DL (ref 6.4–8.2)
RBC # BLD AUTO: 4.77 MILLION/UL (ref 3.81–5.12)
SODIUM SERPL-SCNC: 143 MMOL/L (ref 136–145)
TRIGL SERPL-MCNC: 76 MG/DL
TSH SERPL DL<=0.05 MIU/L-ACNC: 1.29 UIU/ML (ref 0.36–3.74)
VIT B12 SERPL-MCNC: 673 PG/ML (ref 100–900)
WBC # BLD AUTO: 7.21 THOUSAND/UL (ref 4.31–10.16)

## 2020-11-11 PROCEDURE — 83036 HEMOGLOBIN GLYCOSYLATED A1C: CPT | Performed by: FAMILY MEDICINE

## 2020-11-11 PROCEDURE — 84443 ASSAY THYROID STIM HORMONE: CPT | Performed by: FAMILY MEDICINE

## 2020-11-11 PROCEDURE — 82607 VITAMIN B-12: CPT | Performed by: FAMILY MEDICINE

## 2020-11-11 PROCEDURE — 36415 COLL VENOUS BLD VENIPUNCTURE: CPT | Performed by: FAMILY MEDICINE

## 2020-11-11 PROCEDURE — 82306 VITAMIN D 25 HYDROXY: CPT | Performed by: FAMILY MEDICINE

## 2020-11-11 PROCEDURE — 85025 COMPLETE CBC W/AUTO DIFF WBC: CPT | Performed by: FAMILY MEDICINE

## 2020-11-11 PROCEDURE — 80061 LIPID PANEL: CPT | Performed by: FAMILY MEDICINE

## 2020-11-11 PROCEDURE — 80053 COMPREHEN METABOLIC PANEL: CPT | Performed by: FAMILY MEDICINE

## 2020-11-11 PROCEDURE — 82746 ASSAY OF FOLIC ACID SERUM: CPT | Performed by: FAMILY MEDICINE

## 2020-11-11 NOTE — RESULT ENCOUNTER NOTE
Julsia-I reviewed your labs  All tests are normal except for vitamin-D at 27 1  Range   I would recommend vitamin-D 2,000 to 4,000 units a day    Both your fasting blood sugar and A1c are normal   Cholesterol 186 less than 200 normal        Current Outpatient Medications:     Ascorbic Acid (VITAMIN C) 500 MG CAPS, Take 1 tablet by mouth daily, Disp: , Rfl:     Calcium Carb-Cholecalciferol (CALCIUM 500/D) 500-400 MG-UNIT CHEW, Chew 1 tablet daily, Disp: , Rfl:     Cholecalciferol (VITAMIN D3) 2000 units capsule, Take 1 tablet by mouth daily, Disp: , Rfl:     Multiple Vitamins-Minerals (CENTRUM SILVER 50+WOMEN PO), Take 1 tablet by mouth daily, Disp: , Rfl:     sertraline (ZOLOFT) 100 mg tablet, Take 1 tablet (100 mg total) by mouth daily, Disp: 90 tablet, Rfl: 1

## 2020-12-04 DIAGNOSIS — Z12.31 ENCOUNTER FOR SCREENING MAMMOGRAM FOR BREAST CANCER: ICD-10-CM

## 2021-02-12 DIAGNOSIS — Z23 ENCOUNTER FOR IMMUNIZATION: ICD-10-CM

## 2021-02-26 ENCOUNTER — TRANSCRIBE ORDERS (OUTPATIENT)
Dept: ADMINISTRATIVE | Age: 76
End: 2021-02-26

## 2021-03-18 DIAGNOSIS — F33.42 RECURRENT MAJOR DEPRESSIVE DISORDER, IN FULL REMISSION (HCC): ICD-10-CM

## 2021-03-18 RX ORDER — SERTRALINE HYDROCHLORIDE 100 MG/1
100 TABLET, FILM COATED ORAL DAILY
Qty: 90 TABLET | Refills: 1 | Status: SHIPPED | OUTPATIENT
Start: 2021-03-18 | End: 2021-08-10 | Stop reason: SDUPTHER

## 2021-03-29 ENCOUNTER — OFFICE VISIT (OUTPATIENT)
Dept: FAMILY MEDICINE CLINIC | Facility: CLINIC | Age: 76
End: 2021-03-29
Payer: MEDICARE

## 2021-03-29 VITALS
TEMPERATURE: 96.7 F | SYSTOLIC BLOOD PRESSURE: 160 MMHG | DIASTOLIC BLOOD PRESSURE: 100 MMHG | BODY MASS INDEX: 30.7 KG/M2 | HEART RATE: 78 BPM | RESPIRATION RATE: 14 BRPM | WEIGHT: 191 LBS | HEIGHT: 66 IN

## 2021-03-29 DIAGNOSIS — M54.50 ACUTE BILATERAL LOW BACK PAIN WITHOUT SCIATICA: Primary | ICD-10-CM

## 2021-03-29 DIAGNOSIS — S39.012A LUMBAR STRAIN, INITIAL ENCOUNTER: ICD-10-CM

## 2021-03-29 PROCEDURE — 99213 OFFICE O/P EST LOW 20 MIN: CPT | Performed by: FAMILY MEDICINE

## 2021-03-29 RX ORDER — METHOCARBAMOL 500 MG/1
500 TABLET, FILM COATED ORAL 4 TIMES DAILY PRN
Qty: 20 TABLET | Refills: 0 | Status: SHIPPED | OUTPATIENT
Start: 2021-03-29 | End: 2021-06-07 | Stop reason: SDUPTHER

## 2021-03-29 NOTE — PATIENT INSTRUCTIONS
Lower Back Exercises   WHAT YOU NEED TO KNOW:   Lower back exercises help heal and strengthen your back muscles to prevent another injury  Ask your healthcare provider if you need to see a physical therapist for more advanced exercises  DISCHARGE INSTRUCTIONS:   Return to the emergency department if:   · You have severe pain that prevents you from moving  Contact your healthcare provider if:   · Your pain becomes worse  · You have new pain  · You have questions or concerns about your condition or care  Do lower back exercises safely:   · Do the exercises on a mat or firm surface  (not on a bed) to support your spine and prevent low back pain  · Move slowly and smoothly  Avoid fast or jerky motions  · Breathe normally  Do not hold your breath  · Stop if you feel pain  It is normal to feel some discomfort at first  Regular exercise will help decrease your discomfort over time  Lower back exercises: Your healthcare provider may recommend that you do back exercises 10 to 30 minutes each day  He may also recommend that you do exercises 1 to 3 times each day  Ask your healthcare provider which exercises are best for you and how often to do them  · Ankle pumps:  Lie on your back  Move your foot up (with your toes pointing toward your head)  Then, move your foot down (with your toes pointing away from you)  Repeat this exercise 10 times on each side  · Heel slides:  Lie on your back  Slowly bend one leg and then straighten it  Next, bend the other leg and then straighten it  Repeat 10 times on each side  · Pelvic tilt:  Lie on your back with your knees bent and feet flat on the floor  Place your arms in a relaxed position beside your body  Tighten the muscles of your abdomen and flatten your back against the floor  Hold for 5 seconds  Repeat 5 times  · Back stretch:  Lie on your back with your hands behind your head   Bend your knees and turn the lower half of your body to one side  Hold this position for 10 seconds  Repeat 3 times on each side  · Straight leg raises:  Lie on your back with one leg straight  Bend the other knee  Tighten your abdomen and then slowly lift the straight leg up about 6 to 12 inches off the floor  Hold for 1 to 5 seconds  Lower your leg slowly  Repeat 10 times on each leg  · Knee-to-chest:  Lie on your back with your knees bent and feet flat on the floor  Pull one of your knees toward your chest and hold it there for 5 seconds  Return your leg to the starting position  Lift the other knee toward your chest and hold for 5 seconds  Do this 5 times on each side  · Cat and camel:  Place your hands and knees on the floor  Arch your back upward toward the ceiling and lower your head  Round out your spine as much as you can  Hold for 5 seconds  Lift your head upward and push your chest downward toward the floor  Hold for 5 seconds  Do 3 sets or as directed  · Wall squats:  Stand with your back against a wall  Tighten the muscles of your abdomen  Slowly lower your body until your knees are bent at a 45 degree angle  Hold this position for 5 seconds  Slowly move back up to a standing position  Repeat 10 times  · Bird dog:  Place your hands and knees on the floor  Keep your wrists directly below your shoulders and your knees directly below your hips  Pull your belly button in toward your spine  Do not flatten or arch your back  Tighten your abdominal muscles  Raise one arm straight out so that it is aligned with your head  Next, raise the leg opposite your arm  Hold this position for 15 seconds  Lower your arm and leg slowly and change sides  Do 5 sets  © Copyright [address] Information is for End User's use only and may not be sold, redistributed or otherwise used for commercial purposes   All illustrations and images included in CareNotes® are the copyrighted property of [employer] D [employer] M [employer] Inc  or [employer]  The above information is an  only  It is not intended as medical advice for individual conditions or treatments  Talk to your doctor, nurse or pharmacist before following any medical regimen to see if it is safe and effective for you  Low Back Strain   WHAT YOU NEED TO KNOW:   Low back strain is an injury to your lower back muscles or tendons  Tendons are strong tissues that connect muscles to bones  The lower back supports most of your body weight and helps you move, twist, and bend  DISCHARGE INSTRUCTIONS:   Return to the emergency department if:   · You hear or feel a pop in your lower back  · You have increased swelling or pain in your lower back  · You have trouble moving your legs  · Your legs are numb  Contact your healthcare provider if:   · You have a fever  · Your pain does not go away, even after treatment  · You have questions or concerns about your condition or care  Medicines: The following medicines may be ordered by your healthcare provider:  · Acetaminophen decreases pain and fever  It is available without a doctor's order  Ask how much to take and how often to take it  Follow directions  Acetaminophen can cause liver damage if not taken correctly  · NSAIDs , such as ibuprofen, help decrease swelling, pain, and fever  This medicine is available with or without a doctor's order  NSAIDs can cause stomach bleeding or kidney problems in certain people  If you take blood thinner medicine, always ask your healthcare provider if NSAIDs are safe for you  Always read the medicine label and follow directions  · Muscle relaxers  help decrease pain and muscle spasms  · Prescription pain medicine  may be given  Ask how to take this medicine safely  · Take your medicine as directed  Contact your healthcare provider if you think your medicine is not helping or if you have side effects  Tell him or her if you are allergic to any medicine   Keep a list of the medicines, vitamins, and herbs you take  Include the amounts, and when and why you take them  Bring the list or the pill bottles to follow-up visits  Carry your medicine list with you in case of an emergency  Self-care:   · Rest  as directed  You may need to rest in bed for a period of time after your injury  Do not lift heavy objects  · Apply ice  on your back for 15 to 20 minutes every hour or as directed  Use an ice pack, or put crushed ice in a plastic bag  Cover it with a towel  Ice helps prevent tissue damage and decreases swelling and pain  · Apply heat  on your lower back for 20 to 30 minutes every 2 hours for as many days as directed  Heat helps decrease pain and muscle spasms  · Slowly start to increase your activity  as the pain decreases, or as directed  Prevent another low back strain:   · Use correct body movements  ? Bend at the hips and knees when you  objects  Do not bend from the waist  Use your leg muscles as you lift the load  Do not use your back  Keep the object close to your chest as you lift it  Try not to twist or lift anything above your waist     ? Change your position often when you stand for long periods of time  Rest one foot on a small box or footrest, and then switch to the other foot often  ? Try not to sit for long periods of time  When you do, sit in a straight-backed chair with your feet flat on the floor  ? Never reach, pull, or push while you are sitting  · Warm up before you exercise  Do exercises that strengthen your back muscles  Ask your healthcare provider about the best exercise plan for you  · Maintain a healthy weight  Ask your healthcare provider how much you should weigh  Ask him to help you create a weight loss plan if you are overweight  © Copyright [address] Information is for End User's use only and may not be sold, redistributed or otherwise used for commercial purposes   All illustrations and images included in [address] are the copyrighted property of A D A M , Inc  or [facility] Stacy Blake   The above information is an  only  It is not intended as medical advice for individual conditions or treatments  Talk to your doctor, nurse or pharmacist before following any medical regimen to see if it is safe and effective for you

## 2021-03-29 NOTE — PROGRESS NOTES
FAMILY MEDICINE PROGRESS NOTE    Date of Service: 21  Primary Care Provider:   Linda Hancock MD       Name: Natalie Teresa       : 1945       Age:75 y o  Sex: female      MRN: [id number]      Chief Complaint:Back Pain       ASSESSMENT and PLAN:  Natalie Teresa is a 66 y o  female with:     Problem List Items Addressed This Visit     None      Visit Diagnoses     Acute bilateral low back pain without sciatica    -  Primary    Lumbar strain, initial encounter        Relevant Medications    methocarbamol (ROBAXIN) 500 mg tablet        Likely lumbar strain from lifting heavy garbage bag  No midline tenderness on exam, no radiating symptoms  Recommended ongoing use of Tylenol, trial of ibuprofen  Will prescribe Robaxin to see if this helps symptoms  Handout of back stretches and exercises provided to patient  Follow-up if symptoms do not improve  SUBJECTIVE:  Natalie Teresa is a 66 y o  female with history of depression, endometrial cancer, osteopenia, hyperlipidemia who presents today with a chief complaint of Back Pain  HPI     She reports symptoms of back pain that started on Friday  The pain is worse with bending over  The pain is bothersome with standing for prolonged periods of time, like doing the dishes  She denies any injury or trauma, though she did lift a garbage bag that was heavier than she feels than she should have lifted  She has been taking 650 mg Tylenol every 6 hours with some relief  The pain does wake her up a night when laying flat  She denies radiating pain, radicular symptoms  Review of Systems   Musculoskeletal: Positive for back pain  Neurological: Negative for weakness and numbness  I have reviewed the patient's Past Medical History      Current Outpatient Medications:     Ascorbic Acid (VITAMIN C) 500 MG CAPS, Take 1 tablet by mouth daily, Disp: , Rfl:     Calcium Carb-Cholecalciferol (CALCIUM 500/D) 500-400 MG-UNIT CHEW, Chew 1 tablet daily, Disp: , Rfl:   Cholecalciferol (VITAMIN D3) 2000 units capsule, Take 1 tablet by mouth daily, Disp: , Rfl:     Multiple Vitamins-Minerals (CENTRUM SILVER 50+WOMEN PO), Take 1 tablet by mouth daily, Disp: , Rfl:     sertraline (ZOLOFT) 100 mg tablet, Take 1 tablet (100 mg total) by mouth daily, Disp: 90 tablet, Rfl: 1    methocarbamol (ROBAXIN) 500 mg tablet, Take 1 tablet (500 mg total) by mouth 4 (four) times a day as needed for muscle spasms, Disp: 20 tablet, Rfl: 0    OBJECTIVE:  /100   Pulse 78   Temp (!) 96 7 °F (35 9 °C)   Resp 14   Ht 5' 6" (1 676 m)   Wt 86 6 kg (191 lb)   BMI 30 83 kg/m²    BP Readings from Last 3 Encounters:   03/29/21 160/100   09/30/20 138/86   06/05/20 102/74      Wt Readings from Last 3 Encounters:   03/29/21 86 6 kg (191 lb)   09/30/20 95 3 kg (210 lb)   06/05/20 92 1 kg (203 lb)      Physical Exam  Constitutional:       General: She is not in acute distress  Appearance: Normal appearance  She is normal weight  She is not ill-appearing or toxic-appearing  HENT:      Head: Normocephalic and atraumatic  Right Ear: External ear normal       Left Ear: External ear normal       Nose: Nose normal       Mouth/Throat:      Mouth: Mucous membranes are moist    Eyes:      Extraocular Movements: Extraocular movements intact  Conjunctiva/sclera: Conjunctivae normal    Neck:      Musculoskeletal: Normal range of motion and neck supple  Pulmonary:      Effort: Pulmonary effort is normal  No respiratory distress  Musculoskeletal: Normal range of motion  Cervical back: She exhibits no tenderness and no bony tenderness  Thoracic back: She exhibits no bony tenderness  Lumbar back: She exhibits pain  She exhibits normal range of motion, no tenderness, no bony tenderness and no spasm  Comments: Negative straight leg raise bilaterally   Negative Carmne testing bilaterally   Skin:     Findings: No erythema or rash     Neurological:      General: No focal deficit present  Mental Status: She is alert and oriented to person, place, and time  Motor: No weakness, tremor or abnormal muscle tone  Coordination: Coordination is intact  Gait: Gait is intact  Deep Tendon Reflexes:      Reflex Scores:       Patellar reflexes are 2+ on the right side and 2+ on the left side  Psychiatric:         Mood and Affect: Mood normal          Behavior: Behavior normal                  Return if symptoms worsen or fail to improve  Alex Rosario MD    Note: Portions of the record have been created with voice recognition software  Occasional wrong word or "sound a like" substitutions may have occurred due to the inherent limitations of voice recognition software  Read the chart carefully and recognize, using context, where substitutions have occurred

## 2021-03-31 ENCOUNTER — TELEPHONE (OUTPATIENT)
Dept: FAMILY MEDICINE CLINIC | Facility: CLINIC | Age: 76
End: 2021-03-31

## 2021-03-31 DIAGNOSIS — M54.50 ACUTE BILATERAL LOW BACK PAIN WITHOUT SCIATICA: Primary | ICD-10-CM

## 2021-03-31 NOTE — TELEPHONE ENCOUNTER
Patient saw Dr Pilo Huizar on 3/29/21 for back pain  She has been taking methocarbamol since appointment with very little relief  Can she take something with this medication? Or try something different?     Elizabeth

## 2021-03-31 NOTE — TELEPHONE ENCOUNTER
Recommend patient do PT, referral placed to [facility]       Continue Tylenol 1000 mg 3 times daily, recommend taking ibuprofen 400 mg every 6 hours as needed in between Tylenol dosing

## 2021-03-31 NOTE — TELEPHONE ENCOUNTER
Spoke with patient, gave message    Also gave contact info for [facility] as stated in the referral

## 2021-04-01 ENCOUNTER — TELEPHONE (OUTPATIENT)
Dept: PHYSICAL THERAPY | Facility: OTHER | Age: 76
End: 2021-04-01

## 2021-04-01 NOTE — TELEPHONE ENCOUNTER
Call placed to the patient per [facility] referral     Shravan answered the call and stated the patient was not home  Message left for her to call our program back  Ph# and business hrs provided  This is the 1st attempt to reach the patient  Will defer per protocol

## 2021-04-07 ENCOUNTER — TELEPHONE (OUTPATIENT)
Dept: PHYSICAL THERAPY | Facility: OTHER | Age: 76
End: 2021-04-07

## 2021-04-07 NOTE — TELEPHONE ENCOUNTER
Nurse reached out to discuss recent referral entered for  [facility] and offerings  Patient declined to participate in the program at this time  Patient is seeing physical therapist outside [address] [place]  Nurse confirmed patient has [facility] contact information and encouraged to call program back if needed in the future  Patient agreed and very appreciative of call and discussion  Nurse wished her well and referral closed per protocol

## 2021-05-21 ENCOUNTER — TELEPHONE (OUTPATIENT)
Dept: GYNECOLOGIC ONCOLOGY | Facility: CLINIC | Age: 76
End: 2021-05-21

## 2021-05-21 NOTE — TELEPHONE ENCOUNTER
Phone call to patient to reschedule appointment on 6/8 with Marlon Melara for later in the day due to provider being out of the office first thing that morning  Left message with her  Chela Conn for the patient to return my call

## 2021-06-02 ENCOUNTER — TELEPHONE (OUTPATIENT)
Dept: GYNECOLOGIC ONCOLOGY | Facility: CLINIC | Age: 76
End: 2021-06-02

## 2021-06-02 NOTE — TELEPHONE ENCOUNTER
Patient called to cancel appointment Friday 6/11 with Elida Murray  She states she is very busy dealing with her husbands illness at the time   She will call back to reschedule

## 2021-06-07 DIAGNOSIS — S39.012A LUMBAR STRAIN, INITIAL ENCOUNTER: ICD-10-CM

## 2021-06-07 RX ORDER — METHOCARBAMOL 500 MG/1
500 TABLET, FILM COATED ORAL 4 TIMES DAILY PRN
Qty: 20 TABLET | Refills: 2 | Status: SHIPPED | OUTPATIENT
Start: 2021-06-07 | End: 2021-10-07 | Stop reason: ALTCHOICE

## 2021-07-07 ENCOUNTER — TELEPHONE (OUTPATIENT)
Dept: LAB | Facility: HOSPITAL | Age: 76
End: 2021-07-07

## 2021-08-10 DIAGNOSIS — F33.42 RECURRENT MAJOR DEPRESSIVE DISORDER, IN FULL REMISSION (HCC): ICD-10-CM

## 2021-08-10 RX ORDER — SERTRALINE HYDROCHLORIDE 100 MG/1
100 TABLET, FILM COATED ORAL DAILY
Qty: 90 TABLET | Refills: 1 | Status: SHIPPED | OUTPATIENT
Start: 2021-08-10 | End: 2021-12-27 | Stop reason: ALTCHOICE

## 2021-10-07 ENCOUNTER — OFFICE VISIT (OUTPATIENT)
Dept: FAMILY MEDICINE CLINIC | Facility: CLINIC | Age: 76
End: 2021-10-07
Payer: MEDICARE

## 2021-10-07 VITALS
BODY MASS INDEX: 29.41 KG/M2 | DIASTOLIC BLOOD PRESSURE: 80 MMHG | HEART RATE: 80 BPM | RESPIRATION RATE: 16 BRPM | SYSTOLIC BLOOD PRESSURE: 142 MMHG | TEMPERATURE: 98.8 F | WEIGHT: 183 LBS | HEIGHT: 66 IN

## 2021-10-07 DIAGNOSIS — R26.9 GAIT DISTURBANCE: ICD-10-CM

## 2021-10-07 DIAGNOSIS — Z85.42 HISTORY OF ENDOMETRIAL CANCER: ICD-10-CM

## 2021-10-07 DIAGNOSIS — M17.0 PRIMARY OSTEOARTHRITIS OF BOTH KNEES: ICD-10-CM

## 2021-10-07 DIAGNOSIS — E55.9 VITAMIN D DEFICIENCY: ICD-10-CM

## 2021-10-07 DIAGNOSIS — F33.42 RECURRENT MAJOR DEPRESSIVE DISORDER, IN FULL REMISSION (HCC): ICD-10-CM

## 2021-10-07 DIAGNOSIS — E04.2 MULTINODULAR THYROID: ICD-10-CM

## 2021-10-07 DIAGNOSIS — Z23 ENCOUNTER FOR IMMUNIZATION: ICD-10-CM

## 2021-10-07 DIAGNOSIS — R63.4 WEIGHT LOSS: Primary | ICD-10-CM

## 2021-10-07 DIAGNOSIS — R41.89 COGNITIVE CHANGES: ICD-10-CM

## 2021-10-07 DIAGNOSIS — R03.0 ELEVATED BP WITHOUT DIAGNOSIS OF HYPERTENSION: ICD-10-CM

## 2021-10-07 PROCEDURE — G0008 ADMIN INFLUENZA VIRUS VAC: HCPCS

## 2021-10-07 PROCEDURE — 1123F ACP DISCUSS/DSCN MKR DOCD: CPT | Performed by: FAMILY MEDICINE

## 2021-10-07 PROCEDURE — 90662 IIV NO PRSV INCREASED AG IM: CPT

## 2021-10-07 PROCEDURE — 99214 OFFICE O/P EST MOD 30 MIN: CPT | Performed by: FAMILY MEDICINE

## 2021-10-23 ENCOUNTER — APPOINTMENT (OUTPATIENT)
Dept: LAB | Age: 76
End: 2021-10-23
Payer: MEDICARE

## 2021-10-23 LAB
25(OH)D3 SERPL-MCNC: 24.2 NG/ML (ref 30–100)
ALBUMIN SERPL BCP-MCNC: 3.5 G/DL (ref 3.5–5)
ALP SERPL-CCNC: 83 U/L (ref 46–116)
ALT SERPL W P-5'-P-CCNC: 19 U/L (ref 12–78)
ANION GAP SERPL CALCULATED.3IONS-SCNC: 4 MMOL/L (ref 4–13)
AST SERPL W P-5'-P-CCNC: 17 U/L (ref 5–45)
BASOPHILS # BLD AUTO: 0.03 THOUSANDS/ΜL (ref 0–0.1)
BASOPHILS NFR BLD AUTO: 1 % (ref 0–1)
BILIRUB SERPL-MCNC: 0.59 MG/DL (ref 0.2–1)
BUN SERPL-MCNC: 11 MG/DL (ref 5–25)
CALCIUM SERPL-MCNC: 9.6 MG/DL (ref 8.3–10.1)
CHLORIDE SERPL-SCNC: 107 MMOL/L (ref 100–108)
CO2 SERPL-SCNC: 28 MMOL/L (ref 21–32)
CREAT SERPL-MCNC: 0.6 MG/DL (ref 0.6–1.3)
EOSINOPHIL # BLD AUTO: 0.13 THOUSAND/ΜL (ref 0–0.61)
EOSINOPHIL NFR BLD AUTO: 2 % (ref 0–6)
ERYTHROCYTE [DISTWIDTH] IN BLOOD BY AUTOMATED COUNT: 13.2 % (ref 11.6–15.1)
FOLATE SERPL-MCNC: 9.3 NG/ML (ref 3.1–17.5)
GFR SERPL CREATININE-BSD FRML MDRD: 88 ML/MIN/1.73SQ M
GLUCOSE P FAST SERPL-MCNC: 89 MG/DL (ref 65–99)
HCT VFR BLD AUTO: 41.1 % (ref 34.8–46.1)
HGB BLD-MCNC: 13.2 G/DL (ref 11.5–15.4)
IMM GRANULOCYTES # BLD AUTO: 0.01 THOUSAND/UL (ref 0–0.2)
IMM GRANULOCYTES NFR BLD AUTO: 0 % (ref 0–2)
LYMPHOCYTES # BLD AUTO: 1.81 THOUSANDS/ΜL (ref 0.6–4.47)
LYMPHOCYTES NFR BLD AUTO: 31 % (ref 14–44)
MCH RBC QN AUTO: 29.3 PG (ref 26.8–34.3)
MCHC RBC AUTO-ENTMCNC: 32.1 G/DL (ref 31.4–37.4)
MCV RBC AUTO: 91 FL (ref 82–98)
MONOCYTES # BLD AUTO: 0.48 THOUSAND/ΜL (ref 0.17–1.22)
MONOCYTES NFR BLD AUTO: 8 % (ref 4–12)
NEUTROPHILS # BLD AUTO: 3.47 THOUSANDS/ΜL (ref 1.85–7.62)
NEUTS SEG NFR BLD AUTO: 58 % (ref 43–75)
NRBC BLD AUTO-RTO: 0 /100 WBCS
PLATELET # BLD AUTO: 231 THOUSANDS/UL (ref 149–390)
PMV BLD AUTO: 10.6 FL (ref 8.9–12.7)
POTASSIUM SERPL-SCNC: 3.9 MMOL/L (ref 3.5–5.3)
PROT SERPL-MCNC: 6.8 G/DL (ref 6.4–8.2)
RBC # BLD AUTO: 4.51 MILLION/UL (ref 3.81–5.12)
SODIUM SERPL-SCNC: 139 MMOL/L (ref 136–145)
TSH SERPL DL<=0.05 MIU/L-ACNC: 1.32 UIU/ML (ref 0.36–3.74)
VIT B12 SERPL-MCNC: 429 PG/ML (ref 100–900)
WBC # BLD AUTO: 5.93 THOUSAND/UL (ref 4.31–10.16)

## 2021-10-23 PROCEDURE — 82607 VITAMIN B-12: CPT | Performed by: FAMILY MEDICINE

## 2021-10-23 PROCEDURE — 82306 VITAMIN D 25 HYDROXY: CPT | Performed by: FAMILY MEDICINE

## 2021-10-23 PROCEDURE — 80053 COMPREHEN METABOLIC PANEL: CPT | Performed by: FAMILY MEDICINE

## 2021-10-23 PROCEDURE — 36415 COLL VENOUS BLD VENIPUNCTURE: CPT | Performed by: FAMILY MEDICINE

## 2021-10-23 PROCEDURE — 85025 COMPLETE CBC W/AUTO DIFF WBC: CPT | Performed by: FAMILY MEDICINE

## 2021-10-23 PROCEDURE — 82746 ASSAY OF FOLIC ACID SERUM: CPT | Performed by: FAMILY MEDICINE

## 2021-10-23 PROCEDURE — 84443 ASSAY THYROID STIM HORMONE: CPT | Performed by: FAMILY MEDICINE

## 2021-12-27 ENCOUNTER — OFFICE VISIT (OUTPATIENT)
Dept: FAMILY MEDICINE CLINIC | Facility: CLINIC | Age: 76
End: 2021-12-27
Payer: MEDICARE

## 2021-12-27 VITALS
SYSTOLIC BLOOD PRESSURE: 132 MMHG | HEIGHT: 66 IN | HEART RATE: 88 BPM | WEIGHT: 185 LBS | DIASTOLIC BLOOD PRESSURE: 80 MMHG | BODY MASS INDEX: 29.73 KG/M2 | RESPIRATION RATE: 16 BRPM | TEMPERATURE: 98.7 F

## 2021-12-27 DIAGNOSIS — G31.84 MILD COGNITIVE IMPAIRMENT WITH MEMORY LOSS: ICD-10-CM

## 2021-12-27 DIAGNOSIS — C54.1 ENDOMETRIAL CANCER (HCC): ICD-10-CM

## 2021-12-27 DIAGNOSIS — F33.0 MILD EPISODE OF RECURRENT MAJOR DEPRESSIVE DISORDER (HCC): Primary | ICD-10-CM

## 2021-12-27 DIAGNOSIS — R03.0 ELEVATED BLOOD PRESSURE READING: ICD-10-CM

## 2021-12-27 PROCEDURE — 99213 OFFICE O/P EST LOW 20 MIN: CPT | Performed by: FAMILY MEDICINE

## 2022-03-16 ENCOUNTER — OFFICE VISIT (OUTPATIENT)
Dept: FAMILY MEDICINE CLINIC | Facility: CLINIC | Age: 77
End: 2022-03-16
Payer: MEDICARE

## 2022-03-16 ENCOUNTER — TELEPHONE (OUTPATIENT)
Dept: FAMILY MEDICINE CLINIC | Facility: CLINIC | Age: 77
End: 2022-03-16

## 2022-03-16 VITALS
SYSTOLIC BLOOD PRESSURE: 122 MMHG | RESPIRATION RATE: 16 BRPM | DIASTOLIC BLOOD PRESSURE: 84 MMHG | WEIGHT: 190 LBS | TEMPERATURE: 96.5 F | HEART RATE: 96 BPM | BODY MASS INDEX: 30.53 KG/M2 | HEIGHT: 66 IN

## 2022-03-16 DIAGNOSIS — E55.9 VITAMIN D DEFICIENCY: ICD-10-CM

## 2022-03-16 DIAGNOSIS — C54.1 ENDOMETRIAL CANCER (HCC): ICD-10-CM

## 2022-03-16 DIAGNOSIS — Z00.00 MEDICARE ANNUAL WELLNESS VISIT, SUBSEQUENT: ICD-10-CM

## 2022-03-16 DIAGNOSIS — F33.0 MILD EPISODE OF RECURRENT MAJOR DEPRESSIVE DISORDER (HCC): Primary | ICD-10-CM

## 2022-03-16 DIAGNOSIS — M17.0 PRIMARY OSTEOARTHRITIS OF BOTH KNEES: ICD-10-CM

## 2022-03-16 PROCEDURE — 99213 OFFICE O/P EST LOW 20 MIN: CPT | Performed by: FAMILY MEDICINE

## 2022-03-16 PROCEDURE — G0439 PPPS, SUBSEQ VISIT: HCPCS | Performed by: FAMILY MEDICINE

## 2022-03-16 NOTE — PROGRESS NOTES
Assessment and Plan:     Problem List Items Addressed This Visit        Musculoskeletal and Integument    Osteoarthritis of knee       Genitourinary    Endometrial cancer ([address] )       Other    Vitamin D deficiency    Mild episode of recurrent major depressive disorder ([address] ) - Primary    Relevant Medications    sertraline (Zoloft) 50 mg tablet      Other Visit Diagnoses     Medicare annual wellness visit, subsequent            BMI Counseling: Body mass index is 30 67 kg/m²  The BMI is above normal  Nutrition recommendations include decreasing portion sizes, consuming healthier snacks, moderation in carbohydrate intake, reducing intake of saturated and trans fat and reducing intake of cholesterol  Exercise recommendations include exercising 3-5 times per week  No pharmacotherapy was ordered  Rationale for BMI follow-up plan is due to patient being overweight or obese  Preventive health issues were discussed with patient, and age appropriate screening tests were ordered as noted in patient's After Visit Summary  Personalized health advice and appropriate referrals for health education or preventive services given if needed, as noted in patient's After Visit Summary       History of Present Illness:     Patient presents for Medicare Annual Wellness visit    Patient Care Team:  Consuelo Bowman MD as PCP - Getachew Rodriguez MD (Obstetrics and Gynecology)  PRISCA Doan MD (Surgical Oncology)  Parmjit Davis MD (Radiation Oncology)     Problem List:     Patient Active Problem List   Diagnosis    History of endometrial cancer    Endometrial cancer [facility])    Inguinal lymphadenopathy    Multinodular thyroid    Obesity    Osteoarthritis of hip    Osteoarthritis of knee    Vitamin D deficiency    Osteopenia of lumbar spine    Mild episode of recurrent major depressive disorder [facility])      Past Medical and Surgical History:     Past Medical History:   Diagnosis Date    Deep vein thrombosis ([facility] ) 1/28/2016    Diverticulosis     last assessed 05/29/13    Fibrocystic breast     History of DVT (deep vein thrombosis)     Xarelto, 4/2015 thru April 2016    Impaired fasting glucose     last assessed 06/04/14    Inguinal lymphadenopathy     Multiple benign polyps of large intestine     Obesity     Uterine cancer ([facility] )      Past Surgical History:   Procedure Laterality Date    HYSTERECTOMY      LYMPH NODE BIOPSY      OOPHORECTOMY      PELVIC LAPAROSCOPY      TONSILLECTOMY      with adenoidectomy      Family History:     Family History   Problem Relation Age of Onset    Hypothyroidism Mother     Diabetes Maternal Grandfather       Social History:     Social History     Socioeconomic History    Marital status: /Civil Union     Spouse name: None    Number of children: None    Years of education: None    Highest education level: None   Occupational History    None   Tobacco Use    Smoking status: Never Smoker    Smokeless tobacco: Never Used   Vaping Use    Vaping Use: None   Substance and Sexual Activity    Alcohol use: No    Drug use: No    Sexual activity: None   Other Topics Concern    None   Social History Narrative    Denied: Home environment Domestic violence     Social Determinants of Health     Financial Resource Strain: Not on file   Food Insecurity: Not on file   Transportation Needs: Not on file   Physical Activity: Not on file   Stress: Not on file   Social Connections: Not on file   Intimate Partner Violence: Not on file   Housing Stability: Not on file      Medications and Allergies:     Current Outpatient Medications   Medication Sig Dispense Refill    Ascorbic Acid (VITAMIN C) 500 MG CAPS Take 1 tablet by mouth daily      Calcium Carb-Cholecalciferol (CALCIUM 500/D) 500-400 MG-UNIT CHEW Chew 1 tablet daily      Cholecalciferol (VITAMIN D3) 2000 units capsule Take 1 tablet by mouth daily      Multiple Vitamins-Minerals (CENTRUM SILVER 50+WOMEN PO) Take 1 tablet by mouth daily      sertraline (Zoloft) 50 mg tablet Take 1 5 tablets (75 mg total) by mouth daily 90 tablet 1     No current facility-administered medications for this visit  No Known Allergies   Immunizations:     Immunization History   Administered Date(s) Administered    COVID-19 MODERNA VACC 0 5 ML IM 01/08/2021, 08/24/2021    Influenza Split High Dose Preservative Free IM 11/04/2016    Influenza, high dose seasonal 0 7 mL 09/17/2019, 09/30/2020, 10/07/2021    Pneumococcal Conjugate 13-Valent 08/04/2016    Pneumococcal Polysaccharide PPV23 08/08/2017      Health Maintenance:         Topic Date Due    Breast Cancer Screening: Mammogram  11/11/2021    Colorectal Cancer Screening  10/07/2022 (Originally 4/28/1995)    Hepatitis C Screening  11/07/2023 (Originally 1945)         Topic Date Due    COVID-19 Vaccine (3 - Booster for Moderna series) 01/24/2022      Medicare Health Risk Assessment:     /84   Pulse 96   Temp (!) 96 5 °F (35 8 °C)   Resp 16   Ht 5' 6" (1 676 m)   Wt 86 2 kg (190 lb)   BMI 30 67 kg/m²      Annia Avila is here for her Subsequent Wellness visit  Last Medicare Wellness visit information reviewed, patient interviewed and updates made to the record today  Health Risk Assessment:   Patient rates overall health as good  Patient feels that their physical health rating is same  Patient is satisfied with their life  Eyesight was rated as same  Hearing was rated as slightly worse  Patient feels that their emotional and mental health rating is slightly better  Patients states they are never, rarely angry  Patient states they are never, rarely unusually tired/fatigued  Pain experienced in the last 7 days has been none  Patient states that she has experienced no weight loss or gain in last 6 months  Depression Screening:   PHQ-9 Score: 0      Fall Risk Screening:    In the past year, patient has experienced: no history of falling in past year Urinary Incontinence Screening:   Patient has not leaked urine accidently in the last six months  Home Safety:  Patient does not have trouble with stairs inside or outside of their home  Patient has working smoke alarms and has working carbon monoxide detector  Home safety hazards include: none  Nutrition:   Current diet is Regular  Medications:   Patient is currently taking over-the-counter supplements  OTC medications include: see medication list  Patient is able to manage medications  Activities of Daily Living (ADLs)/Instrumental Activities of Daily Living (IADLs):   Walk and transfer into and out of bed and chair?: Yes  Dress and groom yourself?: Yes    Bathe or shower yourself?: Yes    Feed yourself?  Yes  Do your laundry/housekeeping?: Yes  Manage your money, pay your bills and track your expenses?: Yes  Make your own meals?: Yes    Do your own shopping?: Yes    Previous Hospitalizations:   Any hospitalizations or ED visits within the last 12 months?: No      Advance Care Planning:   Living will: Yes    Advanced directive: Yes      Cognitive Screening:   Provider or family/friend/caregiver concerned regarding cognition?: No    PREVENTIVE SCREENINGS      Cardiovascular Screening:    General: Screening Current      Diabetes Screening:     General: Screening Current      Colorectal Cancer Screening:     General: Risks and Benefits Discussed and Patient Declines      Breast Cancer Screening:     General: Risks and Benefits Discussed and Patient Declines      Cervical Cancer Screening:    General: Screening Not Indicated      Osteoporosis Screening:    General: Screening Current      Abdominal Aortic Aneurysm (AAA) Screening:        General: Screening Not Indicated      Lung Cancer Screening:     General: Screening Not Indicated      Hepatitis C Screening:    General: Screening Current    Screening, Brief Intervention, and Referral to Treatment (SBIRT)    Screening  Typical number of drinks in a day: 0  Typical number of drinks in a week: 0  Interpretation: Low risk drinking behavior  Single Item Drug Screening:  How often have you used an illegal drug (including marijuana) or a prescription medication for non-medical reasons in the past year? never    Single Item Drug Screen Score: 0  Interpretation: Negative screen for possible drug use disorder    Brief Intervention  Alcohol & drug use screenings were reviewed  No concerns regarding substance use disorder identified  Other Counseling Topics:   Regular weightbearing exercise and calcium and vitamin D intake         Kailyn Hoyt MD

## 2022-03-16 NOTE — PROGRESS NOTES
Assessment/Plan:     Diagnoses and all orders for this visit:    Mild episode of recurrent major depressive disorder (HCC)  -     sertraline (Zoloft) 50 mg tablet; Take 1 5 tablets (75 mg total) by mouth daily    Primary osteoarthritis of both knees    Vitamin D deficiency    Endometrial cancer ([address] )    Medicare annual wellness visit, subsequent          Increase dose of Sertraline to 75 mg daily  No treatment at this time for OA knees-consider visco supplementation  Continue with Vitamin D supplement  OV 6 months      Patient ID: Jarrett Mosquera is a 64 y o  female  Follow-up visit  Patient was seen 10/2021 with mild weight loss  She was started on an antidepressant 12/2021 Sertraline 50 mg daily with improvement  7 lb weight gain from 10/2021  Mood improvement  Periods of forgetfulness  She opted not to pursue neuropsychological testing  Labs 102/2021 reviewed       Recent Results (from the past 4032 hour(s))   Vitamin B12    Collection Time: 10/23/21 10:00 AM   Result Value Ref Range    Vitamin B-12 429 100 - 900 pg/mL   Folate    Collection Time: 10/23/21 10:00 AM   Result Value Ref Range    Folate 9 3 3 1 - 17 5 ng/mL   Vitamin D 25 hydroxy    Collection Time: 10/23/21 10:00 AM   Result Value Ref Range    Vit D, 25-Hydroxy 24 2 (L) 30 0 - 100 0 ng/mL   CBC and differential    Collection Time: 10/23/21 10:00 AM   Result Value Ref Range    WBC 5 93 4 31 - 10 16 Thousand/uL    RBC 4 51 3 81 - 5 12 Million/uL    Hemoglobin 13 2 11 5 - 15 4 g/dL    Hematocrit 41 1 34 8 - 46 1 %    MCV 91 82 - 98 fL    MCH 29 3 26 8 - 34 3 pg    MCHC 32 1 31 4 - 37 4 g/dL    RDW 13 2 11 6 - 15 1 %    MPV 10 6 8 9 - 12 7 fL    Platelets 126 386 - 861 Thousands/uL    nRBC 0 /100 WBCs    Neutrophils Relative 58 43 - 75 %    Immat GRANS % 0 0 - 2 %    Lymphocytes Relative 31 14 - 44 %    Monocytes Relative 8 4 - 12 %    Eosinophils Relative 2 0 - 6 %    Basophils Relative 1 0 - 1 %    Neutrophils Absolute 3 47 1 85 - 7 62 Thousands/µL Immature Grans Absolute 0 01 0 00 - 0 20 Thousand/uL    Lymphocytes Absolute 1 81 0 60 - 4 47 Thousands/µL    Monocytes Absolute 0 48 0 17 - 1 22 Thousand/µL    Eosinophils Absolute 0 13 0 00 - 0 61 Thousand/µL    Basophils Absolute 0 03 0 00 - 0 10 Thousands/µL   Comprehensive metabolic panel    Collection Time: 10/23/21 10:00 AM   Result Value Ref Range    Sodium 139 136 - 145 mmol/L    Potassium 3 9 3 5 - 5 3 mmol/L    Chloride 107 100 - 108 mmol/L    CO2 28 21 - 32 mmol/L    ANION GAP 4 4 - 13 mmol/L    BUN 11 5 - 25 mg/dL    Creatinine 0 60 0 60 - 1 30 mg/dL    Glucose, Fasting 89 65 - 99 mg/dL    Calcium 9 6 8 3 - 10 1 mg/dL    AST 17 5 - 45 U/L    ALT 19 12 - 78 U/L    Alkaline Phosphatase 83 46 - 116 U/L    Total Protein 6 8 6 4 - 8 2 g/dL    Albumin 3 5 3 5 - 5 0 g/dL    Total Bilirubin 0 59 0 20 - 1 00 mg/dL    eGFR 88 ml/min/1 73sq m   TSH, 3rd generation with Free T4 reflex    Collection Time: 10/23/21 10:00 AM   Result Value Ref Range    TSH 3RD GENERATON 1 320 0 358 - 3 740 uIU/mL           The following portions of the patient's history were reviewed and updated as appropriate: allergies, current medications, past family history, past medical history, past social history, past surgical history and problem list     Review of Systems   Constitutional: Negative for appetite change and unexpected weight change  See HPI    HENT: Positive for hearing loss (hearing aid left ear )  See HPI  Respiratory: Negative for cough, shortness of breath and wheezing  Cardiovascular: Negative for chest pain, palpitations and leg swelling  Status post DVT left leg patient completed 9 months of Xarelto  08/2016 prothrombin mutation gene, factor 5 Leiden and homocystine levels normal   She is currently on Aspirin 81 mg daily  Gastrointestinal: Negative for abdominal pain, blood in stool, constipation, diarrhea, nausea and vomiting          09/2014 colonoscopy 1 polyp    Severe diverticulosis   Endocrine: Negative for polyuria          03/2018 DEXA scan T-score -1 5 lumbar spine consistent with low bone mineral density  Compared to prior examination has been a 5% decrease in the BMD of lumbar spine at 12% decrease in the total BMD of left hip  10/2021 vitamin-D 24 2-on vitamin D supplement    Genitourinary: Negative for difficulty urinating  History of endometrial CA stage I grade 3 status post total hysterectomy 12/2014 she completed chemotherapy and radiation therapy  Left inguinal lymph node biopsy 04/2015 no cancer  02/2018 CT scan chest,abdomen/pelvis- no evidence of metastatic disease  Stable 24 mm right thyroid nodule  Distal colonic diverticulosis  Stable small bilateral inguinal lymph nodes  A dominant left inguinal lymph node is unchanged  Musculoskeletal: Negative for arthralgias and myalgias  OA knees x-rays knees 06/2013 moderately severe osteoarthritis right knee  Moderate osteoarthritis left knee  She completed a course of physical therapy with improvement  Asymptomatic at present  On no pain medications  Skin: Negative for rash  Neurological: Negative for dizziness and headaches  Hematological: Negative for adenopathy  Does not bruise/bleed easily          Lab Results on MVI        Component                Value               Date                       WBC                      5 93                10/23/2021                 HGB                      13 2                10/23/2021                 HCT                      41 1                10/23/2021                 MCV                      91                  10/23/2021                 PLT                      231                 10/23/2021              Lab Results       Component                Value               Date                       [id number]               429                 10/23/2021              Lab Results       Component                Value Date                       FOLATE                   9 3                 10/23/2021             Psychiatric/Behavioral: Positive for dysphoric mood  Negative for sleep disturbance and suicidal ideas  See HPI          Objective:      /84   Pulse 96   Temp (!) 96 5 °F (35 8 °C)   Resp 16   Ht 5' 6" (1 676 m)   Wt 86 2 kg (190 lb)   BMI 30 67 kg/m²     BP Readings from Last 3 Encounters:   03/16/22 122/84   12/27/21 132/80   10/07/21 142/80     Wt Readings from Last 3 Encounters:   03/16/22 86 2 kg (190 lb)   12/27/21 83 9 kg (185 lb)   10/07/21 83 kg (183 lb)        Physical Exam  Constitutional:       General: She is not in acute distress  Neurological:      Mental Status: She is alert and oriented to person, place, and time  Psychiatric:         Mood and Affect: Mood normal          Behavior: Behavior normal          Thought Content:  Thought content normal          Judgment: Judgment normal

## 2022-03-16 NOTE — TELEPHONE ENCOUNTER
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

## 2022-03-16 NOTE — PATIENT INSTRUCTIONS
Medicare Preventive Visit Patient Instructions  Thank you for completing your Welcome to Medicare Visit or Medicare Annual Wellness Visit today  Your next wellness visit will be due in one year (3/17/2023)  The screening/preventive services that you may require over the next 5-10 years are detailed below  Some tests may not apply to you based off risk factors and/or age  Screening tests ordered at today's visit but not completed yet may show as past due  Also, please note that scanned in results may not display below  Preventive Screenings:  Service Recommendations Previous Testing/Comments   Colorectal Cancer Screening  * Colonoscopy    * Fecal Occult Blood Test (FOBT)/Fecal Immunochemical Test (FIT)  * Fecal DNA/Cologuard Test  * Flexible Sigmoidoscopy Age: 54-65 years old   Colonoscopy: every 10 years (may be performed more frequently if at higher risk)  OR  FOBT/FIT: every 1 year  OR  Cologuard: every 3 years  OR  Sigmoidoscopy: every 5 years  Screening may be recommended earlier than age 48 if at higher risk for colorectal cancer  Also, an individualized decision between you and your healthcare provider will decide whether screening between the ages of 74-80 would be appropriate  Colonoscopy: 09/03/2014  FOBT/FIT: Not on file  Cologuard: Not on file  Sigmoidoscopy: Not on file          Breast Cancer Screening Age: 36 years old  Frequency: every 1-2 years  Not required if history of left and right mastectomy Mammogram: 11/11/2020    Screening Current   Cervical Cancer Screening Between the ages of 21-29, pap smear recommended once every 3 years  Between the ages of 33-67, can perform pap smear with HPV co-testing every 5 years     Recommendations may differ for women with a history of total hysterectomy, cervical cancer, or abnormal pap smears in past  Pap Smear: Not on file    Screening Not Indicated   Hepatitis C Screening Once for adults born between 1945 and 1965  More frequently in patients at high risk for Hepatitis C Hep C Antibody: Not on file    Screening Current   Diabetes Screening 1-2 times per year if you're at risk for diabetes or have pre-diabetes Fasting glucose: 89 mg/dL   A1C: 5 4 %    Screening Current   Cholesterol Screening Once every 5 years if you don't have a lipid disorder  May order more often based on risk factors  Lipid panel: 11/11/2020    Screening Current     Other Preventive Screenings Covered by Medicare:  1  Abdominal Aortic Aneurysm (AAA) Screening: covered once if your at risk  You're considered to be at risk if you have a family history of AAA  2  Lung Cancer Screening: covers low dose CT scan once per year if you meet all of the following conditions: (1) Age 50-69; (2) No signs or symptoms of lung cancer; (3) Current smoker or have quit smoking within the last 15 years; (4) You have a tobacco smoking history of at least 30 pack years (packs per day multiplied by number of years you smoked); (5) You get a written order from a healthcare provider  3  Glaucoma Screening: covered annually if you're considered high risk: (1) You have diabetes OR (2) Family history of glaucoma OR (3)  aged 48 and older OR (3)  American aged 72 and older  3  Osteoporosis Screening: covered every 2 years if you meet one of the following conditions: (1) You're estrogen deficient and at risk for osteoporosis based off medical history and other findings; (2) Have a vertebral abnormality; (3) On glucocorticoid therapy for more than 3 months; (4) Have primary hyperparathyroidism; (5) On osteoporosis medications and need to assess response to drug therapy  · Last bone density test (DXA Scan): 03/17/2018  5  HIV Screening: covered annually if you're between the age of 12-76  Also covered annually if you are younger than 13 and older than 72 with risk factors for HIV infection  For pregnant patients, it is covered up to 3 times per pregnancy      Immunizations:  Immunization Recommendations   Influenza Vaccine Annual influenza vaccination during flu season is recommended for all persons aged >= 6 months who do not have contraindications   Pneumococcal Vaccine (Prevnar and Pneumovax)  * Prevnar = PCV13  * Pneumovax = PPSV23   Adults 25-60 years old: 1-3 doses may be recommended based on certain risk factors  Adults 72 years old: Prevnar (PCV13) vaccine recommended followed by Pneumovax (PPSV23) vaccine  If already received PPSV23 since turning 65, then PCV13 recommended at least one year after PPSV23 dose  Hepatitis B Vaccine 3 dose series if at intermediate or high risk (ex: diabetes, end stage renal disease, liver disease)   Tetanus (Td) Vaccine - COST NOT COVERED BY MEDICARE PART B Following completion of primary series, a booster dose should be given every 10 years to maintain immunity against tetanus  Td may also be given as tetanus wound prophylaxis  Tdap Vaccine - COST NOT COVERED BY MEDICARE PART B Recommended at least once for all adults  For pregnant patients, recommended with each pregnancy  Shingles Vaccine (Shingrix) - COST NOT COVERED BY MEDICARE PART B  2 shot series recommended in those aged 48 and above     Health Maintenance Due:      Topic Date Due    Breast Cancer Screening: Mammogram  11/11/2021    Colorectal Cancer Screening  10/07/2022 (Originally 4/28/1995)    Hepatitis C Screening  11/07/2023 (Originally 1945)     Immunizations Due:      Topic Date Due    COVID-19 Vaccine (3 - Booster for Moderna series) 01/24/2022     Advance Directives   What are advance directives? Advance directives are legal documents that state your wishes and plans for medical care  These plans are made ahead of time in case you lose your ability to make decisions for yourself  Advance directives can apply to any medical decision, such as the treatments you want, and if you want to donate organs  What are the types of advance directives?   There are many types of advance directives, and each state has rules about how to use them  You may choose a combination of any of the following:  · Living will: This is a written record of the treatment you want  You can also choose which treatments you do not want, which to limit, and which to stop at a certain time  This includes surgery, medicine, IV fluid, and tube feedings  · Durable power of  for healthcare [facility]): This is a written record that states who you want to make healthcare choices for you when you are unable to make them for yourself  This person, called a proxy, is usually a family member or a friend  You may choose more than 1 proxy  · Do not resuscitate (DNR) order:  A DNR order is used in case your heart stops beating or you stop breathing  It is a request not to have certain forms of treatment, such as CPR  A DNR order may be included in other types of advance directives  · Medical directive: This covers the care that you want if you are in a coma, near death, or unable to make decisions for yourself  You can list the treatments you want for each condition  Treatment may include pain medicine, surgery, blood transfusions, dialysis, IV or tube feedings, and a ventilator (breathing machine)  · Values history: This document has questions about your views, beliefs, and how you feel and think about life  This information can help others choose the care that you would choose  Why are advance directives important? An advance directive helps you control your care  Although spoken wishes may be used, it is better to have your wishes written down  Spoken wishes can be misunderstood, or not followed  Treatments may be given even if you do not want them  An advance directive may make it easier for your family to make difficult choices about your care     Weight Management   Why it is important to manage your weight:  Being overweight increases your risk of health conditions such as heart disease, high blood pressure, type 2 diabetes, and certain types of cancer  It can also increase your risk for osteoarthritis, sleep apnea, and other respiratory problems  Aim for a slow, steady weight loss  Even a small amount of weight loss can lower your risk of health problems  How to lose weight safely:  A safe and healthy way to lose weight is to eat fewer calories and get regular exercise  You can lose up about 1 pound a week by decreasing the number of calories you eat by 500 calories each day  Healthy meal plan for weight management:  A healthy meal plan includes a variety of foods, contains fewer calories, and helps you stay healthy  A healthy meal plan includes the following:  · Eat whole-grain foods more often  A healthy meal plan should contain fiber  Fiber is the part of grains, fruits, and vegetables that is not broken down by your body  Whole-grain foods are healthy and provide extra fiber in your diet  Some examples of whole-grain foods are whole-wheat breads and pastas, oatmeal, brown rice, and bulgur  · Eat a variety of vegetables every day  Include dark, leafy greens such as spinach, kale, chato greens, and mustard greens  Eat yellow and orange vegetables such as carrots, sweet potatoes, and winter squash  · Eat a variety of fruits every day  Choose fresh or canned fruit (canned in its own juice or light syrup) instead of juice  Fruit juice has very little or no fiber  · Eat low-fat dairy foods  Drink fat-free (skim) milk or 1% milk  Eat fat-free yogurt and low-fat cottage cheese  Try low-fat cheeses such as mozzarella and other reduced-fat cheeses  · Choose meat and other protein foods that are low in fat  Choose beans or other legumes such as split peas or lentils  Choose fish, skinless poultry (chicken or turkey), or lean cuts of red meat (beef or pork)  Before you cook meat or poultry, cut off any visible fat  · Use less fat and oil  Try baking foods instead of frying them   Add less fat, such as margarine, sour cream, regular salad dressing and mayonnaise to foods  Eat fewer high-fat foods  Some examples of high-fat foods include french fries, doughnuts, ice cream, and cakes  · Eat fewer sweets  Limit foods and drinks that are high in sugar  This includes candy, cookies, regular soda, and sweetened drinks  Exercise:  Exercise at least 30 minutes per day on most days of the week  Some examples of exercise include walking, biking, dancing, and swimming  You can also fit in more physical activity by taking the stairs instead of the elevator or parking farther away from stores  Ask your healthcare provider about the best exercise plan for you  © Copyright [employer] 2018 Information is for End User's use only and may not be sold, redistributed or otherwise used for commercial purposes   All illustrations and images included in CareNotes® are the copyrighted property of A D [employer] M [employer]  or [address] [place] [address]

## 2022-07-24 ENCOUNTER — APPOINTMENT (EMERGENCY)
Dept: RADIOLOGY | Facility: HOSPITAL | Age: 77
DRG: 065 | End: 2022-07-24
Payer: MEDICARE

## 2022-07-24 ENCOUNTER — HOSPITAL ENCOUNTER (INPATIENT)
Facility: HOSPITAL | Age: 77
LOS: 4 days | DRG: 065 | End: 2022-07-28
Attending: EMERGENCY MEDICINE | Admitting: INTERNAL MEDICINE
Payer: MEDICARE

## 2022-07-24 DIAGNOSIS — I63.9 CVA (CEREBRAL VASCULAR ACCIDENT) (HCC): ICD-10-CM

## 2022-07-24 DIAGNOSIS — I63.9 STROKE (HCC): Primary | ICD-10-CM

## 2022-07-24 DIAGNOSIS — I63.512 ACUTE ISCHEMIC LEFT MCA STROKE (HCC): ICD-10-CM

## 2022-07-24 DIAGNOSIS — F33.0 MILD EPISODE OF RECURRENT MAJOR DEPRESSIVE DISORDER (HCC): ICD-10-CM

## 2022-07-24 LAB
2HR DELTA HS TROPONIN: 1 NG/L
ANION GAP SERPL CALCULATED.3IONS-SCNC: 6 MMOL/L (ref 4–13)
APTT PPP: 26 SECONDS (ref 23–37)
BUN SERPL-MCNC: 20 MG/DL (ref 5–25)
CALCIUM SERPL-MCNC: 9.5 MG/DL (ref 8.3–10.1)
CARDIAC TROPONIN I PNL SERPL HS: 12 NG/L
CARDIAC TROPONIN I PNL SERPL HS: 13 NG/L
CHLORIDE SERPL-SCNC: 110 MMOL/L (ref 96–108)
CO2 SERPL-SCNC: 25 MMOL/L (ref 21–32)
CREAT SERPL-MCNC: 0.68 MG/DL (ref 0.6–1.3)
ERYTHROCYTE [DISTWIDTH] IN BLOOD BY AUTOMATED COUNT: 13.3 % (ref 11.6–15.1)
GFR SERPL CREATININE-BSD FRML MDRD: 83 ML/MIN/1.73SQ M
GLUCOSE SERPL-MCNC: 101 MG/DL (ref 65–140)
GLUCOSE SERPL-MCNC: 92 MG/DL (ref 65–140)
HCT VFR BLD AUTO: 43.5 % (ref 34.8–46.1)
HGB BLD-MCNC: 13.9 G/DL (ref 11.5–15.4)
INR PPP: 0.97 (ref 0.84–1.19)
MCH RBC QN AUTO: 29.1 PG (ref 26.8–34.3)
MCHC RBC AUTO-ENTMCNC: 32 G/DL (ref 31.4–37.4)
MCV RBC AUTO: 91 FL (ref 82–98)
PLATELET # BLD AUTO: 257 THOUSANDS/UL (ref 149–390)
PMV BLD AUTO: 10.6 FL (ref 8.9–12.7)
POTASSIUM SERPL-SCNC: 4.3 MMOL/L (ref 3.5–5.3)
PROTHROMBIN TIME: 13.1 SECONDS (ref 11.6–14.5)
RBC # BLD AUTO: 4.77 MILLION/UL (ref 3.81–5.12)
SODIUM SERPL-SCNC: 141 MMOL/L (ref 135–147)
WBC # BLD AUTO: 9.24 THOUSAND/UL (ref 4.31–10.16)

## 2022-07-24 PROCEDURE — 70496 CT ANGIOGRAPHY HEAD: CPT

## 2022-07-24 PROCEDURE — 99223 1ST HOSP IP/OBS HIGH 75: CPT | Performed by: INTERNAL MEDICINE

## 2022-07-24 PROCEDURE — 84484 ASSAY OF TROPONIN QUANT: CPT | Performed by: EMERGENCY MEDICINE

## 2022-07-24 PROCEDURE — 70498 CT ANGIOGRAPHY NECK: CPT

## 2022-07-24 PROCEDURE — 99285 EMERGENCY DEPT VISIT HI MDM: CPT

## 2022-07-24 PROCEDURE — 36415 COLL VENOUS BLD VENIPUNCTURE: CPT | Performed by: EMERGENCY MEDICINE

## 2022-07-24 PROCEDURE — 82948 REAGENT STRIP/BLOOD GLUCOSE: CPT

## 2022-07-24 PROCEDURE — 84484 ASSAY OF TROPONIN QUANT: CPT | Performed by: INTERNAL MEDICINE

## 2022-07-24 PROCEDURE — 80048 BASIC METABOLIC PNL TOTAL CA: CPT | Performed by: EMERGENCY MEDICINE

## 2022-07-24 PROCEDURE — 99285 EMERGENCY DEPT VISIT HI MDM: CPT | Performed by: EMERGENCY MEDICINE

## 2022-07-24 PROCEDURE — 93005 ELECTROCARDIOGRAM TRACING: CPT

## 2022-07-24 PROCEDURE — 85610 PROTHROMBIN TIME: CPT | Performed by: EMERGENCY MEDICINE

## 2022-07-24 PROCEDURE — 85730 THROMBOPLASTIN TIME PARTIAL: CPT | Performed by: EMERGENCY MEDICINE

## 2022-07-24 PROCEDURE — 85027 COMPLETE CBC AUTOMATED: CPT | Performed by: EMERGENCY MEDICINE

## 2022-07-24 RX ORDER — ASPIRIN 81 MG/1
324 TABLET, CHEWABLE ORAL ONCE
Status: DISCONTINUED | OUTPATIENT
Start: 2022-07-24 | End: 2022-07-24

## 2022-07-24 RX ORDER — DOCUSATE SODIUM 100 MG/1
100 CAPSULE, LIQUID FILLED ORAL 2 TIMES DAILY PRN
Status: DISCONTINUED | OUTPATIENT
Start: 2022-07-24 | End: 2022-07-28 | Stop reason: HOSPADM

## 2022-07-24 RX ORDER — ASPIRIN 300 MG/1
300 SUPPOSITORY RECTAL ONCE
Status: COMPLETED | OUTPATIENT
Start: 2022-07-24 | End: 2022-07-25

## 2022-07-24 RX ORDER — MELATONIN
2000 DAILY
Status: DISCONTINUED | OUTPATIENT
Start: 2022-07-25 | End: 2022-07-28 | Stop reason: HOSPADM

## 2022-07-24 RX ORDER — ENOXAPARIN SODIUM 100 MG/ML
40 INJECTION SUBCUTANEOUS DAILY
Status: DISCONTINUED | OUTPATIENT
Start: 2022-07-25 | End: 2022-07-28 | Stop reason: HOSPADM

## 2022-07-24 RX ORDER — ATORVASTATIN CALCIUM 40 MG/1
40 TABLET, FILM COATED ORAL EVERY EVENING
Status: DISCONTINUED | OUTPATIENT
Start: 2022-07-24 | End: 2022-07-28 | Stop reason: HOSPADM

## 2022-07-24 RX ORDER — ACETAMINOPHEN 325 MG/1
650 TABLET ORAL EVERY 6 HOURS PRN
Status: DISCONTINUED | OUTPATIENT
Start: 2022-07-24 | End: 2022-07-28 | Stop reason: HOSPADM

## 2022-07-24 RX ORDER — ASPIRIN 325 MG
325 TABLET ORAL DAILY
Status: DISCONTINUED | OUTPATIENT
Start: 2022-07-25 | End: 2022-07-25

## 2022-07-24 RX ORDER — MAGNESIUM HYDROXIDE/ALUMINUM HYDROXICE/SIMETHICONE 120; 1200; 1200 MG/30ML; MG/30ML; MG/30ML
30 SUSPENSION ORAL EVERY 6 HOURS PRN
Status: DISCONTINUED | OUTPATIENT
Start: 2022-07-24 | End: 2022-07-28 | Stop reason: HOSPADM

## 2022-07-24 RX ORDER — ONDANSETRON 2 MG/ML
4 INJECTION INTRAMUSCULAR; INTRAVENOUS EVERY 6 HOURS PRN
Status: DISCONTINUED | OUTPATIENT
Start: 2022-07-24 | End: 2022-07-28 | Stop reason: HOSPADM

## 2022-07-24 RX ORDER — ASCORBIC ACID 500 MG
500 TABLET ORAL DAILY
Status: DISCONTINUED | OUTPATIENT
Start: 2022-07-25 | End: 2022-07-28 | Stop reason: HOSPADM

## 2022-07-24 RX ADMIN — IOHEXOL 75 ML: 350 INJECTION, SOLUTION INTRAVENOUS at 20:15

## 2022-07-24 NOTE — ED ATTENDING ATTESTATION
7/24/2022  Nallely VERDUGO DO, saw and evaluated the patient  I have discussed the patient with the resident/non-physician practitioner and agree with the resident's/non-physician practitioner's findings, Plan of Care, and MDM as documented in the resident's/non-physician practitioner's note, except where noted  All available labs and Radiology studies were reviewed  I was present for key portions of any procedure(s) performed by the resident/non-physician practitioner and I was immediately available to provide assistance  At this point I agree with the current assessment done in the Emergency Department  I have conducted an independent evaluation of this patient a history and physical is as follows:    IPatricia DO, saw and evaluated the patient  I have discussed the patient with the resident and agree with the resident's findings, Plan of Care, and MDM as documented in the resident's note, except where noted  All available labs and Radiology studies were reviewed  I was present for key portions of any procedure(s) performed by the resident and I was immediately available to provide assistance  At this point I agree with the current assessment done in the Emergency Department  I have conducted an independent evaluation of this patient a history and physical is as follows:    Evelio Gr is an 76y o  year old female with PMHx significant for hx of endometrial cancer, depression, HTN who presents as a prehospital stroke alert  LKW 8 PM yesterday  Seen by family this evening who noticed that she has a R facial droop and is confused  No known trauma/falls  Further hx and ROS limited due to deficits  General: NAD   HEENT: normocephalic, atraumatic   MMM   CV: RRR   Pulm: normal work of breathing  GI: abdomen soft, non-distended  : deferred  MSK: no LE edema, no deformity  Skin: warm and dry   Neuro: awake and alert, difficult to understand speech, possible R lower facial droop, PERRL, significant difficulty following commands      MDM  Not tpa candidate time of LKW  No signs of trauma on exam  HD stable without excessive HTN  Likely CVA vs encephalopathy from metabolic, infectious, or other disturbances    Anticipated Disposition:  Admission   If ischemic CVA on imaging and not retrieval candidate:   Not in tpa window   Permissive HTN   Stroke workup    Admission   Identify other potential causes of AMS  Load with ASA per neuro      I have personally reviewed the laboratory studies and imaging studies as ordered by the resident/NICKI  I have personally examined the patient              ED Course         Critical Care Time  Procedures

## 2022-07-25 ENCOUNTER — APPOINTMENT (INPATIENT)
Dept: RADIOLOGY | Facility: HOSPITAL | Age: 77
DRG: 065 | End: 2022-07-25
Payer: MEDICARE

## 2022-07-25 ENCOUNTER — TELEPHONE (OUTPATIENT)
Dept: RADIOLOGY | Facility: HOSPITAL | Age: 77
End: 2022-07-25

## 2022-07-25 ENCOUNTER — APPOINTMENT (INPATIENT)
Dept: NON INVASIVE DIAGNOSTICS | Facility: HOSPITAL | Age: 77
DRG: 065 | End: 2022-07-25
Payer: MEDICARE

## 2022-07-25 ENCOUNTER — EPISODE CHANGES (OUTPATIENT)
Dept: CASE MANAGEMENT | Facility: OTHER | Age: 77
End: 2022-07-25

## 2022-07-25 PROBLEM — I63.512 ACUTE ISCHEMIC LEFT MCA STROKE (HCC): Status: ACTIVE | Noted: 2022-07-24

## 2022-07-25 PROBLEM — Z86.718 HISTORY OF DVT (DEEP VEIN THROMBOSIS): Status: ACTIVE | Noted: 2022-07-25

## 2022-07-25 LAB
ALBUMIN SERPL BCP-MCNC: 3.3 G/DL (ref 3.5–5)
ALP SERPL-CCNC: 70 U/L (ref 46–116)
ALT SERPL W P-5'-P-CCNC: 15 U/L (ref 12–78)
ANION GAP SERPL CALCULATED.3IONS-SCNC: 5 MMOL/L (ref 4–13)
AORTIC ROOT: 2.8 CM
APICAL FOUR CHAMBER EJECTION FRACTION: 68 %
ASCENDING AORTA: 3.2 CM
AST SERPL W P-5'-P-CCNC: 17 U/L (ref 5–45)
ATRIAL RATE: 65 BPM
BACTERIA UR QL AUTO: ABNORMAL /HPF
BILIRUB SERPL-MCNC: 0.53 MG/DL (ref 0.2–1)
BILIRUB UR QL STRIP: NEGATIVE
BUN SERPL-MCNC: 21 MG/DL (ref 5–25)
CALCIUM ALBUM COR SERPL-MCNC: 9.5 MG/DL (ref 8.3–10.1)
CALCIUM SERPL-MCNC: 8.9 MG/DL (ref 8.3–10.1)
CHLORIDE SERPL-SCNC: 111 MMOL/L (ref 96–108)
CHOLEST SERPL-MCNC: 146 MG/DL
CLARITY UR: CLEAR
CO2 SERPL-SCNC: 26 MMOL/L (ref 21–32)
COLOR UR: ABNORMAL
CREAT SERPL-MCNC: 0.64 MG/DL (ref 0.6–1.3)
E WAVE DECELERATION TIME: 153 MS
EST. AVERAGE GLUCOSE BLD GHB EST-MCNC: 108 MG/DL
FRACTIONAL SHORTENING: 29 % (ref 28–44)
GFR SERPL CREATININE-BSD FRML MDRD: 85 ML/MIN/1.73SQ M
GLUCOSE SERPL-MCNC: 91 MG/DL (ref 65–140)
GLUCOSE UR STRIP-MCNC: NEGATIVE MG/DL
HBA1C MFR BLD: 5.4 %
HDLC SERPL-MCNC: 53 MG/DL
HGB UR QL STRIP.AUTO: ABNORMAL
INTERVENTRICULAR SEPTUM IN DIASTOLE (PARASTERNAL SHORT AXIS VIEW): 1.1 CM
INTERVENTRICULAR SEPTUM: 1.1 CM (ref 0.6–1.1)
KETONES UR STRIP-MCNC: NEGATIVE MG/DL
LAAS-AP2: 24.7 CM2
LAAS-AP4: 28.5 CM2
LDLC SERPL CALC-MCNC: 72 MG/DL (ref 0–100)
LEFT ATRIUM SIZE: 3.5 CM
LEFT INTERNAL DIMENSION IN SYSTOLE: 3 CM (ref 2.1–4)
LEFT VENTRICULAR INTERNAL DIMENSION IN DIASTOLE: 4.2 CM (ref 3.5–6)
LEFT VENTRICULAR POSTERIOR WALL IN END DIASTOLE: 1 CM
LEFT VENTRICULAR STROKE VOLUME: 44 ML
LEUKOCYTE ESTERASE UR QL STRIP: NEGATIVE
LVSV (TEICH): 44 ML
MAGNESIUM SERPL-MCNC: 2 MG/DL (ref 1.6–2.6)
MUCOUS THREADS UR QL AUTO: ABNORMAL
MV E'TISSUE VEL-LAT: 11 CM/S
MV E'TISSUE VEL-SEP: 6 CM/S
MV PEAK A VEL: 0.6 M/S
MV PEAK E VEL: 64 CM/S
NITRITE UR QL STRIP: NEGATIVE
NON-SQ EPI CELLS URNS QL MICRO: ABNORMAL /HPF
P AXIS: 49 DEGREES
PH UR STRIP.AUTO: 6 [PH]
POTASSIUM SERPL-SCNC: 4 MMOL/L (ref 3.5–5.3)
PR INTERVAL: 206 MS
PROT SERPL-MCNC: 6.2 G/DL (ref 6.4–8.4)
PROT UR STRIP-MCNC: ABNORMAL MG/DL
QRS AXIS: -23 DEGREES
QRSD INTERVAL: 82 MS
QT INTERVAL: 412 MS
QTC INTERVAL: 428 MS
RBC #/AREA URNS AUTO: ABNORMAL /HPF
RIGHT VENTRICLE ID DIMENSION: 4.4 CM
SL CV LEFT ATRIUM LENGTH A2C: 6.1 CM
SL CV LV EF: 70
SL CV PED ECHO LEFT VENTRICLE DIASTOLIC VOLUME (MOD BIPLANE) 2D: 79 ML
SL CV PED ECHO LEFT VENTRICLE SYSTOLIC VOLUME (MOD BIPLANE) 2D: 35 ML
SODIUM SERPL-SCNC: 142 MMOL/L (ref 135–147)
SP GR UR STRIP.AUTO: >=1.05 (ref 1–1.03)
T WAVE AXIS: 22 DEGREES
TRIGL SERPL-MCNC: 103 MG/DL
TSH SERPL DL<=0.05 MIU/L-ACNC: 1.5 UIU/ML (ref 0.45–4.5)
UROBILINOGEN UR STRIP-ACNC: <2 MG/DL
VENTRICULAR RATE: 65 BPM
WBC #/AREA URNS AUTO: ABNORMAL /HPF

## 2022-07-25 PROCEDURE — G1004 CDSM NDSC: HCPCS

## 2022-07-25 PROCEDURE — 93306 TTE W/DOPPLER COMPLETE: CPT | Performed by: INTERNAL MEDICINE

## 2022-07-25 PROCEDURE — 80053 COMPREHEN METABOLIC PANEL: CPT | Performed by: INTERNAL MEDICINE

## 2022-07-25 PROCEDURE — 70551 MRI BRAIN STEM W/O DYE: CPT

## 2022-07-25 PROCEDURE — 99232 SBSQ HOSP IP/OBS MODERATE 35: CPT | Performed by: NURSE PRACTITIONER

## 2022-07-25 PROCEDURE — 81001 URINALYSIS AUTO W/SCOPE: CPT | Performed by: INTERNAL MEDICINE

## 2022-07-25 PROCEDURE — 83036 HEMOGLOBIN GLYCOSYLATED A1C: CPT | Performed by: INTERNAL MEDICINE

## 2022-07-25 PROCEDURE — NC001 PR NO CHARGE: Performed by: STUDENT IN AN ORGANIZED HEALTH CARE EDUCATION/TRAINING PROGRAM

## 2022-07-25 PROCEDURE — 92610 EVALUATE SWALLOWING FUNCTION: CPT

## 2022-07-25 PROCEDURE — C8929 TTE W OR WO FOL WCON,DOPPLER: HCPCS

## 2022-07-25 PROCEDURE — 83735 ASSAY OF MAGNESIUM: CPT | Performed by: INTERNAL MEDICINE

## 2022-07-25 PROCEDURE — 99223 1ST HOSP IP/OBS HIGH 75: CPT | Performed by: STUDENT IN AN ORGANIZED HEALTH CARE EDUCATION/TRAINING PROGRAM

## 2022-07-25 PROCEDURE — A9585 GADOBUTROL INJECTION: HCPCS

## 2022-07-25 PROCEDURE — 84443 ASSAY THYROID STIM HORMONE: CPT | Performed by: INTERNAL MEDICINE

## 2022-07-25 PROCEDURE — 93010 ELECTROCARDIOGRAM REPORT: CPT | Performed by: INTERNAL MEDICINE

## 2022-07-25 PROCEDURE — 80061 LIPID PANEL: CPT | Performed by: INTERNAL MEDICINE

## 2022-07-25 PROCEDURE — 70552 MRI BRAIN STEM W/DYE: CPT

## 2022-07-25 RX ORDER — ASPIRIN 81 MG/1
81 TABLET, CHEWABLE ORAL DAILY
Status: DISCONTINUED | OUTPATIENT
Start: 2022-07-25 | End: 2022-07-28 | Stop reason: HOSPADM

## 2022-07-25 RX ADMIN — ASPIRIN 300 MG: 300 SUPPOSITORY RECTAL at 01:20

## 2022-07-25 RX ADMIN — PERFLUTREN 1.2 ML/MIN: 6.52 INJECTION, SUSPENSION INTRAVENOUS at 11:52

## 2022-07-25 RX ADMIN — GADOBUTROL 8.5 ML: 604.72 INJECTION INTRAVENOUS at 22:01

## 2022-07-25 RX ADMIN — ENOXAPARIN SODIUM 40 MG: 40 INJECTION SUBCUTANEOUS at 08:15

## 2022-07-25 RX ADMIN — ATORVASTATIN CALCIUM 40 MG: 40 TABLET, FILM COATED ORAL at 18:07

## 2022-07-25 NOTE — PLAN OF CARE
Problem: Prexisting or High Potential for Compromised Skin Integrity  Goal: Skin integrity is maintained or improved  Description: INTERVENTIONS:  - Identify patients at risk for skin breakdown  - Assess and monitor skin integrity  - Assess and monitor nutrition and hydration status  - Monitor labs   - Assess for incontinence   - Turn and reposition patient  - Assist with mobility/ambulation  - Relieve pressure over bony prominences  - Avoid friction and shearing  - Provide appropriate hygiene as needed including keeping skin clean and dry  - Evaluate need for skin moisturizer/barrier cream  - Collaborate with interdisciplinary team   - Patient/family teaching  - Consider wound care consult   Outcome: Progressing     Problem: Potential for Falls  Goal: Patient will remain free of falls  Description: INTERVENTIONS:  - Educate patient/family on patient safety including physical limitations  - Instruct patient to call for assistance with activity   - Consult OT/PT to assist with strengthening/mobility   - Keep Call bell within reach  - Keep bed low and locked with side rails adjusted as appropriate  - Keep care items and personal belongings within reach  - Initiate and maintain comfort rounds  - Make Fall Risk Sign visible to staff  - Offer Toileting every 2 Hours, in advance of need  - Initiate/Maintain bed alarm  - Obtain necessary fall risk management equipment: bedalarm  - Apply yellow socks and bracelet for high fall risk patients  - Consider moving patient to room near nurses station  Outcome: Progressing     Problem: MOBILITY - ADULT  Goal: Maintain or return to baseline ADL function  Description: INTERVENTIONS:  -  Assess patient's ability to carry out ADLs; assess patient's baseline for ADL function and identify physical deficits which impact ability to perform ADLs (bathing, care of mouth/teeth, toileting, grooming, dressing, etc )  - Assess/evaluate cause of self-care deficits   - Assess range of motion  - Assess patient's mobility; develop plan if impaired  - Assess patient's need for assistive devices and provide as appropriate  - Encourage maximum independence but intervene and supervise when necessary  - Involve family in performance of ADLs  - Assess for home care needs following discharge   - Consider OT consult to assist with ADL evaluation and planning for discharge  - Provide patient education as appropriate  Outcome: Progressing  Goal: Maintains/Returns to pre admission functional level  Description: INTERVENTIONS:  - Perform BMAT or MOVE assessment daily    - Set and communicate daily mobility goal to care team and patient/family/caregiver  - Collaborate with rehabilitation services on mobility goals if consulted  - Perform Range of Motion 3 times a day  - Reposition patient every 2 hours    - Dangle patient 3 times a day  - Stand patient 3 times a day  - Ambulate patient 3 times a day  - Out of bed to chair 3 times a day   - Out of bed for meals 3 times a day  - Out of bed for toileting  - Record patient progress and toleration of activity level   Outcome: Progressing

## 2022-07-25 NOTE — ED NOTES
Daughter who cares for pt called to assist with MRI screening from   Nurse left message     Felisa Quevedo RN  07/25/22 0005

## 2022-07-25 NOTE — ED PROVIDER NOTES
History  Chief Complaint   Patient presents with   [address] 7/23 @ 2000  R facial droop  HPI  64-year-old female last known normal 7/23 8:00 p m  Presents as a pre-hospital stroke alert  Patient was speaking on the phone last night a p m  With her family and then family cannot contact her today so they called EMS to her house patient was altered on arrival and exhibiting right face droop  Patient was a pre-hospital stroke alert  Patient intermittently follows commands and is having difficulty with word finding  Patient has equal strength bilaterally  Prior to Admission Medications   Prescriptions Last Dose Informant Patient Reported? Taking?    Ascorbic Acid (VITAMIN C) 500 MG CAPS  Self Yes No   Sig: Take 1 tablet by mouth daily   Calcium Carb-Cholecalciferol (CALCIUM 500/D) 500-400 MG-UNIT CHEW  Self Yes No   Sig: Chew 1 tablet daily   Cholecalciferol (VITAMIN D3) 2000 units capsule  Self Yes No   Sig: Take 1 tablet by mouth daily   Multiple Vitamins-Minerals (CENTRUM SILVER 50+WOMEN PO)  Self Yes No   Sig: Take 1 tablet by mouth daily   sertraline (Zoloft) 50 mg tablet   No No   Sig: Take 1 5 tablets (75 mg total) by mouth daily      Facility-Administered Medications: None       Past Medical History:   Diagnosis Date    Deep vein thrombosis ([facility] [address] ) 1/28/2016    Diverticulosis     last assessed 05/29/13    Fibrocystic breast     History of DVT (deep vein thrombosis)     Xarelto, 4/2015 thru April 2016    Impaired fasting glucose     last assessed 06/04/14    Inguinal lymphadenopathy     Multiple benign polyps of large intestine     Obesity     Uterine cancer (HCC)        Past Surgical History:   Procedure Laterality Date    HYSTERECTOMY      LYMPH NODE BIOPSY      OOPHORECTOMY      PELVIC LAPAROSCOPY      TONSILLECTOMY      with adenoidectomy       Family History   Problem Relation Age of Onset    Hypothyroidism Mother     Diabetes Maternal Grandfather      I have reviewed and agree with the history as documented  E-Cigarette/Vaping    E-Cigarette Use Never User      E-Cigarette/Vaping Substances    Nicotine No     THC No     CBD No     Flavoring No     Other No     Unknown No      Social History     Tobacco Use    Smoking status: Never Smoker    Smokeless tobacco: Never Used   Vaping Use    Vaping Use: Never used   Substance Use Topics    Alcohol use: No    Drug use: No        Review of Systems   Unable to perform ROS: Mental status change       Physical Exam  ED Triage Vitals   Temperature Pulse Respirations Blood Pressure SpO2   07/24/22 2011 07/24/22 1950 07/24/22 1950 07/24/22 1950 07/24/22 1950   98 3 °F (36 8 °C) 74 16 161/80 94 %      Temp Source Heart Rate Source Patient Position - Orthostatic VS BP Location FiO2 (%)   07/24/22 2011 07/24/22 1950 07/24/22 1950 07/24/22 1950 --   Tympanic Monitor Lying Right arm       Pain Score       07/24/22 1950       No Pain             Orthostatic Vital Signs  Vitals:    07/25/22 1450 07/25/22 1500 07/25/22 1515 07/25/22 1551   BP: 133/73 135/75 135/73 134/72   Pulse: 62 63 58 60   Patient Position - Orthostatic VS:           Physical Exam  Vitals reviewed  Constitutional:       General: She is not in acute distress  Appearance: She is well-developed  HENT:      Head: Normocephalic and atraumatic  Right Ear: External ear normal       Left Ear: External ear normal       Nose: Nose normal  No congestion or rhinorrhea  Mouth/Throat:      Mouth: Mucous membranes are moist       Pharynx: No oropharyngeal exudate or posterior oropharyngeal erythema  Eyes:      General:         Right eye: No discharge  Left eye: No discharge  Extraocular Movements: Extraocular movements intact  Conjunctiva/sclera: Conjunctivae normal       Pupils: Pupils are equal, round, and reactive to light  Cardiovascular:      Rate and Rhythm: Normal rate and regular rhythm  Pulses: Normal pulses        Heart sounds: Normal heart sounds  Pulmonary:      Effort: Pulmonary effort is normal  No respiratory distress  Breath sounds: Normal breath sounds  No wheezing or rales  Abdominal:      Palpations: Abdomen is soft  Tenderness: There is no abdominal tenderness  Musculoskeletal:         General: No tenderness  Cervical back: Normal range of motion and neck supple  No rigidity  Skin:     General: Skin is warm  Findings: No erythema or rash  Neurological:      Mental Status: She is alert  Cranial Nerves: Cranial nerve deficit present           ED Medications  Medications   ascorbic acid (VITAMIN C) tablet 500 mg (500 mg Oral Not Given 7/25/22 0815)   calcium carbonate-vitamin D (OSCAL-D) 500 mg-200 units per tablet 1 tablet (1 tablet Oral Not Given 7/25/22 0753)   sertraline (ZOLOFT) tablet 75 mg (75 mg Oral Not Given 7/25/22 0816)   multivitamin-minerals (CENTRUM) tablet 1 tablet (1 tablet Oral Not Given 7/25/22 0815)   cholecalciferol (VITAMIN D3) tablet 2,000 Units (2,000 Units Oral Not Given 7/25/22 0815)   acetaminophen (TYLENOL) tablet 650 mg (has no administration in time range)   docusate sodium (COLACE) capsule 100 mg (has no administration in time range)   ondansetron (ZOFRAN) injection 4 mg (has no administration in time range)   aluminum-magnesium hydroxide-simethicone (MYLANTA) oral suspension 30 mL (has no administration in time range)   atorvastatin (LIPITOR) tablet 40 mg (40 mg Oral Not Given 7/24/22 2220)   enoxaparin (LOVENOX) subcutaneous injection 40 mg (40 mg Subcutaneous Given 7/25/22 0815)   aspirin chewable tablet 81 mg (81 mg Oral Not Given 7/25/22 0815)   iohexol (OMNIPAQUE) 350 MG/ML injection (SINGLE-DOSE) 75 mL (75 mL Intravenous Given 7/24/22 2015)   aspirin rectal suppository 300 mg (300 mg Rectal Given 7/25/22 0120)   perflutren lipid microsphere (DEFINITY) injection (1 2 mL/min Intravenous Given 7/25/22 1152)       Diagnostic Studies  Results Reviewed     Procedure Component Value Units Date/Time    Hemoglobin A1c w/EAG Estimation [[id number]] Collected: 07/25/22 0415    Lab Status: Final result Specimen: Blood Updated: 07/25/22 0809     Hemoglobin A1C 5 4 %       mg/dl     Lipid Panel with Direct LDL reflex [[id number]]  (Normal) Collected: 07/25/22 0415    Lab Status: Final result Specimen: Blood Updated: 07/25/22 0503     Cholesterol 146 mg/dL      Triglycerides 103 mg/dL      HDL, Direct 53 mg/dL      LDL Calculated 72 mg/dL     TSH, 3rd generation [[id number]]  (Normal) Collected: 07/25/22 0415    Lab Status: Final result Specimen: Blood Updated: 07/25/22 0503     TSH 3RD GENERATON 1 500 uIU/mL     Narrative:      Patients undergoing fluorescein dye angiography may retain small amounts of fluorescein in the body for 48-72 hours post procedure  Samples containing fluorescein can produce falsely depressed TSH values  If the patient had this procedure,a specimen should be resubmitted post fluorescein clearance        Comprehensive metabolic panel [[id number]]  (Abnormal) Collected: 07/25/22 0415    Lab Status: Final result Specimen: Blood Updated: 07/25/22 0503     Sodium 142 mmol/L      Potassium 4 0 mmol/L      Chloride 111 mmol/L      CO2 26 mmol/L      ANION GAP 5 mmol/L      BUN 21 mg/dL      Creatinine 0 64 mg/dL      Glucose 91 mg/dL      Calcium 8 9 mg/dL      Corrected Calcium 9 5 mg/dL      AST 17 U/L      ALT 15 U/L      Alkaline Phosphatase 70 U/L      Total Protein 6 2 g/dL      Albumin 3 3 g/dL      Total Bilirubin 0 53 mg/dL      eGFR 85 ml/min/1 73sq m     Narrative:      [place] guidelines for Chronic Kidney Disease (CKD):     Stage 1 with normal or high GFR (GFR > 90 mL/min/1 73 square meters)    Stage 2 Mild CKD (GFR = 60-89 mL/min/1 73 square meters)    Stage 3A Moderate CKD (GFR = 45-59 mL/min/1 73 square meters)    Stage 3B Moderate CKD (GFR = 30-44 mL/min/1 73 square meters)    Stage 4 Severe CKD (GFR = 15-29 mL/min/1 73 square meters)    Stage 5 End Stage CKD (GFR <15 mL/min/1 73 square meters)  Note: GFR calculation is accurate only with a steady state creatinine    Magnesium [[id number]]  (Normal) Collected: 07/25/22 0415    Lab Status: Final result Specimen: Blood Updated: 07/25/22 0457     Magnesium 2 0 mg/dL     Urine Microscopic [[id number]]  (Abnormal) Collected: 07/25/22 0115    Lab Status: Final result Specimen: Urine, Straight Cath Updated: 07/25/22 0136     RBC, UA 1-2 /hpf      WBC, UA 1-2 /hpf      Epithelial Cells Occasional /hpf      Bacteria, UA Occasional /hpf      MUCUS THREADS Moderate    UA w Reflex to Microscopic w Reflex to Culture [[id number]]  (Abnormal) Collected: 07/25/22 0115    Lab Status: Final result Specimen: Urine, Straight Cath Updated: 07/25/22 0134     Color, UA Light Yellow     Clarity, UA Clear     Specific Gravity, UA >=1 050     pH, UA 6 0     Leukocytes, UA Negative     Nitrite, UA Negative     Protein, UA Trace mg/dl      Glucose, UA Negative mg/dl      Ketones, UA Negative mg/dl      Urobilinogen, UA <2 0 mg/dl      Bilirubin, UA Negative     Occult Blood, UA Trace    HS Troponin I 2hr [[id number]]  (Normal) Collected: 07/24/22 2219    Lab Status: Final result Specimen: Blood from Arm, Right Updated: 07/24/22 2314     hs TnI 2hr 13 ng/L      Delta 2hr hsTnI 1 ng/L     Fingerstick Glucose (POCT) [[id number]]  (Normal) Collected: 07/24/22 1949    Lab Status: Final result Updated: 07/24/22 2234     POC Glucose 101 mg/dl     HS Troponin 0hr (reflex protocol) [[id number]]  (Normal) Collected: 07/24/22 2000    Lab Status: Final result Specimen: Blood from Arm, Right Updated: 07/24/22 2033     hs TnI 0hr 12 ng/L     Basic metabolic panel [[id number]]  (Abnormal) Collected: 07/24/22 2000    Lab Status: Final result Specimen: Blood from Arm, Right Updated: 07/24/22 2022     Sodium 141 mmol/L      Potassium 4 3 mmol/L      Chloride 110 mmol/L      CO2 25 mmol/L      ANION GAP 6 mmol/L      BUN 20 mg/dL      Creatinine 0 68 mg/dL      Glucose 92 mg/dL      Calcium 9 5 mg/dL      eGFR 83 ml/min/1 73sq m     Narrative:      [place] guidelines for Chronic Kidney Disease (CKD):     Stage 1 with normal or high GFR (GFR > 90 mL/min/1 73 square meters)    Stage 2 Mild CKD (GFR = 60-89 mL/min/1 73 square meters)    Stage 3A Moderate CKD (GFR = 45-59 mL/min/1 73 square meters)    Stage 3B Moderate CKD (GFR = 30-44 mL/min/1 73 square meters)    Stage 4 Severe CKD (GFR = 15-29 mL/min/1 73 square meters)    Stage 5 End Stage CKD (GFR <15 mL/min/1 73 square meters)  Note: GFR calculation is accurate only with a steady state creatinine    Protime-INR [[id number]]  (Normal) Collected: 07/24/22 2000    Lab Status: Final result Specimen: Blood from Arm, Right Updated: 07/24/22 2020     Protime 13 1 seconds      INR 0 97    APTT [[id number]]  (Normal) Collected: 07/24/22 2000    Lab Status: Final result Specimen: Blood from Arm, Right Updated: 07/24/22 2020     PTT 26 seconds     CBC and Platelet [[id number]]  (Normal) Collected: 07/24/22 2000    Lab Status: Final result Specimen: Blood from Arm, Right Updated: 07/24/22 2017     WBC 9 24 Thousand/uL      RBC 4 77 Million/uL      Hemoglobin 13 9 g/dL      Hematocrit 43 5 %      MCV 91 fL      MCH 29 1 pg      MCHC 32 0 g/dL      RDW 13 3 %      Platelets 191 Thousands/uL      MPV 10 6 fL                  MRI brain wo contrast   Final Result by Shaheed Baig DO (07/25 1045)      Acute to early subacute ischemia identified involving the left MCA territory described in detail above with one larger infarct and multiple additional smaller infarcts  No hemorrhagic transformation identified  This examination was marked "immediate notification" in Epic in order to begin the standard process by which the radiology reading room liaison alerts the referring practitioner          Workstation performed: [id number]         CTA stroke alert (head/neck)   Final Result by Kush Leslie MD (07/24 2034)      Distal left M2/M3 MCA occlusion  Incidental thyroid nodule(s) for which nonemergent thyroid ultrasound is recommended  I personally discussed this study with neurologist on 7/24/2022 at 7:57 PM                         Workstation performed: [id number]         CT stroke alert brain   Final Result by Kush Leslie MD (07/24 2001)      No evidence of acute intracranial hemorrhage  Left frontal temporal and low-attenuation suggesting acute to subacute ischemia               I personally discussed this study with neurologist on 7/24/2022 at 7:57 PM                 Workstation performed: [id number]         MRI brain w contrast    (Results Pending)         Procedures  ECG 12 Lead Documentation Only    Date/Time: 7/24/2022 8:18 PM  Performed by: Jarrett Valente DO  Authorized by: Jarrett Valente DO     Indications / Diagnosis:  Stroke  ECG reviewed by me, the ED Provider: yes    Patient location:  ED  Interpretation:     Interpretation: normal    Rate:     ECG rate:  65    ECG rate assessment: normal    Rhythm:     Rhythm: sinus rhythm    Ectopy:     Ectopy: none    QRS:     QRS axis:  Normal  Conduction:     Conduction: normal    ST segments:     ST segments:  Normal  T waves:     T waves: normal            ED Course                  Stroke Assessment     Row Name 07/24/22 2007             NIH Stroke Scale    Interval --      Level of Consciousness (1a ) 0      LOC Questions (1b ) 1      LOC Commands (1c ) 1      Best Gaze (2 ) 0      Visual (3 ) 0      Facial Palsy (4 ) 1      Motor Arm, Left (5a ) 0      Motor Arm, Right (5b ) 0      Motor Leg, Left (6a ) 0      Motor Leg, Right (6b ) 0      Limb Ataxia (7 ) 0      Sensory (8 ) 0      Best Language (9 ) 0      Dysarthria (10 ) 1      Extinction and Inattention (11 ) (Formerly Neglect) 0      Total 4                                    MDM  Number of Diagnoses or Management Options     Amount and/or Complexity of Data Reviewed  Clinical lab tests: ordered and reviewed  Tests in the radiology section of CPT®: ordered and reviewed  Discuss the patient with other providers: yes    Risk of Complications, Morbidity, and/or Mortality  Presenting problems: high  Diagnostic procedures: high  Management options: high      13-year-old female last known normal 7/23 8:00 p m  Presents as a pre-hospital stroke alert      Likely left sided CVA infarct  Not a candidate for embolectomy  Patient started on aspirin and admitted on the stroke pathway  Disposition  Final diagnoses:   Stroke [facility])   CVA (cerebral vascular accident) ([facility] [address] )     Time reflects when diagnosis was documented in both MDM as applicable and the Disposition within this note     Time User Action Codes Description Comment    7/24/2022  8:13 PM Baron Fry Add [I63 9] Stroke ([facility] [address] )     7/24/2022  8:21 PM Baron Fry Add [I63 9] CVA (cerebral vascular accident) [facility])       ED Disposition     ED Disposition   Admit    Condition   Stable    Date/Time   Sun Jul 24, 2022  8:21 PM    Comment   Case was discussed with OTILIO and the patient's admission status was agreed to be Admission Status: inpatient status            Follow-up Information     Follow up With Specialties Details Why Contact Info Additional Avinash Joy Neurology [facility] Neurology Call in 1 day(s)  [address] [facility] Neurology [facility], [address] [place] [address], [address] [facility]          Current Discharge Medication List      CONTINUE these medications which have NOT CHANGED    Details   Ascorbic Acid (VITAMIN C) 500 MG CAPS Take 1 tablet by mouth daily      Calcium Carb-Cholecalciferol (CALCIUM 500/D) 500-400 MG-UNIT CHEW Chew 1 tablet daily      Cholecalciferol (VITAMIN D3) 2000 units capsule Take 1 tablet by mouth daily      Multiple Vitamins-Minerals (CENTRUM SILVER 50+WOMEN PO) Take 1 tablet by mouth daily      sertraline (Zoloft) 50 mg tablet Take 1 5 tablets (75 mg total) by mouth daily  Qty: 90 tablet, Refills: 1    Associated Diagnoses: Mild episode of recurrent major depressive disorder ([facility] [address] )           No discharge procedures on file  PDMP Review     None           ED Provider  Attending physically available and evaluated Gala James I managed the patient along with the ED Attending      Electronically Signed by         Carlota Minor DO  07/25/22 7807

## 2022-07-25 NOTE — ASSESSMENT & PLAN NOTE
Tamy Warner is a 76 y o  female who presented as stroke alert on 7/24/2022  7:46 PM with initial NIHSS of 6 and LKW 7/23 1330 , initial BP was Blood Pressure: 161/80  Initial presenting deficits were right facial droop and aphasia  As a result of being outside the window pt was determined to not be a candidate for tPA  No prior history of TIA/stroke  HTN, depression, endometrial cancer (in remission since 2014)  Thinners use:  None reported    Workup:  · In the ED, /80, now 140/74  · EKG showed NSR, rate 65  INR 0 97  · A1c 5 4%, LDL 72, cholesterol 146, triglycerides  · TSH WNL, CBC, CMP grossly unremarkable  · UDS/UA no infection   CTH: Left frontal temporal and low-attenuation suggesting acute to subacute ischemia   CTA: Left Distal M2/M3 occlusion   · MRI-  left frontal temporal infarct, smaller infarcts within left frontal lobe, punctate infarct noted within left occipital lobe and the MCA/PCA junction  Pertinent Scores:   · NIHSS: 6     Significant exam findings:  Alert, oriented to self only  Cannot follow complex commands  Anomic aphasia  Repetition preserved  Right-sided facial droop  Etiology of ischemic stroke:  Cardioembolic given multiple cortical hits-suspect paroxysmal AFib  Plan, Stroke pathway:   · Assessment and plan was discussed with attending neurologist, Dr Ivory Che  · [id number]  · BP goals: SBP upto 220 for 24 hours, SBP upto 160-200 for 48 hours  · ASA - 300 mg rectal dosing given on 07/24  Continue with 81 mg daily aspirin thereafter  · Will obtain MRI brain with contrast to r/o mass lesions given hx of endometrial cancer  · Atorvastatin 40mg QHS  · Glucose <180 , normothermia  · Neuro checks- Every 1 hour x 4 hours, then every 2 hours x 4 hours, then every 4 hours x 72 hours     · Stat CT Head for change in neuro status  · Monitor on telemetry  · DVT ppx, SCDs  · PT/OT/PM&R evaluations   · Medical management as per primary team appreciated

## 2022-07-25 NOTE — ASSESSMENT & PLAN NOTE
Unfortunately, patient is 25 hours old since last seen to be normal   Neurology saw patient and recommends permissive hypertension  Stroke protocol  As patient has dysphonia and dysphagia; will place aspirin rectal for now  Neurology consult  Appreciate recommendations  Continue neuro follow-up, physical and occupational therapy with PMR and case management consult  Neuro checks  Telemetry  TSH, hemoglobin A1c with lipid profile  Lipitor 40 mg daily  Will do echocardiogram; noted CTA showing distal left-sided middle cerebral artery occlusion

## 2022-07-25 NOTE — CASE MANAGEMENT
Case Management Assessment & Discharge Planning Note    Patient name Jarrett Mosquera  Location ED 26/ED 26 MRN [id number]  : 1945 Date 2022       Current Admission Date: 2022  Current Admission Diagnosis:Cerebrovascular accident (CVA) due to occlusion of left middle cerebral artery [facility])   Patient Active Problem List    Diagnosis Date Noted    History of DVT (deep vein thrombosis) 2022    [facility] 20[facility]    Cerebrovascular accident (CVA) due to occlusion of left middle cerebral artery ([facility] ) 2022    Mild episode of recurrent major depressive disorder ([facility] ) 10/07/2021    Osteopenia of lumbar spine 2019    History of endometrial cancer 2018    Multinodular thyroid 2017    Inguinal lymphadenopathy 2015    Endometrial cancer ([facility] ) 2014    Obesity 2013    Osteoarthritis of hip 2013    Osteoarthritis of knee 2013    Vitamin D deficiency 2013      LOS (days): 1  Geometric Mean LOS (GMLOS) (days):   Days to GMLOS:     OBJECTIVE:    Risk of Unplanned Readmission Score: 10 08         Current admission status: Inpatient  Referral Reason: Stroke    Preferred Pharmacy:   [address] [place] [id number]  [address] [facility] [address]  Phone: [phone] Fax: [address]  [address] [place] [id number]  Phone: [phone] Fax: [phone]    Primary Care Provider: Riki Ford MD    Primary Insurance: MEDICARE  Secondary Insurance: AARP    ASSESSMENT:  Jose E Salas Proxies    There are no active Health Care Proxies on file         Advance Directives  Does patient have a [address]?: No  Does patient have Advance Directives?: Yes         Readmission Root Cause  30 Day Readmission: No    Patient Information  Admitted from[de-identified] Home  Mental Status: Confused  During Assessment patient was accompanied by: Daughter  Assessment information provided by[de-identified] Daughter, Patient  Primary Caregiver: Self  Support Systems: Self, Leia Freedman Dr of Residence: [address] [place] [address] do you live in?: [facility] entry access options   Select all that apply : Stairs  Number of steps to enter home : 2  Type of Current Residence: [place]  In the last 12 months, was there a time when you were not able to pay the mortgage or rent on time?: No  In the last 12 months, how many places have you lived?: 1  In the last 12 months, was there a time when you did not have a steady place to sleep or slept in a shelter (including now)?: No  Homeless/housing insecurity resource given?: No  Living Arrangements: Lives Alone    Activities of Daily Living Prior to Admission  Functional Status: Independent  Completes ADLs independently?: Yes  Ambulates independently?: Yes  Does patient use assisted devices?: No  Does patient currently own DME?: No  Does patient have a history of Outpatient Therapy (PT/OT)?: No  Does the patient have a history of Short-Term Rehab?: No  Does patient have a history of HHC?: No  Does patient currently have [facility]?: No       Patient Information Continued  Income Source: Pension/[employer]  Does patient have prescription coverage?: Yes  Within the past 12 months, you worried that your food would run out before you got the money to buy more : Never true  Within the past 12 months, the food you bought just didn't last and you didn't have money to get more : Never true  Food insecurity resource given?: No  Does patient receive dialysis treatments?: No  Does patient have a history of substance abuse?: No  Does patient have a history of Mental Health Diagnosis?: Yes (depression)  Is patient receiving treatment for mental health?: Yes  Has patient received inpatient treatment related to mental health in the last 2 years?: No     Means of Transportation  Means of Transport to Appts[de-identified] Drives Self  In the past 12 months, has lack of transportation kept you from medical appointments or from getting medications?: No  In the past 12 months, has lack of transportation kept you from meetings, work, or from getting things needed for daily living?: No  Was application for public transport provided?: No    DISCHARGE DETAILS:    Discharge planning discussed with[de-identified] Patient and daughter You Chung of Choice: Yes     CM contacted family/caregiver?: Yes      Contacts  Patient Contacts: Jose Manuel Santana  Relationship to Patient[de-identified] Family  Contact Method: In Person  Reason/Outcome: Continuity of Care, Emergency Contact, Discharge Planning        Additional Comments: CM met with pt and pt's daughter, Minh Osorio at bedside to complete Open  Pt was able to participate in the conversation but, per daughter, some of the information she provided was not accurate  Minh Osorio assisted with the Open  Minh Osorio states that at baseline pt is independent and and able to drive  Both Minh Osorio and pt's daughter Rick Lwoe were away this week and when Minh Osorio went to check on pt on Sunday she found that pt was not acting right at which time 911 was called  Minh Osorio provided CM with pt's advanced directives, CM made a copy and returned original to Minh Osorio  CM placed Advanced directives in MR bin to be scanned into chart

## 2022-07-25 NOTE — ED NOTES
Daughter who cares for pt called to assist with MRI screening from  Nurse left message       Venecia Lantigua RN  07/25/22 0003

## 2022-07-25 NOTE — PLAN OF CARE
Problem: Prexisting or High Potential for Compromised Skin Integrity  Goal: Skin integrity is maintained or improved  Description: INTERVENTIONS:  - Identify patients at risk for skin breakdown  - Assess and monitor skin integrity  - Assess and monitor nutrition and hydration status  - Monitor labs   - Assess for incontinence   - Turn and reposition patient  - Assist with mobility/ambulation  - Relieve pressure over bony prominences  - Avoid friction and shearing  - Provide appropriate hygiene as needed including keeping skin clean and dry  - Evaluate need for skin moisturizer/barrier cream  - Collaborate with interdisciplinary team   - Patient/family teaching  - Consider wound care consult   Outcome: Progressing     Problem: Potential for Falls  Goal: Patient will remain free of falls  Description: INTERVENTIONS:  - Educate patient/family on patient safety including physical limitations  - Instruct patient to call for assistance with activity   - Consult OT/PT to assist with strengthening/mobility   - Keep Call bell within reach  - Keep bed low and locked with side rails adjusted as appropriate  - Keep care items and personal belongings within reach  - Initiate and maintain comfort rounds  - Make Fall Risk Sign visible to staff    - Consider moving patient to room near nurses station  Outcome: Progressing     Problem: MOBILITY - ADULT  Goal: Maintain or return to baseline ADL function  Description: INTERVENTIONS:  -  Assess patient's ability to carry out ADLs; assess patient's baseline for ADL function and identify physical deficits which impact ability to perform ADLs (bathing, care of mouth/teeth, toileting, grooming, dressing, etc )  - Assess/evaluate cause of self-care deficits   - Assess range of motion  - Assess patient's mobility; develop plan if impaired  - Assess patient's need for assistive devices and provide as appropriate  - Encourage maximum independence but intervene and supervise when necessary  - Involve family in performance of ADLs  - Assess for home care needs following discharge   - Consider OT consult to assist with ADL evaluation and planning for discharge  - Provide patient education as appropriate  Outcome: Progressing  Goal: Maintains/Returns to pre admission functional level  Description: INTERVENTIONS:  - Perform BMAT or MOVE assessment daily    - Set and communicate daily mobility goal to care team and patient/family/caregiver  - Collaborate with rehabilitation services on mobility goals if consulted  - Reposition patient every 2 hours      - Out of bed for toileting  - Record patient progress and toleration of activity level   Outcome: Progressing     Problem: NEUROSENSORY - ADULT  Goal: Achieves stable or improved neurological status  Description: INTERVENTIONS  - Monitor and report changes in neurological status  - Monitor vital signs such as temperature, blood pressure, glucose, and any other labs ordered   - Initiate measures to prevent increased intracranial pressure  - Monitor for seizure activity and implement precautions if appropriate      Outcome: Progressing  Goal: Remains free of injury related to seizures activity  Description: INTERVENTIONS  - Maintain airway, patient safety  and administer oxygen as ordered  - Monitor patient for seizure activity, document and report duration and description of seizure to physician/advanced practitioner  - If seizure occurs,  ensure patient safety during seizure  - Reorient patient post seizure  - Seizure pads on all 4 side rails  - Instruct patient/family to notify RN of any seizure activity including if an aura is experienced  - Instruct patient/family to call for assistance with activity based on nursing assessment  - Administer anti-seizure medications if ordered    Outcome: Progressing  Goal: Achieves maximal functionality and self care  Description: INTERVENTIONS  - Monitor swallowing and airway patency with patient fatigue and changes in neurological status  - Encourage and assist patient to increase activity and self care     - Encourage visually impaired, hearing impaired and aphasic patients to use assistive/communication devices  Outcome: Progressing

## 2022-07-25 NOTE — SPEECH THERAPY NOTE
Speech-Language Pathology Bedside Swallow Evaluation      Patient Name: Juan Jose De    WAELDERZ'LUIS A Date: 7/25/2022     Problem List  Principal Problem:    Cerebrovascular accident (CVA) due to occlusion of left middle cerebral artery ([facility] )  Active Problems:    History of endometrial cancer    Vitamin D deficiency    Mild episode of recurrent major depressive disorder ([facility] )    History of DVT (deep vein thrombosis)      Past Medical History  Past Medical History:   Diagnosis Date    [facility] ) 1/28/2016    Diverticulosis     last assessed 05/29/13    Fibrocystic breast     History of DVT (deep vein thrombosis)     Xarelto, 4/2015 thru April 2016    Impaired fasting glucose     last assessed 06/04/14    Inguinal lymphadenopathy     Multiple benign polyps of large intestine     Obesity     Uterine cancer ([facility] )        Past Surgical History  Past Surgical History:   Procedure Laterality Date    HYSTERECTOMY      LYMPH NODE BIOPSY      OOPHORECTOMY      PELVIC LAPAROSCOPY      TONSILLECTOMY      with adenoidectomy       Summary   Pt presented with s/s suggestive of moderate-severe oral dysphagia  The patient does present with a right side facial droop  She is unable to follow commands for full evaluation of ROM  Patient is unable to fully masticate and breakdown regular bolus  She spits out whole pieces  The patient has adequate transfer and control of puree solids and thin liquids  Pharyngeal stage of swallowing appears adequate  The patient does have slightly delayed cough x1  The patient is admitted on stroke pathway  Appears to have difficulty follow directions and question some expressive aphasia  Will complete full speech/language evaluation when able       Risk/s for Aspiration: low      Recommended Diet: puree/level 1 diet and thin liquids   Recommended Form of Meds: whole with liquid   Aspiration precautions and swallowing strategies: upright posture, small bites/sips and alternating bites and sips  Other Recommendations: Continue frequent oral care    Current Medical Status  Chief Complaint:     Change in mental status with noted dysphonia  A  History of Present Illness:  Frank Kyle is a 76 y o  female who has past medical history significant for endometrial cancer in the past, depression, hypertension who was last seen to be within normal limits in usual activity and behavior approximately 8:00 p m  last night  She lives alone  The rest of the family however were away coming back just this evening from a vacation  They were able to speak to her last night thinking that she was in her normal self      Upon seeing her in her home, she was found to be different the and somewhat not as responsive or lost   Because the patient also manifests with right-sided facial droop EMS was then called and brought her to the emergency room  Currently, the patient manifests right-sided neglect with note of dysphonia and dysphagia  She manifests mild right facial droop  When spoken, the patient is unable to follow much commands feeling as if she is lost and unable to comprehend  Current Precautions:  Fall  Aspiration    Allergies:  No known food allergies  Past medical history:  Please see H&P for details    Special Studies:  CT head 7/24/2022:   No evidence of acute intracranial hemorrhage  Left frontal temporal and low-attenuation suggesting acute to subacute ischemia      Social/Education/Vocational Hx:  Pt lives alone    Swallow Information   Current Risks for Dysphagia & Aspiration: upper dentures are at home   Current Symptoms/Concerns: unable to masticate regular solids   Current Diet: NPO   Baseline Diet: regular diet and thin liquids    Baseline Assessment   Behavior/Cognition: alert  Speech/Language Status: able to follow commands inconsistently and limited verbal output  Patient Positioning: upright in bed  Pain Status/Interventions/Response to Interventions: No report of or nonverbal indications of pain  Swallow Mechanism Exam  Facial: right facial droop  Labial: decreased ROM right side  Lingual: unable to test 2/2 limited command following  Velum: unable to visualize  Mandible: decreased ROM right side  Dentition: natural bottom dentition; edentuous for upper (dentures are at home)   Vocal quality:clear/adequate   Volitional Cough: strong/productive   Respiratory Status: on RA      Consistencies Assessed and Performance   Consistencies Administered: thin liquids, puree and hard solids  Materials administered included applesauce and pretzel with thin liquids     Oral Stage: moderate-severe  Retrieval of items via tsp and straw is adequate  Mastication time is not efficient with poor bolus breakdown with regular solids  The patient is unable to transfer regular solids and spits out whole pieces  She appears to tolerate puree solids and thin liquids well  Pharyngeal Stage: WFL  Swallow Mechanics:  Swallowing initiation appeared prompt  Laryngeal rise was palpated and judged to be within functional limits  No coughing, throat clearing, change in vocal quality or respiratory status noted today  Esophageal Concerns: none reported    Summary and Recommendations (see above)    Results Reviewed with: patient, RN and PA     Treatment Recommended: dysphagia therapy      Frequency of treatment: 3-5x week, as able     Patient Stated Goal: none stated     Dysphagia LTG  -Patient will demonstrate safe and effective oral intake (without overt s/s significant oral/pharyngeal dysphagia including s/s penetration or aspiration) for the highest appropriate diet level       Short Term Goals:  -Pt will tolerate Dysphagia 1/pureed diet and thin liquid with no significant s/s oral or pharyngeal dysphagia across 1-3 diagnostic session/s    -Patient will tolerate trials of upgraded food and/or liquid texture with no significant s/s of oral or pharyngeal dysphagia including aspiration across 1-3 diagnostic sessions     Speech Therapy Prognosis   Prognosis: good    Prognosis Considerations: medical status and cognitive status

## 2022-07-25 NOTE — CONSULTS
NEUROLOGY RESIDENCY - ADMISSION H&P NOTE     Name: Adolph Falcon   Age & Sex: 76 y o  female   MRN: [id number]  Unit/Bed#: ED 26   Encounter: [id number]    ASSESSMENT & PLAN     CVA (cerebral vascular accident) [facility])  Gayle Cristina is a 76 y o  female who presented as stroke alert on 7/24/2022  7:46 PM with initial NIHSS of 6 and LKW 8pm the night prior , initial BP was Blood Pressure: 161/80  Initial presenting deficits were right facial droop and aphasia  As a result of being outside the window pt was determined to not be a candidate for tPA   BP over last 24 hours: Blood Pressure: 145/65  current BP: Blood Pressure: 145/65   Neuro exam this morning is very similar to neuro exam at presentation, see full exam below  Imaging:   CTH: No evidence of acute intracranial hemorrhage  Left frontal temporal and low-attenuation suggesting acute to subacute ischemia   CTA: Left Distal M2/M3 occlusion     Plan:   Admit for Stroke pathway   Maintain permissive HTN, given distal occlusion recommend keeping Systolic blood pressure higher than >  and less than <220   Aspirin 81 mg starting today   Statin   A1c/lipid panel    Maintain glucose below 200   MRI brain   Echo    Neuro checks   Monitor on telemetry   Medical management as per primary team appreciated   PT/OT/Speech/PMR consults appreciated when able  [employer] [facility] to continue to follow         Recommendations for outpatient neurological follow up have yet to be determined  VTE Prophylaxis: Enoxaparin (Lovenox)  / sequential compression device   Code Status: Level 3 DNR  POLST: Not yet determined    Anticipated Length of Stay:  Patient will be admitted on an Inpatient basis with an anticipated length of stay of > 2 midnights  Justification for Hospital Stay: Stroke, aphasia, [address]     Chief Complaint   Patient presents with   [address] 7/23 @ 2000  R facial droop          HISTORY OF PRESENT ILLNESS     Negro Garza is a right handed 76 y o  female  with pertinent history of endometrial cancer, remote DVT, multinodular thyroid, obesity, osteoarthritis, and depression, who presented for evaluation of a possible stroke  She presented to [facility] ED on 7/24 at 7:46 with initial NIHSS of 6 and LKW 8pm the night prior per family, initial BP was Blood Pressure: 161/80  Initial presenting deficits were right facial droop and aphasia  Initial brain CT showed an LEFT frontal/temporal hypodensity suggesting an infarct  CT head and neck LEFT MCA M2 M3 occlusion  As a result of being outside the window pt was determined to not be a candidate for tPA  She has no prior stroke history, but does have a history of DVT in 2015 that was managed with Xarelto for several months  Patient is not currently on anticoagulation as an outpatient  Pertinent Negatives include: chest pain, LOC, diaphoresis, numbness    Per PCP note, at baseline patient live alone and performs all ADLs independently       REVIEW OF SYSTEMS     Review of Systems   Unable to perform ROS: Mental status change       PAST MEDICAL HISTORY     Past Medical History:   Diagnosis Date    Deep vein thrombosis ([facility] [address] ) 1/28/2016    Diverticulosis     last assessed 05/29/13    Fibrocystic breast     History of DVT (deep vein thrombosis)     Xarelto, 4/2015 thru April 2016    Impaired fasting glucose     last assessed 06/04/14    Inguinal lymphadenopathy     Multiple benign polyps of large intestine     Obesity     Uterine cancer (HCC)        Allergies:   No Known Allergies    PAST SURGICAL HISTORY     Past Surgical History:   Procedure Laterality Date    HYSTERECTOMY      LYMPH NODE BIOPSY      OOPHORECTOMY      PELVIC LAPAROSCOPY      TONSILLECTOMY      with adenoidectomy       SOCIAL & FAMILY HISTORY     Social History     Substance and Sexual Activity   Alcohol Use No     Social History     Substance and Sexual Activity   Drug Use No Social History     Tobacco Use   Smoking Status Never Smoker   Smokeless Tobacco Never Used       Marital Status:    Occupation: Retired  Patient Pre-hospital Living Situation: Lives Alone  Patient Pre-hospital Level of Mobility: Walking independently (per patient)      Family History:  Family History   Problem Relation Age of Onset    Hypothyroidism Mother     Diabetes Maternal Grandfather            OBJECTIVE     Vitals:    22 0315 22 0400 22 0600 22 0713   BP: 133/68 132/65 131/61 140/74   BP Location:   Left arm Left arm   Pulse: 56 55 (!) 50 (!) 51   Resp:  18   Temp:    98 5 °F (36 9 °C)   TempSrc:    Tympanic   SpO2: 95% 96% 95% 98%   Weight:            Temperature:   Temp (24hrs), Av 4 °F (36 9 °C), Min:98 3 °F (36 8 °C), Max:98 5 °F (36 9 °C)    Temperature: 98 5 °F (36 9 °C)    Intake & Output:  I/O        07 07 07 07 07 0700    Urine (mL/kg/hr)  250     Total Output  250     Net  -250                  Weights:        Body mass index is 30 25 kg/m²  Weight (last 2 days)     Date/Time Weight    22 85 (187 39)          Physical Exam  Constitutional:       General: She is not in acute distress  HENT:      Head: Normocephalic and atraumatic  Eyes:      Extraocular Movements: Extraocular movements intact and EOM normal       Pupils: Pupils are equal, round, and reactive to light  Cardiovascular:      Rate and Rhythm: Normal rate and regular rhythm  Abdominal:      General: Abdomen is flat  Palpations: Abdomen is soft  Skin:     General: Skin is warm and dry  Neurological:      Mental Status: She is alert  Coordination: Finger-Nose-Finger Test and Heel to [facility] Test normal       Deep Tendon Reflexes:      Reflex Scores:       Tricep reflexes are 2+ on the right side and 2+ on the left side  Bicep reflexes are 2+ on the right side and 2+ on the left side         Brachioradialis reflexes are 2+ on the right side and 2+ on the left side  Patellar reflexes are 2+ on the right side and 2+ on the left side  Achilles reflexes are 2+ on the right side and 2+ on the left side  Psychiatric:         Speech: Speech is slurred  Neurologic Exam     Mental Status   Disoriented to person  Disoriented to place  ("at hospital" unsure of which hospital)  Disoriented to time  ("I am 35years old", unsure of year)  Follows 1 step commands  Attention: decreased  Concentration: decreased  Speech: slurred (Slight slurring and aphasia  Noticeable R sided facial droop  Most noticeable around R lips)  Level of consciousness: alert  Knowledge: poor  Able to name object  Unable to read  Able to repeat  Abnormal comprehension (limited ability to follow simple comands)  Cranial Nerves     CN II   Visual acuity: (difficult to assess, notes "4" no matter how many fingers shown  Does track movement in all visual fields)    CN III, IV, VI   Pupils are equal, round, and reactive to light  Extraocular motions are normal    Right pupil: Size: 4 mm  Shape: regular  Reactivity: brisk  Consensual response: intact  Accommodation: intact  Left pupil: Size: 4 mm  Shape: regular  Reactivity: brisk  Consensual response: intact  Accommodation: intact  Nystagmus: none     CN V   Facial sensation intact       CN VII   Right facial weakness: central  Left facial weakness: none    CN VIII   CN VIII normal    Hearing: intact (to conversation)    CN IX, X   CN IX normal    CN X normal    Palate: symmetric    CN XI   CN XI normal    Right trapezius strength: normal  Left trapezius strength: normal    CN XII   CN XII normal    Tongue: not atrophic  Fasciculations: absent  Tongue deviation: none    Motor Exam   Muscle bulk: normal (for age)  Right arm tone: normal  Left arm tone: normal  Right arm pronator drift: absent  Left arm pronator drift: absent  Right leg tone: normal  Left leg tone: normal    Strength   Right neck flexion: 5/5  Left neck flexion: 5/5  Right neck extension: 5/5  Left neck extension: 5/5  Right deltoid: 5/5  Left deltoid: 5/5  Right biceps: 5/5  Left biceps: 5/5  Right triceps: 5/5  Left triceps: 5/5  Right wrist flexion: 5/5  Left wrist flexion: 5/5  Right wrist extension: 5/5  Left wrist extension: 5/5  Right interossei: 5/5  Left interossei: 5/5  Right abdominals: 5/5  Left abdominals: 5/5  Right iliopsoas: 5/5  Left iliopsoas: 5/5  Right quadriceps: 5/5  Left quadriceps: 5/5  Right hamstrin/5  Left hamstrin/5  Right glutei: 5/5  Left glutei: 5  Right anterior tibial: 55  Left anterior tibial: 5  Right posterior tibial: 5  Left posterior tibial: 5  Right peroneal: 55  Left peroneal: 55  Right gastroc: 5/5  Left gastroc: 55    Sensory Exam   Light touch normal      Gait, Coordination, and Reflexes     Coordination   Finger to nose coordination: normal  Heel to shin coordination: normal    Tremor   Resting tremor: absent  Intention tremor: absent  Action tremor: absent    Reflexes   Right brachioradialis: 2+  Left brachioradialis: 2+  Right biceps: 2+  Left biceps: 2+  Right triceps: 2+  Left triceps: 2+  Right patellar: 2+  Left patellar: 2+  Right achilles: 2+  Left achilles: 2+  Right : 2+  Left : 2+  Right plantar: normal  Left plantar: normal       NIHSS:  1a Level of Consciousness: 0 = Alert   1b  LOC Questions: 2 = Answers neither correctly   1c  LOC Commands: 1 = Obeys one correctly   2  Best Gaze: 0 = Normal   3  Visual: 0 = No visual field loss   4  Facial Palsy: 1=Minor paralysis (flattened nasolabial fold, asymmetric on smiling)   5a  Motor Right Arm: 0=No drift, limb holds 90 (or 45) degrees for full 10 seconds   5b  Motor Left Arm: 0=No drift, limb holds 90 (or 45) degrees for full 10 seconds   6a  Motor Right Le=No drift, limb holds 90 (or 45) degrees for full 10 seconds   6b   Motor Left Le=No drift, limb holds 90 (or 45) degrees for full 10 seconds   7  Limb Ataxia:  0=Absent   8  Sensory: 0=Normal; no sensory loss   9  Best Language:  2=Severe aphasia; fragmentary expression, inference needed, cannot identify materials   10  Dysarthria: 0=Normal articulation   11  Extinction and Inattention (formerly Neglect): 0=No abnormality   Total Score: 6         LABORATORY DATA     Labs: I have personally reviewed pertinent reports  Results from last 7 days   Lab Units 07/24/22 2000   WBC Thousand/uL 9 24   HEMOGLOBIN g/dL 13 9   HEMATOCRIT % 43 5   PLATELETS Thousands/uL 257      Results from last 7 days   Lab Units 07/25/22 0415 07/24/22 2000   SODIUM mmol/L 142 141   POTASSIUM mmol/L 4 0 4 3   CHLORIDE mmol/L 111* 110*   CO2 mmol/L 26 25   BUN mg/dL 21 20   CREATININE mg/dL 0 64 0 68   CALCIUM mg/dL 8 9 9 5   ALK PHOS U/L 70  --    ALT U/L 15  --    AST U/L 17  --      Results from last 7 days   Lab Units 07/25/22 0415   MAGNESIUM mg/dL 2 0          Results from last 7 days   Lab Units 07/24/22 2000   INR  0 97   PTT seconds 26               Micro:  No results found for: Savanna Arana, [address]     Radiology Results: I have personally reviewed pertinent reports  CTA stroke alert (head/neck)   Final Result by Collin Broderick MD (07/24 2034)      Distal left M2/M3 MCA occlusion  Incidental thyroid nodule(s) for which nonemergent thyroid ultrasound is recommended  I personally discussed this study with neurologist on 7/24/2022 at 7:57 PM                         Workstation performed: [id number]         CT stroke alert brain   Final Result by Collin Broderick MD (07/24 2001)      No evidence of acute intracranial hemorrhage  Left frontal temporal and low-attenuation suggesting acute to subacute ischemia               I personally discussed this study with neurologist on 7/24/2022 at 7:57 PM                 Workstation performed: [id number]         MRI brain wo contrast    (Results Pending)       Other Diagnostic Testing: I have personally reviewed pertinent reports  ACTIVE MEDICATIONS     Current Facility-Administered Medications   Medication Dose Route Frequency    acetaminophen (TYLENOL) tablet 650 mg  650 mg Oral Q6H PRN    aluminum-magnesium hydroxide-simethicone (MYLANTA) oral suspension 30 mL  30 mL Oral Q6H PRN    ascorbic acid (VITAMIN C) tablet 500 mg  500 mg Oral Daily    aspirin chewable tablet 81 mg  81 mg Oral Daily    atorvastatin (LIPITOR) tablet 40 mg  40 mg Oral QPM    calcium carbonate-vitamin D (OSCAL-D) 500 mg-200 units per tablet 1 tablet  1 tablet Oral Daily With Breakfast    cholecalciferol (VITAMIN D3) tablet 2,000 Units  2,000 Units Oral Daily    docusate sodium (COLACE) capsule 100 mg  100 mg Oral BID PRN    enoxaparin (LOVENOX) subcutaneous injection 40 mg  40 mg Subcutaneous Daily    multivitamin-minerals (CENTRUM) tablet 1 tablet  1 tablet Oral Daily    ondansetron (ZOFRAN) injection 4 mg  4 mg Intravenous Q6H PRN    sertraline (ZOLOFT) tablet 75 mg  75 mg Oral Daily         HOME MEDICATIONS     Prior to Admission medications    Medication Sig Start Date End Date Taking?  Authorizing Provider   Ascorbic Acid (VITAMIN C) 500 MG CAPS Take 1 tablet by mouth daily    Historical Provider, MD   Calcium Carb-Cholecalciferol (CALCIUM 500/D) 500-400 MG-UNIT CHEW Chew 1 tablet daily    Historical Provider, MD   Cholecalciferol (VITAMIN D3) 2000 units capsule Take 1 tablet by mouth daily    Historical Provider, MD   Multiple Vitamins-Minerals (CENTRUM SILVER 50+WOMEN PO) Take 1 tablet by mouth daily    Historical Provider, MD   sertraline (Zoloft) 50 mg tablet Take 1 5 tablets (75 mg total) by mouth daily 3/16/22   Philippe Lucio MD     ACTIVE MEDICATIONS    ==  [facility], MS3

## 2022-07-25 NOTE — ASSESSMENT & PLAN NOTE
Patient presented with right facial droop and aphasia, unfortunately,  Unfortunately, outside of tPA window  · CT head No evidence of acute intracranial hemorrhage  Left frontal temporal and low-attenuation suggesting acute to subacute ischemia  · CTA head and neck showed distal left M2/M3 MCA occlusion  · MRI brain pending  · Echo   · NPO pending speech evaluation  · Continue rectal aspirin, statin if cleared by speech    · Neurology consult pending  · Neuro checks  · Telemetry  · PT/OT evaluations

## 2022-07-25 NOTE — CONSULTS
[facility] Neurology Consult Note    Name: Fe Ayon   Age & Sex: 76 y o  female   MRN: [id number]  Unit/Bed#: [facility] 729-01   Encounter: [id number]  Length of Stay: 1    Assessment plan:      Acute ischemic left MCA stroke [facility])  Assessment & Plan  Fe Ayon is a 76 y o  female who presented as stroke alert on 7/24/2022  7:46 PM with initial NIHSS of 6 and LKW 7/23 1330 , initial BP was Blood Pressure: 161/80  Initial presenting deficits were right facial droop and aphasia  As a result of being outside the window pt was determined to not be a candidate for tPA  No prior history of TIA/stroke  HTN, depression, endometrial cancer (in remission since 2014)  Thinners use:  None reported    Workup:  · In the ED, /80, now 140/74  · EKG showed NSR, rate 65  INR 0 97  · A1c 5 4%, LDL 72, cholesterol 146, triglycerides  · TSH WNL, CBC, CMP grossly unremarkable  · UDS/UA no infection   CTH: Left frontal temporal and low-attenuation suggesting acute to subacute ischemia   CTA: Left Distal M2/M3 occlusion   · MRI-  left frontal temporal infarct, smaller infarcts within left frontal lobe, punctate infarct noted within left occipital lobe and the MCA/PCA junction  Pertinent Scores:   · NIHSS: 6     Significant exam findings:  Alert, oriented to self only  Cannot follow complex commands  Anomic aphasia  Repetition preserved  Right-sided facial droop  Etiology of ischemic stroke:  Cardioembolic given multiple cortical hits-suspect paroxysmal AFib  Plan, Stroke pathway:   · Assessment and plan was discussed with attending neurologist, Dr Tristan Miranda  · [id number]  · BP goals: SBP upto 220 for 24 hours, SBP upto 160-200 for 48 hours  · ASA - 300 mg rectal dosing given on 07/24  Continue with 81 mg daily aspirin thereafter    · Will obtain MRI brain with contrast to r/o mass lesions given hx of endometrial cancer  · Atorvastatin 40mg QHS  · Glucose <180 , normothermia  · Neuro checks- Every 1 hour x 4 hours, then every 2 hours x 4 hours, then every 4 hours x 72 hours     · Stat CT Head for change in neuro status  · Monitor on telemetry  · DVT ppx, SCDs  · PT/OT/PM&R evaluations   · Medical management as per primary team appreciated  Yojana Chowdhury will need follow up in in 6 weeks with neurovascular team    Pending for discharge: pending echo, rehab evaluations    Subjective:    Reason for Consult / Principal Problem:  Stroke  Hx and PE limited by:  Confusion    HPI:   Ms Yojana Chowdhury is a 76 y o  female who presented as a stroke alert on 7/24/2022 at 1946 with initial NIHSS of 6 and LKW 1330 on 7/23  PMHx significant for endometrial cancer, HTN, depression  Patient lives alone; family was on vacation but spoke to her over the phone on 7/23 afternoon (1330) and at the time, she was at her baseline  Afterwards, she stopped answering text messages and phone calls  One of her daughters reached patient's home on 07/24 evening  Patient would not open the door and daughter had to go through the back door; she found the patient with presenting symptoms and EMS was called  On arrival to the ED, initial BP was Blood Pressure: 161/80  Initial presenting deficits were aphasia and right sided facial droop  CTH revealed a left frontal temporal hypodensity  CTA head and neck revealed distal left M2/M3 MCA occlusion  As a result of being outside the window, pt was determined to not be a candidate for tPA  MRI shows left frontal temporal infarct, smaller infarcts within left frontal lobe, punctate infarct noted within left occipital lobe and the MCA/PCA junction  Daughter at bedside on 07/25 afternoon reports patient not using any thinners or BP medications at home  She was independent and lived alone and managed ADLs on her own  Used to walk with a cane at baseline  Neurology team consulted for further recommendations regarding stroke  Inpatient consult to Neurology  Consult performed by:  Vikas Agarwal Roslyn Espinosa MD  Consult ordered by:  Zuleika Byrd MD        Historical Information   Past Medical History:   Diagnosis Date    Deep vein thrombosis ([facility] [address] ) 1/28/2016    Diverticulosis     last assessed 05/29/13    Fibrocystic breast     History of DVT (deep vein thrombosis)     Xarelto, 4/2015 thru April 2016    Impaired fasting glucose     last assessed 06/04/14    Inguinal lymphadenopathy     Multiple benign polyps of large intestine     Obesity     Uterine cancer (HCC)      Past Surgical History:   Procedure Laterality Date    HYSTERECTOMY      LYMPH NODE BIOPSY      OOPHORECTOMY      PELVIC LAPAROSCOPY      TONSILLECTOMY      with adenoidectomy     Social History   Social History     Substance and Sexual Activity   Alcohol Use No     Social History     Substance and Sexual Activity   Drug Use No     E-Cigarette/Vaping    E-Cigarette Use Never User      E-Cigarette/Vaping Substances    Nicotine No     THC No     CBD No     Flavoring No     Other No     Unknown No      Social History     Tobacco Use   Smoking Status Never Smoker   Smokeless Tobacco Never Used     Family History:   Family History   Problem Relation Age of Onset    Hypothyroidism Mother     Diabetes Maternal Grandfather      Meds/Allergies   all current active meds have been reviewed and current meds:   Current Facility-Administered Medications   Medication Dose Route Frequency    acetaminophen (TYLENOL) tablet 650 mg  650 mg Oral Q6H PRN    aluminum-magnesium hydroxide-simethicone (MYLANTA) oral suspension 30 mL  30 mL Oral Q6H PRN    ascorbic acid (VITAMIN C) tablet 500 mg  500 mg Oral Daily    aspirin chewable tablet 81 mg  81 mg Oral Daily    atorvastatin (LIPITOR) tablet 40 mg  40 mg Oral QPM    calcium carbonate-vitamin D (OSCAL-D) 500 mg-200 units per tablet 1 tablet  1 tablet Oral Daily With Breakfast    cholecalciferol (VITAMIN D3) tablet 2,000 Units  2,000 Units Oral Daily    docusate sodium (COLACE) capsule 100 mg  100 mg Oral BID PRN    enoxaparin (LOVENOX) subcutaneous injection 40 mg  40 mg Subcutaneous Daily    multivitamin-minerals (CENTRUM) tablet 1 tablet  1 tablet Oral Daily    ondansetron (ZOFRAN) injection 4 mg  4 mg Intravenous Q6H PRN    sertraline (ZOLOFT) tablet 75 mg  75 mg Oral Daily     No Known Allergies    Review of previous medical records was completed  Review of Systems   Respiratory: Negative for cough, chest tightness and shortness of breath  Cardiovascular: Negative for chest pain and leg swelling  Gastrointestinal: Negative for nausea and vomiting  Neurological: Positive for facial asymmetry and speech difficulty  Psychiatric/Behavioral: Positive for confusion and decreased concentration  ROS -limited given pt's confusion     Objective:     Patient ID: Latrell Kline is a 76 y o  female  Vitals:   Vitals:    22 0600 22 0713 22 0900 22 1450   BP: 131/61 140/74 142/75 133/73   BP Location: Left arm Left arm     Pulse: (!) 50 (!) 51 56 62   Resp: 14 18     Temp:  98 5 °F (36 9 °C)  98 3 °F (36 8 °C)   TempSrc:  Tympanic     SpO2: 95% 98% 96% 93%   Weight:   84 8 kg (187 lb)    Height:   5' 7" (1 702 m)       Body mass index is 29 29 kg/m²  Intake/Output Summary (Last 24 hours) at 2022 1523  Last data filed at 2022 0100  Gross per 24 hour   Intake --   Output 250 ml   Net -250 ml       Temperature:   Temp (24hrs), Av 4 °F (36 9 °C), Min:98 3 °F (36 8 °C), Max:98 5 °F (36 9 °C)    Temperature: 98 3 °F (36 8 °C)    Invasive Devices: Invasive Devices  Report    Peripheral Intravenous Line  Duration           Peripheral IV 22 Right Antecubital 1 day    Peripheral IV 22 Right Arm <1 day              Exam:   Vitals reviewed  General Examination: No distress, cooperative  CVS: S1, S2 noted  Regular rate, rhythm  Mental Status  Alert, oriented to self only  Follows simple commands only  Anomic aphasia  Significant word-finding difficulty noted  Unable to recognize "glove"  Repetition preserved  Speech: no dysarthria  Cranial Nerves  CN II: Visual fields-unable to follow commands  CN III, IV, VI: EOMI bilaterally  Normal lids and orbits bilaterally  Pupils equal round and reactive to light bilaterally  No nystagmus  CN V: Facial sensation is normal   CN VII:  Right:  Facial droop  Left: There is no facial weakness or asymmetry  CN VIII:  Unable to follow commands for auditory testing  CN IX, X: Uvula midline  Palate elevates symmetrically  CN XI:  Right: Sternocleidomastoid strength is normal  Trapezius strength is normal   Left: Sternocleidomastoid strength is normal  Trapezius strength is normal   CN XII: Tongue midline without atrophy or fasciculations with appropriate movement  Motor  Normal bulk, tone  No fasciculations present  No pronator drift  Right Left   Shoulder abduction 5 5   Shoulder adduction 5 5   Elbow flexion 5 5   Elbow extension 5 5   Wrist flexion 5 5   Wrist extension 5 5   Hip flexion 5 5   Hip extension 5 5   Knee flexion 5 5   Knee extension 5 5   Plantar flexion 5 5   Dorsiflexion 5 5     Sensory  Light touch is normal in upper and lower extremities  Vibration, temperature:  Unable to follow commands  Sensation to pinprick normal in upper and lower extremities  Proprioception-unable to follow commands     Reflexes Right Left   Brachioradialis     +2  +2   Biceps                                   +2  +2   Triceps  +2  +2   Patellar   +1  +1   Achilles   Trace  Trace     Ankle clonus absent b/l  Plantars down b/l  Coordination  Finger-to-nose normal bilaterally  Heel to shin-unable to follow commands     Gait  Deferred     Labs: I have personally reviewed pertinent reports      Results from last 7 days   Lab Units 07/24/22 2000   WBC Thousand/uL 9 24   HEMOGLOBIN g/dL 13 9   HEMATOCRIT % 43 5   PLATELETS Thousands/uL 257      Results from last 7 days   Lab Units 07/25/22 0415 07/24/22 2000   POTASSIUM mmol/L 4 0 4 3   CHLORIDE mmol/L 111* 110*   CO2 mmol/L 26 25   BUN mg/dL 21 20   CREATININE mg/dL 0 64 0 68   CALCIUM mg/dL 8 9 9 5   ALK PHOS U/L 70  --    ALT U/L 15  --    AST U/L 17  --      Results from last 7 days   Lab Units 07/25/22 0415   MAGNESIUM mg/dL 2 0          Results from last 7 days   Lab Units 07/24/22 2000   INR  0 97   PTT seconds 26               Imaging and diagnostic studies:    Radiology Results: I have personally reviewed pertinent reports  MRI brain wo contrast   Final Result by Shaheed Jaime DO (07/25 1045)      Acute to early subacute ischemia identified involving the left MCA territory described in detail above with one larger infarct and multiple additional smaller infarcts  No hemorrhagic transformation identified  This examination was marked "immediate notification" in Epic in order to begin the standard process by which the radiology reading room liaison alerts the referring practitioner  Workstation performed: [id number]         CTA stroke alert (head/neck)   Final Result by Cj Anaya MD (07/24 2034)      Distal left M2/M3 MCA occlusion  Incidental thyroid nodule(s) for which nonemergent thyroid ultrasound is recommended  I personally discussed this study with neurologist on 7/24/2022 at 7:57 PM                         Workstation performed: [id number]         CT stroke alert brain   Final Result by Cj Anaya MD (07/24 2001)      No evidence of acute intracranial hemorrhage  Left frontal temporal and low-attenuation suggesting acute to subacute ischemia  I personally discussed this study with neurologist on 7/24/2022 at 7:57 PM                 Workstation performed: [id number]         MRI brain w contrast    (Results Pending)       Other Diagnostic Testing: I have personally reviewed pertinent reports        Active medications:  Current Facility-Administered Medications Medication Dose Route Frequency    acetaminophen (TYLENOL) tablet 650 mg  650 mg Oral Q6H PRN    aluminum-magnesium hydroxide-simethicone (MYLANTA) oral suspension 30 mL  30 mL Oral Q6H PRN    ascorbic acid (VITAMIN C) tablet 500 mg  500 mg Oral Daily    aspirin chewable tablet 81 mg  81 mg Oral Daily    atorvastatin (LIPITOR) tablet 40 mg  40 mg Oral QPM    calcium carbonate-vitamin D (OSCAL-D) 500 mg-200 units per tablet 1 tablet  1 tablet Oral Daily With Breakfast    cholecalciferol (VITAMIN D3) tablet 2,000 Units  2,000 Units Oral Daily    docusate sodium (COLACE) capsule 100 mg  100 mg Oral BID PRN    enoxaparin (LOVENOX) subcutaneous injection 40 mg  40 mg Subcutaneous Daily    multivitamin-minerals (CENTRUM) tablet 1 tablet  1 tablet Oral Daily    ondansetron (ZOFRAN) injection 4 mg  4 mg Intravenous Q6H PRN    sertraline (ZOLOFT) tablet 75 mg  75 mg Oral Daily       Prior to Admission medications    Medication Sig Start Date End Date Taking?  Authorizing Provider   Ascorbic Acid (VITAMIN C) 500 MG CAPS Take 1 tablet by mouth daily    Historical Provider, MD   Calcium Carb-Cholecalciferol (CALCIUM 500/D) 500-400 MG-UNIT CHEW Chew 1 tablet daily    Historical Provider, MD   Cholecalciferol (VITAMIN D3) 2000 units capsule Take 1 tablet by mouth daily    Historical Provider, MD   Multiple Vitamins-Minerals (CENTRUM SILVER 50+WOMEN PO) Take 1 tablet by mouth daily    Historical Provider, MD   sertraline (Zoloft) 50 mg tablet Take 1 5 tablets (75 mg total) by mouth daily 3/16/22   Alcides Lopez MD         Code status and advanced directives:  Code Status: Level 3 - DNAR and DNI      VTE Pharmacologic Prophylaxis: Enoxaparin (Lovenox)  VTE Mechanical Prophylaxis: sequential compression device    ==  MD [facility] Neurology Residency, PGY-II

## 2022-07-25 NOTE — PROGRESS NOTES
[address] [facility]  Progress Note - Kareem Martinez 1945, 76 y o  female MRN: [id number]  Unit/Bed#: ED 26 Encounter: [id number]  Primary Care Provider: Jenna Mensah MD   Date and time admitted to hospital: 7/24/2022  7:46 PM    * Cerebrovascular accident (CVA) due to occlusion of left middle cerebral artery [facility])  Assessment & Plan  Patient presented with right facial droop and aphasia, unfortunately,  Unfortunately, outside of tPA window  · CT head No evidence of acute intracranial hemorrhage  Left frontal temporal and low-attenuation suggesting acute to subacute ischemia  · CTA head and neck showed distal left M2/M3 MCA occlusion  · MRI brain pending  · Echo   · NPO pending speech evaluation  · Continue rectal aspirin, statin if cleared by speech  · Neurology consult pending  · Neuro checks  · Telemetry  · PT/OT evaluations    Mild episode of recurrent major depressive disorder (HCC)  Assessment & Plan  · On Zoloft 75 mg daily, can continue pending speech clearance  Vitamin D deficiency  Assessment & Plan  · On cholecalciferol  History of DVT (deep vein thrombosis)  Assessment & Plan  · Completed several month course of anticoagulation with Xarelto  History of endometrial cancer  Assessment & Plan  History of stage IA, grade 3 s/p completion of adjuvant chemotherapy and radiation in April 2015  Follows with GYN-ON[facility] as outpatient  VTE Pharmacologic Prophylaxis:   Moderate Risk (Score 3-4) - Pharmacological DVT Prophylaxis Ordered: enoxaparin (Lovenox)  Patient Centered Rounds: I performed bedside rounds with nursing staff today  Discussions with Specialists or Other Care Team Provider: nursing    Education and Discussions with Family / Patient: Updated  (daughter) at bedside  Time Spent for Care: 30 minutes  More than 50% of total time spent on counseling and coordination of care as described above      Current Length of Stay: 1 day(s)  Current Patient Status: Inpatient   Certification Statement: The patient will continue to require additional inpatient hospital stay due to neuro consult, MRI brain, stroke work up  Discharge Plan: Anticipate discharge in 48-72 hrs to discharge location to be determined pending rehab evaluations  Code Status: Level 3 - DNAR and DNI    Subjective:   Patient is aphasic  She offers no acute complaints  She is confused  Follows some commands  Objective:     Vitals:   Temp (24hrs), Av 4 °F (36 9 °C), Min:98 3 °F (36 8 °C), Max:98 5 °F (36 9 °C)    Temp:  [98 3 °F (36 8 °C)-98 5 °F (36 9 °C)] 98 5 °F (36 9 °C)  HR:  [50-74] 51  Resp:  [14-20] 18  BP: (114-166)/(61-81) 140/74  SpO2:  [94 %-98 %] 98 %  Body mass index is 30 25 kg/m²  Input and Output Summary (last 24 hours): Intake/Output Summary (Last 24 hours) at 2022 0852  Last data filed at 2022 0100  Gross per 24 hour   Intake --   Output 250 ml   Net -250 ml       Physical Exam:   Physical Exam  Vitals and nursing note reviewed  Constitutional:       General: She is not in acute distress  Cardiovascular:      Rate and Rhythm: Normal rate  Pulmonary:      Breath sounds: Normal breath sounds  Abdominal:      Tenderness: There is no abdominal tenderness  Musculoskeletal:         General: No swelling  Skin:     General: Skin is warm  Neurological:      Mental Status: She is alert  Comments: Oriented to person and place only, aphasic  Follows some commands  Psychiatric:         Cognition and Memory: Cognition is impaired            Additional Data:     Labs:  Results from last 7 days   Lab Units 22   WBC Thousand/uL 9 24   HEMOGLOBIN g/dL 13 9   HEMATOCRIT % 43 5   PLATELETS Thousands/uL 257     Results from last 7 days   Lab Units 22  0415   SODIUM mmol/L 142   POTASSIUM mmol/L 4 0   CHLORIDE mmol/L 111*   CO2 mmol/L 26   BUN mg/dL 21   CREATININE mg/dL 0 64   ANION GAP mmol/L 5   CALCIUM mg/dL 8  9   ALBUMIN g/dL 3 3*   TOTAL BILIRUBIN mg/dL 0 53   ALK PHOS U/L 70   ALT U/L 15   AST U/L 17   GLUCOSE RANDOM mg/dL 91     Results from last 7 days   Lab Units 07/24/22 2000   INR  0 97     Results from last 7 days   Lab Units 07/24/22  1949   POC GLUCOSE mg/dl 101     Results from last 7 days   Lab Units 07/25/22  0415   HEMOGLOBIN A1C % 5 4           Lines/Drains:  Invasive Devices  Report    Peripheral Intravenous Line  Duration           Peripheral IV 07/24/22 Right Antecubital 1 day    Peripheral IV 07/24/22 Right Arm <1 day                  Telemetry:  Telemetry Orders (From admission, onward)             48 Hour Telemetry Monitoring  Continuous x 48 hours        References:    Telemetry Guidelines   Question:  Reason for 48 Hour Telemetry  Answer:  Acute CVA (<24 hrs old, hemispheric strokes, selected brainstem strokes, cardiac arrhythmias)                 Telemetry Reviewed: Normal Sinus Rhythm  Indication for Continued Telemetry Use: Acute CVA             Imaging: Reviewed radiology reports from this admission including: CT head and CTA    Recent Cultures (last 7 days):         Last 24 Hours Medication List:   Current Facility-Administered Medications   Medication Dose Route Frequency Provider Last Rate    acetaminophen  650 mg Oral Q6H PRN Peg Ibrahim MD      aluminum-magnesium hydroxide-simethicone  30 mL Oral Q6H PRN Peg Ibrahim MD      ascorbic acid  500 mg Oral Daily Peg Ibrahim MD      aspirin  81 mg Oral Daily Sarita Mackay MD      atorvastatin  40 mg Oral QPM Peg Ibrahim MD      calcium carbonate-vitamin D  1 tablet Oral Daily With Breakfast Peg Ibrahim MD      cholecalciferol  2,000 Units Oral Daily Peg Ibrahim MD      docusate sodium  100 mg Oral BID PRN Peg Ibrahim MD      enoxaparin  40 mg Subcutaneous Daily Peg Ibrahim MD      multivitamin-minerals  1 tablet Oral Daily MD King Elliott ondansetron  4 mg Intravenous Q6H PRN Simone Rodriguez MD      sertraline  75 mg Oral Daily Simone Rodriguez MD          Today, Patient Was Seen By: JF Rivera    **Please Note: This note may have been constructed using a voice recognition system  **

## 2022-07-25 NOTE — ASSESSMENT & PLAN NOTE
History of stage IA, grade 3 s/p completion of adjuvant chemotherapy and radiation in April 2015  Follows with GYN-[facility] as outpatient

## 2022-07-25 NOTE — QUICK NOTE
NEUROLOGY RESIDENCY OVERNIGHT QUICK NOTE     Name: Jose Dumont   Age & Sex: 76 y o  female   MRN: [id number]  Unit/Bed#: ED 26   Encounter: [id number]  Length of Stay: 0    Reason for Consult / Principal Problem:  Stroke Alert   Time last known well: 8pm the night prior (2022)  Stroke alert called: 7:42 pm (2022)   Neurology stroke alert response:  Immediate   Hx and PE limited by:  Speech   Review of previous medical records was completed  Initial BP: Blood Pressure: 161/80   Family, was no present at the bedside for history and examination  TPA  Decision: Patient not a TPA candidate  Unclear time of onset outside appropriate time window  NIHSS:  1a Level of Consciousness: 0 = Alert   1b  LOC Questions: 2 = Answers neither correctly   1c  LOC Commands: 1 = Obeys one correctly   2  Best Gaze: 0 = Normal   3  Visual: 0 = No visual field loss   4  Facial Palsy: 1=Minor paralysis (flattened nasolabial fold, asymmetric on smiling)   5a  Motor Right Arm: 0=No drift, limb holds 90 (or 45) degrees for full 10 seconds   5b  Motor Left Arm: 0=No drift, limb holds 90 (or 45) degrees for full 10 seconds   6a  Motor Right Le=No drift, limb holds 90 (or 45) degrees for full 10 seconds   6b  Motor Left Le=No drift, limb holds 90 (or 45) degrees for full 10 seconds   7  Limb Ataxia:  0=Absent   8  Sensory: 0=Normal; no sensory loss   9  Best Language:  2=Severe aphasia; fragmentary expression, inference needed, cannot identify materials   10  Dysarthria: 0=Normal articulation   11   Extinction and Inattention (formerly Neglect): 0=No abnormality   Total Score: 6       Stroke Modified [place] Score: Unable to determine currently, will gather additional data      ASSESSMENT & PLAN     CVA (cerebral vascular accident) [facility])  Tommy Street is a 76 y o  female who presented as stroke alert on 2022  7:46 PM with initial NIHSS of 6 and LKW 8pm the night prior , initial BP was Blood Pressure: 161/80  Initial presenting deficits were right facial droop and aphasia  As a result of being outside the window pt was determined to not be a candidate for tPA   BP over last 24 hours: Blood Pressure: 145/65  current BP: Blood Pressure: 145/65    Imaging:   CTH: No evidence of acute intracranial hemorrhage  Left frontal temporal and low-attenuation suggesting acute to subacute ischemia   CTA: Left Distal M2/M3 occlusion     Plan:   Admit for Stroke pathway   Maintain permissive HTN, given distal occlusion recommend keeping Systolic blood pressure higher than >  and less than <220   Recommend Load with  and then Aspirin 81 mg starting tomorrow    Statin   A1c/lipid panel    Maintain glucose below 200   MRI brain   Echo    Neuro checks   Monitor on telemetry   Medical management as per primary team appreciated   PT/OT/Speech/PMR consults appreciated when able   Neurology to continue to follow          Case was discussed with overnight attending  Please refer to dayteam note for full neurology consult note       SUBJECTIVE     HPI: Marge Panda is a 76 y o  female who presented as stroke alert on 7/24/2022  7:46 PM with initial NIHSS of 6 and LKW 2000 on 7/23 (the day prior)  Patient lives alone and patient's family had recently returned from a vacation yesterday night  Patient's family last spoke to patient last night and at the time she was reportedly at her baseline  When family called tp check up on her today patient was noted to have her presenting symptoms and EMS was called  On arrival to the ED, initial BP was Blood Pressure: 161/80  Initial presenting deficits were aphasia and right sided facial droop  CT head revealed a Left frontal/ temporal hypodensity suggesting a Acute/subacute ischemic event  CTA head and neck revealed  As a result of being outside the window pt was determined to not be a candidate for tPA         Historical Information   Past Medical History:   Diagnosis Date    Deep vein thrombosis ([facility] [address] ) 2016    Diverticulosis     last assessed 13    Fibrocystic breast     History of DVT (deep vein thrombosis)     Xarelto, 2015 thru 2016    Impaired fasting glucose     last assessed 14    Inguinal lymphadenopathy     Multiple benign polyps of large intestine     Obesity     Uterine cancer (HCC)      Past Surgical History:   Procedure Laterality Date    HYSTERECTOMY      LYMPH NODE BIOPSY      OOPHORECTOMY      PELVIC LAPAROSCOPY      TONSILLECTOMY      with adenoidectomy     Social History   Social History     Substance and Sexual Activity   Alcohol Use No     Social History     Substance and Sexual Activity   Drug Use No     E-Cigarette/Vaping     E-Cigarette/Vaping Substances    Nicotine No     THC No     CBD No     Flavoring No     Other No     Unknown No      Social History     Tobacco Use   Smoking Status Never Smoker   Smokeless Tobacco Never Used         OBJECTIVE     Patient ID: Naresh Wilkerson is a 76 y o  female  Vitals:   Vitals:    22   BP: 154/70 151/72 166/81 163/69   BP Location:   Left arm    Pulse: 62 60 60 (!) 52   Resp: 18 20 20 20   Temp:       TempSrc:       SpO2: 95% 95% 96% 96%   Weight:          Body mass index is 30 25 kg/m²     No intake or output data in the 24 hours ending 22    Temperature:   Temp (24hrs), Av 3 °F (36 8 °C), Min:98 3 °F (36 8 °C), Max:98 3 °F (36 8 °C)    Temperature: 98 3 °F (36 8 °C)      LABORATORY DATA     Results from last 7 days   Lab Units 22   WBC Thousand/uL 9 24   HEMOGLOBIN g/dL 13 9   HEMATOCRIT % 43 5   PLATELETS Thousands/uL 257      Results from last 7 days   Lab Units 22   POTASSIUM mmol/L 4 3   CHLORIDE mmol/L 110*   CO2 mmol/L 25   BUN mg/dL 20   CREATININE mg/dL 0 68   CALCIUM mg/dL 9 5              Results from last 7 days   Lab Units 22   INR 0 97   PTT seconds 26               Rayne Love MD   [facility] Neurology Residency, PGY-4

## 2022-07-25 NOTE — H&P
[facility]  H&P- Manjinder Walters 1945, 76 y o  female MRN: [id number]  Unit/Bed#: ED 26 Encounter: [id number]  Primary Care Provider: Martin Tate MD   Date and time admitted to hospital: 7/24/2022  7:46 PM    * Cerebrovascular accident (CVA) due to occlusion of left middle cerebral artery [facility])  Assessment & Plan  Unfortunately, patient is 25 hours old since last seen to be normal   Neurology saw patient and recommends permissive hypertension  Stroke protocol  As patient has dysphonia and dysphagia; will place aspirin rectal for now  Neurology consult  Appreciate recommendations  Continue neuro follow-up, physical and occupational therapy with PMR and case management consult  Neuro checks  Telemetry  TSH, hemoglobin A1c with lipid profile  Lipitor 40 mg daily  Mild episode of recurrent major depressive disorder (HCC)  Assessment & Plan  On Zoloft 75 mg daily  Vitamin D deficiency  Assessment & Plan  On cholecalciferol  VTE Prophylaxis: Enoxaparin (Lovenox)  / sequential compression device   Code Status: Level 3 - DNAR and DNI as discussed with daughter who knows her mother's wishes  POLST: There is no POLST form on file for this patient (pre-hospital)    Anticipated Length of Stay:  Patient will be admitted on an Inpatient basis with an anticipated length of stay of  greater than 2 midnights  Justification for Hospital Stay: Please see detailed plans noted above  Chief Complaint:     Change in mental status with noted dysphonia  A  History of Present Illness:  Manjinder Walters is a 76 y o  female who has past medical history significant for endometrial cancer in the past, depression, hypertension who was last seen to be within normal limits in usual activity and behavior approximately 8:00 p m  last night  She lives alone  The rest of the family however were away coming back just this evening from a vacation    They were able to speak to her last night thinking that she was in her normal self  Upon seeing her in her home, she was found to be different the and somewhat not as responsive or lost   Because the patient also manifests with right-sided facial droop EMS was then called and brought her to the emergency room  Currently, the patient manifests right-sided neglect with note of dysphonia and dysphagia  She manifests mild right facial droop  When spoken, the patient is unable to follow much commands feeling as if she is lost and unable to comprehend  Review of Systems:  Review of systems is very limited as the patient does not give history  She remains quiet and side    Constitutional:  Denies fever or chills   Eyes:  Denies change in visual acuity   HENT:  Denies nasal congestion or sore throat   Respiratory:  Denies cough or shortness of breath   Cardiovascular:  Denies chest pain or edema   GI:  Denies abdominal pain, nausea, vomiting, bloody stools or diarrhea   :  Denies dysuria   Musculoskeletal:  Denies back pain or joint pain   Integument:  Denies rash   Neurologic:  Denies headache, focal weakness or sensory changes   Endocrine:  Denies polyuria or polydipsia   Lymphatic:  Denies swollen glands   Psychiatric:  Denies depression or anxiety     Past Medical and Surgical History:   Past Medical History:   Diagnosis Date    Deep vein thrombosis ([facility] [address] ) 1/28/2016    Diverticulosis     last assessed 05/29/13    Fibrocystic breast     History of DVT (deep vein thrombosis)     Xarelto, 4/2015 thru April 2016    Impaired fasting glucose     last assessed 06/04/14    Inguinal lymphadenopathy     Multiple benign polyps of large intestine     Obesity     Uterine cancer ([facility] [address] )      Past Surgical History:   Procedure Laterality Date    HYSTERECTOMY      LYMPH NODE BIOPSY      OOPHORECTOMY      PELVIC LAPAROSCOPY      TONSILLECTOMY      with adenoidectomy       Meds/Allergies:  (Not in a hospital admission)      Allergies: No Known Allergies  History:  Marital Status:    Occupation:  Patient is retired  Patient Pre-hospital Living Situation:  Lives at home  Patient Pre-hospital Level of Mobility:  Alone usually takes care of herself  Patient Pre-hospital Diet Restrictions:  Regular  Substance Use History:   Social History     Substance and Sexual Activity   Alcohol Use No     Social History     Tobacco Use   Smoking Status Never Smoker   Smokeless Tobacco Never Used     Social History     Substance and Sexual Activity   Drug Use No       Family History:  Family History   Problem Relation Age of Onset    Hypothyroidism Mother     Diabetes Maternal Grandfather        Physical Exam:     Vitals:   Blood Pressure: 158/74 (07/24/22 2215)  Pulse: 60 (07/24/22 2215)  Temperature: 98 3 °F (36 8 °C) (07/24/22 2011)  Temp Source: Tympanic (07/24/22 2011)  Respirations: 20 (07/24/22 2215)  Weight - Scale: 85 kg (187 lb 6 3 oz) (07/24/22 1954)  SpO2: 97 % (07/24/22 2215)    Constitutional:  Well developed, well nourished, no acute distress, non-toxic appearance ; patient presents looking more on the left side than the right  Eyes:  PERRL, conjunctiva normal   HENT:  Atraumatic, external ears normal, nose normal, oropharynx moist, no pharyngeal exudates  Neck- normal range of motion, no tenderness, supple   Respiratory:  No respiratory distress, normal breath sounds, no rales, no wheezing   Cardiovascular:  Normal rate, normal rhythm, no murmurs, no gallops, no rubs   GI:  Soft, nondistended, normal bowel sounds, nontender, no organomegaly, no mass, no rebound, no guarding   :  No costovertebral angle tenderness   Musculoskeletal:  No edema, no tenderness, no deformities  Back- no tenderness  Integument:  Well hydrated, no rash   Lymphatic:  No lymphadenopathy noted   Neurologic:  Alert and awake, would look at examiner  Can follow commands but transiently with poor attention  EOMs intact  Minimal speech but dysphonic    Patient appears not to understand fully commands  No drifting  Bilaterally 4/5  Psychiatric:  Speech and behavior minimal and appearing lost      Lab Results: I have personally reviewed pertinent reports  Results from last 7 days   Lab Units 07/24/22 2000   WBC Thousand/uL 9 24   HEMOGLOBIN g/dL 13 9   HEMATOCRIT % 43 5   PLATELETS Thousands/uL 257     Results from last 7 days   Lab Units 07/24/22 2000   POTASSIUM mmol/L 4 3   CHLORIDE mmol/L 110*   CO2 mmol/L 25   BUN mg/dL 20   CREATININE mg/dL 0 68   CALCIUM mg/dL 9 5     Results from last 7 days   Lab Units 07/24/22 2000   INR  0 97           Imaging: I have personally reviewed pertinent reports  CT stroke alert brain    Result Date: 7/24/2022  Narrative: CT BRAIN - STROKE ALERT PROTOCOL INDICATION:   Stroke Alert  COMPARISON:  None  TECHNIQUE:  CT examination of the brain was performed  In addition to axial images, coronal reformatted images were created and submitted for interpretation  Radiation dose length product (DLP) for this visit:  827 mGy-cm   This examination, like all CT scans performed in the [facility], was performed utilizing techniques to minimize radiation dose exposure, including the use of iterative reconstruction and automated exposure control  IMAGE QUALITY:  Diagnostic  FINDINGS:  PARENCHYMA: No evidence of acute intracranial hemorrhage  Left frontal temporal and low-attenuation suggesting acute to subacute ischemia  VENTRICLES AND EXTRA-AXIAL SPACES:  Normal for patient's age  VISUALIZED ORBITS AND PARANASAL SINUSES:  Unremarkable  CALVARIUM AND EXTRACRANIAL SOFT TISSUES:   Normal      Impression: No evidence of acute intracranial hemorrhage  Left frontal temporal and low-attenuation suggesting acute to subacute ischemia    I personally discussed this study with neurologist on 7/24/2022 at 7:57 PM   Workstation performed: [id number]     CTA stroke alert (head/neck)    Result Date: 7/24/2022  Narrative: CTA NECK AND BRAIN WITH CONTRAST INDICATION: Stroke Alert COMPARISON:   None  TECHNIQUE:   Post contrast imaging was performed after administration of iodinated contrast through the neck and brain  Post contrast axial 0 625 mm images timed to opacify the arterial system  3D rendering was performed on an independent workstation  MIP reconstructions performed  Coronal reconstructions were performed of the noncontrast portion of the brain  Radiation dose length product (DLP) for this visit:  452 mGy-cm   This examination, like all CT scans performed in the [facility], was performed utilizing techniques to minimize radiation dose exposure, including the use of iterative reconstruction and automated exposure control  IV Contrast:  75 mL of iohexol (OMNIPAQUE)  IMAGE QUALITY:   Diagnostic FINDINGS: CERVICAL VASCULATURE AORTIC ARCH AND GREAT VESSELS:  Mild atherosclerotic disease of the arch, proximal great vessels and visualized subclavian vessels  No significant stenosis  RIGHT VERTEBRAL ARTERY CERVICAL SEGMENT:  Normal origin  The vessel is normal in caliber throughout the neck  LEFT VERTEBRAL ARTERY CERVICAL SEGMENT:  Normal origin  The vessel is normal in caliber throughout the neck  RIGHT EXTRACRANIAL CAROTID SEGMENT:  Mild atherosclerotic disease of the distal common carotid artery and proximal cervical internal carotid artery without significant stenosis compared to the more distal ICA  LEFT EXTRACRANIAL CAROTID SEGMENT:  Mild atherosclerotic disease of the distal common carotid artery and proximal cervical internal carotid artery without significant stenosis compared to the more distal ICA  NASCET criteria was used to determine the degree of internal carotid artery diameter stenosis  INTRACRANIAL VASCULATURE INTERNAL CAROTID ARTERIES:  Normal enhancement of the intracranial portions of the internal carotid arteries  Normal ophthalmic artery origins  Normal ICA terminus   ANTERIOR CIRCULATION: Symmetric A1 segments and anterior cerebral arteries with normal enhancement  Normal anterior communicating artery  MIDDLE CEREBRAL ARTERY CIRCULATION:  Distal left M2/M3 MCA occlusion  DISTAL VERTEBRAL ARTERIES:  Normal distal vertebral arteries  Posterior inferior cerebellar artery origins are normal  Normal vertebral basilar junction  BASILAR ARTERY:  Basilar artery is normal in caliber  Normal superior cerebellar arteries  POSTERIOR CEREBRAL ARTERIES: Both posterior cerebral arteries arises from the basilar tip  Both arteries demonstrate normal enhancement  Normal posterior communicating arteries  VENOUS STRUCTURES:  Normal  NON VASCULAR ANATOMY BONY STRUCTURES:  No acute osseous abnormality  SOFT TISSUES OF THE NECK: 2 2 cm right thyroid nodule  Incidental discovery of one or more thyroid nodule(s) measuring more than 1 5 cm and without suspicious features is noted in this patient who is above 28years old; according to guidelines published  in the February 2015 white paper on incidental thyroid nodules in the Journal of the [employer] of Radiology [facility]), further characterization with thyroid ultrasound is recommended  THORACIC INLET:  Unremarkable  Impression: Distal left M2/M3 MCA occlusion  Incidental thyroid nodule(s) for which nonemergent thyroid ultrasound is recommended  I personally discussed this study with neurologist on 7/24/2022 at 7:57 PM  Workstation performed: [id number]         ** Please Note: Dragon 360 Dictation voice to text software was used in the creation of this document   **

## 2022-07-26 PROCEDURE — 92526 ORAL FUNCTION THERAPY: CPT

## 2022-07-26 PROCEDURE — 99232 SBSQ HOSP IP/OBS MODERATE 35: CPT | Performed by: STUDENT IN AN ORGANIZED HEALTH CARE EDUCATION/TRAINING PROGRAM

## 2022-07-26 PROCEDURE — 99223 1ST HOSP IP/OBS HIGH 75: CPT

## 2022-07-26 PROCEDURE — 99232 SBSQ HOSP IP/OBS MODERATE 35: CPT | Performed by: NURSE PRACTITIONER

## 2022-07-26 PROCEDURE — 97167 OT EVAL HIGH COMPLEX 60 MIN: CPT

## 2022-07-26 PROCEDURE — 97163 PT EVAL HIGH COMPLEX 45 MIN: CPT

## 2022-07-26 PROCEDURE — 92523 SPEECH SOUND LANG COMPREHEN: CPT

## 2022-07-26 RX ADMIN — OXYCODONE HYDROCHLORIDE AND ACETAMINOPHEN 500 MG: 500 TABLET ORAL at 08:18

## 2022-07-26 RX ADMIN — ASPIRIN 81 MG CHEWABLE TABLET 81 MG: 81 TABLET CHEWABLE at 08:18

## 2022-07-26 RX ADMIN — Medication 1 TABLET: at 08:18

## 2022-07-26 RX ADMIN — Medication 2000 UNITS: at 08:18

## 2022-07-26 RX ADMIN — SERTRALINE HYDROCHLORIDE 75 MG: 50 TABLET ORAL at 08:18

## 2022-07-26 RX ADMIN — ENOXAPARIN SODIUM 40 MG: 40 INJECTION SUBCUTANEOUS at 08:18

## 2022-07-26 RX ADMIN — ATORVASTATIN CALCIUM 40 MG: 40 TABLET, FILM COATED ORAL at 16:52

## 2022-07-26 NOTE — PHYSICAL THERAPY NOTE
Physical Therapy Evaluation    Patient Name: Naresh Wilkerson    XAXQM'F Date: 7/26/2022     Problem List  Principal Problem:    Acute ischemic left MCA stroke [facility])  Active Problems:    History of endometrial cancer    Vitamin D deficiency    Mild episode of recurrent major depressive disorder ([facility] )    History of DVT (deep vein thrombosis)       Past Medical History  Past Medical History:   Diagnosis Date    Deep vein thrombosis ([facility] ) 1/28/2016    Diverticulosis     last assessed 05/29/13    Fibrocystic breast     History of DVT (deep vein thrombosis)     Xarelto, 4/2015 thru April 2016    Impaired fasting glucose     last assessed 06/04/14    Inguinal lymphadenopathy     Multiple benign polyps of large intestine     Obesity     Uterine cancer [facility])         Past Surgical History  Past Surgical History:   Procedure Laterality Date    HYSTERECTOMY      LYMPH NODE BIOPSY      OOPHORECTOMY      PELVIC LAPAROSCOPY      TONSILLECTOMY      with adenoidectomy           07/26/22 0936   PT Last Visit   PT Visit Date 07/26/22   Note Type   Note type Evaluation   Pain Assessment   Pain Assessment Tool 0-10   Pain Score No Pain   Restrictions/Precautions   Weight Bearing Precautions Per Order No   Other Precautions Cognitive; Chair Alarm; Bed Alarm; Fall Risk;Multiple lines;Telemetry  (expressive aphasia, dysphagia)   Home Living   Type of [address] [place] [address] One level;Stairs to enter with rails  (2 KARIN)   Home Equipment Cane   Additional Comments pt questionable historian, above home set up obtained from CM note however pt reports living in a 2 SH  Prior Function   Level of Miller Independent with ADLs and functional mobility   Lives With (S)  Alone   Receives Help From Family   ADL Assistance Independent   IADLs Independent   Comments pt questionable historian   above info obtained from CM note   General   Family/Caregiver Present No   Cognition Overall Cognitive Status (S)  Impaired   Arousal/Participation Cooperative   Orientation Level Oriented to person;Oriented to place; Disoriented to time;Disoriented to situation  (general place)   Following Commands Follows one step commands with increased time or repetition   Comments minimally verbal  mainly yes/no answers or will answer to choices  admits to being confused  appears ? paranoid   RLE Assessment   RLE Assessment WFL   LLE Assessment   LLE Assessment WFL   Light Touch   RLE Light Touch Grossly intact   LLE Light Touch Grossly intact   Bed Mobility   Supine to Sit 5  Supervision   Additional items HOB elevated; Increased time required;Verbal cues   Sit to Supine Unable to assess   Additional Comments remained seated in chair with alarm upon conclusion   Transfers   Sit to Stand 4  Minimal assistance   Additional items Assist x 1; Increased time required  (CGA)   Stand to Sit 5  Supervision   Additional items Increased time required;Verbal cues   Stand pivot 4  Minimal assistance   Additional items Assist x 1; Increased time required;Verbal cues  (CGA)   Additional Comments no AD utilized   Ambulation/Elevation   Gait pattern Decreased foot clearance; Short stride; Excessively slow   Gait Assistance 4  Minimal assist   Additional items Assist x 1;Verbal cues  (CGA)   Assistive Device None  (vs HHAx1)   Distance 40'x2   Balance   Static Sitting Fair +   Dynamic Sitting Fair   Static Standing Fair -   Dynamic Standing Poor +   Ambulatory Poor +   Endurance Deficit   Endurance Deficit Yes   Endurance Deficit Description fatigue   Activity Tolerance   Activity Tolerance Patient tolerated treatment well   Medical Staff Made Aware Jayy WHYTE, [address] [place] [address] pt cleared to see and mobilize per nursing   Assessment   Prognosis Good   Problem List Decreased endurance; Impaired balance;Decreased mobility; Decreased cognition; Impaired judgement;Decreased safety awareness  (expressive aphasia)   Assessment Pt is a 76 y o  female admitted to [facility] on 7/24/2022 with R facial droop and word finding difficulty  Pt received a primary medical dx of acute ischemic L MCA CVA  Pt has the following comorbidities which affect their treatment: h/o DVT, Osteoarthritis, depression, uterine cancer, inguinal lymphadenopathy, as well as personal factors including living alone and stairs to access home  Pt has a high complexity clinical presentation due to Ongoing medical management for primary dx, Decreased activity tolerance compared to baseline, Fall risk, Increased assistance needed from caregiver at current time, Ongoing telemetry monitoring, Cog status, Trending lab values, Diagnostic imaging pending, Continuous pulse oximetry monitoring  , and PMH  PT was consulted to evaluate pt's functional mobility and discharge needs  Upon evaluation, patient required S for bed mobility, CGA for STS transfers, CGA for ambulation  Pt's functional impairments include: decreased balance, activity tolerance, and mobility  At conclusion of eval, pt remained seated in chair with chair alarm, phone, call bell, and all other personal needs within reach  Pt would benefit from skilled PT to address their functional mobility limitations  The patient's AM-PAC Basic Mobility Inpatient Short Form Raw Score is 18  A Raw score of greater than 16 suggests the patient may benefit from discharge to home  However, pt is currently functioning below baseline and has significant cog impairments which make it unsafe to currently be home alone  Please also refer to the recommendation of the Physical Therapist for safe discharge planning  D/C recommendations are rehab (PMR) vs OPPT pending available support of family     Barriers to Discharge (S)  Decreased caregiver support  (pt not safe to be home alone at this time 2* cog impairments)   Goals   Patient Goals none stated   [facility] Expiration Date 08/09/22   Short Term Goal #1 In 10-14 days, pt will: 1) perform bed mobility with mod I to promote functional independence and decrease caregiver burden  2) perform transfers to<>from all surfaces with mod I to promote functional independence and safety 3) ambulate 200' with mod I and least restrictive device to promote safe return to home  4) negotiate 2 stairs with mod I to promote safe return to home  5) improve balance grades by 1/2 to promote safety with functional mobility  PT Treatment Day 0   Plan   Treatment/Interventions Functional transfer training;LE strengthening/ROM; Elevations; Endurance training; Therapeutic exercise;Cognitive reorientation;Patient/family training;Equipment eval/education; Bed mobility;Gait training;Spoke to nursing;Spoke to case management;OT;Spoke to advanced practitioner   PT Frequency 3-5x/wk   Recommendation   PT Discharge Recommendation   (rehab (PMR) vs OPPT pending avaliable support of daughters)   Additional Comments (S)  Need to clarify what level of support pt's daughters are able to provide upon d/c  Do not believe pt is safe to live alone at this time     AM-PAC Basic Mobility Inpatient   Turning in Bed Without Bedrails 3   Lying on Back to Sitting on Edge of Flat Bed 3   Moving Bed to Chair 3   Standing Up From Chair 3   Walk in Room 3   Climb 3-5 Stairs 3   Basic Mobility Inpatient Raw Score 18   Basic Mobility Standardized Score 41 05   Highest Level Of Mobility   JH-HLM Goal 6: Walk 10 steps or more   JH-HLM Achieved 7: Walk 25 feet or more   Modified [place] Scale   Modified Georgia Scale 3   Barthel Index   Feeding 5   Bathing 5   Grooming Score 0   Dressing Score 5   Bladder Score 10   Bowels Score 10   Toilet Use Score 5   Transfers (Bed/Chair) Score 10   Mobility (Level Surface) Score 0   Stairs Score 0   Barthel Index Score 50   Darnell Mejia, PT, DPT

## 2022-07-26 NOTE — SPEECH THERAPY NOTE
Speech/Language Evaluation      Patient Name: Evelio Gr    PQSCL'P Date: roblem List  Principal Problem:    Acute ischemic left MCA stroke [facility])  Active Problems:    History of endometrial cancer    Vitamin D deficiency    Mild episode of recurrent major depressive disorder ([facility] )    History of DVT (deep vein thrombosis)      Past Medical History  Past Medical History:   Diagnosis Date    Deep vein thrombosis ([facility] ) 1/28/2016    Diverticulosis     last assessed 05/29/13    Fibrocystic breast     History of DVT (deep vein thrombosis)     Xarelto, 4/2015 thru April 2016    Impaired fasting glucose     last assessed 06/04/14    Inguinal lymphadenopathy     Multiple benign polyps of large intestine     Obesity     [facility]        Past Surgical History  Past Surgical History:   Procedure Laterality Date    HYSTERECTOMY      LYMPH NODE BIOPSY      OOPHORECTOMY      PELVIC LAPAROSCOPY      TONSILLECTOMY      with adenoidectomy         Summary   The patient appears to present with a moderate expressive and receptive aphasia  Speech intelligibility is adequate  It is difficult to fully assess cognitive status due to language impairment  Daughters are at bedside who report patient does provide some inaccurate personal information  Question if this is language or cognitive related  Recommendation:  Speech therapy in acute care as able  Therapy Prognosis: good  Prognosis considerations: motivation   Treatment Recommended/Frequency: 1-2x week     Long-term goal:  Patient will communicate wants, needs and opinions effectively with different conversational partners in a variety of ADLs    Short Term Goals:   Patient will complete unison counting (1-10, 1-20) with min assist    Patient will read automatic speech sequences with 90% accuracy and fluency    Patient will complete simple automatic phrases/sentences) with 90% accuracy and min cues      The patient will follow 1-step commands related to functional living environment with 90% accuracy given min cues  Current Medical:       Chief Complaint:     Change in mental status with noted dysphonia   A  History of Present Illness:  Wiliam Mcmillan a 76 y o  female who has past medical history significant for endometrial cancer in the past, depression, hypertension who was last seen to be within normal limits in usual activity and behavior approximately 8:00 p m  last night   She lives alone   The rest of the family however were away coming back just this evening from a vacation  Cielo Clock were able to speak to her last night thinking that she was in her normal self      Upon seeing her in her home, she was found to be different the and somewhat not as responsive or lost   Because the patient also manifests with right-sided facial droop EMS was then called and brought her to the emergency room   Currently, the patient manifests right-sided neglect with note of dysphonia and dysphagia   She manifests mild right facial droop   When spoken, the patient is unable to follow much commands feeling as if she is lost and unable to comprehend      General Cognitive Status:  Alert with adequate attention    Auditory Comprehension:  Body part ID: impaired even with visual cue   Left vs Right Body part: n/a  One step commands: impaired  Two step commands: impaired  Complex or 3 step commands: n/a  Picture ID: ~50%  Letter ID: n/a  Personal y/n ?'s: wfl  Simple y/n ?'s: wfl  Complex y/n ?'s: n/a  Paragraph Level Comprehension: n/a  Comprehension of Conversation: ability to comprehend and participate in conversation fluctuates     Reading Comprehension:  Single word comprehension/Match picture given choice of 2 words: wfl  Simple direction following: impaired    Speech Mechanism Examination:   Facial: right facial droop  Labial: WFL  Lingual: WFL  Velum: symmetrical  Mandible: adequate ROM  Dentition: adequate and upper dentures  Vocal quality:clear/adequate     Oral Expression:  Auto Sequences:   Count Forwards: unable to count to 10 with model   Automatic Social Utterances[de-identified] wfl  Word Repetition: wfl  Phrase Completion: n/a  Confrontation Naming: [facility]  Responsive Naming: impaired  Divergent Naming: n/a  Picture Description: n/a  Conversation: mostly   Able to make basic needs known: most     Written Expression:  Able to write name   Legibility is decreased and writing is micrographic     Motor Speech:  No dysarthria or apraxia noted    Orientation:  Person: [facility]  Place: [facility], [address]: n/a  Time of Day: n/a  Month: impaired even with choice of 2  NISH: n/a  Year:  wfl with choice of 2  Reason for hospitalization: impaired    Cognitive -linguistic skills:  Needs further evaluation    Also noted:  Phonemic paraphasias  Semantic paraphasias  Perseveration  Aware/partially aware of deficits    Results discussed with:  Patient and family

## 2022-07-26 NOTE — PLAN OF CARE
Problem: Prexisting or High Potential for Compromised Skin Integrity  Goal: Skin integrity is maintained or improved  Description: INTERVENTIONS:  - Identify patients at risk for skin breakdown  - Assess and monitor skin integrity  - Assess and monitor nutrition and hydration status  - Monitor labs   - Assess for incontinence   - Turn and reposition patient  - Assist with mobility/ambulation  - Relieve pressure over bony prominences  - Avoid friction and shearing  - Provide appropriate hygiene as needed including keeping skin clean and dry  - Evaluate need for skin moisturizer/barrier cream  - Collaborate with interdisciplinary team   - Patient/family teaching  - Consider wound care consult   7/26/2022 0741 by Marylene Pinion, RN  Outcome: Progressing  7/25/2022 1753 by Marylene Pinion, RN  Outcome: Progressing     Problem: Potential for Falls  Goal: Patient will remain free of falls  Description: INTERVENTIONS:  - Educate patient/family on patient safety including physical limitations  - Instruct patient to call for assistance with activity   - Consult OT/PT to assist with strengthening/mobility   - Keep Call bell within reach  - Keep bed low and locked with side rails adjusted as appropriate  - Keep care items and personal belongings within reach  - Initiate and maintain comfort rounds  - Make Fall Risk Sign visible to staff    - Consider moving patient to room near nurses station  7/26/2022 0741 by Marylene Pinion, RN  Outcome: Progressing  7/25/2022 1753 by Marylene Pinion, RN  Outcome: Progressing     Problem: MOBILITY - ADULT  Goal: Maintain or return to baseline ADL function  Description: INTERVENTIONS:  -  Assess patient's ability to carry out ADLs; assess patient's baseline for ADL function and identify physical deficits which impact ability to perform ADLs (bathing, care of mouth/teeth, toileting, grooming, dressing, etc )  - Assess/evaluate cause of self-care deficits   - Assess range of motion  - Assess patient's mobility; develop plan if impaired  - Assess patient's need for assistive devices and provide as appropriate  - Encourage maximum independence but intervene and supervise when necessary  - Involve family in performance of ADLs  - Assess for home care needs following discharge   - Consider OT consult to assist with ADL evaluation and planning for discharge  - Provide patient education as appropriate  7/26/2022 0741 by Jw Falcon RN  Outcome: Progressing  7/25/2022 1753 by Jw Falcon RN  Outcome: Progressing  Goal: Maintains/Returns to pre admission functional level  Description: INTERVENTIONS:  - Perform BMAT or MOVE assessment daily    - Set and communicate daily mobility goal to care team and patient/family/caregiver     - Collaborate with rehabilitation services on mobility goals if consulted    - Out of bed for toileting  - Record patient progress and toleration of activity level   7/26/2022 0741 by Jw Falcon RN  Outcome: Progressing  7/25/2022 1753 by Jw Falcon RN  Outcome: Progressing     Problem: NEUROSENSORY - ADULT  Goal: Achieves stable or improved neurological status  Description: INTERVENTIONS  - Monitor and report changes in neurological status  - Monitor vital signs such as temperature, blood pressure, glucose, and any other labs ordered   - Initiate measures to prevent increased intracranial pressure  - Monitor for seizure activity and implement precautions if appropriate      7/26/2022 0741 by Jw Falcon RN  Outcome: Progressing  7/25/2022 1753 by Jw Falcon RN  Outcome: Progressing  Goal: Remains free of injury related to seizures activity  Description: INTERVENTIONS  - Maintain airway, patient safety  and administer oxygen as ordered  - Monitor patient for seizure activity, document and report duration and description of seizure to physician/advanced practitioner  - If seizure occurs,  ensure patient safety during seizure  - Reorient patient post seizure  - Seizure pads on all 4 side rails  - Instruct patient/family to notify RN of any seizure activity including if an aura is experienced  - Instruct patient/family to call for assistance with activity based on nursing assessment  - Administer anti-seizure medications if ordered    7/26/2022 0741 by Liayh Perales RN  Outcome: Progressing  7/25/2022 1753 by Liyah Perales RN  Outcome: Progressing  Goal: Achieves maximal functionality and self care  Description: INTERVENTIONS  - Monitor swallowing and airway patency with patient fatigue and changes in neurological status  - Encourage and assist patient to increase activity and self care     - Encourage visually impaired, hearing impaired and aphasic patients to use assistive/communication devices  7/26/2022 0741 by Liyah Perales RN  Outcome: Progressing  7/25/2022 1753 by Liyah Perales RN  Outcome: Progressing

## 2022-07-26 NOTE — PROGRESS NOTES
[facility] Neurology Progress note    Name: Bridgett Abdullahi   Age & Sex: 76 y o  female   MRN: [id number]  Unit/Bed#: [facility] 729-01   Encounter: [id number]    Bridgett Abdullahi will need follow up in in 6 weeks with neurovascular team   Pending for discharge: rehab recs, treatment of other conditions as per medicine team    Assessment plan:    * Acute ischemic left MCA stroke [facility])  Assessment & Plan  76 y o  female who presented as stroke alert on 7/24/2022  7:46 PM with initial NIHSS of 6 and LKW 7/23 1330 , initial BP was Blood Pressure: 161/80  Initial presenting deficits were right facial droop and aphasia  As a result of being outside the window pt was determined to not be a candidate for tPA       No prior history of TIA/stroke  HTN, depression, endometrial cancer (in remission since 2014)  Thinners use:  None reported     Workup:  · In the ED, /80, now 134/74  · EKG showed NSR, rate 65  INR 0 97  · A1c 5 4%, LDL 72, cholesterol 146, triglycerides  · TSH WNL, CBC, CMP grossly unremarkable  · UDS/UA no infection  · CTH: Left frontal temporal and low-attenuation suggesting acute to subacute ischemia  · CTA: Left Distal M2/M3 occlusion   · MRI-  left frontal temporal infarct, smaller infarcts within left frontal lobe, punctate infarct noted within left occipital lobe and the MCA/PCA junction  · MRI brain w contrast: negative for mets  · Echo: EF 70%  Normal DF  LA mildly dilated       Pertinent Scores:   · NIHSS: 6      Significant exam findings:  Alert, oriented to self only  Cannot follow complex commands  Anomic aphasia  Right-sided facial droop        Etiology of ischemic stroke:  Cardioembolic given multiple cortical hits-suspect paroxysmal AFib      Plan, Stroke pathway:   · Assessment and plan was discussed with attending neurologist, Dr Pancho Huynh  · SBP upto 160-200 for 48 hours until 7/26 PM   Normotension thereafter  · Continue with 81 mg daily aspirin    · Atorvastatin 40mg QHS  · Glucose <180 , normothermia  · Neuro checks- Every 1 hour x 4 hours, then every 2 hours x 4 hours, then every 4 hours x 72 hours     · Stat CT Head for change in neuro status  · Monitor on telemetry  Upon discharge, loop recorder for arrhythmia workup  · DVT ppx, SCDs  · PT/OT/PM&R evaluations   · Upon discharge, follow-up with neurovascular team outpatient in 4-6 weeks  · Neurology will sign off; please reach out with any questions/concerns  · Medical management as per primary team appreciated      Subjective:     Patient seen and examined at bedside  No significant events overnight  Patient currently denies CP, SOB, palpitations, abdominal pain, nausea vomiting, fever, chills, headache, dizziness, new onset numbness/ tingling, new onset weakness, change in bowel/ bladder, worsening difficulty speaking, difficulty swallowing, new vision changes  Discussed current clinical status and plan with patient, daughter at bedside- verbalizes understanding  Review of Systems  ROS -see above  Objective:     Patient ID: Roshan Hoyt is a 76 y o  female  Vitals:    22 2241 22 0244 22 0746 22 1504   BP: 147/77 131/58 134/74 151/88   BP Location:       Pulse: 63 61 57 71   Resp:  17     Temp: (!) 97 4 °F (36 3 °C) 97 8 °F (36 6 °C) 97 5 °F (36 4 °C) 98 6 °F (37 °C)   TempSrc:       SpO2: 95% 97% 95% 95%   Weight:       Height:          Temperature:   Temp (24hrs), Av 8 °F (36 6 °C), Min:97 4 °F (36 3 °C), Max:98 6 °F (37 °C)    Temperature: 98 6 °F (37 °C)    Exam:   Vitals reviewed  General Examination: No distress, cooperative  CVS: S1, S2 noted  Regular rate, rhythm       Mental Status  Alert, oriented to self only  Follows simple commands only  Anomic aphasia  Significant word-finding difficulty noted  Unable to recognize "glove"  Repetition preserved    Speech: no dysarthria        Cranial Nerves  CN II: Visual fields-unable to follow commands  CN III, IV, VI: EOMI bilaterally  Normal lids and orbits bilaterally  Pupils equal round and reactive to light bilaterally  No nystagmus  CN V: Facial sensation is normal   CN VII:  Right:  Facial droop  Left: There is no facial weakness or asymmetry  CN VIII:  Unable to follow commands for auditory testing  CN IX, X: Uvula midline  Palate elevates symmetrically  CN XI:  Right: Sternocleidomastoid strength is normal  Trapezius strength is normal   Left: Sternocleidomastoid strength is normal  Trapezius strength is normal   CN XII: Tongue midline without atrophy or fasciculations with appropriate movement      Motor  Normal bulk, tone  No fasciculations present    No pronator drift         Right Left   Shoulder abduction 5 5   Shoulder adduction 5 5   Elbow flexion 5 5   Elbow extension 5 5   Wrist flexion 5 5   Wrist extension 5 5   Hip flexion 5 5   Hip extension 5 5   Knee flexion 5 5   Knee extension 5 5   Plantar flexion 5 5   Dorsiflexion 5 5      Sensory  Light touch is normal in upper and lower extremities    Vibration, temperature:  Unable to follow commands  Sensation to pinprick normal in upper and lower extremities  Proprioception-unable to follow commands     Reflexes Right Left   Brachioradialis     +2  +2   Biceps                                   +2  +2   Triceps  +2  +2   Patellar   +1  +1   Achilles   Trace  Trace      Ankle clonus absent b/l  Plantars down b/l        Coordination  Finger-to-nose normal bilaterally  Heel to shin-unable to follow commands     Gait  Deferred    Labs: I have personally reviewed pertinent reports      Results from last 7 days   Lab Units 07/24/22 2000   WBC Thousand/uL 9 24   HEMOGLOBIN g/dL 13 9   HEMATOCRIT % 43 5   PLATELETS Thousands/uL 257      Results from last 7 days   Lab Units 07/25/22  0415 07/24/22 2000   SODIUM mmol/L 142 141   POTASSIUM mmol/L 4 0 4 3   CHLORIDE mmol/L 111* 110*   CO2 mmol/L 26 25   BUN mg/dL 21 20   CREATININE mg/dL 0 64 0 68   CALCIUM mg/dL 8 9 9 5   ALK PHOS U/L 70  --    ALT U/L 15  --    AST U/L 17  --      Results from last 7 days   Lab Units 07/25/22  0415   MAGNESIUM mg/dL 2 0          Results from last 7 days   Lab Units 07/24/22 2000   INR  0 97   PTT seconds 26             Imaging:     Radiology Results: I have personally reviewed pertinent films in PACS    MRI brain w contrast   Final Result by Shaheed Heart DO (07/26 6005)      No evidence of intracranial metastasis  Workstation performed: [id number]         MRI brain wo contrast   Final Result by Shaheed Heart DO (07/25 1045)      Acute to early subacute ischemia identified involving the left MCA territory described in detail above with one larger infarct and multiple additional smaller infarcts  No hemorrhagic transformation identified  This examination was marked "immediate notification" in Epic in order to begin the standard process by which the radiology reading room liaison alerts the referring practitioner  Workstation performed: [id number]         CTA stroke alert (head/neck)   Final Result by Harish Fish MD (07/24 2034)      Distal left M2/M3 MCA occlusion  Incidental thyroid nodule(s) for which nonemergent thyroid ultrasound is recommended  I personally discussed this study with neurologist on 7/24/2022 at 7:57 PM                         Workstation performed: [id number]         CT stroke alert brain   Final Result by Harish Fish MD (07/24 2001)      No evidence of acute intracranial hemorrhage  Left frontal temporal and low-attenuation suggesting acute to subacute ischemia  I personally discussed this study with neurologist on 7/24/2022 at 7:57 PM                 Workstation performed: [id number]             Other Diagnostic Testing: I have personally reviewed pertinent reports        Active Medications:     Current Facility-Administered Medications   Medication Dose Route Frequency    acetaminophen (TYLENOL) tablet 650 mg  650 mg Oral Q6H PRN    aluminum-magnesium hydroxide-simethicone (MYLANTA) oral suspension 30 mL  30 mL Oral Q6H PRN    ascorbic acid (VITAMIN C) tablet 500 mg  500 mg Oral Daily    aspirin chewable tablet 81 mg  81 mg Oral Daily    atorvastatin (LIPITOR) tablet 40 mg  40 mg Oral QPM    calcium carbonate-vitamin D (OSCAL-D) 500 mg-200 units per tablet 1 tablet  1 tablet Oral Daily With Breakfast    cholecalciferol (VITAMIN D3) tablet 2,000 Units  2,000 Units Oral Daily    docusate sodium (COLACE) capsule 100 mg  100 mg Oral BID PRN    enoxaparin (LOVENOX) subcutaneous injection 40 mg  40 mg Subcutaneous Daily    multivitamin-minerals (CENTRUM) tablet 1 tablet  1 tablet Oral Daily    ondansetron (ZOFRAN) injection 4 mg  4 mg Intravenous Q6H PRN    sertraline (ZOLOFT) tablet 75 mg  75 mg Oral Daily       Prior to Admission medications    Medication Sig Start Date End Date Taking?  Authorizing Provider   Ascorbic Acid (VITAMIN C) 500 MG CAPS Take 1 tablet by mouth daily    Historical Provider, MD   Calcium Carb-Cholecalciferol (CALCIUM 500/D) 500-400 MG-UNIT CHEW Chew 1 tablet daily    Historical Provider, MD   Cholecalciferol (VITAMIN D3) 2000 units capsule Take 1 tablet by mouth daily    Historical Provider, MD   Multiple Vitamins-Minerals (CENTRUM SILVER 50+WOMEN PO) Take 1 tablet by mouth daily    Historical Provider, MD   sertraline (Zoloft) 50 mg tablet Take 1 5 tablets (75 mg total) by mouth daily 3/16/22   Naa Bolden MD         VTE Pharmacologic Prophylaxis: Enoxaparin (Lovenox)  VTE Mechanical Prophylaxis: sequential compression device    ==  MD Kim Fiore [facility] Neurology Residency, PGY-II

## 2022-07-26 NOTE — SPEECH THERAPY NOTE
Speech Language/Pathology    Speech/Language Pathology Progress Note    Patient Name: Roshan Hoyt  RBKHM'C Date: 7/26/2022     Problem List  Principal Problem:    Acute ischemic left MCA stroke [facility])  Active Problems:    History of endometrial cancer    Vitamin D deficiency    Mild episode of recurrent major depressive disorder ([facility] [address] )    History of DVT (deep vein thrombosis)       Past Medical History  Past Medical History:   Diagnosis Date    Deep vein thrombosis ([facility] ) 1/28/2016    Diverticulosis     last assessed 05/29/13    Fibrocystic breast     History of DVT (deep vein thrombosis)     Xarelto, 4/2015 thru April 2016    Impaired fasting glucose     last assessed 06/04/14    Inguinal lymphadenopathy     Multiple benign polyps of large intestine     Obesity     Uterine cancer ([facility] )         Past Surgical History  Past Surgical History:   Procedure Laterality Date    HYSTERECTOMY      LYMPH NODE BIOPSY      OOPHORECTOMY      PELVIC LAPAROSCOPY      TONSILLECTOMY      with adenoidectomy         Subjective:  Patient is asleep, but wakes to verbal cues  Daughter is present  Objective: The patient is seen for dysphagia therapy at lunch meal  She is wearing upper dentures  RN reports patient had difficulty with medications this am, and was observed pocketing with difficulty transferring them  She is assessed with puree solids and thin liquids  The patient is able to independently set up tray and feed herself  Bite size and rate is adequate  No oral residue is observed  No overt s/s aspiration observed with straw sips of thin liquids  The patient is trialed with soft solids (juan cracker)  Bite strength is adequate and patient is somewhat impulsive  Mastication time is min prolonged  No oral residue is observed, especially in right buccal cavity  Assessment:  The patient is somewhat impulsive with juan cracker, but tolerated well       Plan/Recommendations:  Recommend diet upgrade to mechanical soft and thin liquids  Patient and daughter educated on goals of [address]   Patient also needs formal speech/language evaluation

## 2022-07-26 NOTE — TELEPHONE ENCOUNTER
Per Dr Bharati Manning (radiologist) exam will be on hold till carotid stent mri safety information is obtained

## 2022-07-26 NOTE — CONSULTS
PHYSICAL MEDICINE AND REHABILITATION CONSULT NOTE  Virgil Du 76 y o  female MRN: [id number]  Unit/Bed#: [facility] 729-01 Encounter: [id number]    Requested by: Keesha Ayon MD  Reason for Consultation:  Rehabilitation medicine evaluation and assistance with appropriate disposition  Primary insurance listed on facesheet:  Medicare A/B    Primary Rehabilitation Diagnosis:   Stroke:  01 2  Right Body Involvement (Left Brain)    Etiologic Diagnosis:  L MCA distribution infarctions      Assessment and Recommendations:  76 F with PMH of endometrial cancer, hypertension, depression, vitamin-D deficiency, remote DVT, multinodular thyroid, osteoarthritis, overweight who developed right facial droop in aphasia and was evaluated in the hospital with initial CT head showing left frontotemporal hypodensity suggesting infarct  CT angio head and neck showed distal left 2 and 2 and M3 occlusion  Patient was felt to be outside the window for tPA  MRI confirmed acute to early subacute ischemia involving left MCA territory with moderate size acute infarct in the anterior aspect of the left MCA territory involving the insula and left frontal lobe with additional smaller infarcts in the frontal lobe as well as punctate infarct in the left occipital lobe  EKG showed normal sinus rhythm  Echocardiogram showed ejection fraction of 70% with normal diastolic function mildly dilated left atrium  Patient was started on baby aspirin and recommended statin  Patient was evaluated by speech therapy and noted to have aphasia and dysphagia requiring modified diet  Patient was evaluated by skilled therapies and was found to have significant decline in ADLs and ambulation and appears appropriate for admission to [address] [place] [address]  Prior Level of Function and Social history:    Patient was living alone in 2 story home  Her  passed away prostate cancer last year    Patient has 2 daughters that live in the area 1 that does not work and would be able to provide supervision/assist after discharge from rehab  Independent with ADLs  Independent with ambulation    Current Level of Function:    Min assist for transfers, Min assist 40 ft for ambulation  Moderate assist lower body bathing, lower body dressing, toileting  Min assist upper body dressing    Acute left MCA distribution infarction with new onset aphasia, apraxia, imbalance, incoordination, and dysphagia with subsequent decline in ADLs and mobility:  - Aspirin, statin, optimal blood pressure control  - Continue PT, OT while in acute setting to improve functional mobility, transfers, upper and lower body strengthening, conditioning, balance, and gait training with appropriate assistive device    - Continue SLP for dysphagia  - Continue SLP to evaluation for dysphagia  - Recommend SLP to evaluate and treat deficits in cognition  - Frequent reorientation, reassurance  - Avoid sedating meds when possible   - Optimize sleep-wake  - Supportive counseling  - Fall precautions  - Turn patient Q2H, skin checks minimum Qshift  - VTE prophylaxis per primary team     Disposition recommendation:     [facility] - patient is good candidate for transfer to [facility] pending:      - Facility acceptance, insurance authorization, and bed availability      Overall Medical Status:  - Vitals: stable, patient saturating well on room air  - Recent labs: stable   - Recent imaging:  see elsewhere  - Continue to monitor vitals, labs, exam closely   - Additional Work-up/management prior to potential transfer to rehab/discharge:    none    Cardiopulmonary - history of hypertension  - closely monitor vitals with and without activity, orthostatics, clinical exam, and labs as patient is at increased risk for volume overload and dehydration  - adjust medications and fluid intake accordingly    Mood/sleep -   - Supportive counseling     Hydration -   - ensure adequate hydration but avoid fluid overload Nutrition/dysphagia  - modified diet, aspiration precautions, speech therapy  - ensure optimal intake     Bladder function - bladder incontinence  - monitor for retention, incontinence, signs/symptoms of UTI  - recommend toileting (bedpan only if unable to use commode or toilet, but monitor skin closely) program Q2-4 H during day and Q4-6H overnight      Bowel function - risk of constipation and incontinence  - Bowel regimen per primary team   - PRN bowel regimen     Encounter for skin care -   - Frequent turning, Q2H, EHOB cushion when OOB in chair  - Consider/provide hydragard BID for buttocks, sacrum, and heels  - Ensure optimal bowel/bladder hygiene   - Monitor closely      # Other medical issues:     Per primary service        ==================================================================    Chief Complaint:  Recent CVA     History of Present Illness:   76 F with PMH of endometrial cancer, hypertension, depression, vitamin-D deficiency, remote DVT, multinodular thyroid, osteoarthritis, overweight who developed right facial droop in aphasia and was evaluated in the hospital with initial CT head showing left frontotemporal hypodensity suggesting infarct  CT angio head and neck showed distal left 2 and 2 and M3 occlusion  Patient was felt to be outside the window for tPA  MRI confirmed acute to early subacute ischemia involving left MCA territory with moderate size acute infarct in the anterior aspect of the left MCA territory involving the insula and left frontal lobe with additional smaller infarcts in the frontal lobe as well as punctate infarct in the left occipital lobe  EKG showed normal sinus rhythm  Echocardiogram showed ejection fraction of 70% with normal diastolic function mildly dilated left atrium  Patient was started on baby aspirin and recommended statin  Patient was evaluated by speech therapy and noted to have aphasia and dysphagia requiring modified diet   Patient was evaluated by skilled therapies and was found to have significant decline in ADLs and ambulation and appears appropriate for admission to [address] [place] [address]  On eval, patient laying in bed in no acute distress with adequate wakefulness and eye contact  She is able to say her name appropriately but has significant aphasia other words  Patient also with some motor apraxia  She is able to lift all limbs greater than antigravity  Review of Systems:     ROS: Could not be adequately (or fully adequately be) obtained due to degree of impaired cognition/communication at time of encounter      No Known Allergies    Current Facility-Administered Medications:     acetaminophen (TYLENOL) tablet 650 mg, 650 mg, Oral, Q6H PRN, Jesu Clements MD    aluminum-magnesium hydroxide-simethicone (MYLANTA) oral suspension 30 mL, 30 mL, Oral, Q6H PRN, Jesu Clements MD    ascorbic acid (VITAMIN C) tablet 500 mg, 500 mg, Oral, Daily, Jesu Clements MD, 500 mg at 07/26/22 0818    aspirin chewable tablet 81 mg, 81 mg, Oral, Daily, Paulette Saucedo MD, 81 mg at 07/26/22 0818    atorvastatin (LIPITOR) tablet 40 mg, 40 mg, Oral, QPM, Jesu Clements MD, 40 mg at 07/25/22 1807    calcium carbonate-vitamin D (OSCAL-D) 500 mg-200 units per tablet 1 tablet, 1 tablet, Oral, Daily With Breakfast, Jesu Clements MD, 1 tablet at 07/26/22 0818    cholecalciferol (VITAMIN D3) tablet 2,000 Units, 2,000 Units, Oral, Daily, Jesu Clements MD, 2,000 Units at 07/26/22 0818    docusate sodium (COLACE) capsule 100 mg, 100 mg, Oral, BID PRN, Jesu Clements MD    enoxaparin (LOVENOX) subcutaneous injection 40 mg, 40 mg, Subcutaneous, Daily, Jesu Clements MD, 40 mg at 07/26/22 0818    multivitamin-minerals (CENTRUM) tablet 1 tablet, 1 tablet, Oral, Daily, Jesu Clements MD, 1 tablet at 07/26/22 0818    ondansetron (ZOFRAN) injection 4 mg, 4 mg, Intravenous, Q6H PRN, Jesu Clements MD  [facility] sertraline (ZOLOFT) tablet 75 mg, 75 mg, Oral, Daily, Peg Ibrahim MD, 75 mg at 07/26/22 0818    Past Medical History:   Diagnosis Date    Deep vein thrombosis ([facility] ) 1/28/2016    Diverticulosis     last assessed 05/29/13    Fibrocystic breast     History of DVT (deep vein thrombosis)     Xarelto, 4/2015 thru April 2016    Impaired fasting glucose     last assessed 06/04/14    Inguinal lymphadenopathy     Multiple benign polyps of large intestine     Obesity     Uterine cancer ([place] [id number] )        Past Surgical History:   Procedure Laterality Date    HYSTERECTOMY      LYMPH NODE BIOPSY      OOPHORECTOMY      PELVIC LAPAROSCOPY      TONSILLECTOMY      with adenoidectomy      Family History   Problem Relation Age of Onset    Hypothyroidism Mother     Diabetes Maternal Grandfather        Social History available currently in EMR:  (See additional SH as outlined above)   Social History     Socioeconomic History    Marital status:      Spouse name: None    Number of children: None    Years of education: None    Highest education level: None   Occupational History    None   Tobacco Use    Smoking status: Never Smoker    Smokeless tobacco: Never Used   Vaping Use    Vaping Use: Never used   Substance and Sexual Activity    Alcohol use: No    Drug use: No    Sexual activity: None   Other Topics Concern    None   Social History Narrative    Denied: Home environment Domestic violence     Social Determinants of Health     Financial Resource Strain: Not on file   Food Insecurity: No Food Insecurity    Worried About Running Out of Food in the Last Year: Never true    Jodie of Food in the Last Year: Never true   Transportation Needs: No Transportation Needs    Lack of Transportation (Medical): No    Lack of Transportation (Non-Medical):  No   Physical Activity: Not on file   Stress: Not on file   Social Connections: Not on file   Intimate Partner Violence: Not on file   Housing Stability: Low Risk     Unable to Pay for Housing in the Last Year: No    Number of Places Lived in the Last Year: 1    Unstable Housing in the Last Year: No       Physical Examination:   Temp:  [97 4 °F (36 3 °C)-98 6 °F (37 °C)] 98 6 °F (37 °C)  HR:  [57-71] 71  Resp:  [17] 17  BP: (131-151)/(58-88) 151/88  General: Awake, alert in NAD  HENT: NCAT, MMM  Neck: Supple, no lymphadenopathy  Respiratory: Unlabored breathing, breath sounds equal, Lungs CTA, no wheezes, rales, or rhonchi  Cardiovascular: Regular rate and rhythm, no murmurs, rubs, or gallops  Gastrointestinal: Soft, non-tender, non-distended, normoactive bowel sounds  Genitourinary: No pantoja  SkiN/MSK/Extremities:  Distal extremities appropriately warm, normal cap refill distally, no cyanosis or calf edema, no calf tenderness to palpation  Neurologic/Psych:   MENTAL STATUS:  adequate wakefulness and eye contact  She is able to say her name appropriately but has significant aphasia other words  Affect: Flattened and down some  CN II-XII: R facial weakness otherwise intact   Strength/MMT:  R side 5 to 5-/5  L: side 5/5  Unable to adequately perform FTN     Data:  Lab Results   Component Value Date    HGB 13 9 07/24/2022    HGB 14 0 08/04/2015    HCT 43 5 07/24/2022    HCT 40 6 08/04/2015    WBC 9 24 07/24/2022    WBC 5 72 08/04/2015     05/11/2015    K 4 0 07/25/2022    K 4 0 05/11/2015     (H) 07/25/2022     05/11/2015    GLUCOSE 108 05/11/2015    CREATININE 0 64 07/25/2022    CREATININE 0 64 10/21/2015    BUN 21 07/25/2022    BUN 15 10/21/2015       MRI brain wo contrast    Result Date: 7/25/2022  Impression: Acute to early subacute ischemia identified involving the left MCA territory described in detail above with one larger infarct and multiple additional smaller infarcts  No hemorrhagic transformation identified   This examination was marked "immediate notification" in Epic in order to begin the standard process by which the radiology reading room liaison alerts the referring practitioner  Workstation performed: [id number]     MRI brain w contrast    Result Date: 7/26/2022  Impression: No evidence of intracranial metastasis  Workstation performed: [id number]     CT stroke alert brain    Result Date: 7/24/2022  Impression: No evidence of acute intracranial hemorrhage  Left frontal temporal and low-attenuation suggesting acute to subacute ischemia  I personally discussed this study with neurologist on 7/24/2022 at 7:57 PM   Workstation performed: [id number]     CTA stroke alert (head/neck)    Result Date: 7/24/2022  Impression: Distal left M2/M3 MCA occlusion  Incidental thyroid nodule(s) for which nonemergent thyroid ultrasound is recommended  I personally discussed this study with neurologist on 7/24/2022 at 7:57 PM  Workstation performed: [id number]       Pertinent labs and imaging reviewed  Patient seen on date of service listed  Total time spent:  75 minutes (at least) with more than 50% spent counseling/coordinating care  Counseling includes extended discussion with patient (+ daughter) re: history, symptoms, medications, functional issues, mood, medical management (which in part specifically includes management of recent CVA) and rehabilitation management plan, and disposition  Additional time spent with thorough chart review in EMR, reviewing recent medications, labs, imaging, and management plan  This was followed by formulating a comprehensive inpatient medical/rehabilitation management plan and coordinating with pertinent specialists as indicated  Thank you for allowing the PM&R service to participate in the care of this patient  We will continue to follow Srikanth Morton progress with you  Please do not hesitate to call with questions or concerns      Abhi Ocampo MD, [address] [place] [address]  Physical Medicine and Rehabilitation  Brain Injury Medicine

## 2022-07-26 NOTE — TELEPHONE ENCOUNTER
Patient has a left carotid stent and was unable to provide cards disclosing the type of stent she has  For MRI Safety purposes it was explained to the patient that her stent will be further researched and someone will call her to reschedule  Per patient stent was put in at [facility] around 2014  MRI staff will f/u with patient

## 2022-07-26 NOTE — ASSESSMENT & PLAN NOTE
76 y o  female who presented as stroke alert on 7/24/2022  7:46 PM with initial NIHSS of 6 and LKW 7/23 1330 , initial BP was Blood Pressure: 161/80  Initial presenting deficits were right facial droop and aphasia  As a result of being outside the window pt was determined to not be a candidate for tPA       No prior history of TIA/stroke  HTN, depression, endometrial cancer (in remission since 2014)  Thinners use:  None reported     Workup:  · In the ED, /80, now 134/74  · EKG showed NSR, rate 65  INR 0 97  · A1c 5 4%, LDL 72, cholesterol 146, triglycerides  · TSH WNL, CBC, CMP grossly unremarkable  · UDS/UA no infection  · CTH: Left frontal temporal and low-attenuation suggesting acute to subacute ischemia  · CTA: Left Distal M2/M3 occlusion   · MRI-  left frontal temporal infarct, smaller infarcts within left frontal lobe, punctate infarct noted within left occipital lobe and the MCA/PCA junction  · MRI brain w contrast: negative for mets  · Echo: EF 70%  Normal DF  LA mildly dilated       Pertinent Scores:   · NIHSS: 6      Significant exam findings:  Alert, oriented to self only  Cannot follow complex commands  Anomic aphasia  Right-sided facial droop        Etiology of ischemic stroke:  Cardioembolic given multiple cortical hits-suspect paroxysmal AFib      Plan, Stroke pathway:   · Assessment and plan was discussed with attending neurologist, Dr Tristan Miranda  · SBP upto 160-200 for 48 hours until 7/26 PM   Normotension thereafter  · Continue with 81 mg daily aspirin  · Atorvastatin 40mg QHS  · Glucose <180 , normothermia  · Neuro checks- Every 1 hour x 4 hours, then every 2 hours x 4 hours, then every 4 hours x 72 hours     · Stat CT Head for change in neuro status  · Monitor on telemetry  Upon discharge, loop recorder for arrhythmia workup  · DVT ppx, SCDs  · PT/OT/PM&R evaluations   · Upon discharge, follow-up with neurovascular team outpatient in 4-6 weeks    · Neurology will sign off; please reach out with any questions/concerns    · Medical management as per primary team appreciated

## 2022-07-26 NOTE — OCCUPATIONAL THERAPY NOTE
Occupational Therapy Evaluation     Patient Name: Marylene Maryland  CTDMV'O Date: 7/26/2022  Problem List  Principal Problem:    Acute ischemic left MCA stroke [facility])  Active Problems:    History of endometrial cancer    Vitamin D deficiency    Mild episode of recurrent major depressive disorder ([facility] )    History of DVT (deep vein thrombosis)    Past Medical History  Past Medical History:   Diagnosis Date    Deep vein thrombosis ([facility] ) 1/28/2016    Diverticulosis     last assessed 05/29/13    Fibrocystic breast     History of DVT (deep vein thrombosis)     Xarelto, 4/2015 thru April 2016    Impaired fasting glucose     last assessed 06/04/14    Inguinal lymphadenopathy     Multiple benign polyps of large intestine     Obesity     Uterine cancer [facility])      Past Surgical History  Past Surgical History:   Procedure Laterality Date    HYSTERECTOMY      LYMPH NODE BIOPSY      OOPHORECTOMY      PELVIC LAPAROSCOPY      TONSILLECTOMY      with adenoidectomy         07/26/22 0920   OT Last Visit   OT Visit Date 07/26/22   Note Type   Note type Evaluation   Restrictions/Precautions   Weight Bearing Precautions Per Order No   Other Precautions Fall Risk;Telemetry;Cognitive; Chair Alarm; Bed Alarm   Pain Assessment   Pain Assessment Tool 0-10   Pain Score No Pain   Home Living   Type of [address] [place] [address] One level;Stairs to enter with rails  (2STE)   Bathroom Toilet Standard   Bathroom Equipment (No DME at baseline)   [address] [facility]   Prior Function   Level of [place] Independent with ADLs and functional mobility   Lives With (S)  Alone   Receives Help From Family   ADL Assistance Independent   IADLs Independent   Comments pt is unreliable/poor historian, information obtained from EMR/CM notes     Lifestyle   Autonomy I with ADL's/IADL's, no AD with funcctional ambulation +drives   Reciprocal Relationships daughters (2)   Service to Others retired   Intrinsic Gratification pt unable to state/continue to assess ADL   Eating Assistance 5  Supervision/Setup   Grooming Assistance 5  Supervision/Setup   UB Bathing Assistance 5  Supervision/Setup   LB Bathing Assistance 3  Moderate Assistance   UB Dressing Assistance 4  Minimal Assistance   LB Dressing Assistance 3  Moderate Assistance   LB Dressing Deficit Don/doff R sock; Don/doff L sock  (able to pull up over heal with forward trunk flexion and Supervision for dynamic sitting balance/safety   Toileting Assistance  3  Moderate Assistance   Bed Mobility   Supine to Sit 5  Supervision   Additional items Assist x 1;HOB elevated   Sit to Supine Unable to assess   Additional Comments pt in chair with all needs met and alarm engaged upon departure   Transfers   Sit to Stand 4  Minimal assistance   Additional items Assist x 1   Stand to Sit 5  Supervision   Additional items Assist x 1   Additional Comments No AD wtih functional transfers   Functional Mobility   Functional Mobility 4  Minimal assistance   Additional Comments min a without AD short distance functional mobility, into hallway   Additional items No AD   Balance   Static Sitting Fair +   Dynamic Sitting Fair   Static Standing Fair -   Dynamic Standing Poor +   Ambulatory Poor +   Activity Tolerance   Activity Tolerance Patient tolerated treatment well   Medical Staff Made Aware PT lindsay   Nurse Made Aware RN cleared pt for therapy   RUE Assessment   RUE Assessment WFL   LUE Assessment   LUE Assessment WFL   Hand Function   Gross Motor Coordination Functional   Fine Motor Coordination Functional   Vision - Complex Assessment   Tracking Decreased smoothness of horizontal tracking; Requires cues, head turns, or add eye shifts to track   Convergence [facility] NETWORK [facility])   Visual Fields Difficulty detecting stimulus with OD RUQ; Difficulty detecting stimulus with OD RLQ   Acuity Able to read clock/calendar on wall without difficulty   Additional Comments pt with difficulty following multi-step directives, continue to assess   Cognition Overall Cognitive Status (S)  Impaired   Arousal/Participation Alert; Responsive   Attention Difficulty attending to directions   Orientation Level Oriented to person;Disoriented to place; Disoriented to time;Disoriented to situation   Memory Decreased recall of precautions;Decreased recall of recent events;Decreased short term memory;Decreased recall of biographical information;Decreased long term memory   Following Commands Follows one step commands with increased time or repetition   Comments pt presents with flat affect, confused with slow processing/delayed responses, +rigth facial droop, one word answers to evaluative , word finding difficulties (pt denies) aphasia (?) unreliable/poor historian, poor memory  Assessment   Limitation Decreased ADL status; Decreased Safe judgement during ADL;Decreased cognition;Decreased endurance;Decreased self-care trans;Decreased high-level ADLs   Prognosis Fair   Assessment Pt is a 76 y o  female who was admitted to [facility] on 7/24/2022 with right facial droop/aphasia and now here with  Acute ischemic left MCA stroke (HCC) (-) tPA  Pt's problem list also includes PMH of  has a past medical history of Deep vein thrombosis ([facility] ) (1/28/2016), Diverticulosis, Fibrocystic breast, History of DVT (deep vein thrombosis), Impaired fasting glucose, Inguinal lymphadenopathy, Multiple benign polyps of large intestine, Obesity, and Uterine cancer ([facility] )    At baseline pt was completing I with ADL's/IaDL's, +drives no AD with mobility tasks    Pt lives alone in a ranch style home with KARIN  Currently pt requires min/mod a  for overall ADLS and min a without AD for functional mobility/transfers   Pt currently presents with impairments in the following categories -steps to enter environment, limited home support, difficulty performing ADLS, difficulty performing IADLS , limited insight into deficits, compliance, flat affect and decreased initiation and engagement  activity tolerance, endurance, standing balance/tolerance, UE strength, memory, insight, safety , judgement , attention , sequencing , task initiation , communication and interpersonal skills  These impairments, as well as pt's fatigue, decreased caregiver support and risk for falls  limit pt's ability to safely engage in all baseline areas of occupation, includingbathing, dressing, toileting, functional mobility/transfers, community mobility, laundry , driving, house maintenance, medication management, meal prep, cleaning, social participation  and leisure activities  The patient's raw score on the AM-PAC Daily Activity inpatient short form is 15, standardized score is 34 69, less than 39 4  Patients at this level are likely to benefit from discharge to post-acute rehabilitation services  Please refer to the recommendation of the Occupational Therapist for safe discharge planning  From OT standpoint, recommend STR upon D/C  OT will continue to follow to address the below stated goals  Goals   Patient Goals unable to state   LTG Time Frame 10-14   Long Term Goal #1 see goals below   Plan   Treatment Interventions ADL retraining;Functional transfer training;UE strengthening/ROM; Endurance training;Cognitive reorientation;Patient/family training;Equipment evaluation/education;Continued evaluation; Energy conservation; Activityengagement; Compensatory technique education   Goal Expiration Date 08/09/22   OT Frequency 3-5x/wk   Recommendation   OT Discharge Recommendation Post acute rehabilitation services   AM-[facility] Daily Activity Inpatient   Lower Body Dressing 2   Bathing 2   Toileting 2   Upper Body Dressing 3   Grooming 3   Eating 3   Daily Activity Raw Score 15   Daily Activity Standardized Score (Calc for Raw Score >=11) 34 69   AM-PAC Applied Cognition Inpatient   Following a Speech/Presentation 1   Understanding Ordinary Conversation 3   Taking Medications 1   Remembering Where Things Are Placed or Put Away 1   Remembering List of 4-5 Errands 1   Taking Care of Complicated Tasks 1   Applied Cognition Raw Score 8   Applied Cognition Standardized Score 19 32   Barthel Index   Feeding 5   Bathing 0   Grooming Score 0   Dressing Score 5   Bladder Score 5   Bowels Score 10   Toilet Use Score 5   Transfers (Bed/Chair) Score 5   Mobility (Level Surface) Score 5   Stairs Score 0   Barthel Index Score 40   Modified Letcher Scale   Modified Letcher Scale 3      Occupational Therapy Goals:    *Mod I with bed mobility to engage in functional tasks  *Mod I Adl's after setup with use of AE PRN  *Mod I toileting and clothing management   *Mod I functional mobility and transfers to/from all surfaces with Fair + dynamic balance and safety for participation in dynamic adls and iadl tasks   *Demonstrate good carryover with safe use of RW during functional tasks   *Assess DME needs   *Increase activity tolerance to 25-30 minutes for participation in adls and enjoyable activities  *Pt to participate in further cognitive testing with good attention and participation to assist with safe d/c recommendations  *Mod I with Simulated IADL management task  *Pt will follow 100% simple one step verbal commands and be A/Ox4 consistently t/o use of external environmental cues w/ mod I  *Increase standing tolerance to 5-6 minutes with f+balance for ADL/IADL' independence      Terri Wnyn MOT, OTR/L

## 2022-07-26 NOTE — PLAN OF CARE
Problem: PHYSICAL THERAPY ADULT  Goal: Performs mobility at highest level of function for planned discharge setting  See evaluation for individualized goals  Description: Treatment/Interventions: Functional transfer training, LE strengthening/ROM, Elevations, Endurance training, Therapeutic exercise, Cognitive reorientation, Patient/family training, Equipment eval/education, Bed mobility, Gait training, Spoke to nursing, Spoke to case management, OT, Spoke to advanced practitioner          See flowsheet documentation for full assessment, interventions and recommendations  Note: Prognosis: Good  Problem List: Decreased endurance, Impaired balance, Decreased mobility, Decreased cognition, Impaired judgement, Decreased safety awareness (expressive aphasia)  Assessment: Pt is a 76 y o  female admitted to [facility] on 7/24/2022 with R facial droop and word finding difficulty  Pt received a primary medical dx of acute ischemic L MCA CVA  Pt has the following comorbidities which affect their treatment: h/o DVT, Osteoarthritis, depression, uterine cancer, inguinal lymphadenopathy, as well as personal factors including living alone and stairs to access home  Pt has a high complexity clinical presentation due to Ongoing medical management for primary dx, Decreased activity tolerance compared to baseline, Fall risk, Increased assistance needed from caregiver at current time, Ongoing telemetry monitoring, Cog status, Trending lab values, Diagnostic imaging pending, Continuous pulse oximetry monitoring  , and PMH  PT was consulted to evaluate pt's functional mobility and discharge needs  Upon evaluation, patient required S for bed mobility, CGA for STS transfers, CGA for ambulation  Pt's functional impairments include: decreased balance, activity tolerance, and mobility  At conclusion of eval, pt remained seated in chair with chair alarm, phone, call bell, and all other personal needs within reach   Pt would benefit from skilled PT to address their functional mobility limitations  The patient's AM-PAC Basic Mobility Inpatient Short Form Raw Score is 18  A Raw score of greater than 16 suggests the patient may benefit from discharge to home  However, pt is currently functioning below baseline and has significant cog impairments which make it unsafe to currently be home alone  Please also refer to the recommendation of the Physical Therapist for safe discharge planning  D/C recommendations are rehab (PMR) vs OPPT pending available support of family  Barriers to Discharge: (S) Decreased caregiver support (pt not safe to be home alone at this time 2* cog impairments)     PT Discharge Recommendation:  (rehab (PMR) vs OPPT pending avaliable support of daughters)    See flowsheet documentation for full assessment

## 2022-07-26 NOTE — PROGRESS NOTES
[address] [facility]  Progress Note - Preeti Lopez 1945, 76 y o  female MRN: [id number]  Unit/Bed#: [facility] 729-01 Encounter: [id number]  Primary Care Provider: Collette Soles, MD   Date and time admitted to hospital: 7/24/2022  7:46 PM    * Acute ischemic left MCA stroke [facility])  Assessment & Plan  Patient presented with right facial droop and aphasia, unfortunately,  Unfortunately, outside of tPA window  · CT head No evidence of acute intracranial hemorrhage  Left frontal temporal and low-attenuation suggesting acute to subacute ischemia  · CTA head and neck showed distal left M2/M3 MCA occlusion  · MRI brain without contrast Acute to early subacute ischemia identified involving the left MCA territory described in detail above with one larger infarct and multiple additional smaller infarcts  No hemorrhagic transformation identified  · MRI brain with contrast negative for metastasis (in setting of hx of endometrial CA)  · Echo unremarkable  · Continue ASA/statin  · Dysphagia diet  · Neuro checks  · Telemetry, negative upon review  · Remains aphasic, no focal weakness appreciated on exam   Right-sided facial droop  · Neurology following, appreciate final recommendations  Loop versus ZIO patch  · PT/OT recommending ARC versus home with 24/7 supervision and outpatient  I do believe acute rehab would be beneficial   PMR consult pending  Mild episode of recurrent major depressive disorder (HCC)  Assessment & Plan  · On Zoloft 75 mg daily    Vitamin D deficiency  Assessment & Plan  · On cholecalciferol  History of DVT (deep vein thrombosis)  Assessment & Plan  · Completed several month course of anticoagulation with Xarelto  History of endometrial cancer  Assessment & Plan  · History of stage IA, grade 3 s/p completion of adjuvant chemotherapy and radiation in April 2015  Follows with GYN-ONC as outpatient         VTE Pharmacologic Prophylaxis:   Moderate Risk (Score 3-4) - Pharmacological DVT Prophylaxis Ordered: enoxaparin (Lovenox)  Patient Centered Rounds: I performed bedside rounds with nursing staff today  Discussions with Specialists or Other Care Team Provider: nursing, neuro, cm    Education and Discussions with Family / Patient: Updated  (daughter) at bedside  Ashlyn    Time Spent for Care: 30 minutes  More than 50% of total time spent on counseling and coordination of care as described above  Current Length of Stay: 2 day(s)  Current Patient Status: Inpatient   Certification Statement: The patient will continue to require additional inpatient hospital stay due to final neuro recommendations, discharge planning home versus rehab  Discharge Plan: Anticipate discharge in 24-48 hrs to rehab facility  Code Status: Level 3 - DNAR and DNI    Subjective:   Patient remains aphasic  Following commands better today  Objective:     Vitals:   Temp (24hrs), Av 7 °F (36 5 °C), Min:97 4 °F (36 3 °C), Max:98 3 °F (36 8 °C)    Temp:  [97 4 °F (36 3 °C)-98 3 °F (36 8 °C)] 97 5 °F (36 4 °C)  HR:  [57-70] 57  Resp:  [17] 17  BP: (131-147)/(58-82) 134/74  SpO2:  [93 %-97 %] 95 %  Body mass index is 29 29 kg/m²  Input and Output Summary (last 24 hours):   No intake or output data in the 24 hours ending 22 1105    Physical Exam:   Physical Exam  Vitals and nursing note reviewed  Constitutional:       General: She is not in acute distress  Cardiovascular:      Rate and Rhythm: Normal rate  Pulmonary:      Breath sounds: Normal breath sounds  Abdominal:      Tenderness: There is no abdominal tenderness  Musculoskeletal:         General: No swelling  Skin:     General: Skin is warm  Neurological:      Mental Status: She is alert  Comments: Expressive aphasia, following commands better today  Right-sided facial droop noted  Moving all extremities  Psychiatric:         Mood and Affect: Mood is depressed  Affect is flat  Additional Data:     Labs:  Results from last 7 days   Lab Units 07/24/22 2000   WBC Thousand/uL 9 24   HEMOGLOBIN g/dL 13 9   HEMATOCRIT % 43 5   PLATELETS Thousands/uL 257     Results from last 7 days   Lab Units 07/25/22  0415   SODIUM mmol/L 142   POTASSIUM mmol/L 4 0   CHLORIDE mmol/L 111*   CO2 mmol/L 26   BUN mg/dL 21   CREATININE mg/dL 0 64   ANION GAP mmol/L 5   CALCIUM mg/dL 8 9   ALBUMIN g/dL 3 3*   TOTAL BILIRUBIN mg/dL 0 53   ALK PHOS U/L 70   ALT U/L 15   AST U/L 17   GLUCOSE RANDOM mg/dL 91     Results from last 7 days   Lab Units 07/24/22 2000   INR  0 97     Results from last 7 days   Lab Units 07/24/22  1949   POC GLUCOSE mg/dl 101     Results from last 7 days   Lab Units 07/25/22  0415   HEMOGLOBIN A1C % 5 4           Lines/Drains:  Invasive Devices  Report    Peripheral Intravenous Line  Duration           Peripheral IV 07/24/22 Right Arm 1 day                  Telemetry:  Telemetry Orders (From admission, onward)             48 Hour Telemetry Monitoring  Continuous x 48 hours        References:    Telemetry Guidelines   Question:  Reason for 48 Hour Telemetry  Answer:  Acute CVA (<24 hrs old, hemispheric strokes, selected brainstem strokes, cardiac arrhythmias)                 Telemetry Reviewed: Normal Sinus Rhythm  Indication for Continued Telemetry Use: Acute CVA             Imaging: No pertinent imaging reviewed      Recent Cultures (last 7 days):         Last 24 Hours Medication List:   Current Facility-Administered Medications   Medication Dose Route Frequency Provider Last Rate    acetaminophen  650 mg Oral Q6H PRN Beacher Check, MD      aluminum-magnesium hydroxide-simethicone  30 mL Oral Q6H PRN Beacher Check, MD      ascorbic acid  500 mg Oral Daily Beacher Check, MD      aspirin  81 mg Oral Daily Joelle Cosme MD      atorvastatin  40 mg Oral QPM Beacher Check, MD      calcium carbonate-vitamin D  1 tablet Oral Daily With Breakfast Jean Vanegas Albaro Russo MD      cholecalciferol  2,000 Units Oral Daily Onur Araujo MD      docusate sodium  100 mg Oral BID PRN Onur Araujo MD      enoxaparin  40 mg Subcutaneous Daily Onur Araujo MD      multivitamin-minerals  1 tablet Oral Daily Onur Araujo MD      ondansetron  4 mg Intravenous Q6H PRN Onur Araujo MD      sertraline  75 mg Oral Daily Onur Araujo MD          Today, Patient Was Seen By: JF Larson    **Please Note: This note may have been constructed using a voice recognition system  **

## 2022-07-26 NOTE — ASSESSMENT & PLAN NOTE
Patient presented with right facial droop and aphasia, unfortunately,  Unfortunately, outside of tPA window  · CT head No evidence of acute intracranial hemorrhage  Left frontal temporal and low-attenuation suggesting acute to subacute ischemia  · CTA head and neck showed distal left M2/M3 MCA occlusion  · MRI brain without contrast Acute to early subacute ischemia identified involving the left MCA territory described in detail above with one larger infarct and multiple additional smaller infarcts  No hemorrhagic transformation identified  · MRI brain with contrast negative for metastasis (in setting of hx of endometrial CA)  · Echo unremarkable  · Continue ASA/statin  · Dysphagia diet  · Neuro checks  · Telemetry, negative upon review  · Remains aphasic, no focal weakness appreciated on exam   Right-sided facial droop  · Neurology following, appreciate final recommendations  Loop versus ZIO patch  · PT/OT recommending ARC versus home with 24/7 supervision and outpatient  I do believe acute rehab would be beneficial   PMR consult pending

## 2022-07-26 NOTE — PLAN OF CARE
Problem: OCCUPATIONAL THERAPY ADULT  Goal: Performs self-care activities at highest level of function for planned discharge setting  See evaluation for individualized goals  Description: Treatment Interventions: ADL retraining, Functional transfer training, UE strengthening/ROM, Endurance training, Cognitive reorientation, Patient/family training, Equipment evaluation/education, Continued evaluation, Energy conservation, Activityengagement, Compensatory technique education          See flowsheet documentation for full assessment, interventions and recommendations  Note: Limitation: Decreased ADL status, Decreased Safe judgement during ADL, Decreased cognition, Decreased endurance, Decreased self-care trans, Decreased high-level ADLs  Prognosis: Fair  Assessment: Pt is a 76 y o  female who was admitted to [facility] on 7/24/2022 with right facial droop/aphasia and now here with  Acute ischemic left MCA stroke (HCC) (-) tPA  Pt's problem list also includes PMH of  has a past medical history of Deep vein thrombosis ([facility] ) (1/28/2016), Diverticulosis, Fibrocystic breast, History of DVT (deep vein thrombosis), Impaired fasting glucose, Inguinal lymphadenopathy, Multiple benign polyps of large intestine, Obesity, and [facility]ine [facility] ([facility] )    At baseline pt was completing I with ADL's/IaDL's, +drives no AD with mobility tasks    Pt lives alone in a ranch style home with KARIN  Currently pt requires min/mod a  for overall ADLS and min a without AD for functional mobility/transfers   Pt currently presents with impairments in the following categories -steps to enter environment, limited home support, difficulty performing ADLS, difficulty performing IADLS , limited insight into deficits, compliance, flat affect and decreased initiation and engagement  activity tolerance, endurance, standing balance/tolerance, UE strength, memory, insight, safety , judgement , attention , sequencing , task initiation , communication and interpersonal skills  These impairments, as well as pt's fatigue, decreased caregiver support and risk for falls  limit pt's ability to safely engage in all baseline areas of occupation, includingbathing, dressing, toileting, functional mobility/transfers, community mobility, laundry , driving, house maintenance, medication management, meal prep, cleaning, social participation  and leisure activities  The patient's raw score on the AM-PAC Daily Activity inpatient short form is 15, standardized score is 34 69, less than 39 4  Patients at this level are likely to benefit from discharge to post-acute rehabilitation services  Please refer to the recommendation of the Occupational Therapist for safe discharge planning  From OT standpoint, recommend STR upon D/C  OT will continue to follow to address the below stated goals       OT Discharge Recommendation: Post acute rehabilitation services

## 2022-07-26 NOTE — ASSESSMENT & PLAN NOTE
· History of stage IA, grade 3 s/p completion of adjuvant chemotherapy and radiation in April 2015  Follows with GYN-ONC as outpatient

## 2022-07-27 PROCEDURE — 92507 TX SP LANG VOICE COMM INDIV: CPT

## 2022-07-27 PROCEDURE — 99232 SBSQ HOSP IP/OBS MODERATE 35: CPT | Performed by: NURSE PRACTITIONER

## 2022-07-27 RX ADMIN — Medication 1 TABLET: at 08:58

## 2022-07-27 RX ADMIN — SERTRALINE HYDROCHLORIDE 75 MG: 50 TABLET ORAL at 08:58

## 2022-07-27 RX ADMIN — Medication 2000 UNITS: at 08:58

## 2022-07-27 RX ADMIN — OXYCODONE HYDROCHLORIDE AND ACETAMINOPHEN 500 MG: 500 TABLET ORAL at 08:58

## 2022-07-27 RX ADMIN — ENOXAPARIN SODIUM 40 MG: 40 INJECTION SUBCUTANEOUS at 08:58

## 2022-07-27 RX ADMIN — ATORVASTATIN CALCIUM 40 MG: 40 TABLET, FILM COATED ORAL at 17:27

## 2022-07-27 RX ADMIN — ASPIRIN 81 MG CHEWABLE TABLET 81 MG: 81 TABLET CHEWABLE at 08:58

## 2022-07-27 NOTE — TREATMENT PLAN
Electrophysiology was reach out to concerning loop recorder implantation for cryptogenic stroke  As there is a long outpatient recall list, will attempt to place this while she is inpatient  Discussed with patient and eldest daughter at bedside  All questions concerning reasoning, monitoring, and procedure itself were answered  Daughter would like to discuss with her sisters and let us know if they would like to proceed with loop recorder implantation tomorrow  Patient is not need to be NPO for this procedure  Then will likely be discharged to [facility] on Friday  Daughter will be present tomorrow as her mother may need her to also sign consent as she is aphasic and she will let us know what they decide

## 2022-07-27 NOTE — SPEECH THERAPY NOTE
Speech Language/Pathology    Speech/Language Pathology Progress Note    Patient Name: Xochitl Batista  [id number] Date: 2022     Problem List  Principal Problem:    Acute ischemic left MCA stroke [facility])  Active Problems:    History of endometrial cancer    Vitamin D deficiency    Mild episode of recurrent major depressive disorder ([facility] )    History of DVT (deep vein thrombosis)       Past Medical History  Past Medical History:   Diagnosis Date    [facility]    Diverticulosis     last assessed 13    Fibrocystic breast     History of DVT (deep vein thrombosis)     Xarelto, 2015 thru 2016    Impaired fasting glucose     last assessed 14    Inguinal lymphadenopathy     Multiple benign polyps of large intestine     Obesity     [facility]         Past Surgical History  Past Surgical History:   Procedure Laterality Date    HYSTERECTOMY      LYMPH NODE BIOPSY      OOPHORECTOMY      PELVIC LAPAROSCOPY      TONSILLECTOMY      with adenoidectomy         Subjective:  Patient is awake and alert  Daughter is present at bedside  Objective: The patient is seen for language therapy  She continues with a moderate expressive aphasia, but appears to have more insight today when errors are made  The patient is able to state name,  and hospital, but is unable to name month and year, despite cues  She is able to name 2/3 daughters and needs max cues to recall 3rd daughters name and her current location  The patient participates in 3 structured activities  She needs model and cues to complete automatic tasks (counting to 10 and NISH), but is improved since yesterday  She competes ~50% automatic tasks, with cues  The patient does not seem to benefit from phonemic or semantic cues, and continues with perseveration's throughout  She can be redirected  The patient is unable to complete category naming task, even with choice of 2 for responses   Did not target direction following today  Appears to have improved expression during spontaneous conversation  Assessment:  Patient continues with aphasia, but has some improvement since yesterday  Plan/Recommendations:  Continue ST as able towards goals  Recommend f/u in rehab setting

## 2022-07-27 NOTE — PROGRESS NOTES
[address] [facility]  Progress Note - Gala James 1945, 76 y o  female MRN: [id number]  Unit/Bed#: [facility] 729-01 Encounter: [id number]  Primary Care Provider: Federica Pagan MD   Date and time admitted to hospital: 7/24/2022  7:46 PM    * Acute ischemic left MCA stroke [facility])  Assessment & Plan  Patient presented with right facial droop and aphasia, unfortunately,  Unfortunately, outside of tPA window  · CT head No evidence of acute intracranial hemorrhage  Left frontal temporal and low-attenuation suggesting acute to subacute ischemia  · CTA head and neck showed distal left M2/M3 MCA occlusion  · MRI brain without contrast Acute to early subacute ischemia identified involving the left MCA territory described in detail above with one larger infarct and multiple additional smaller infarcts  No hemorrhagic transformation identified  · MRI brain with contrast negative for metastasis (in setting of hx of endometrial CA)  · Echo unremarkable  · Continue ASA/statin  · Dysphagia diet  · Neuro checks  · Telemetry, negative upon review  · Remains aphasic, no focal weakness appreciated on exam   Right-sided facial droop  · Neurology now signed off, stable for discharge  Plan for loop as outpatient  Outpatient follow-up in 4-6 weeks  · PT/OT recommending rehab  PMR consult appreciated, would be could are candidate  Case management following  Mild episode of recurrent major depressive disorder (HCC)  Assessment & Plan  · On Zoloft 75 mg daily    Vitamin D deficiency  Assessment & Plan  · On cholecalciferol  History of DVT (deep vein thrombosis)  Assessment & Plan  · Completed several month course of anticoagulation with Xarelto  History of endometrial cancer  Assessment & Plan  · History of stage IA, grade 3 s/p completion of adjuvant chemotherapy and radiation in April 2015  Follows with GYN-ONC as outpatient           VTE Pharmacologic Prophylaxis:   Moderate Risk (Score 3-4) - Pharmacological DVT Prophylaxis Ordered: enoxaparin (Lovenox)  Patient Centered Rounds: I performed bedside rounds with nursing staff today  Discussions with Specialists or Other Care Team Provider: nursing, leatha Ceja at [facility] to assist with coordinating outpatient loop  Education and Discussions with Family / Patient: Updated  (daughter) via phone  Detailed message left for Ashlyn    Time Spent for Care: 30 minutes  More than 50% of total time spent on counseling and coordination of care as described above  Current Length of Stay: 3 day(s)  Current Patient Status: Inpatient   Certification Statement: The patient will continue to require additional inpatient hospital stay due to Discharge planning to rehab  Discharge Plan: Anticipate discharge later today or tomorrow to rehab facility  Code Status: Level 3 - DNAR and DNI    Subjective:   Patient offers no acute complaints  Appears frustrated by aphasia  Following more commands today  Able to get more words out  Objective:     Vitals:   Temp (24hrs), Av 2 °F (36 8 °C), Min:97 4 °F (36 3 °C), Max:98 6 °F (37 °C)    Temp:  [97 4 °F (36 3 °C)-98 6 °F (37 °C)] 97 4 °F (36 3 °C)  HR:  [63-71] 70  Resp:  [16] 16  BP: (121-151)/(66-88) 127/75  SpO2:  [92 %-95 %] 94 %  Body mass index is 29 29 kg/m²  Input and Output Summary (last 24 hours):   No intake or output data in the 24 hours ending 22 0902    Physical Exam:   Physical Exam  Vitals and nursing note reviewed  Cardiovascular:      Rate and Rhythm: Normal rate  Heart sounds: No murmur heard  Pulmonary:      Breath sounds: Normal breath sounds  Abdominal:      Tenderness: There is no abdominal tenderness  Musculoskeletal:         General: Swelling present  Skin:     General: Skin is warm  Neurological:      Mental Status: She is alert  Comments: Aphasic, follows most commands  Moving all extremities, no focal weakness   Right sided facial droop   Psychiatric:      Comments: Appears frustrated by aphasia at times          Additional Data:     Labs:  Results from last 7 days   Lab Units 07/24/22 2000   WBC Thousand/uL 9 24   HEMOGLOBIN g/dL 13 9   HEMATOCRIT % 43 5   PLATELETS Thousands/uL 257     Results from last 7 days   Lab Units 07/25/22  0415   SODIUM mmol/L 142   POTASSIUM mmol/L 4 0   CHLORIDE mmol/L 111*   CO2 mmol/L 26   BUN mg/dL 21   CREATININE mg/dL 0 64   ANION GAP mmol/L 5   CALCIUM mg/dL 8 9   ALBUMIN g/dL 3 3*   TOTAL BILIRUBIN mg/dL 0 53   ALK PHOS U/L 70   ALT U/L 15   AST U/L 17   GLUCOSE RANDOM mg/dL 91     Results from last 7 days   Lab Units 07/24/22 2000   INR  0 97     Results from last 7 days   Lab Units 07/24/22  1949   POC GLUCOSE mg/dl 101     Results from last 7 days   Lab Units 07/25/22  0415   HEMOGLOBIN A1C % 5 4           Lines/Drains:  Invasive Devices  Report    Peripheral Intravenous Line  Duration           Peripheral IV 07/24/22 Right Arm 2 days                  Telemetry:  Telemetry Orders (From admission, onward)             48 Hour Telemetry Monitoring  Continuous x 48 hours        References:    Telemetry Guidelines   Question:  Reason for 48 Hour Telemetry  Answer:  Acute CVA (<24 hrs old, hemispheric strokes, selected brainstem strokes, cardiac arrhythmias)                 Telemetry Reviewed: Normal Sinus Rhythm  Indication for Continued Telemetry Use: Acute CVA             Imaging: No pertinent imaging reviewed      Recent Cultures (last 7 days):         Last 24 Hours Medication List:   Current Facility-Administered Medications   Medication Dose Route Frequency Provider Last Rate    acetaminophen  650 mg Oral Q6H PRN Tiny Moon MD      aluminum-magnesium hydroxide-simethicone  30 mL Oral Q6H PRROYER Moon MD      ascorbic acid  500 mg Oral Daily Tiny Moon MD      aspirin  81 mg Oral Daily Daija Mock MD      atorvastatin  40 mg Oral QPM Jean Fish MD      calcium carbonate-vitamin D  1 tablet Oral Daily With Breakfast Tiny Moon MD      cholecalciferol  2,000 Units Oral Daily Tiny Moon MD      docusate sodium  100 mg Oral BID PRN Tiny Moon MD      enoxaparin  40 mg Subcutaneous Daily Tiny Moon MD      multivitamin-minerals  1 tablet Oral Daily Tiny Moon MD      ondansetron  4 mg Intravenous Q6H PRN Tiny Moon MD      sertraline  75 mg Oral Daily Tiny Moon MD          Today, Patient Was Seen By: JF Clarke    **Please Note: This note may have been constructed using a voice recognition system  **

## 2022-07-27 NOTE — CASE MANAGEMENT
Case Management Discharge Planning Note    Patient name Fe Ayon  Location [facility] [id number]  : 1945 Date 2022       Current Admission Date: 2022  Current Admission Diagnosis:Acute ischemic left MCA stroke [facility])   Patient Active Problem List    Diagnosis Date Noted    History of DVT (deep vein thrombosis) 2022    Acute ischemic left [facility] stroke [facility] ) 2022    Mild episode of recurrent major depressive disorder ([place] [address] ) 10/07/2021    Osteopenia of lumbar spine 2019    History of endometrial cancer 2018    Multinodular thyroid 2017    Inguinal lymphadenopathy 2015    Endometrial cancer ([facility] ) 2014    Obesity 2013    Osteoarthritis of hip 2013    Osteoarthritis of knee 2013    Vitamin D deficiency 2013      LOS (days): 3  Geometric Mean LOS (GMLOS) (days): 2 90  Days to GMLOS:0 2     OBJECTIVE:  Risk of Unplanned Readmission Score: 10 43         Current admission status: Inpatient   Preferred Pharmacy:   [facility] [address] YELITZA LOPES NUMANASA [address] [place] [address]  [address] [place] [address]  Phone: [phone] Fax: [address]  [address]  [address] [facility] [address]  Phone: [phone] Fax: [phone]    Primary Care Provider: Esther Ruiz MD    Primary Insurance: MEDICARE  Secondary Insurance: AARP    DISCHARGE DETAILS:    Discharge planning discussed with[de-identified] Daughters at bedside  Caret of Choice: Yes  Comments - Freedom of Choice: List provided of acute rehab facilities  Broad referral placed  Other Referral/Resources/Interventions Provided:  Interventions: Acute Rehab  Referral Comments: CM advised by provider that pt is medically stable for discharge and is appropriate for acute rehab placement, per PM&R team  CM placed broad referral via Aidin, awaiting responses   CM spoke with daughters at bedside and they are in agreement with placement but would prefer pt to remain at [facility])  Daughters made aware that [facility] is on the 4th and 9th floors of the hospital -- Per daughters, 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

## 2022-07-27 NOTE — ASSESSMENT & PLAN NOTE
Patient presented with right facial droop and aphasia, unfortunately,  Unfortunately, outside of tPA window  · CT head No evidence of acute intracranial hemorrhage  Left frontal temporal and low-attenuation suggesting acute to subacute ischemia  · CTA head and neck showed distal left M2/M3 MCA occlusion  · MRI brain without contrast Acute to early subacute ischemia identified involving the left MCA territory described in detail above with one larger infarct and multiple additional smaller infarcts  No hemorrhagic transformation identified  · MRI brain with contrast negative for metastasis (in setting of hx of endometrial CA)  · Echo unremarkable  · Continue ASA/statin  · Dysphagia diet  · Neuro checks  · Telemetry, negative upon review  · Remains aphasic, no focal weakness appreciated on exam   Right-sided facial droop  · Neurology now signed off, stable for discharge  Plan for loop as outpatient  Outpatient follow-up in 4-6 weeks  · PT/OT recommending rehab  PMR consult appreciated, would be could are candidate  Case management following

## 2022-07-27 NOTE — CASE MANAGEMENT
Case Management Discharge Planning Note    Patient name Preeti Lopez  Location [address] [place] [address] [facility] [id number]  : 1945 Date 2022       Current Admission Date: 2022  Current Admission Diagnosis:Acute ischemic left MCA stroke [facility])   Patient Active Problem List    Diagnosis Date Noted    History of DVT (deep vein thrombosis) 2022    Acute ischemic left MCA stroke ([place] [address] ) 2022    Mild episode of recurrent major depressive disorder ([place] [address] ) 10/07/2021    Osteopenia of lumbar spine 2019    History of endometrial cancer 2018    Multinodular thyroid 2017    Inguinal lymphadenopathy 2015    Endometrial cancer ([place] [address] ) 2014    Obesity 2013    Osteoarthritis of hip 2013    Osteoarthritis of knee 2013    Vitamin D deficiency 2013      LOS (days): 3  Geometric Mean LOS (GMLOS) (days): 2 90  Days to GMLOS:0 1     OBJECTIVE:  Risk of Unplanned Readmission Score: 10 46         Current admission status: Inpatient   Preferred Pharmacy:   [facility] [address] YELITZA SMITH [address] [facility]  [place] [address] [address]  Phone: [phone] Fax: [address] [address]  Phone: [phone] Fax: [phone]    Primary Care Provider: Collette Soles, MD    Primary Insurance: MEDICARE  Secondary Insurance: AARP    DISCHARGE DETAILS:    Discharge planning discussed with[de-identified] Daughter Justin Leggett     Other Referral/Resources/Interventions Provided:  Interventions: Acute Rehab  Referral Comments: Per Aidin responses, pt has been accepted for admission to [place] [address] and [address] [place] [address] tomorrow  CM also advised by [facility] ARC team that pt may admit to an open bed on Friday   CM spoke with daughter Justin Leggett to discuss same -- She would like to accept the bed at [address] on Friday as pt was an employee here and it is a familiar Name band; environment  Family strongly advocating for placement at [facility] despite CM's discussion regarding gonzales discharge to the next level of care  CM advised intake team at [facility] that family would like to accept the bed on Friday  CM will follow      Treatment Team Recommendation: Acute Rehab  Discharge Destination Plan[de-identified] Acute Rehab

## 2022-07-27 NOTE — RESTORATIVE TECHNICIAN NOTE
Restorative Technician Note      Patient Name: Gala James     Note Type: Mobility  Patient Position Upon Consult: Supine  Activity Performed: Ambulated; [id number]; Stood  Assistive Device: Roller walker  Education Provided: Yes  Patient Position at [employer] of Consult: Bedside chair;  All needs within reach; Bed/Chair alarm activated    Selene SUGGS, Restorative Technician, [employer]

## 2022-07-28 ENCOUNTER — TRANSITIONAL CARE MANAGEMENT (OUTPATIENT)
Dept: FAMILY MEDICINE CLINIC | Facility: CLINIC | Age: 77
End: 2022-07-28

## 2022-07-28 ENCOUNTER — HOSPITAL ENCOUNTER (INPATIENT)
Facility: HOSPITAL | Age: 77
LOS: 15 days | Discharge: HOME | DRG: 057 | End: 2022-08-12
Attending: PHYSICAL MEDICINE & REHABILITATION | Admitting: PHYSICAL MEDICINE & REHABILITATION
Payer: MEDICARE

## 2022-07-28 VITALS
DIASTOLIC BLOOD PRESSURE: 73 MMHG | OXYGEN SATURATION: 94 % | SYSTOLIC BLOOD PRESSURE: 122 MMHG | HEIGHT: 67 IN | TEMPERATURE: 97.8 F | BODY MASS INDEX: 29.35 KG/M2 | HEART RATE: 47 BPM | RESPIRATION RATE: 16 BRPM | WEIGHT: 187 LBS

## 2022-07-28 DIAGNOSIS — M16.9 OSTEOARTHRITIS OF HIP, UNSPECIFIED LATERALITY, UNSPECIFIED OSTEOARTHRITIS TYPE: ICD-10-CM

## 2022-07-28 DIAGNOSIS — M85.88 OSTEOPENIA OF LUMBAR SPINE: ICD-10-CM

## 2022-07-28 DIAGNOSIS — I48.91 ATRIAL FIBRILLATION (HCC): ICD-10-CM

## 2022-07-28 DIAGNOSIS — M17.0 PRIMARY OSTEOARTHRITIS OF BOTH KNEES: ICD-10-CM

## 2022-07-28 DIAGNOSIS — K59.00 CONSTIPATION, UNSPECIFIED CONSTIPATION TYPE: ICD-10-CM

## 2022-07-28 DIAGNOSIS — I49.5 SA NODE DYSFUNCTION (HCC): ICD-10-CM

## 2022-07-28 DIAGNOSIS — I63.512 ACUTE ISCHEMIC LEFT MCA STROKE (HCC): Primary | ICD-10-CM

## 2022-07-28 PROBLEM — R13.10 DYSPHAGIA: Status: ACTIVE | Noted: 2022-07-28

## 2022-07-28 LAB
FLUAV RNA RESP QL NAA+PROBE: NEGATIVE
FLUBV RNA RESP QL NAA+PROBE: NEGATIVE
RSV RNA RESP QL NAA+PROBE: NEGATIVE
SARS-COV-2 RNA RESP QL NAA+PROBE: NEGATIVE

## 2022-07-28 PROCEDURE — 99221 1ST HOSP IP/OBS SF/LOW 40: CPT | Performed by: INTERNAL MEDICINE

## 2022-07-28 PROCEDURE — 97112 NEUROMUSCULAR REEDUCATION: CPT

## 2022-07-28 PROCEDURE — 97116 GAIT TRAINING THERAPY: CPT

## 2022-07-28 PROCEDURE — 99239 HOSP IP/OBS DSCHRG MGMT >30: CPT | Performed by: PHYSICIAN ASSISTANT

## 2022-07-28 PROCEDURE — NC001 PR NO CHARGE: Performed by: PHYSICIAN ASSISTANT

## 2022-07-28 PROCEDURE — 99223 1ST HOSP IP/OBS HIGH 75: CPT | Performed by: PHYSICAL MEDICINE & REHABILITATION

## 2022-07-28 PROCEDURE — 0241U HB NFCT DS VIR RESP RNA 4 TRGT: CPT | Performed by: PHYSICIAN ASSISTANT

## 2022-07-28 PROCEDURE — NC001 PR NO CHARGE

## 2022-07-28 RX ORDER — ASPIRIN 81 MG/1
81 TABLET, CHEWABLE ORAL DAILY
Qty: 30 TABLET | Refills: 0 | Status: ON HOLD
Start: 2022-07-29 | End: 2022-08-10 | Stop reason: SDUPTHER

## 2022-07-28 RX ORDER — MAGNESIUM HYDROXIDE/ALUMINUM HYDROXICE/SIMETHICONE 120; 1200; 1200 MG/30ML; MG/30ML; MG/30ML
30 SUSPENSION ORAL EVERY 6 HOURS PRN
Status: DISCONTINUED | OUTPATIENT
Start: 2022-07-28 | End: 2022-08-12 | Stop reason: HOSPADM

## 2022-07-28 RX ORDER — ASCORBIC ACID 500 MG
500 TABLET ORAL DAILY
Status: DISCONTINUED | OUTPATIENT
Start: 2022-07-29 | End: 2022-08-12 | Stop reason: HOSPADM

## 2022-07-28 RX ORDER — ATORVASTATIN CALCIUM 40 MG/1
40 TABLET, FILM COATED ORAL EVERY EVENING
Status: DISCONTINUED | OUTPATIENT
Start: 2022-07-28 | End: 2022-08-12 | Stop reason: HOSPADM

## 2022-07-28 RX ORDER — ACETAMINOPHEN 325 MG/1
650 TABLET ORAL EVERY 6 HOURS PRN
Status: DISCONTINUED | OUTPATIENT
Start: 2022-07-28 | End: 2022-08-12 | Stop reason: HOSPADM

## 2022-07-28 RX ORDER — ONDANSETRON 4 MG/1
4 TABLET, ORALLY DISINTEGRATING ORAL EVERY 6 HOURS PRN
Status: DISCONTINUED | OUTPATIENT
Start: 2022-07-28 | End: 2022-08-12 | Stop reason: HOSPADM

## 2022-07-28 RX ORDER — POLYETHYLENE GLYCOL 3350 17 G/17G
17 POWDER, FOR SOLUTION ORAL DAILY PRN
Status: DISCONTINUED | OUTPATIENT
Start: 2022-07-28 | End: 2022-08-12 | Stop reason: HOSPADM

## 2022-07-28 RX ORDER — ENOXAPARIN SODIUM 100 MG/ML
40 INJECTION SUBCUTANEOUS DAILY
Status: DISCONTINUED | OUTPATIENT
Start: 2022-07-29 | End: 2022-08-12 | Stop reason: HOSPADM

## 2022-07-28 RX ORDER — BISACODYL 10 MG
10 SUPPOSITORY, RECTAL RECTAL DAILY PRN
Status: DISCONTINUED | OUTPATIENT
Start: 2022-07-28 | End: 2022-08-12 | Stop reason: HOSPADM

## 2022-07-28 RX ORDER — ASPIRIN 81 MG/1
81 TABLET, CHEWABLE ORAL DAILY
Status: DISCONTINUED | OUTPATIENT
Start: 2022-07-29 | End: 2022-08-12 | Stop reason: HOSPADM

## 2022-07-28 RX ORDER — MELATONIN
2000 DAILY
Status: DISCONTINUED | OUTPATIENT
Start: 2022-07-29 | End: 2022-08-12 | Stop reason: HOSPADM

## 2022-07-28 RX ORDER — SENNOSIDES 8.6 MG
2 TABLET ORAL DAILY
Status: DISCONTINUED | OUTPATIENT
Start: 2022-07-28 | End: 2022-07-29

## 2022-07-28 RX ORDER — ATORVASTATIN CALCIUM 40 MG/1
40 TABLET, FILM COATED ORAL EVERY EVENING
Qty: 30 TABLET | Refills: 0 | Status: ON HOLD
Start: 2022-07-28 | End: 2022-08-10 | Stop reason: SDUPTHER

## 2022-07-28 RX ADMIN — Medication 1 TABLET: at 08:51

## 2022-07-28 RX ADMIN — OXYCODONE HYDROCHLORIDE AND ACETAMINOPHEN 500 MG: 500 TABLET ORAL at 08:51

## 2022-07-28 RX ADMIN — ENOXAPARIN SODIUM 40 MG: 40 INJECTION SUBCUTANEOUS at 08:52

## 2022-07-28 RX ADMIN — Medication 2000 UNITS: at 08:51

## 2022-07-28 RX ADMIN — ATORVASTATIN CALCIUM 40 MG: 40 TABLET, FILM COATED ORAL at 18:08

## 2022-07-28 RX ADMIN — ASPIRIN 81 MG CHEWABLE TABLET 81 MG: 81 TABLET CHEWABLE at 08:51

## 2022-07-28 RX ADMIN — SENNOSIDES 17.2 MG: 8.6 TABLET, FILM COATED ORAL at 18:07

## 2022-07-28 RX ADMIN — SERTRALINE HYDROCHLORIDE 75 MG: 50 TABLET ORAL at 08:51

## 2022-07-28 NOTE — ASSESSMENT & PLAN NOTE
· Stage IA, grade 3 s/p completion of adjuvant chemotherapy and radiation in April 2015  · Follows with GYN-ONC as OP

## 2022-07-28 NOTE — ARC ADMISSION
Per SHANE Forrester patient is cleared for discharge today  Patient and family prefer [facility]  [facility] can accept patient today  Patient can admit to [facility] into Room 972 with a transport time of 2:00 pm, for Nurse Report please call [phone]  Provided update with same to [place] 12 through [facility] 
Received Tigertext from [place] [address] stating patient/family would like [facility] in anticipation of an available bed on 7/29, Friday  SHANE Ferguson understands the anticipated bed is pending a patient who is scheduled for discharge Friday and remains medically cleared for discharge  Patient will need a COVID test prior to admitting to [facility] 
Referral received for consideration of patient for Inpatient Acute Rehab  After reviewing patients case patient has been approved for [facility] pending available support/assistance at home after rehab discharge if needed  Provided update with same to [facility] 12 through [address] [address]  Laila Hernández stated pt/family preference is [facility]  Initially did not anticipate an available bed at [facility] until early next week  Laila Hernández stated pt/family would like a referral sent to [facility] acute rehab prior to accepting a bed at [facility]  After communication with another patient who is agreeable to [facility] now anticipate a bed available at [facility] Friday, 7/29 pending patient who is scheduled for discharge remains cleared for discharge  Informed CM Laila Hernández of benny Hernández will check with [facility] if they are able to accept patient tomorrow vs ARC admission on Friday seeing as patient is medically cleared for discharge 
DISCHARGE

## 2022-07-28 NOTE — CASE MANAGEMENT
Case Management Discharge Planning Note    Patient name Evelio Gr  Location [address] [facility] [address] [facility] [id number]  : 1945 Date 2022       Current Admission Date: 2022  Current Admission Diagnosis:Acute ischemic left MCA stroke [facility])   Patient Active Problem List    Diagnosis Date Noted    History of DVT (deep vein thrombosis) 2022    Acute ischemic left MCA stroke ([place] [address] ) 2022    Mild episode of recurrent major depressive disorder ([place] [address] ) 10/07/2021    Osteopenia of lumbar spine 2019    History of endometrial cancer 2018    Multinodular thyroid 2017    Inguinal lymphadenopathy 2015    Endometrial cancer ([place] [address] ) 2014    Obesity 2013    Osteoarthritis of hip 2013    Osteoarthritis of knee 2013    Vitamin D deficiency 2013      LOS (days): 4  Geometric Mean LOS (GMLOS) (days): 2 90  Days to GMLOS:-0 7     OBJECTIVE:  Risk of Unplanned Readmission Score: 10 57         Current admission status: Inpatient   Preferred Pharmacy:   [address] [facility] [address] [address] [place] [address] [address]  [address] [place] [address]  Phone: [phone] Fax: [address]  [address] [place] [address]  Phone: [phone] Fax: [phone]    Primary Care Provider: Amena Link MD    Primary Insurance: MEDICARE  Secondary Insurance: AAR    DISCHARGE DETAILS:    Discharge planning discussed with[de-identified] Daughters at bedside     Other Referral/Resources/Interventions Provided:  Interventions: Acute Rehab  Referral Comments: Pt accepted for admission to [facility] today pending negative covid test  CM spoke with pt and family at bedside to discuss same and they are in agreement with medical stability and subsequent transfer  Provider aware and also in agreement  [address] [address] RN notified      Treatment Team Recommendation: Acute Rehab  Discharge Destination Plan[de-identified] Acute Rehab      IMM Given (Date):: 07/27/22  IMM Given to[de-identified] Family     Additional Comments: Awaiting room number, report, and fax #

## 2022-07-28 NOTE — PROGRESS NOTES
PHYSICAL MEDICINE AND REHABILITATION   PREADMISSION ASSESSMENT     Projected [facility] Diagnoses:  Impairment of mobility, safety, Activities of Daily Living (ADLs), and cognitive/communication skills due to Stroke:  01 2  Right Body Involvement (Left Brain)  Etiologic: Left MCA Distribution Infarctions  Date of Onset: 7/24/2022   Date of surgery: NA    PATIENT INFORMATION  Name: Adolph Falcon Phone #: [phone] (home)   Address: [address] [place] [id number]  YOB: 1945 Age: 76 y o  SS#   Marital Status:   Ethnicity:   Employment Status: retired  Extended Emergency Contact Information  Primary Emergency Contact: [employer] Phone: [phone]  Relation: Daughter  Secondary Emergency Contact: [employer] Phone: [phone]  Relation: Daughter  Advance Directive: Code: Level 3 - DNAR and DNI (no ACP docs)      INSURANCE/COVERAGE:     Primary Payor: MEDICARE / Plan: MEDICARE A AND B / Product Type: Medicare A & B Fee for Service /   Secondary Payer:  [facility]   Payer Contact:  Payer Contact: [phone]   Contact Phone:  Contact Phone:     Authorization #: NA  Coverage Dates: NA  LCD: NA  MEDICARE #: [id number]  Medicare Days: 60/30/60  Medical Record #: [id number]    REFERRAL SOURCE:   Referring provider: Wood Ramos MD  Referring facility: [address] [place] [address]  Room: [facility] 72/[facility] 729-  PCP: Evelyne Chatterjee MD PCP phone number: [phone]    MEDICAL INFORMATION  HPI:   Geoffrey Fernandez is a 76 F with PMH of endometrial cancer, hypertension, depression, vitamin-D deficiency, remote DVT, multinodular thyroid, osteoarthritis, overweight who developed right facial droop in aphasia and was evaluated in the hospital with initial CT head showing left frontotemporal hypodensity suggesting infarct  CT angio head and neck showed distal left 2 and 2 and M3 occlusion    Patient was felt to be outside the window for tPA  MRI confirmed acute to early subacute ischemia involving left MCA territory with moderate size acute infarct in the anterior aspect of the left MCA territory involving the insula and left frontal lobe with additional smaller infarcts in the frontal lobe as well as punctate infarct in the left occipital lobe  Pt did not receive tPA due to being outside of the window  EKG showed normal sinus rhythm  Echocardiogram showed ejection fraction of 70% with normal diastolic function mildly dilated left atrium  Patient was started on baby aspirin and recommended statin  Patient was evaluated by speech therapy and noted to have aphasia and dysphagia requiring modified diet  Patient is hemodynamically stable at this time  PT/OT therapies were consulted and continue to follow patient at this time  PM&R was consulted and are recommending inpatient Acute Rehab when medically stable  All involved medical disciplines feel/agree patient is medically ready for discharge at this time  Inpatient acute rehabilitation physician was consulted  Upon review of patients case and correspondence with PT/OT therapies and PM&R services, [facility] physician feels Robert Siobhan will benefit and is a good candidate / appropriate for inpatient acute rehab at this time  She has demonstrated the willingness / desire and tolerance to participate in the required 3 hours or more of therapies per day  COVID Vaccines: Judi Zhao: 01/08/2021, 08/24/2021, 05/20/2022  Tested Negative for COVID on 7/28/2022    Past Medical History:   Past Surgical History:    Allergies:     Past Medical History:   Diagnosis Date    Deep vein thrombosis ([facility] [address] ) 1/28/2016    Diverticulosis     last assessed 05/29/13    Fibrocystic breast     History of DVT (deep vein thrombosis)     Xarelto, 4/2015 thru April 2016    Impaired fasting glucose     last assessed 06/04/14    Inguinal lymphadenopathy     Multiple benign polyps of large intestine     Obesity     Uterine cancer ([facility] [address] )     Past Surgical History:   Procedure Laterality Date    HYSTERECTOMY      LYMPH NODE BIOPSY      OOPHORECTOMY      PELVIC LAPAROSCOPY      TONSILLECTOMY      with adenoidectomy     No Known Allergies      Medical/functional conditions requiring inpatient rehabilitation: Dysphagia, Aphasia  Decreased: self care, endurance, mobility, cognition, safety awareness  Impaired: judgement and balance  Risk for medical/clinical complications: DVT/PE, falls, confusion, hypertensive episodes, aspiration, infection, skin breakdown and self limiting participation with therapies  Comorbidities/Surgeries in the last 100 days:  Hypertension  VTE prophylaxis per primary team   Depression  Dysphagia  Aphasia    CURRENT VITAL SIGNS:   Temp:  [97 8 °F (36 6 °C)-98 5 °F (36 9 °C)] 97 8 °F (36 6 °C)  HR:  [47-72] 47  BP: (122-136)/(67-73) 122/73   Intake/Output Summary (Last 24 hours) at 7/28/2022 1348  Last data filed at 7/28/2022 1301  Gross per 24 hour   Intake 600 ml   Output --   Net 600 ml        LABORATORY RESULTS:      Lab Results   Component Value Date    HGB 13 9 07/24/2022    HGB 14 0 08/04/2015    HCT 43 5 07/24/2022    HCT 40 6 08/04/2015    WBC 9 24 07/24/2022    WBC 5 72 08/04/2015     Lab Results   Component Value Date    BUN 21 07/25/2022    BUN 15 10/21/2015     05/11/2015    K 4 0 07/25/2022    K 4 0 05/11/2015     (H) 07/25/2022     05/11/2015    GLUCOSE 108 05/11/2015    CREATININE 0 64 07/25/2022    CREATININE 0 64 10/21/2015     Lab Results   Component Value Date    PROTIME 13 1 07/24/2022    PROTIME 13 3 08/04/2015    INR 0 97 07/24/2022    INR 1 08 08/04/2015        DIAGNOSTIC STUDIES:  MRI brain wo contrast    Result Date: 7/25/2022  Impression: Acute to early subacute ischemia identified involving the left MCA territory described in detail above with one larger infarct and multiple additional smaller infarcts  No hemorrhagic transformation identified   This examination was marked "immediate notification" in Epic in order to begin the standard process by which the radiology reading room liaison alerts the referring practitioner  Workstation performed: [id number]     MRI brain w contrast    Result Date: 7/26/2022  Impression: No evidence of intracranial metastasis  Workstation performed: [id number]     CT stroke alert brain    Result Date: 7/24/2022  Impression: No evidence of acute intracranial hemorrhage  Left frontal temporal and low-attenuation suggesting acute to subacute ischemia  I personally discussed this study with neurologist on 7/24/2022 at 7:57 PM   Workstation performed: [id number]     CTA stroke alert (head/neck)    Result Date: 7/24/2022  Impression: Distal left M2/M3 MCA occlusion  Incidental thyroid nodule(s) for which nonemergent thyroid ultrasound is recommended  I personally discussed this study with neurologist on 7/24/2022 at 7:57 PM  Workstation performed: [id number]       PRECAUTIONS/SPECIAL NEEDS:  Tobacco:   Social History     Tobacco Use   Smoking Status Never Smoker   Smokeless Tobacco Never Used   , Alcohol:    Social History     Substance and Sexual Activity   Alcohol Use Not Currently   , Anticoagulation:  Lovenox, Safety Concerns, Pain Management, Aspiration Risk/Precautions, Dietary Restrictions: Dysphagia Mechanical Soft w/Think Liquids, Fall & Aspiration Precautions      MEDICATIONS:     Current Facility-Administered Medications:     acetaminophen (TYLENOL) tablet 650 mg, 650 mg, Oral, Q6H PRN, Smitha Stroud MD    aluminum-magnesium hydroxide-simethicone (MYLANTA) oral suspension 30 mL, 30 mL, Oral, Q6H PRN, Smitha Stroud MD    ascorbic acid (VITAMIN C) tablet 500 mg, 500 mg, Oral, Daily, Smitha Stroud MD, 500 mg at 07/28/22 0851    aspirin chewable tablet 81 mg, 81 mg, Oral, Daily, Anthony Dong MD, 81 mg at 07/28/22 0851    atorvastatin (LIPITOR) tablet 40 mg, 40 mg, Oral, QPM, Smitha Stroud MD, 40 mg at 07/27/22 1727    calcium carbonate-vitamin D (OSCAL-D) 500 mg-200 units per tablet 1 tablet, 1 tablet, Oral, Daily With Breakfast, Boogie Lang MD, 1 tablet at 07/28/22 0851    cholecalciferol (VITAMIN D3) tablet 2,000 Units, 2,000 Units, Oral, Daily, Boogie Lang MD, 2,000 Units at 07/28/22 0851    docusate sodium (COLACE) capsule 100 mg, 100 mg, Oral, BID PRN, Boogie Lang MD    enoxaparin (LOVENOX) subcutaneous injection 40 mg, 40 mg, Subcutaneous, Daily, Boogie Lang MD, 40 mg at 07/28/22 1715    multivitamin-minerals (CENTRUM) tablet 1 tablet, 1 tablet, Oral, Daily, Boogie Lang MD, 1 tablet at 07/28/22 0851    ondansetron (ZOFRAN) injection 4 mg, 4 mg, Intravenous, Q6H PRN, Boogie Lang MD    sertraline (ZOLOFT) tablet 75 mg, 75 mg, Oral, Daily, Boogie Lang MD, 75 mg at 07/28/22 0851    SKIN INTEGRITY:   no rashes, no erythema, no peripheral edema    PRIOR LEVEL OF FUNCTION:  She lives in a(n) single family home  Bridgett Abdullahi is  and lives alone  Self Care: Independent, Indoor Mobility: Independent and Cognition: Independent    FALLS IN THE LAST 6 MONTHS: 0    HOME ENVIRONMENT:  The living area: can live on one level  There are 2 steps to enter the home  The patient will not have 24 hour supervision/physical assistance available upon discharge  Per PMR Consult documentation: Patient has 2 daughters that live in the area 1 that does not work and would be able to provide supervision/assist after discharge from rehab  PREVIOUS DME:  Equipment in home (previous DME): None    FUNCTIONAL STATUS:    Physical Therapy Occupational Therapy Speech Therapy     07/26/22 0936    PT Last Visit   PT Visit Date 07/26/22   Note Type   Note type Evaluation   Pain Assessment   Pain Assessment Tool 0-10   Pain Score No Pain   Restrictions/Precautions   Weight Bearing Precautions Per Order No   Other Precautions Cognitive; Chair Alarm; Bed Alarm; Fall Risk;Multiple lines;Telemetry  (expressive aphasia, dysphagia)   Home Living   Type of [address] One level;Stairs to enter with rails  (2 KARIN)   Home Equipment Cane   Additional Comments pt questionable historian, above home set up obtained from CM note however pt reports living in a 2 SH  Prior Function   Level of [place] Independent with ADLs and functional mobility   Lives With (S)  Alone   Receives Help From Family   ADL Assistance Independent   IADLs Independent   Comments pt questionable historian  above info obtained from CM note   General   Family/Caregiver Present No   Cognition   Overall Cognitive Status (S)  Impaired   Arousal/Participation Cooperative   Orientation Level Oriented to person;Oriented to place; Disoriented to time;Disoriented to situation  (general place)   Following Commands Follows one step commands with increased time or repetition   Comments minimally verbal  mainly yes/no answers or will answer to choices  admits to being confused  appears ? paranoid   RLE Assessment   RLE Assessment WFL   LLE Assessment   LLE Assessment WFL   Light Touch   RLE Light Touch Grossly intact   LLE Light Touch Grossly intact   Bed Mobility   Supine to Sit 5  Supervision   Additional items HOB elevated; Increased time required;Verbal cues   Sit to Supine Unable to assess   Additional Comments remained seated in chair with alarm upon conclusion   Transfers   Sit to Stand 4  Minimal assistance   Additional items Assist x 1; Increased time required  (CGA)   Stand to Sit 5  Supervision   Additional items Increased time required;Verbal cues   Stand pivot 4  Minimal assistance   Additional items Assist x 1; Increased time required;Verbal cues  (CGA)   Additional Comments no AD utilized   Ambulation/Elevation   Gait pattern Decreased foot clearance; Short stride; Excessively slow   Gait Assistance 4  Minimal assist   Additional items Assist x 1;Verbal cues  (CGA)   Assistive Device None  (vs HHAx1)   Distance 40'x2   Balance   Static Sitting Fair +   Dynamic Sitting Fair   Static Standing Fair -   Dynamic Standing Poor +   Ambulatory Poor +   Endurance Deficit   Endurance Deficit Yes   Endurance Deficit Description fatigue   Activity Tolerance   Activity Tolerance Patient tolerated treatment well   Medical Staff Made Aware Suzan WHYTE, [address] pt cleared to see and mobilize per nursing   Assessment   Prognosis Good   Problem List Decreased endurance; Impaired balance;Decreased mobility; Decreased cognition; Impaired judgement;Decreased safety awareness  (expressive aphasia)   Assessment Pt is a 76 y o  female admitted to \[facility]\"" on 7/24/2022 with R facial droop and word finding difficulty  Pt received a primary medical dx of acute ischemic L MCA CVA  Pt has the following comorbidities which affect their treatment: h/o DVT, Osteoarthritis, depression, uterine cancer, inguinal lymphadenopathy, as well as personal factors including living alone and stairs to access home  Pt has a high complexity clinical presentation due to Ongoing medical management for primary dx, Decreased activity tolerance compared to baseline, Fall risk, Increased assistance needed from caregiver at current time, Ongoing telemetry monitoring, Cog status, Trending lab values, Diagnostic imaging pending, Continuous pulse oximetry monitoring  , and PMH  PT was consulted to evaluate pt's functional mobility and discharge needs  Upon evaluation, patient required S for bed mobility, CGA for STS transfers, CGA for ambulation  Pt's functional impairments include: decreased balance, activity tolerance, and mobility  At conclusion of eval, pt remained seated in chair with chair alarm, phone, call bell, and all other personal needs within reach  Pt would benefit from skilled PT to address their functional mobility limitations  The patient's AM-PAC Basic Mobility Inpatient Short Form Raw Score is 18   A Raw score of greater than 16 suggests the patient may benefit from discharge to home  However, pt is currently functioning below baseline and has significant cog impairments which make it unsafe to currently be home alone  Please also refer to the recommendation of the Physical Therapist for safe discharge planning  D/C recommendations are rehab (PMR) vs OPPT pending available support of family        07/26/22 0920    OT Last Visit   OT Visit Date 07/26/22   Note Type   Note type Evaluation   Restrictions/Precautions   Weight Bearing Precautions Per Order No   Other Precautions Fall Risk;Telemetry;Cognitive; Chair Alarm; Bed Alarm   Pain Assessment   Pain Assessment Tool 0-10   Pain Score No Pain   Home Living   Type of [address] [place] [address] One level;Stairs to enter with rails  (2STE)   Bathroom Toilet Standard   Bathroom Equipment (No DME at baseline)   [employer]   Prior Function   Level of [place] Independent with ADLs and functional mobility   Lives With (S)  Alone   Receives Help From Family   ADL Assistance Independent   IADLs Independent   Comments pt is unreliable/poor historian, information obtained from EMR/CM notes  Lifestyle   Autonomy I with ADL's/IADL's, no AD with funcctional ambulation +drives   Reciprocal Relationships daughters (2)   Service to Others retired   Intrinsic Gratification pt unable to state/continue to assess   ADL   Eating Assistance 5  Supervision/Setup   Grooming Assistance 5  Supervision/Setup   UB Bathing Assistance 5  Supervision/Setup   LB Bathing Assistance 3  Moderate Assistance   [address] 3  Moderate Assistance   LB Dressing Deficit Don/doff R sock; Don/doff L sock  (able to pull up over heal with forward trunk flexion and Supervision for dynamic sitting balance/safety   Toileting Assistance  3  Moderate Assistance   Bed Mobility   Supine to Sit 5  Supervision   Additional items Assist x 1;HOB elevated   Sit to Supine Unable to assess   Additional Comments pt in chair with all needs met and alarm engaged upon departure   Transfers   Sit to Stand 4  Minimal assistance   Additional items Assist x 1   Stand to Sit 5  Supervision   Additional items Assist x 1   Additional Comments No AD wtih functional transfers   Functional Mobility   Functional Mobility 4  Minimal assistance   Additional Comments min a without AD short distance functional mobility, into hallway   Additional items No AD   Balance   Static Sitting Fair +   Dynamic Sitting Fair   Static Standing Fair -   Dynamic Standing Poor +   Ambulatory Poor +   Activity Tolerance   Activity Tolerance Patient tolerated treatment well   Medical Staff Made Aware PT lindsay   Nurse Made Aware RN cleared pt for therapy   RUE Assessment   RUE Assessment WFL   LUE Assessment   LUE Assessment WFL   Hand Function   Gross Motor Coordination Functional   Fine Motor Coordination Functional   Vision - Complex Assessment   Tracking Decreased smoothness of horizontal tracking; Requires cues, head turns, or add eye shifts to track   [facility])   Visual Fields Difficulty detecting stimulus with OD RUQ; Difficulty detecting stimulus with OD RLQ   Acuity Able to read clock/calendar on wall without difficulty   Additional Comments pt with difficulty following multi-step directives, continue to assess   Cognition   Overall Cognitive Status (S)  Impaired   Arousal/Participation Alert; Responsive   Attention Difficulty attending to directions   Orientation Level Oriented to person;Disoriented to place; Disoriented to time;Disoriented to situation   Memory Decreased recall of precautions;Decreased recall of recent events;Decreased short term memory;Decreased recall of biographical information;Decreased long term memory   Following Commands Follows one step commands with increased time or repetition   Comments pt presents with flat affect, confused with slow processing/delayed responses, +rigth facial droop, one word answers to evaluative , word finding difficulties (pt denies) aphasia (?) unreliable/poor historian, poor memory  Assessment   Limitation Decreased ADL status; Decreased Safe judgement during ADL;Decreased cognition;Decreased endurance;Decreased self-care trans;Decreased high-level ADLs   Prognosis Fair   Assessment Pt is a 76 y o  female who was [facility] to [facility] on 7/24/2022 with right facial droop/aphasia and now here with  Acute ischemic left MCA stroke (HCC) (-) tPA  Pt's problem list also includes PMH of  has a past medical history of Deep vein thrombosis ([facility] [address] ) (1/28/2016), Diverticulosis, Fibrocystic breast, History of DVT (deep vein thrombosis), Impaired fasting glucose, Inguinal lymphadenopathy, Multiple benign polyps of large intestine, Obesity, and Uterine cancer ([facility] [address] )    At baseline pt was completing I with ADL's/IaDL's, +drives no AD with mobility tasks    Pt lives alone in a ranch style home with KARIN  Currently pt requires min/mod a  for overall ADLS and min a without AD for functional mobility/transfers  Pt currently presents with impairments in the following categories -steps to enter environment, limited home support, difficulty performing ADLS, difficulty performing IADLS , limited insight into deficits, compliance, flat affect and decreased initiation and engagement  activity tolerance, endurance, standing balance/tolerance, UE strength, memory, insight, safety , judgement , attention , sequencing , task initiation , communication and interpersonal skills   These impairments, as well as pt's fatigue, decreased caregiver support and risk for falls  limit pt's ability to safely engage in all baseline areas of occupation, includingbathing, dressing, toileting, functional mobility/transfers, community mobility, laundry , driving, house maintenance, medication management, meal prep, cleaning, social participation  and leisure activities  The patient's raw score on the AM-PAC Daily Activity inpatient short form is 15, standardized score is 34 69, less than 39 4  Patients at this level are likely to benefit from discharge to post-acute rehabilitation services  Please refer to the recommendation of the Occupational Therapist for safe discharge planning  From OT standpoint, recommend STR upon D/C  OT will continue to follow to address the below stated goals        Today's Date: 2022     Problem List  Principal Problem:    Acute ischemic left MCA stroke ([facility] )  Active Problems:    History of endometrial cancer    Vitamin D deficiency    Mild episode of recurrent major depressive disorder ([facility] )    History of DVT (deep vein thrombosis)        Past Medical History  Medical History        Past Medical History:   Diagnosis Date    Deep vein thrombosis ([facility] ) 2016    Diverticulosis       last assessed 13    Fibrocystic breast      History of DVT (deep vein thrombosis)       Xarelto, 2015 thru 2016    Impaired fasting glucose       last assessed 14    Inguinal lymphadenopathy      Multiple benign polyps of large intestine      Obesity      Uterine cancer (HCC)              Past Surgical History  Surgical History         Past Surgical History:   Procedure Laterality Date    HYSTERECTOMY        LYMPH NODE BIOPSY        OOPHORECTOMY        PELVIC LAPAROSCOPY        TONSILLECTOMY         with adenoidectomy               Subjective:  Patient is awake and alert  Daughter is present at bedside       Objective: The patient is seen for language therapy  She continues with a moderate expressive aphasia, but appears to have more insight today when errors are made  The patient is able to state name,  and hospital, but is unable to name month and year, despite cues  She is able to name 2/3 daughters and needs max cues to recall 3rd daughters name and her current location  The patient participates in 3 structured activities   She needs model and cues to complete automatic tasks (counting to 10 and NISH), but is improved since yesterday  She competes ~50% automatic tasks, with cues  The patient does not seem to benefit from phonemic or semantic cues, and continues with perseveration's throughout  She can be redirected  The patient is unable to complete category naming task, even with choice of 2 for responses  Did not target direction following today  Appears to have improved expression during spontaneous conversation       Assessment:  Patient continues with aphasia, but has some improvement since yesterday       Plan/Recommendations:  Continue ST as able towards goals  Recommend f/u in rehab setting  CARE SCORES:  Self Care:  Eatin: Setup or clean-up assistance  Oral hygiene: 05: Setup or clean-up assistance  Toilet hygiene: 03: Partial/moderate assistance  Shower/bathing self: 03: Partial/moderate assistance  Upper body dressin: Supervision or touching  assistance  Lower body dressin: Partial/moderate assistance  Putting on/taking off footwear: 09: Not applicable  Transfers:  Roll left and right: 04: Supervision or touching  assistance  Sit to lyin: Not applicable  Lying to sitting on side of bed: 05: Setup or clean-up assistance  Sit to stand: 04: Supervision or touching  assistance  Chair/bed to chair transfer: 04: Supervision or touching  assistance  Toilet transfer: 04: Supervision or touching  assistance  Mobility:  Walk 10 ft: 04: Supervision or touching  assistance  Walk 50 ft with two turns: 09: Not applicable  Walk [id number]: 09: Not applicable    CURRENT GAP IN FUNCTION  Prior to Admission: Functional Status: Patient was independent with mobility/ambulation, transfers, ADL's, IADL's  Estimated length of stay: 10 to 14 days    Anticipated Post-Discharge Disposition/Treatment  Disposition: Return to previous home/apartment    Outpatient Services: Physical Therapy (PT), Occupational Therapy (OT) and Speech Therapy    BARRIERS TO DISCHARGE  Lovenox, Weakness, Diminished cognition/Mentation change, Balance Difficulty, Fatigue, Home Accessibility, Caregiver Accessibility, Financial Resources and Equipment Needs    INTERVENTIONS FOR DISCHARGE  Adaptive equipment, Patient/Family/Caregiver Education, [employer], Support Group, Financial Assistance, Arrange DME needs, Medication Changes Per MD Orders, Therapy exercises and Center of balance support     REQUIRED THERAPY:  Patient will require PT, OT and ST 60 minutes each per day, five days per week to achieve rehab goals  REQUIRED FUNCTIONAL AND MEDICAL MANAGEMENT FOR INPATIENT REHABILITATION:  Skin:  There are no pressure sores currently, Deep Vein Thrombosis (DVT) Prophylaxis:  SCD's while in bed and Lovenox,      RECOMMENDED LEVEL OF CARE:   Lyric Hays is a 76 F with PMH of endometrial cancer, hypertension, depression, vitamin-D deficiency, remote DVT, multinodular thyroid, osteoarthritis, overweight who developed right facial droop in aphasia and was evaluated in the hospital with initial CT head showing left frontotemporal hypodensity suggesting infarct  CT angio head and neck showed distal left 2 and 2 and M3 occlusion  Patient was felt to be outside the window for tPA  MRI confirmed acute to early subacute ischemia involving left MCA territory with moderate size acute infarct in the anterior aspect of the left MCA territory involving the insula and left frontal lobe with additional smaller infarcts in the frontal lobe as well as punctate infarct in the left occipital lobe  Pt did not receive tPA due to being outside of the window  EKG showed normal sinus rhythm  Echocardiogram showed ejection fraction of 70% with normal diastolic function mildly dilated left atrium  Patient was started on baby aspirin and recommended statin  Patient was evaluated by speech therapy and noted to have aphasia and dysphagia requiring modified diet   Patient is hemodynamically stable at this time  PT/OT therapies were consulted and noted patient to have significant decline from baseline level  Prior to admission patient was completely independent with functional mobility, ADLs, and IADLs without an assistive device  Currently she is requiring Min A with transfers and gait with the use of a RW and Supervision/Setup for UB bathing, Min A for UB dressing and Mod A for LB ADL's  Nursing is being recommended for education as well as bowel/bladder management, internal medicine to monitor/ manage medical conditions, PM&R to maximize function and provide medical oversight, and inpatient rehab to maximize self-care, mobility, strength, endurance, and cognition upon discharge to home with the support of his family

## 2022-07-28 NOTE — CONSULTS
Internal Medicine Consultation Note    Patient: Latrell Kline  Age/sex: 76 y o  female  Medical Record #: [id number]      ASSESSMENT PLAN    Lt MCA CVA  · Continue ASA 81mg daily and atorvastatin 40mg daily  · Therapy per primary service  · Outpt follow-up with Neurology  · Outpt follow-up with Cardiology for loop recorder  Pt's daughter refused this admission  Depression  · Continue sertraline  · Recommend Neuropsychology consult  Subjective/ HPI: Patient seen and examined  Latrell Kline is a 76 y o  female, with depression, history of DVT s/p Xarelto and history of endometrial cancer, who presented as a stroke alert on 7/24/2022 with initial NIHSS of 6  Patient's family was on vacation but spoke to her over the phone on 7/23/22 afternoon (1330) and at the time, she was at her baseline  Afterwards, she stopped answering text messages and phone calls  One of her daughters reached patient's home on 07/24/22 evening  Patient would not open the door and daughter had to go through the back door; she found the patient with aphasia and Rt facial weakness  On arrival to the ED, initial BP was Blood Pressure: 161/80  CTH revealed a left frontal temporal hypodensity  CTA head and neck revealed distal left M2/M3 MCA occlusion  As a result of being outside the window, pt was determined to not be a candidate for tPA  MRI shows left frontal temporal infarct, smaller infarcts within left frontal lobe, punctate infarct noted within left occipital lobe and the MCA/PCA junction  She was started on Aspirin and atorvastatin  Family refused loop recorder placement during hospitalization  She was determined to be stable for transfer to the [facility] 7/28/22  IM consulted for medical management  ROS:   A 10 point ROS was performed; negative except as noted above       Social History:    Substance Use History:   Social History     Substance and Sexual Activity   Alcohol Use Not Currently     Social History     Tobacco Use Smoking Status Never Smoker   Smokeless Tobacco Never Used     Social History     Substance and Sexual Activity   Drug Use No       Family History:    Family History   Problem Relation Age of Onset    Hypothyroidism Mother     Diabetes Maternal Grandfather          Review of Scheduled Meds:      Labs:     Results from last 7 days   Lab Units 22   WBC Thousand/uL 9 24   HEMOGLOBIN g/dL 13 9   HEMATOCRIT % 43 5   PLATELETS Thousands/uL 257     Results from last 7 days   Lab Units 22  0415 22   SODIUM mmol/L 142 141   POTASSIUM mmol/L 4 0 4 3   CHLORIDE mmol/L 111* 110*   CO2 mmol/L 26 25   BUN mg/dL 21 20   CREATININE mg/dL 0 64 0 68   CALCIUM mg/dL 8 9 9 5     Results from last 7 days   Lab Units 22   HEMOGLOBIN A1C % 5 4     Results from last 7 days   Lab Units 22   INR  0 97        Results from last 7 days   Lab Units 22  1949   POC GLUCOSE mg/dl 101       No results found for: Gerda Sullivan, Peggy Oscar, SPUTUMCULTCAMELIA    Input and Output Summary (last 24 hours):     No intake or output data in the 24 hours ending 22 1437    Imaging:     No orders to display       *Labs /Radiology studiesLabs reviewed  *Medications reviewed and reconciled as needed  *Please refer to order section for additional ordered labs studies  *Case discussed with primary attending during morning huddle case rounds    Vitals:   Temp (24hrs), Av 2 °F (36 8 °C), Min:97 8 °F (36 6 °C), Max:98 5 °F (36 9 °C)    Temp:  [97 8 °F (36 6 °C)-98 5 °F (36 9 °C)] 98 3 °F (36 8 °C)  HR:  [47-72] 63  Resp:  [18] 18  BP: (122-151)/(67-73) 151/69  SpO2:  [92 %-95 %] 95 %  Body mass index is 29 57 kg/m²  Physical Exam:   HEENT:  Head: Normocephalic, no lesions, without obvious abnormality  CARDIAC:  regular rate and rhythm, S1, S2 normal, no murmur, click, rub or gallop  LUNGS:  normal air entry, lungs clear to auscultation  ABDOMEN:  soft, non-tender   Bowel sounds normal  No masses, no organomegaly  EXTREMITIES:  extremities normal, warm and well-perfused; no cyanosis, clubbing, or edema  NEURO:   Expressive and receptive aphasia  Does not consistently follow commands  Moves extremities equally  PSYCH:  Alert  Invasive Devices  Report    None                  VTE Pharmacologic Prophylaxis: Enoxaparin (Lovenox)  Code Status: Prior  Current Length of Stay: 0 day(s)    Total floor / unit time spent today 1 hour with more than 50% spent counseling/coordinating care  Counseling includes discussion with patient re: progress  and discussion with patient of his/her current medical state/information  Coordination of patient's care was performed in conjunction with primary service  Time invested included review of patient's labs, vitals, and management of their comorbidities with continued monitoring  In addition, this patient was discussed with medical team including physician and advanced extenders  The care of the patient was extensively discussed and appropriate treatment plan was formulated unique for this patient  ** Please Note: Fluency Direct voice to text software may have been used in the creation of this document   Audio transcription errors may occur**

## 2022-07-28 NOTE — PROGRESS NOTES
[address] [facility]  Progress Note - Harlan Sagastume 1945, 76 y o  female MRN: [id number]  Unit/Bed#: [facility] 729-01 Encounter: [id number]  Primary Care Provider: Ezequiel Jones MD   Date and time admitted to hospital: 7/24/2022  7:46 PM    * Acute ischemic left MCA stroke [facility])  Assessment & Plan  Patient presented with right facial droop and aphasia, unfortunately,  Unfortunately, outside of tPA window  · CT head No evidence of acute intracranial hemorrhage  Left frontal temporal and low-attenuation suggesting acute to subacute ischemia  · CTA head and neck showed distal left M2/M3 MCA occlusion  · MRI brain without contrast Acute to early subacute ischemia identified involving the left MCA territory described in detail above with one larger infarct and multiple additional smaller infarcts  No hemorrhagic transformation identified  · MRI brain with contrast negative for metastasis (in setting of hx of endometrial CA)  · Echo unremarkable  · Continue ASA/statin  · Dysphagia diet  · Neuro checks  · Telemetry, negative upon review  · Remains aphasic, no focal weakness appreciated on exam   Right-sided facial droop  · Neurology now signed off, stable for discharge  · Plan for loop as outpatient  Outpatient follow-up in 4-6 weeks  · Daughter deferred inpatient placement of loop recorder today by EP, they are aware of the long wait for placement and that they will need to follow up with cardiology as outpatient  · PT/OT recommending rehab  Case management following  Plan for discharge to [facility] tomorrow  History of DVT (deep vein thrombosis)  Assessment & Plan  · Completed several month course of anticoagulation with Xarelto  Mild episode of recurrent major depressive disorder (HCC)  Assessment & Plan  · On Zoloft 75 mg daily    Vitamin D deficiency  Assessment & Plan  · On cholecalciferol      History of endometrial cancer  Assessment & Plan  · History of stage IA, grade 3 s/p completion of adjuvant chemotherapy and radiation in 2015  Follows with GYN-ON[facility] as outpatient  VTE Pharmacologic Prophylaxis:   Moderate Risk (Score 3-4) - Pharmacological DVT Prophylaxis Ordered: enoxaparin (Lovenox)  Patient Centered Rounds: I performed bedside rounds with nursing staff today  Discussions with Specialists or Other Care Team Provider: CM,RN,EP    Education and Discussions with Family / Patient: Updated  (daughter) via phone  Time Spent for Care: 30 minutes  More than 50% of total time spent on counseling and coordination of care as described above  Current Length of Stay: 4 day(s)  Current Patient Status: Inpatient   Certification Statement: The patient will continue to require additional inpatient hospital stay due to pending [facility] bed availability  Discharge Plan: Anticipate discharge tomorrow to rehab facility  Code Status: Level 3 - DNAR and DNI    Subjective:   Patient was resting in bed, reports some frustration in regards to her slow improvement in regards to her aphasia  Did have discussion with daughter Jeremiah Hartman over the phone in regards to EP loop recorder placement today  She states that she discussed with her daughter Soni Hanson who lives out of town and does not want her mother to have a foreign body implanted at this time  She would like to focus on rehabilitation first  They are aware that there is a long wait for loop recorder placement and that they will need to follow up with cardiology as an outpatient to have this placed in the future  Objective:     Vitals:   Temp (24hrs), Av 1 °F (36 7 °C), Min:97 8 °F (36 6 °C), Max:98 5 °F (36 9 °C)    Temp:  [97 8 °F (36 6 °C)-98 5 °F (36 9 °C)] 97 8 °F (36 6 °C)  HR:  [60-72] 60  BP: (128-136)/(67-73) 135/69  SpO2:  [92 %-95 %] 94 %  Body mass index is 29 29 kg/m²  Input and Output Summary (last 24 hours):      Intake/Output Summary (Last 24 hours) at 2022 1033  Last data filed at 7/27/2022 1731  Gross per 24 hour   Intake 480 ml   Output --   Net 480 ml       Physical Exam:   Physical Exam  Constitutional:       General: She is not in acute distress  HENT:      Mouth/Throat:      Mouth: Mucous membranes are moist    Eyes:      General: No scleral icterus  Cardiovascular:      Rate and Rhythm: Normal rate and regular rhythm  Heart sounds: Normal heart sounds  Pulmonary:      Breath sounds: Normal breath sounds  Abdominal:      General: Abdomen is flat  Bowel sounds are normal       Palpations: Abdomen is soft  Musculoskeletal:      Right lower leg: No edema  Left lower leg: No edema  Skin:     General: Skin is warm  Neurological:      Mental Status: She is alert        Comments: Aphasia, r sided facial droop   Psychiatric:         Mood and Affect: Mood normal           Additional Data:     Labs:  Results from last 7 days   Lab Units 07/24/22 2000   WBC Thousand/uL 9 24   HEMOGLOBIN g/dL 13 9   HEMATOCRIT % 43 5   PLATELETS Thousands/uL 257     Results from last 7 days   Lab Units 07/25/22  0415   SODIUM mmol/L 142   POTASSIUM mmol/L 4 0   CHLORIDE mmol/L 111*   CO2 mmol/L 26   BUN mg/dL 21   CREATININE mg/dL 0 64   ANION GAP mmol/L 5   CALCIUM mg/dL 8 9   ALBUMIN g/dL 3 3*   TOTAL BILIRUBIN mg/dL 0 53   ALK PHOS U/L 70   ALT U/L 15   AST U/L 17   GLUCOSE RANDOM mg/dL 91     Results from last 7 days   Lab Units 07/24/22 2000   INR  0 97     Results from last 7 days   Lab Units 07/24/22  1949   POC GLUCOSE mg/dl 101     Results from last 7 days   Lab Units 07/25/22  0415   HEMOGLOBIN A1C % 5 4           Lines/Drains:  Invasive Devices  Report    Peripheral Intravenous Line  Duration           Peripheral IV 07/24/22 Right Arm 3 days                  Telemetry:  Telemetry Orders (From admission, onward)             48 Hour Telemetry Monitoring  Continuous x 48 hours        References:    Telemetry Guidelines   Question:  Reason for 48 Hour Telemetry  Answer:  Acute CVA (<24 hrs old, hemispheric strokes, selected brainstem strokes, cardiac arrhythmias)                 Telemetry Reviewed: Normal Sinus Rhythm  Indication for Continued Telemetry Use: Acute CVA             Imaging: No pertinent imaging reviewed  Recent Cultures (last 7 days):         Last 24 Hours Medication List:   Current Facility-Administered Medications   Medication Dose Route Frequency Provider Last Rate    acetaminophen  650 mg Oral Q6H PRN Julisa Ventura MD      aluminum-magnesium hydroxide-simethicone  30 mL Oral Q6H PRN Julisa Ventura MD      ascorbic acid  500 mg Oral Daily Julisa Ventura MD      aspirin  81 mg Oral Daily Jose David Botello MD      atorvastatin  40 mg Oral QPM Julisa Ventura MD      calcium carbonate-vitamin D  1 tablet Oral Daily With Breakfast Julisa Ventura MD      cholecalciferol  2,000 Units Oral Daily Julisa Ventura MD      docusate sodium  100 mg Oral BID PRN Julisa Ventura MD      enoxaparin  40 mg Subcutaneous Daily Julisa Ventura MD      multivitamin-minerals  1 tablet Oral Daily Julisa Ventura MD      ondansetron  4 mg Intravenous Q6H PRN Julisa Ventura MD      sertraline  75 mg Oral Daily Julisa Ventura MD          Today, Patient Was Seen By: Seven Farley PA-C    **Please Note: This note may have been constructed using a voice recognition system  **

## 2022-07-28 NOTE — TREATMENT PLAN
Individualized Plan of Mary Barriga 76 y o  female MRN: [id number]  Unit/Bed#: -01 Encounter: [id number]     PATIENT INFORMATION  ADMISSION DATE: 7/28/2022  2:28 PM LUCA CATEGORY:Stroke:  01 2  Right Body Involvement (Left Brain)   ADMISSION DIAGNOSIS: Stroke ([facility] [address] ) [I63 [id number]]  EXPECTED LOS: 10-14 days     MEDICAL/FUNCTIONAL PROGNOSIS  Based on my assessment of the patient's medical conditions and current functional status, the prognosis for attaining medical and functional goals or the IRF stay is:  Good    Cardiopulmonary function: Ensure cardiopulmonary stability and optimize cardiopulmonary function not only at rest but with activity as patient's activity level significantly increases in acute rehab compared with prior to transfer in preparation for safe discharge from [facility]  Must closely and frequently monitor blood pressure and HR to ensure adequate cardiac output during ADLs and ambulation as patient is at increased risk for orthostatic hypotension/syncope and potential injury if not monitored for and managed adequately  Blood pressure management:    Frequent monitoring of blood pressure with appropriate adjustments in blood pressure medication management to optimize blood pressure control and prevent/limit renal complications  Monitoring impact of blood pressure and side-effects of blood pressure medications at rest and with activity  Pain management:  Pain will improve with frequent evaluation of pain, careful adjustments in medications, frequent re-evaluation of patient's pain and medical/neurologic status to ensure optimal pain control, avoidance of potential serious and even life-threatening side-effects and drug interactions, as well as weaning pain medications as soon as possible to decrease risk of short and long-term use         Neurologic Disorder: CVA causing impaired mobility, ADLs, and gait:  intensive skilled therapies with physical therapy and occupational therapy with close oversight and management by rehab specialized physician in acute rehabilitation setting to most expeditiously and effectively improve functional mobility, transfers, upper and lower body strengthening, conditioning, balance, and gait training with appropriate assistive device  Patient will have optimal supervision and management of patient's underlying neurologic disorder with specialized rehabilitation physician during this period of recovery to ensure most expeditious and optimal recovery with decreased risks of fall/injury and other complications including acute worsening of neuro disorder, decrease risk of VTE, PNA, and skin ulceration  Cognitive deficits following cerebrovascular disease: intensive skilled therapies including speech language pathology to evaluate and treat deficits in cognition  Close oversight and management by rehab specialized physician of cognitive deficits and potential confounding factors (prevention of, monitoring for, and if found treatment of possible complications such as infections, as well as optimally managing sleep, mood, and meications which can negatively impact cognition and functional recovery)  Dysphagia: improve swallowing through SLP evaluation with intensive treatments, optimal nutrition, and fluid intake  Adjust diet and swallowing precautions as indicated to decrease risk of aspiration pneumonia and respiratory distress  Close oversight and management by rehab specialized physician  Inpatient rehabilitation education/teaching: To be provided to patient and typically family/caregiver (if able to be identified) by all skilled therapists, rehab nursing, case management, and rehab specialized physician to ensure optimal recovery and decrease risks of complications in both acute rehabilitation setting as well as after discharge     Anxiety (and/or Depression): Patient's mood and it's impact on therapy participation and functional recovery will improve during course with supportive counseling, relaxation/breathing techniques and if necessary medication management  Requires frequent re-assessment and close management to ensure anxiety/depression management during acute rehab course with planning for appropriate outpatient management to ensure optimal mental health and functional recovery  Skin wounds: Appropriate skin checks for wound/skin evaluation including evaluation of healing, worsening of wounds, or signs of infection  Wound care management from rehab nursing, wound care nursing, physicians  Ensure frequent appropriate turning, positioning in bed, in chair, when mobilizing, and when appropriate with use of appropriate devices to optimize healing and decrease risk of worsening or new skin breakdown  Medical Goals: Patient will be medically stable for discharge to [facility] upon completion of rehab program    [address]: Home - with supervision and Home - Assistance      ANTICIPATED FOLLOW-UP SERVICE:   Outpatient Therapy Services: PT, OT and SLP      Home Health Services: PT, OT and SLP    DISCIPLINE SPECIFIC PLANS:  Required Disciplines & Services: Rehabillitation Nursing and Case Management    REQUIRED THERAPY:  Therapy Hours per Day Days per Week Total Days   Physical Therapy 1 5 5   Occupational Therapy 1 5 5   Speech/Language Therapy 1 5 5   NOTE: Additional therapy time(s) may be added as appropriate to meet patient needs and to achieve functional goals      Patient will either participate in above therapy regimen or participate in 900 minutes of therapy within 7 day week consisting of PT, OT and SLP due to the following medical procedure/condition:Stroke:  01 2  Right Body Involvement (Left Brain)    ANTICIPATED FUNCTIONAL OUTCOMES:  ADL:  Modified independent   Bladder/Bowel:  Modified independent    Transfers:  modified independent Locomotion:  Modified independent   Cognitive: May require some assist at time of discharge     DISCHARGE PLANNING NEEDS  Equipment needs: Discharge needs to be reviewed with team      REHAB ANTICIPATED PARTICIPATION RESTRICTIONS:  Assist with Bathing Shower, Assist with Mobility, Assist with Tub Shower Transfer, Inability to Drive, Rquires Assist with ADLS, Requires Assit with Homemaking, Requires Assit with Steps, Requires modified diet and Weight Bearing as tolerated

## 2022-07-28 NOTE — QUICK NOTE
Patient's daughter refusing to have loop recorder placed during this hospitalization  She was made aware that it's purpose is for further etiological investigation  She reports she would like the focus at this time to be on rehab for her mother  She would like to follow up OP with cardiology and potentially have it placed at a later date         [address] [place] [address]

## 2022-07-28 NOTE — DISCHARGE SUMMARY
[address] [facility]  Discharge- Washington Rolan 1945, 76 y o  female MRN: [id number]  Unit/Bed#: [facility] 729-01 Encounter: [id number]  Primary Care Provider: Klarissa Bloom MD   Date and time admitted to hospital: 7/24/2022  7:46 PM    * Acute ischemic left MCA stroke [facility])  Assessment & Plan  Patient presented with right facial droop and aphasia, unfortunately,  Unfortunately, outside of tPA window  · CT head No evidence of acute intracranial hemorrhage  Left frontal temporal and low-attenuation suggesting acute to subacute ischemia  · CTA head and neck showed distal left M2/M3 MCA occlusion  · MRI brain without contrast Acute to early subacute ischemia identified involving the left MCA territory described in detail above with one larger infarct and multiple additional smaller infarcts  No hemorrhagic transformation identified  · MRI brain with contrast negative for metastasis (in setting of hx of endometrial CA)  · Echo unremarkable  · Continue ASA/statin  · Dysphagia diet  · Neuro checks  · Telemetry, negative upon review  · Remains aphasic, no focal weakness appreciated on exam   Right-sided facial droop  · Neurology now signed off, stable for discharge  · Plan for loop as outpatient  Outpatient follow-up in 4-6 weeks  · Daughter deferred inpatient placement of loop recorder today by EP, they are aware of the long wait for placement and that they will need to follow up with cardiology as outpatient  · PT/OT recommending rehab  Case management following  Plan for discharge to [facility] today  History of DVT (deep vein thrombosis)  Assessment & Plan  · Completed several month course of anticoagulation with Xarelto  Mild episode of recurrent major depressive disorder (HCC)  Assessment & Plan  · On Zoloft 75 mg daily    Vitamin D deficiency  Assessment & Plan  · On cholecalciferol      History of endometrial cancer  Assessment & Plan  · History of stage IA, grade 3 s/p completion of adjuvant chemotherapy and radiation in April 2015  Follows with GYN-[facility] as outpatient  Medical Problems             Resolved Problems  Date Reviewed: 7/28/2022   None               Discharging Physician / Practitioner: Kuldip Pacheco PA-C  PCP: Collette Soles, MD  Admission Date:   Admission Orders (From admission, onward)     Ordered        07/24/22 2041  Inpatient Admission  Once                      Discharge Date: 07/28/22    Consultations During Hospital Stay:  · Neurology, PMR     Procedures Performed:   · NOne     Significant Findings / Test Results:   MRI brain w contrast   Final Result by Shaheed Alan DO (07/26 8134)      No evidence of intracranial metastasis  Workstation performed: [id number]         MRI brain wo contrast   Final Result by Shaheed Alan DO (07/25 1045)      Acute to early subacute ischemia identified involving the left MCA territory described in detail above with one larger infarct and multiple additional smaller infarcts  No hemorrhagic transformation identified  This examination was marked "immediate notification" in Epic in order to begin the standard process by which the radiology reading room liaison alerts the referring practitioner  Workstation performed: [id number]         CTA stroke alert (head/neck)   Final Result by Adrienne De Leon MD (07/24 2034)      Distal left M2/M3 MCA occlusion  Incidental thyroid nodule(s) for which nonemergent thyroid ultrasound is recommended  I personally discussed this study with neurologist on 7/24/2022 at 7:57 PM                         Workstation performed: [id number]         CT stroke alert brain   Final Result by Adrienne De Leon MD (07/24 2001)      No evidence of acute intracranial hemorrhage  Left frontal temporal and low-attenuation suggesting acute to subacute ischemia               I personally discussed this study with neurologist on 7/24/2022 at 7:57 PM  Workstation performed: [id number]             Incidental Findings:   · As above     Test Results Pending at Discharge (will require follow up): · None      Outpatient Tests Requested:  · Outpatient loop recorder placement with cardiology   · Follow up with neurology in 6 weeks   · Follow up with PCP     Complications:  None     Reason for Admission: acute ischemic left MCA stroke     Hospital Course:   Linda Washington is a 76 y o  female patient who originally presented to the hospital on 7/24/2022 due to right sided facial droop and aphasia  She did not receive tPA  Seen by neurology  MRI brain confirmed  left frontal temporal infarct, smaller infarcts within left frontal lobe, punctate infarct noted within left occipital lobe and the MCA/PCA junction  Etiology of patient's stroke unclear as echo negative and telemetry without evidence of arrhythmia  Recommended for outpatient loop recorder placement with cardiology  She will need to continue aspirin and statin as outpatient and follow up with neurology in 6 weeks  She is medically stable for discharge to [facility]  Please see above list of diagnoses and related plan for additional information  Condition at Discharge: stable    Discharge Day Visit / Exam:   * Please refer to separate progress note for these details *      Discharge instructions/Information to patient and family:   See after visit summary for information provided to patient and family  Provisions for Follow-Up Care:  See after visit summary for information related to follow-up care and any pertinent home health orders  Disposition:   Acute Rehab at [facility]    Planned Readmission: none      Discharge Statement:  I spent 45 minutes discharging the patient  This time was spent on the day of discharge  I had direct contact with the patient on the day of discharge   Greater than 50% of the total time was spent examining patient, answering all patient questions, arranging and discussing plan of care with patient as well as directly providing post-discharge instructions  Additional time then spent on discharge activities  Discharge Medications:  See after visit summary for reconciled discharge medications provided to patient and/or family        **Please Note: This note may have been constructed using a voice recognition system**

## 2022-07-28 NOTE — PHYSICAL THERAPY NOTE
PHYSICAL THERAPY NOTE          Patient Name: Adolph Falcon  [id number] Date: 7/28/2022 07/28/22 1030   PT Last Visit   PT Visit Date 07/28/22   Pain Assessment   Pain Assessment Tool 0-10   Pain Score No Pain   Restrictions/Precautions   Weight Bearing Precautions Per Order No   Other Precautions Cognitive; Chair Alarm; Bed Alarm; Fall Risk   General   Chart Reviewed Yes   Response to Previous Treatment Patient with no complaints from previous session  Family/Caregiver Present No   Cognition   Overall Cognitive Status Impaired   Arousal/Participation Cooperative   Attention Attends with cues to redirect   Orientation Level Oriented to person;Oriented to place; Disoriented to time;Disoriented to situation  (place with options)   Following Commands Follows one step commands with increased time or repetition   Comments overall pleasant to work with   Subjective   Subjective agreeable to participate   Bed Mobility   Supine to Sit 5  Supervision   Additional items HOB elevated; Increased time required;Verbal cues   Sit to Supine Unable to assess   Additional Comments remained seated in chair with alarm upon conclusion   Transfers   Sit to Stand 4  Minimal assistance   Additional items Assist x 1; Increased time required;Verbal cues   Stand to Sit 4  Minimal assistance   Additional items Assist x 1; Increased time required;Verbal cues   Stand pivot 4  Minimal assistance   Additional items Assist x 1; Increased time required;Verbal cues   Additional Comments c HHA   Ambulation/Elevation   Gait pattern Improper Weight shift;Decreased foot clearance;Decreased R stance; Short stride; Excessively slow; Step through pattern   Gait Assistance 4  Minimal assist   Additional items Assist x 1;Verbal cues   Assistive Device   (HHAx1)   Distance 65'x2   Stair Management Assistance 4  Minimal assist   Additional items Assist x 1;Verbal cues; Increased time required   Stair Management Technique One rail R;Alternating pattern; Foreward;Reciprocal   Number of Stairs 7   Balance   Static Sitting Fair +   Dynamic Sitting Fair   Static Standing Fair -   Dynamic Standing Poor +   Ambulatory Poor +   Endurance Deficit   Endurance Deficit Yes   Endurance Deficit Description fatigue   Activity Tolerance   Activity Tolerance Patient tolerated treatment well   Medical Staff Made Aware PT student   Nurse Made Aware pt cleared to see and mobilize per nursing   Exercises   Neuro re-ed at HR with minAx1: lateral walking c b/l UE support 2x20' L and R  Fwd marching/bwd walking 2x20' each   Assessment   Prognosis Good   Problem List Decreased strength;Decreased endurance; Impaired balance;Decreased mobility; Decreased cognition; Impaired judgement;Decreased safety awareness   Assessment Pt agreeable to participate in PT session  Pt performed functional mobility and therex as outlined above  Increased difficulty noted in R SLS throughout session with subsequent R lateral lean  Also difficulty noted with bwd walking despite cues for big equal step lengths  Pt left seated in chair with chair alarm, call bell, phone, and all personal needs within reach  Pt will continue to benefit from skilled acute care PT to further address their functional mobility limitations  D/C recommendations remain rehab  Barriers to Discharge Inaccessible home environment;Decreased caregiver support   Goals   Patient Goals to go to rehab   STG Expiration Date 08/09/22   PT Treatment Day 1   Plan   Treatment/Interventions Functional transfer training;LE strengthening/ROM; Elevations; Therapeutic exercise; Endurance training;Cognitive reorientation;Patient/family training;Equipment eval/education; Bed mobility;Gait training;Spoke to nursing;OT;Spoke to case management   Progress Progressing toward goals   PT Frequency 3-5x/wk   Recommendation   PT Discharge Recommendation Post acute rehabilitation services   AM-PAC Basic Mobility Inpatient   Turning in Bed Without Bedrails 3   Lying on Back to Sitting on Edge of Flat Bed 3   Moving Bed to Chair 3   Standing Up From Chair 3   Walk in Room 3   Climb 3-5 Stairs 3   Basic Mobility Inpatient Raw Score 18   Basic Mobility Standardized Score 41 05   Highest Level Of Mobility   JH-HLM Goal 6: Walk 10 steps or more   JH-HLM Achieved 7: Walk 25 feet or more   Tam Maier, PT, DPT

## 2022-07-28 NOTE — H&P
PHYSICAL MEDICINE AND REHABILITATION H&P/ADMISSION NOTE  Fe Ayon 76 y o  female MRN: [id number]  Unit/Bed#: [facility] 972-01 Encounter: [id number]     Rehab Diagnosis: Impairment of mobility, safety, Activities of Daily Living (ADLs), and cognitive/communication skills due to Stroke:  01 2  Right Body Involvement (Left Brain)  Etiologic: Left MCA Distribution Infarctions    History of Present Illness:   Fe Ayon is a 76 y o  female with a PMH significant for h/o endometrial cancer, depression, h/o DVT, vitamin D deficency and OA, who presented to the [address] [place] [address] ED on 07/23 with a stroke alert  She was in contact with family the night prior when the next day family could not contact her and called EMS  Patient was noted to have altered mental status upon EMS arrival and was experiencing right facial droop  Neurology was consulted  14 [facility] ordered without evidence of acute intracranial hemorrhage  CTA with left distal m2/m3 occlusion  SLP assessed and also noted patient to have dysphagia  MRI brain with acute to early subacute ischemia involving the left MCA territory with a larger infarct and multiple smaller infarcts  No hemorrhagic transformation identified  With her history of endometrial cancer the MRI of the brain also ruled out mets  ECHO obtained with EF of 70%  Neurology recommending outpatient loop recorder placement  She will continue ASA and Lipitor  PM&R consulted and recommended patient for ARC  She participated with PT/OT/ST and was deemed appropriate for post-acute rehabilitation services after demonstrating the ability to tolerate three hours of therapy per day  She was accepted to [facility] and arrived on 7/28/22  Plan:     Rehabilitation   Functional deficits: impaired mobility, self care, ataxia, dysphagia, aphasia    Begin PT/OT/SLP  Rehabilitation goals are to achieve a supervision-Mod I level with mobility and self care  Prognosis is good    MICHELE is 10-14 days  Estimated discharge is home  DVT prophylaxis   Lovenox 40 mg SQ inj daily    Pain   Tylenol 650 mg Q6H PRN for mild pain    Bladder plan   Continent   Daughter reports concern for urinary incontinence, will monitor while in ARC    Bowel plan   Continent   Last BM date 7/26    Code Status   DNR and DNI    Skin Care plan:  · Turn while in bed Q2hr with Q15-30 min repositioning in chair  · Float heels off bed   · Monitor skin daily     Dysphagia  Assessment & Plan  · Dysphagia level 2 diet with thin liquids  · SLP to follow    History of DVT (deep vein thrombosis)  Assessment & Plan  · Potential h/o xarelto use; not currently on med list, will confirm with family  · Currently Lovenox SQ Daily and SCD       Mild episode of recurrent major depressive disorder ([facility] )  Assessment & Plan  · Continue Zoloft 75 mg daily      Vitamin D deficiency  Assessment & Plan  · Continue Cholecalciferol       History of endometrial cancer  Assessment & Plan  · Stage IA, grade 3 s/p completion of adjuvant chemotherapy and radiation in April 2015  · Follows with GYN-ONC as OP      * Acute ischemic left MCA stroke ([facility] )  Assessment & Plan  · [facility]ed to [facility] with right facial droop and aphasia  · Outside tPA window  · CT without evidence of acute intracranial hemorrhage  Left frontal temporal and low attenuation suggesting acute-subacute ischemia  · CTA H/N with distal Left M2/M3 MCA occlusion   · MRI brain- acute to early subacute ischemia identified involving the left MCA territory with one larger infarct and multiple additional smaller infarcts   No hemorrhagic transformation identified   · MRI brain negative for metastasis in setting of h/o endometrial cancer  · ECHO unremarkable   · Continue ASA/Statin therapy per neurology recs  · Dysphagia diet; SLP following  · Neurology also recommended Loop recorder as OP with OP FU in 4-6 weeks  · Daughter deferred inpatient placement of loop recorder; aware of need to follow up with Cardiology as OP  · Acute chomprehensive interdisciplinary inpatient rehabilitation to include intensive skilled therapies as outlines with oversight and management by a rehabilitation Physician Assistant overseen by rehabilitation physician as well as inpatient rehabilitation nursing, case management and weekly interdisciplinary team meeting            Subjective: This is a 76year old female presenting after a CVA  Currently on ASA/Lipitor  Daughter in room at time of evaluation and confirmed she was not on these medications previously  No cardiac history reported  Patient is fairly quiet and daughter answering most questions  Daughter can provide assistance upon discharge and is willing to have patient live with her if needed  Patient confused when asked to perform several commands to assess function  Answered with "yes" "no" responses or short sentences  Denied visual deficits, chest pain or palpitations, shortness of breath, abdominal pain, difficulty urinating or dysuria, or myalgias  She did admit to feeling constipated and is unsure of last BM  Daughter confirmed ongoing lower extremity edema with h/o OA  Review of Systems   Constitutional: Negative  Negative for appetite change, fatigue and fever  HENT: Negative  Negative for trouble swallowing  Pt/daughter deny trouble swallowing but is currently on dysphagia diet   Eyes: Negative  Negative for visual disturbance  Respiratory: Negative  Negative for cough  Cardiovascular: Negative  Negative for chest pain and palpitations  Gastrointestinal: Positive for constipation  Negative for abdominal pain and nausea  Genitourinary: Negative  Negative for difficulty urinating, dysuria, frequency and urgency  Musculoskeletal: Positive for joint swelling  Negative for myalgias  No pain currently but does have a history of osteoarthritis with BLE edema that is chronic   Skin: Negative      Neurological: Positive for speech difficulty  Negative for weakness  Hematological: Negative  Psychiatric/Behavioral:        Patient lost her  about one year ago and has been taking anti-depressants          Function:  PRIOR LEVEL OF FUNCTION:  She lives in [facility] single family home  Aleks Flor is  and lives alone  Self Care: Independent, Indoor Mobility: Independent and Cognition: Independent     FALLS IN THE LAST 6 MONTHS: 0     HOME ENVIRONMENT:  The living area: can live on one level  There are 2 steps to enter the home  The patient will not have 24 hour supervision/physical assistance available upon discharge  Per PMR Consult documentation: Patient has 2 daughters that live in the area 1 that does not work and would be able to provide supervision/assist after discharge from rehab      PREVIOUS DME:  Equipment in home (previous DME): None    Current level of function:  Physical Therapy: Min assist for transfers, min assist for ambulation  Occupational Therapy: Mod assist for LB bathing, LB dressing and toileting Min assist for UB dressing   Speech Therapy: Moderate expressive aphasia, able to state name,  and hospital, unsure of month and year  Max cues for some naming and current location, needs model and cues to complete automatic tasks, dysphagia level 2 diet with thin liquids     Physical Exam:  /69 (BP Location: Right arm)   Pulse 63   Temp 98 3 °F (36 8 °C) (Oral)   Resp 18   Ht 5' 7" (1 702 m)   Wt 85 6 kg (188 lb 12 8 oz)   SpO2 95%   BMI 29 57 kg/m²      No intake or output data in the 24 hours ending 22 1454    Body mass index is 29 57 kg/m²  Physical Exam  Vitals reviewed  Constitutional:       General: She is not in acute distress  Appearance: Normal appearance  She is not ill-appearing  HENT:      Head: Normocephalic and atraumatic  Mouth/Throat:      Mouth: Mucous membranes are moist       Pharynx: Oropharynx is clear     Eyes:      Extraocular Movements: Extraocular movements intact  Pupils: Pupils are equal, round, and reactive to light  Cardiovascular:      Rate and Rhythm: Normal rate and regular rhythm  Pulses: Normal pulses  Heart sounds: Normal heart sounds  No murmur heard  Pulmonary:      Effort: Pulmonary effort is normal  No respiratory distress  Breath sounds: Normal breath sounds  Abdominal:      General: Bowel sounds are normal  There is distension  Palpations: Abdomen is soft  Comments: Mild distension    Musculoskeletal:      Cervical back: Normal range of motion  Right lower leg: Edema present  Left lower leg: Edema present  Comments: Mild edema to bilateral lower extremities, non-pitting, +1   Able demonstrate ability to move BUE/BLE, difficult time comprehending dorsiflexion/plantarflexion    Neurological:      Mental Status: She is alert and oriented to person, place, and time  Coordination: Coordination abnormal       Comments: Patient with aphasia, ataxia, equal strength bilaterally however hard to gauge actual strength due to not comprehending certain tasks    Psychiatric:         Mood and Affect: Mood normal             Labs, medications, and imaging personally reviewed      Laboratory:    Lab Results   Component Value Date    SODIUM 142 07/25/2022    K 4 0 07/25/2022     (H) 07/25/2022    CO2 26 07/25/2022    BUN 21 07/25/2022    CREATININE 0 64 07/25/2022    GLUC 91 07/25/2022    CALCIUM 8 9 07/25/2022     Lab Results   Component Value Date    WBC 9 24 07/24/2022    HGB 13 9 07/24/2022    HCT 43 5 07/24/2022    MCV 91 07/24/2022     07/24/2022     Lab Results   Component Value Date    INR 0 97 07/24/2022    INR 1 08 08/04/2015    INR 1 21 (H) 05/11/2015    PROTIME 13 1 07/24/2022    PROTIME 13 3 08/04/2015    PROTIME 15 0 (H) 05/11/2015         Current Facility-Administered Medications:     acetaminophen (TYLENOL) tablet 650 mg, 650 mg, Oral, Q6H PRN, Jett Phelps PRISCA    aluminum-magnesium hydroxide-simethicone (MYLANTA) oral suspension 30 mL, 30 mL, Oral, Q6H PRN, Severiano Lewis PA-C    [START ON 7/29/2022] ascorbic acid (VITAMIN C) tablet 500 mg, 500 mg, Oral, Daily, Ludmila Lawson PA-C    [START ON 7/29/2022] aspirin chewable tablet 81 mg, 81 mg, Oral, Daily, Ludmila Lawson PA-C    atorvastatin (LIPITOR) tablet 40 mg, 40 mg, Oral, QPM, Ludmila Lawson PA-C    bisacodyl (DULCOLAX) rectal suppository 10 mg, 10 mg, Rectal, Daily PRN, Severiano Lewis PA-C    [START ON 7/29/2022] calcium carbonate-vitamin D (OSCAL-D) 500 mg-200 units per tablet 1 tablet, 1 tablet, Oral, Daily With Breakfast, Severiano Lewis PA-C    [START ON 7/29/2022] cholecalciferol (VITAMIN D3) tablet 2,000 Units, 2,000 Units, Oral, Daily, Ludmila Lawson PA-C    [START ON 7/29/2022] enoxaparin (LOVENOX) subcutaneous injection 40 mg, 40 mg, Subcutaneous, Daily, Ludmila Lawson PA-C    [START ON 7/29/2022] multivitamin-minerals (CENTRUM) tablet 1 tablet, 1 tablet, Oral, Daily, Severiano Lewis PA-C    ondansetron (ZOFRAN-ODT) dispersible tablet 4 mg, 4 mg, Oral, Q6H PRN, Severiano Lewis PA-C    polyethylene glycol (MIRALAX) packet 17 g, 17 g, Oral, Daily PRN, Severiano Lewis PA-C    senna (SENOKOT) tablet 17 2 mg, 2 tablet, Oral, Daily, Severiano Lewis PA-C    [START ON 7/29/2022] sertraline (ZOLOFT) tablet 75 mg, 75 mg, Oral, Daily, Severiano Lewis PA-C  No Known Allergies   Patient Active Problem List    Diagnosis Date Noted    Dysphagia 07/28/2022    History of DVT (deep vein thrombosis) 07/25/2022    Acute ischemic left MCA stroke (HCC) 07/24/2022    Mild episode of recurrent major depressive disorder ([facility] ) 10/07/2021    Osteopenia of lumbar spine 09/17/2019    History of endometrial cancer 03/14/2018    Multinodular thyroid 01/09/2017    Inguinal lymphadenopathy 04/17/2015    Endometrial cancer ([facility] ) 11/17/2014    Obesity 05/29/2013    Osteoarthritis of hip 05/29/2013    Osteoarthritis of knee 05/29/2013    Vitamin D deficiency 05/29/2013     Past Medical History:   Diagnosis Date    Deep vein thrombosis ([facility] ) 1/28/2016    Diverticulosis     last assessed 05/29/13    Fibrocystic breast     History of DVT (deep vein thrombosis)     Xarelto, 4/2015 thru April 2016    Impaired fasting glucose     last assessed 06/04/14    Inguinal lymphadenopathy     Multiple benign polyps of large intestine     Obesity     Uterine cancer ([facility] )      Past Surgical History:   Procedure Laterality Date    HYSTERECTOMY      LYMPH NODE BIOPSY      OOPHORECTOMY      PELVIC LAPAROSCOPY      TONSILLECTOMY      with adenoidectomy     Social History     Socioeconomic History    Marital status:      Spouse name: Not on file    Number of children: Not on file    Years of education: Not on file    Highest education level: Not on file   Occupational History    Not on file   Tobacco Use    Smoking status: Never Smoker    Smokeless tobacco: Never Used   Vaping Use    Vaping Use: Never used   Substance and Sexual Activity    Alcohol use: Not Currently    Drug use: No    Sexual activity: Not on file   Other Topics Concern    Not on file   Social History Narrative    Denied: Home environment Domestic violence     Social Determinants of Health     Financial Resource Strain: Not on file   Food Insecurity: No Food Insecurity    Worried About Running Out of Food in the Last Year: Never true    Jodie of Food in the Last Year: Never true   Transportation Needs: No Transportation Needs    Lack of Transportation (Medical): No    Lack of Transportation (Non-Medical):  No   Physical Activity: Not on file   Stress: Not on file   Social Connections: Not on file   Intimate Partner Violence: Not on file   Housing Stability: Low Risk     Unable to Pay for Housing in the Last Year: No    Number of Places Lived in the Last Year: 1    Unstable Housing in the Last Year: No Social History     Tobacco Use   Smoking Status Never Smoker   Smokeless Tobacco Never Used     Social History     Substance and Sexual Activity   Alcohol Use Not Currently     Family History   Problem Relation Age of Onset    Hypothyroidism Mother     Diabetes Maternal Grandfather          Medical Necessity Criteria for ARC Admission: Bowel/Bladder Management  In addition, the preadmission screen, post-admission physical evaluation, overall plan of care and admissions order demonstrate a reasonable expectation that the following criteria were met at the time of admission to the [facility]  1  The patient requires active and ongoing therapeutic intervention of multiple therapy disciplines (physical therapy, occupational therapy, speech-language pathology, or prosthetics/orthotics), one of which is physical or occupational therapy  2  Patient requires an intensive rehabilitation therapy program, as defined in Chapter 1, section 110 2 2 of the CMS Medicare Policy Manual  This intensive rehabilitation therapy program will consist of at least 3 hours of therapy per day at least 5 days per week or at least 15 hours of intensive rehabilitation therapy within a 7 consecutive day period, beginning with the date of admission to the [facility]  3  The patient is reasonably expected to actively participate in, and benefit significantly from, the intensive rehabilitation therapy program as defined in Chapter 1, section 110 2 2 of the CMS Medicare Policy Manual at this time of admission to the [facility]  She can reasonably be expected to make measurable improvement (that will be of practical value to improve the patients functional capacity or adaptation to impairments) as a result of the rehabilitation treatment, as defined in section 110 3, and such improvement can be expected to be made within the prescribed period of time   As noted in the CMS Medicare Policy Manual, the patient need not be expected to achieve complete independence in the domain of self-care nor be expected to return to his or her prior level of functioning in order to meet this standard  4  The patient must require physician supervision by a rehabilitation physician  As such, a rehabilitation physician will conduct face-to-face visits with the patient at least 3 days per week throughout the patients stay in the [facility] to assess the patient both medically and functionally, as well as to modify the course of treatment as needed to maximize the patients capacity to benefit from the rehabilitation process  5  The patient requires an intensive and coordinated interdisciplinary approach to providing rehabilitation, as defined in Chapter 1, section 110 2 5 of the CMS Medicare Policy Manual  This will be achieved through periodic team conferences, conducted at least once in a 7-day period, and comprising of an interdisciplinary team of medical professionals consisting of: a rehabilitation physician, registered nurse,  and/or , and a licensed/certified therapist from each therapy discipline involved in treating the patient  Changes Since Pre-admission Assessment: None -This patient's participation in rehab continues to be reasonable, necessary and appropriate  CMS Required Post-Admission Physician Evaluation Elements  History and Physical, including medical history, functional history and active comorbidities as in above text  Post-Admission Physician Evaluation:  The patient has the potential to make improvement and is in need of physical, occupational, and/or therapy services  The patient may also need nutritional services  Given the patient's complex medical condition and risk of further medical complications, rehabilitative services cannot be safely provided at a lower level of care, such as a skilled nursing facility   I have reviewed the patient's functional and medical status at the time of the preadmission screening and they are the same as on the day of this admission  I acknowledge that I have personally performed a full physical examination on this patient within 24 hours of admission  The patient and/or family demonstrated understanding the rehabilitation program and the discharge process after we discussed them  Agree in entirety: yes  Minor adaptions: none    Major changes: none    Carter Mckee PA-C    ** Please Note: Fluency Direct voice to text software may have been used in the creation of this document  **      Total time spent:  75 minutes with more than 50% spent counseling/coordinating care  Counseling includes extended discussion with patient (+/- family/relevant historian)  re: history, symptoms, medications, functional issues, mood, medical and rehabilitation management plan, and disposition  Additional time spent with thorough chart review in EMR, reviewing recent medications, labs, imaging, and management plan  This was followed by formulating a comprehensive inpatient medical/rehabilitation management plan, and coordinating with pertinent specialists as indicated

## 2022-07-28 NOTE — ASSESSMENT & PLAN NOTE
· Presented to [facility] ED with right facial droop and aphasia  · Outside tPA window  · CT without evidence of acute intracranial hemorrhage  Left frontal temporal and low attenuation suggesting acute-subacute ischemia  · CTA H/N with distal Left M2/M3 MCA occlusion   · MRI brain- acute to early subacute ischemia identified involving the left MCA territory with one larger infarct and multiple additional smaller infarcts   No hemorrhagic transformation identified   · MRI brain negative for metastasis in setting of h/o endometrial cancer  · ECHO unremarkable   · Continue ASA/Statin therapy per neurology recs  · Dysphagia diet; SLP following  · Neurology also recommended Loop recorder as OP with OP FU in 4-6 weeks  · Daughter deferred inpatient placement of loop recorder; aware of need to follow up with Cardiology as OP  · Completing acute chomprehensive interdisciplinary inpatient rehabilitation to include intensive skilled therapies as outlines with oversight and management by a rehabilitation Physician Assistant overseen by rehabilitation physician as well as inpatient rehabilitation nursing, case management and weekly interdisciplinary team meeting  · OP PT recommended   · OP follow-up with neuro, cards

## 2022-07-28 NOTE — ASSESSMENT & PLAN NOTE
Patient presented with right facial droop and aphasia, unfortunately,  Unfortunately, outside of tPA window  · CT head No evidence of acute intracranial hemorrhage  Left frontal temporal and low-attenuation suggesting acute to subacute ischemia  · CTA head and neck showed distal left M2/M3 MCA occlusion  · MRI brain without contrast Acute to early subacute ischemia identified involving the left MCA territory described in detail above with one larger infarct and multiple additional smaller infarcts  No hemorrhagic transformation identified  · MRI brain with contrast negative for metastasis (in setting of hx of endometrial CA)  · Echo unremarkable  · Continue ASA/statin  · Dysphagia diet  · Neuro checks  · Telemetry, negative upon review  · Remains aphasic, no focal weakness appreciated on exam   Right-sided facial droop  · Neurology now signed off, stable for discharge  · Plan for loop as outpatient  Outpatient follow-up in 4-6 weeks  · Daughter deferred inpatient placement of loop recorder today by EP, they are aware of the long wait for placement and that they will need to follow up with cardiology as outpatient  · PT/OT recommending rehab  Case management following  Plan for discharge to [facility] tomorrow

## 2022-07-29 ENCOUNTER — PATIENT OUTREACH (OUTPATIENT)
Dept: CASE MANAGEMENT | Facility: OTHER | Age: 77
End: 2022-07-29

## 2022-07-29 LAB
ALBUMIN SERPL BCP-MCNC: 3 G/DL (ref 3.5–5)
ALP SERPL-CCNC: 70 U/L (ref 46–116)
ALT SERPL W P-5'-P-CCNC: 19 U/L (ref 12–78)
ANION GAP SERPL CALCULATED.3IONS-SCNC: 0 MMOL/L (ref 4–13)
ANION GAP SERPL CALCULATED.3IONS-SCNC: 4 MMOL/L (ref 4–13)
AST SERPL W P-5'-P-CCNC: 46 U/L (ref 5–45)
BASOPHILS # BLD AUTO: 0.03 THOUSANDS/ΜL (ref 0–0.1)
BASOPHILS NFR BLD AUTO: 0 % (ref 0–1)
BILIRUB SERPL-MCNC: 0.57 MG/DL (ref 0.2–1)
BUN SERPL-MCNC: 18 MG/DL (ref 5–25)
BUN SERPL-MCNC: 19 MG/DL (ref 5–25)
CALCIUM ALBUM COR SERPL-MCNC: 10.1 MG/DL (ref 8.3–10.1)
CALCIUM SERPL-MCNC: 9.3 MG/DL (ref 8.3–10.1)
CALCIUM SERPL-MCNC: 9.3 MG/DL (ref 8.3–10.1)
CHLORIDE SERPL-SCNC: 109 MMOL/L (ref 96–108)
CHLORIDE SERPL-SCNC: 109 MMOL/L (ref 96–108)
CO2 SERPL-SCNC: 25 MMOL/L (ref 21–32)
CO2 SERPL-SCNC: 28 MMOL/L (ref 21–32)
CREAT SERPL-MCNC: 0.58 MG/DL (ref 0.6–1.3)
CREAT SERPL-MCNC: 0.69 MG/DL (ref 0.6–1.3)
EOSINOPHIL # BLD AUTO: 0.25 THOUSAND/ΜL (ref 0–0.61)
EOSINOPHIL NFR BLD AUTO: 3 % (ref 0–6)
ERYTHROCYTE [DISTWIDTH] IN BLOOD BY AUTOMATED COUNT: 13.2 % (ref 11.6–15.1)
GFR SERPL CREATININE-BSD FRML MDRD: 83 ML/MIN/1.73SQ M
GFR SERPL CREATININE-BSD FRML MDRD: 87 ML/MIN/1.73SQ M
GLUCOSE P FAST SERPL-MCNC: 94 MG/DL (ref 65–99)
GLUCOSE SERPL-MCNC: 153 MG/DL (ref 65–140)
GLUCOSE SERPL-MCNC: 94 MG/DL (ref 65–140)
HCT VFR BLD AUTO: 42.1 % (ref 34.8–46.1)
HGB BLD-MCNC: 13.7 G/DL (ref 11.5–15.4)
IMM GRANULOCYTES # BLD AUTO: 0.02 THOUSAND/UL (ref 0–0.2)
IMM GRANULOCYTES NFR BLD AUTO: 0 % (ref 0–2)
LYMPHOCYTES # BLD AUTO: 2.42 THOUSANDS/ΜL (ref 0.6–4.47)
LYMPHOCYTES NFR BLD AUTO: 32 % (ref 14–44)
MCH RBC QN AUTO: 29.6 PG (ref 26.8–34.3)
MCHC RBC AUTO-ENTMCNC: 32.5 G/DL (ref 31.4–37.4)
MCV RBC AUTO: 91 FL (ref 82–98)
MONOCYTES # BLD AUTO: 0.8 THOUSAND/ΜL (ref 0.17–1.22)
MONOCYTES NFR BLD AUTO: 10 % (ref 4–12)
NEUTROPHILS # BLD AUTO: 4.14 THOUSANDS/ΜL (ref 1.85–7.62)
NEUTS SEG NFR BLD AUTO: 55 % (ref 43–75)
NRBC BLD AUTO-RTO: 0 /100 WBCS
PLATELET # BLD AUTO: 217 THOUSANDS/UL (ref 149–390)
PMV BLD AUTO: 10.6 FL (ref 8.9–12.7)
POTASSIUM SERPL-SCNC: 4.3 MMOL/L (ref 3.5–5.3)
POTASSIUM SERPL-SCNC: 5.6 MMOL/L (ref 3.5–5.3)
PROT SERPL-MCNC: 6.4 G/DL (ref 6.4–8.4)
RBC # BLD AUTO: 4.63 MILLION/UL (ref 3.81–5.12)
SODIUM SERPL-SCNC: 137 MMOL/L (ref 135–147)
SODIUM SERPL-SCNC: 138 MMOL/L (ref 135–147)
WBC # BLD AUTO: 7.66 THOUSAND/UL (ref 4.31–10.16)

## 2022-07-29 PROCEDURE — 97166 OT EVAL MOD COMPLEX 45 MIN: CPT

## 2022-07-29 PROCEDURE — 85025 COMPLETE CBC W/AUTO DIFF WBC: CPT | Performed by: PHYSICAL MEDICINE & REHABILITATION

## 2022-07-29 PROCEDURE — 97535 SELF CARE MNGMENT TRAINING: CPT

## 2022-07-29 PROCEDURE — 92507 TX SP LANG VOICE COMM INDIV: CPT

## 2022-07-29 PROCEDURE — 97161 PT EVAL LOW COMPLEX 20 MIN: CPT

## 2022-07-29 PROCEDURE — 80048 BASIC METABOLIC PNL TOTAL CA: CPT | Performed by: PHYSICIAN ASSISTANT

## 2022-07-29 PROCEDURE — 97530 THERAPEUTIC ACTIVITIES: CPT

## 2022-07-29 PROCEDURE — 97110 THERAPEUTIC EXERCISES: CPT

## 2022-07-29 PROCEDURE — 80053 COMPREHEN METABOLIC PANEL: CPT | Performed by: PHYSICAL MEDICINE & REHABILITATION

## 2022-07-29 PROCEDURE — 92610 EVALUATE SWALLOWING FUNCTION: CPT

## 2022-07-29 PROCEDURE — 99232 SBSQ HOSP IP/OBS MODERATE 35: CPT | Performed by: INTERNAL MEDICINE

## 2022-07-29 PROCEDURE — 92523 SPEECH SOUND LANG COMPREHEN: CPT

## 2022-07-29 RX ORDER — DOCUSATE SODIUM 100 MG/1
100 CAPSULE, LIQUID FILLED ORAL 2 TIMES DAILY
Status: DISCONTINUED | OUTPATIENT
Start: 2022-07-29 | End: 2022-08-12 | Stop reason: HOSPADM

## 2022-07-29 RX ORDER — SENNOSIDES 8.6 MG
2 TABLET ORAL 2 TIMES DAILY
Status: DISCONTINUED | OUTPATIENT
Start: 2022-07-29 | End: 2022-08-12 | Stop reason: HOSPADM

## 2022-07-29 RX ADMIN — OXYCODONE HYDROCHLORIDE AND ACETAMINOPHEN 500 MG: 500 TABLET ORAL at 11:07

## 2022-07-29 RX ADMIN — DOCUSATE SODIUM 100 MG: 100 CAPSULE, LIQUID FILLED ORAL at 17:20

## 2022-07-29 RX ADMIN — ASPIRIN 81 MG CHEWABLE TABLET 81 MG: 81 TABLET CHEWABLE at 11:07

## 2022-07-29 RX ADMIN — SERTRALINE HYDROCHLORIDE 75 MG: 50 TABLET ORAL at 11:07

## 2022-07-29 RX ADMIN — DOCUSATE SODIUM 100 MG: 100 CAPSULE, LIQUID FILLED ORAL at 11:11

## 2022-07-29 RX ADMIN — SENNOSIDES 17.2 MG: 8.6 TABLET, FILM COATED ORAL at 17:22

## 2022-07-29 RX ADMIN — Medication 1 TABLET: at 11:07

## 2022-07-29 RX ADMIN — MULTIPLE VITAMINS W/ MINERALS TAB 1 TABLET: TAB ORAL at 11:09

## 2022-07-29 RX ADMIN — ATORVASTATIN CALCIUM 40 MG: 40 TABLET, FILM COATED ORAL at 17:19

## 2022-07-29 RX ADMIN — ENOXAPARIN SODIUM 40 MG: 40 INJECTION SUBCUTANEOUS at 11:08

## 2022-07-29 RX ADMIN — Medication 2000 UNITS: at 11:08

## 2022-07-29 NOTE — PROGRESS NOTES
Occupational Therapy Initial Evaluation     07/29/22 0700   Patient Data   Rehab Impairment Impairment of mobility, safety, Activities of Daily Living (ADLs), and cognitive/communication skills due to Stroke:  01 2  Right Body Involvement (Left Brain)   Etiologic Diagnosis left mca distributioninfarctions   Date of Onset 07/24/22   Support System   Name Pt w/ supportive local daughters, Fidencio Chandler and Danay Berman  Able to provide 24 hour supervision No   Able to provide physical help? No   Home Setup   Type of Home Single Level   Method of Entry Stairs   Number of Stairs 1   Number of Stairs in Home 0   First Floor Bathroom Full;Tub;Combo;Grab Bars   First Floor Bathroom Accessibility Grab bars in tub/shower   First Floor Setup Available Yes   Home Modifications Necessary? No   Available Equipment   (none)   Baseline Information   Vocation Volunteer  (gift shop)   Transportation    Prior Device(s) Used   (none)   Prior IADL Participation   Money Management Estimate Costs; Identify Money;Estimate Change;Combine Bills   Meal Preparation Full Participation   Laundry Full Participation   Home Cleaning Full Participation   Prior Level of Function   Self-Care 3  Independent - Patient completed the activities by him/herself, with or without an assistive device, with no assistance from a helper  Indoor-Mobility (Ambulation) 3  Independent - Patient completed the activities by him/herself, with or without an assistive device, with no assistance from a helper  Stairs 3  Independent - Patient completed the activities by him/herself, with or without an assistive device, with no assistance from a helper  Functional Cognition 3  Independent - Patient completed the activities by him/herself, with or without an assistive device, with no assistance from a helper  Prior Device Used Z   None of the above   Falls in the Last Year   Number of falls in the past 12 months 0   Patient Preference   Nickname (Patient Preference) Catrina Blizzard   Psychosocial   Psychosocial (WDL) X   Patient Behaviors/Mood Brightens with approach;Calm; Cooperative;Pleasant;Flat affect   Restrictions/Precautions   Precautions Bed/chair alarms;Cognitive; Fall Risk;Supervision on toilet/commode   Weight Bearing Restrictions No   ROM Restrictions No   Pain Assessment   Pain Assessment Tool 0-10   Pain Score No Pain   Eating Assessment   Type of Assistance Needed Verbal cues; Supervision   Physical Assistance Level No physical assistance   Eating CARE Score 4   Oral Hygiene   Type of Assistance Needed Physical assistance   Physical Assistance Level 25% or less   Comment while in stance at sink, Min vc's for sequencing  Oral Hygiene CARE Score 3   Tub/Shower Transfer   Reason Not Assessed Sponge Bath;Endurance;Balance; Safety  (pt w/ active shower orders, plan to assess next ADL )   Shower/Bathe Self   Type of Assistance Needed Physical assistance;Verbal cues   Physical Assistance Level 26%-50%   Comment sponge bath  Bathed BUE and BLE w/ crossed leg technique  Min A to steady while in stance to bathe arsenio and buttocks, A for thoroughness to bathe buttocks  During bathing, req vc's for task sequencing, difficulty noted w/ task progression and termination  Shower/Bathe Self CARE Score 3   Dressing/Undressing Clothing   Type of Assistance Needed Supervision;Set-up / [place]; Verbal cues   Physical Assistance Level No physical assistance   Comment seated, vc's for task initiation   Upper Body Dressing CARE Score 4   Type of Assistance Needed Physical assistance;Verbal cues; Set-up / clean-up   Physical Assistance Level 25% or less   Comment able to thread while seated w/ cues to initiate, Min A to steady while in stance for clothing management  Lower Body Dressing CARE Score 3   Putting On/Taking Off Footwear   Type of Assistance Needed Verbal cues; Supervision;Set-up / clean-up   Physical Assistance Level No physical assistance   Comment seated, crossed leg technique  Putting On/Taking Off Footwear CARE Score 4   Toileting Hygiene   Type of Assistance Needed Physical assistance;Verbal cues; Adaptive equipment   Physical Assistance Level 25% or less   Comment managed arsenio hygiene, Min A in stance for clothing management w/ cues to initiate  Toileting Hygiene CARE Score 3   Toilet Transfer   Type of Assistance Needed Physical assistance; Adaptive equipment;Verbal cues   Physical Assistance Level 26%-50%   Comment Mod A for toilet xfer without grab bars to simluate home context, Min A w/ use of b/l grab bars w/ vc's to inc safety  Toilet Transfer CARE Score 3   Transfer Bed/Chair/Wheelchair   Type of Assistance Needed Physical assistance;Verbal cues   Physical Assistance Level 25% or less   Comment HHA, no AD <>bathroom  Chair/Bed-to-Chair Transfer CARE Score 3   Lying to Sitting on Side of Bed   Type of Assistance Needed Incidental touching   Physical Assistance Level No physical assistance   Lying to Sitting on Side of Bed CARE Score 4   Sit to Stand   Type of Assistance Needed Physical assistance;Verbal cues   Physical Assistance Level 25% or less   Comment cues for proper hand placement  Sit to Stand CARE Score 3   Comprehension   QI: Comprehension 2  Sometimes Understands: Understands only basic conversations or simple, direct phrases  Frequently requires cues to understand   Expression   QI: Expression 2  Frequently exhibits difficulty with expressing needs and ideas   RUE Assessment   RUE Assessment WFL  (R-hand dominant)   LUE Assessment   LUE Assessment WFL   Coordination   Movements are Fluid and Coordinated 1   Sensation   Light Touch No apparent deficits   Sharp/Dull No apparent deficits   Cognition   Overall Cognitive Status Impaired   Arousal/Participation Cooperative; Alert   Attention Attends with cues to redirect   Orientation Level Oriented to person;Disoriented to place; Disoriented to time;Disoriented to situation   Memory Decreased recall of precautions;Decreased recall of recent events;Decreased short term memory;Decreased recall of biographical information;Decreased long term memory   Following Commands Follows multistep commands with increased time or repetition   Comments Noted w/ flat affect, impaired safety awareness, impaired comprehension, poor attention to task, impaired task initiation/progression/termination, impaired problem-solving, dec STM, impaired cognitive flexibility, and cognitive fatigue  +aphasia, able to follow 1-step simple commands, Y/N questions, and binary choices  Vision   Vision Comments Reports wearing reading glasses  Perception   Perseveration Perseverates during ADLs  (and during conversation)   Discharge Information   Vocational Plan Retired/not working   Patient's Discharge Plan home w/ family support, anticipate need for 24 hour S   Patient's Rehab Expectations "get better "   Barriers to Discharge Home Limited Family Support; Unsafe Home Setup; Decreased Cognitive Function;Decreased Strength;Decreased Endurance; Safety Considerations   Impressions Pt is a 77 y/o female presenting to [facility] on 7/23/22 w/ AMS and R facial drooping  Work-up revealed L MCA infarcts  PMH includes endometrial cancer, depression, h/o DVT, vitamin D deficiency, and OA   Pt reports completing ADLs/IADLs and fxnl mobility at Independently without AD, (+) driving, and volunteering  Pt is currently functioning at overall Min A for ADLs w/ Mod vc's and Min A for HHA fxnl mobility without AD  Pt is limited by dec strength, dec endurance, impaired balance, poor attention, impaired safety awareness, aphasia, impaired comprehension, dec STM, impaired judgement, cognitive fatigue, impaired problem-solving, poor cognitive flexibility, unsafe home set-up, limited social support, and ADL dysfunction   Pt will benefit from skilled OT services w/ focus on above barriers, assess need for DME, provide pt/family edu, and maximize fxnl indep to return to [facility] in 2 week ELOS to meet overall S ADL goals     OT Therapy Minutes   OT Time In 0700   OT Time Out 0830   OT Total Time (minutes) 90   OT Mode of treatment - Individual (minutes) 90   OT Mode of treatment - Concurrent (minutes) 0   OT Mode of treatment - Group (minutes) 0   OT Mode of treatment - Co-treat (minutes) 0   OT Mode of Treatment - Total time(minutes) 90 minutes   OT Cumulative Minutes 90   Cumulative Minutes   Cumulative therapy minutes 90     Idalia Perry MS, OTR/L

## 2022-07-29 NOTE — PROGRESS NOTES
[facility] PT EVAL     07/29/22 1330   Rehab Team Goals   Transfer Team Goal Patient will require supervision with transfers with least restrictive device upon completion of rehab program   Locomotion Team Goal Patient will require supervision with locomotion with least restrictive device upon completion of rehab program   Rehab Team Interventions   PT Interventions Gait Training; Therapeutic Exercise;Neuromuscualr Reeducation;Transfer Training;Community Reintegration;Bed Mobility; Patient/Family Education   Toileting Transfer Goal   Toilet transfer Goal 04  Supervision or touching assistance- Florence provides VERBAL CUES or supervision throughout activity  Assistive Device   (LRAD)   Status Ongoing; Target goal - two weeks   Intervention Balance Work;Assistive Device   PT Transfer Goal   Roll left and right Goal 05  Setup or clean-up assistance - Florence SETS UP or CLEANS UP, patient completes activity  Florence assists only prior to or following the activity  Sit to lying Goal 05  Setup or clean-up assistance - [place] SETS UP or CLEANS UP, patient completes activity  [place] assists only prior to or following the activity  Lying to sitting on side of bed Goal 05  Setup or clean-up assistance - [place] SETS UP or CLEANS UP, patient completes activity  Fl[place] assists only prior to or following the activity  Sit to stand Goal 04  Supervision or touching assistance- [place] provides VERBAL CUES or supervision throughout activity  Chair/bed-to-chair transfer Goal 04  Supervision or touching assistance- [place] provides VERBAL CUES or supervision throughout activity  Car Transfer Goal 04  Supervision or touching assistance- Florence provides VERBAL CUES or supervision throughout activity  Environment Level Surface; Well Lit; Tile Floor   Status Ongoing; Target goal - two weeks   Locomotion Goal   Primary discharge mode of locomotion Walking   Target Walk Distance 500 ft   Assist Device   (LRAD)   Gait Pattern Improvement Inconsistant Jesi;Decreased foot clearance; Improper weight shift   Environment Level Surface; Well Lit; Tile Floor   Walk 10 feet Goal 04  Supervision or touching assistance- Randolph provides VERBAL CUES or supervision throughout activity  Walk 50 feet with 2 turns Goal 04  Supervision or touching assistance- Randolph provides VERBAL CUES or supervision throughout activity  Walk 150 feet Goal 04  Supervision or touching assistance- Randolph provides VERBAL CUES or supervision throughout activity  Walking 10 feet on uneven surface 04  Supervision or touching assistance- Randolph provides VERBAL CUES or supervision throughout activity  Walking Goal Status Ongoing; Target goal - two weeks   Wheel 50 feet with 2 turns Goal 09  Not applicable   Wheel 787 feet Goal 09  Not applicable   Stairs Goal   1 step or curb goal 04  Supervision or touching assistance- Randolph provides VERBAL CUES or supervision throughout activity  4 steps Goal 04  Supervision or touching assistance- Randolph provides VERBAL CUES or supervision throughout activity  12 steps Goal 04  Supervision or touching assistance- Randolph provides VERBAL CUES or supervision throughout activity  Number of Stairs 12   Status Ongoing; Target goal - two weeks   Object Retrieval Goal   Picking up object Goal 04  Supervision or touching assistance- Randolph provides VERBAL CUES or supervision throughout activity     Assistive Device  None   Small Object Picked Up MARKER

## 2022-07-29 NOTE — PROGRESS NOTES
Internal Medicine Progress Note  Patient: Aleks Flor  Age/sex: 76 y o  female  Medical Record #: [id number]      ASSESSMENT/PLAN: (Interval History)  Aleks Flor is seen and examined and management for following issues:    Lt MCA CVA  · Continue ASA 81mg daily and atorvastatin 40mg daily  · Therapy per primary service  · Outpt follow-up with Neurology  · Outpt follow-up with Cardiology for loop recorder  Pt's daughter refused this admission  Depression  · Continue sertraline  · Recommend Neuropsychology consult  Hyperkalemia  · K 5 6  · Will repeat to ensure accuracy  The above assessment and plan was reviewed and updated as determined by my evaluation of the patient on 7/29/2022      Labs:   Results from last 7 days   Lab Units 07/29/22  0645 07/24/22 2000   WBC Thousand/uL 7 66 9 24   HEMOGLOBIN g/dL 13 7 13 9   HEMATOCRIT % 42 1 43 5   PLATELETS Thousands/uL 217 257     Results from last 7 days   Lab Units 07/29/22 0645 07/25/22  0415   SODIUM mmol/L 137 142   POTASSIUM mmol/L 5 6* 4 0   CHLORIDE mmol/L 109* 111*   CO2 mmol/L 28 26   BUN mg/dL 18 21   CREATININE mg/dL 0 58* 0 64   CALCIUM mg/dL 9 3 8 9     Results from last 7 days   Lab Units 07/25/22  0415   HEMOGLOBIN A1C % 5 4     Results from last 7 days   Lab Units 07/24/22 2000   INR  0 97     Results from last 7 days   Lab Units 07/24/22  1949   POC GLUCOSE mg/dl 101       Review of Scheduled Meds:  Current Facility-Administered Medications   Medication Dose Route Frequency Provider Last Rate    acetaminophen  650 mg Oral Q6H PRN Marijane Signs, PA-C      aluminum-magnesium hydroxide-simethicone  30 mL Oral Q6H PRN Marijane Signs, PA-C      ascorbic acid  500 mg Oral Daily Marijane Signs, PA-C      aspirin  81 mg Oral Daily Marijane Signs, PA-C      atorvastatin  40 mg Oral QPM Marijane Signs, PA-C      bisacodyl  10 mg Rectal Daily PRN Marijane Signs, PA-C      calcium carbonate-vitamin D  1 tablet Oral Daily With Breakfast Duarte Juarez PA-C      cholecalciferol  2,000 Units Oral Daily Duarte Juarez PA-C      enoxaparin  40 mg Subcutaneous Daily Duarte Juarze PA-C      multivitamin-minerals  1 tablet Oral Daily Duarte Juarez PA-C      ondansetron  4 mg Oral Q6H PRN Duarte Juarez PA-C      polyethylene glycol  17 g Oral Daily PRN Duarte Juarez PA-C      senna  2 tablet Oral Daily Duarte Juarez PA-C      sertraline  75 mg Oral Daily Duarte Juarez PA-C         Subjective/ HPI: Patient seen and examined  Patients overnight issues or events were reviewed with nursing or staff during rounds or morning huddle session  New or overnight issues include the following:     Pt seen in her room with her daughter at the bedside  Pt denies current complaints but answers are not reliable due to aphasia  ROS:   A 10 point ROS was performed; negative except as noted above         Imaging:     No orders to display       *Labs /Radiology studies Reviewed  *Medications  reviewed and reconciled as needed  *Please refer to order section for additional ordered labs studies  *Case discussed with primary attending during morning huddle case rounds    Physical Examination:  Vitals:   Vitals:    07/28/22 1433 07/28/22 2059 07/29/22 0631   BP: 151/69  119/63   BP Location: Right arm Left arm Left arm   Pulse: 63 60 58   Resp: 18 17 16   Temp: 98 3 °F (36 8 °C) 98 °F (36 7 °C) 97 6 °F (36 4 °C)   TempSrc: Oral Oral Oral   SpO2: 95% 93% 96%   Weight: 85 6 kg (188 lb 12 8 oz)     Height: 5' 7" (1 702 m)       General Appearance: no distress, conversive  HEENT: PERRLA, conjuctiva normal; oropharynx clear; mucous membranes moist;   Neck:  Supple, no lymphadenopathy or thyromegaly  Lungs: CTA, normal respiratory effort, no retractions, expiratory effort normal  CV: regular rate and rhythm , PMI normal   ABD: soft non tender, no masses , no hepatic or splenomegaly  EXT: DP pulses intact, no lymphadenopathy, no edema  Skin: normal turgor, normal texture, no rash  Psych: affect normal, mood normal  Neuro: Expressive and receptive aphasia  The above physical exam was reviewed and updated as determined by my evaluation of the patient on 7/29/2022  Invasive Devices  Report    None                    VTE Pharmacologic Prophylaxis: Enoxaparin  Code Status: Level 3 - DNAR and DNI  Current Length of Stay: 1 day(s)      Total time spent:  30 minutes with more than 50% spent counseling/coordinating care  Counseling includes discussion with patient re: progress  and discussion with patient of his/her current medical state/information  Coordination of patient's care was performed in conjunction with primary service  Time invested included review of patient's labs, vitals, and management of their comorbidities with continued monitoring  In addition, this patient was discussed with medical team including physician and advanced extenders  The care of the patient was extensively discussed and appropriate treatment plan was formulated unique for this patient  ** Please Note:  voice to text software may have been used in the creation of this document   Although proof errors in transcription or interpretation are a potential of such software**

## 2022-07-29 NOTE — PROGRESS NOTES
[facility] PT EVAL     07/29/22 8064   Patient Data   Rehab Impairment Impairment of mobility, safety, Activities of Daily Living (ADLs), and cognitive/communication skills due to Stroke:Right Body Involvement (Left Brain)   Etiologic Diagnosis Left MCA Distribution Infarctions   Date of Onset 07/24/22   Support System   Name Patient lives home alone  She has daughters who are supportive  Florin Whitfield present initally at the start of session and stated that she did not work and could be a good support for patient and time of discharge   Home Setup   Type of Home Single Level   Method of Entry Stairs   Number of Stairs 1  (vs 2 from back (no railing))   Number of Stairs in Home 0   First Floor Bathroom Full;Tub; Shower;Grab Bars   First Floor Bathroom Accessibility Grab bars in tub/shower   First Floor Setup Available Yes   Home Modifications Necessary? No   Baseline Information   Vocation Volunteer   Transportation    Prior IADL Participation   Meal Preparation Full Participation   Laundry Full Participation   Home Cleaning Full Participation   Prior Level of Function   Self-Care 3  Independent - Patient completed the activities by him/herself, with or without an assistive device, with no assistance from a helper  Indoor-Mobility (Ambulation) 3  Independent - Patient completed the activities by him/herself, with or without an assistive device, with no assistance from a helper  Stairs 3  Independent - Patient completed the activities by him/herself, with or without an assistive device, with no assistance from a helper  Functional Cognition 3  Independent - Patient completed the activities by him/herself, with or without an assistive device, with no assistance from a helper  Prior Device Used Z   None of the above   Falls in the Last Year   Number of falls in the past 12 months 0   Patient Preference   Nickname (Patient Preference) Earl Zavala   Restrictions/Precautions   Precautions Bed/chair alarms;Cognitive; Fall Risk;Aphasia; Impulsive;Supervision on toilet/commode   Weight Bearing Restrictions No   ROM Restrictions No   Pain Assessment   Pain Assessment Tool 0-10   Pain Score No Pain   Toilet Transfer   Surface Assessed Standard Commode   Transfer Technique Standard   Limitations Noted In Balance;Problem Solving   Adaptive Equipment Grab Bar   Type of Assistance Needed Physical assistance   Physical Assistance Level 25% or less   Comment no AD   Toilet Transfer CARE Score 3   Transfer Bed/Chair/Wheelchair   Limitations Noted In Balance;Problem Solving; Sequencing   Type of Assistance Needed Physical assistance   Physical Assistance Level 25% or less   Comment SPT with no AD   Chair/Bed-to-Chair Transfer CARE Score 3   Roll Left and Right   Type of Assistance Needed Physical assistance   Physical Assistance Level 25% or less   Roll Left and Right CARE Score 3   Sit to Lying   Type of Assistance Needed Incidental touching   Physical Assistance Level No physical assistance   Sit to Lying CARE Score 4   Lying to Sitting on Side of Bed   Type of Assistance Needed Incidental touching   Physical Assistance Level No physical assistance   Lying to Sitting on Side of Bed CARE Score 4   Sit to Stand   Type of Assistance Needed Physical assistance   Physical Assistance Level 25% or less   Sit to Stand CARE Score 3   Picking Up Object   Type of Assistance Needed Physical assistance   Physical Assistance Level 25% or less   Comment no use of reacher   Picking Up Object CARE Score 3   Car Transfer   Type of Assistance Needed Physical assistance   Physical Assistance Level 25% or less   Car Transfer CARE Score 3   Ambulation   Primary Mode of Locomotion Prior to Admission Walk   Distance Walked (feet) 150 ft  (+ 200 + 20'x3)   Assist Device Hand Hold  (progressing to no HHA)   Gait Pattern Inconsistant Jesi;Decreased foot clearance; Improper weight shift  (R leg circumducion)   Limitations Noted In Endurance; Heel Strike; Sequencing;Speed;Strength;Swing;Balance   Provided Assistance with: Balance;Direction   Walk 10 Feet   Type of Assistance Needed Physical assistance   Physical Assistance Level 25% or less   Walk 10 Feet CARE Score 3   Walk 50 Feet with Two Turns   Type of Assistance Needed Physical assistance   Physical Assistance Level 25% or less   Walk 50 Feet with Two Turns CARE Score 3   Walk 150 Feet   Type of Assistance Needed Physical assistance   Physical Assistance Level 25% or less   Walk 150 Feet CARE Score 3   Walking 10 Feet on Uneven Surfaces   Comment please perforn next session   Reason if not Attempted Safety concerns   Walking 10 Feet on Uneven Surfaces CARE Score 88   Wheel 50 Feet with Two Turns   Reason if not Attempted Activity not applicable   Wheel 50 Feet with Two Turns CARE Score 9   Wheel 150 Feet   Reason if not Attempted Activity not applicable   Wheel 712 Feet CARE Score 9   Curb or Single Stair   Style negotiated Single stair   Type of Assistance Needed Physical assistance   Physical Assistance Level 26%-50%   Comment FF with BL railing   1 Step (Curb) CARE Score 3   4 Steps   Type of Assistance Needed Physical assistance   Physical Assistance Level 26%-50%   Comment FF with BL railing   4 Steps CARE Score 3   12 Steps   Type of Assistance Needed Physical assistance   Physical Assistance Level 26%-50%   Comment FF with BL railing   12 Steps CARE Score 3   Stairs   Type Stairs   # of Steps 12  (FF)   Weight Bearing Precautions Fall Risk   Assist Devices Bilateral Rail   Comprehension   QI: Comprehension 3  Usually Understands: Understands most conversations, but misses some part/intent of message  Requires cues at times to understand  Expression   QI: Expression 2   Frequently exhibits difficulty with expressing needs and ideas   RLE Assessment   RLE Assessment WFL   LLE Assessment   LLE Assessment WFL   RUE Assessment   RUE Assessment WFL   LUE Assessment   LUE Assessment [facility] Movements are Fluid and Coordinated 0   Sensation   Light Touch No apparent deficits   Propioception Partial deficits in the RLE   Cognition   Overall Cognitive Status Impaired   Arousal/Participation Alert; Cooperative   Attention Attends with cues to redirect   Orientation Level Oriented to person;Oriented to place   Memory Decreased short term memory;Decreased recall of recent events;Decreased recall of precautions   Following Commands Follows one step commands with increased time or repetition   Comments responds best to "yes/no" questions due to aphasia   Objective Measure   PT Measure(s) TUG 17 9 seconds no AD at Jaguar; TUG manual 24 0 seconds (Jaguar no AD); TUG cog 21 3 (Jaguar no AD); Gait Speed 0 7 m/s2; 5x STS 26 45 seconds   PT Findings Patient educated on PT POC, goal setting and purpose of rehab   Therapeutic Exercise   Therapeutic Exercise/Activity L1x10 minutes BUE/LE; BLE heel cord and HS gentle stretch TERT 2 minutes   Discharge Information   Vocational Plan Retired/not working   Patient's Discharge Plan return home with family support   Patient's Rehab Expectations "to go home"   Barriers to Discharge Home Decreased Cognitive Function;Decreased Endurance; Safety Considerations   Impressions Pt is 76 y o  female seen for PT evaluation s/p [facility] w/ Acute ischemic left MCA stroke ([facility] [address] )  Comorbidities affecting pt's physical performance at time of assessment include: h/o endometrial cancer, OA of hip and knees, osteopenia of lumbar spine, and dysphagia  PTA, pt was independent w/ all functional mobility w/ no AD, ambulates community distances and elevations, ambulates unrestricted distances and all terrain and lives alone in 1 level home with 1 vs 2 KARIN   Personal factors affecting pt at time of IE include: stairs to enter home, communication issues, inability to navigate community distances, inability to navigate level surfaces w/o external assistance, unable to perform dynamic tasks in community, decreased cognition, limited insight into impairments, inability to perform IADLs, inability to perform ADLs and inability to live alone  Please find objective findings from PT assessment regarding body systems outlined above with impairments and limitations including impaired balance, decreased endurance, impaired coordination, gait deviations, decreased functional mobility tolerance, decreased safety awareness, impaired judgement, fall risk and decreased cognition  The following objective measures performed on IE also reveal limitations: Timed Up and Go: 17 9 seconds (positive risk for falls) and Gait Speed: 0 7 m/s (needs intervention to reduce risk for falls/ limited community ambulator)  He required overall min/ moderate A for mobility without use of AD  She presents as a good rehab candidate and is expected to need 2 weeks to achieve S level goals using no AD  She requires skilled PT intervention at this time to maximize her function, mobility and safety     PT Therapy Minutes   PT Time In 1330   PT Time Out 1500   PT Total Time (minutes) 90   PT Mode of treatment - Individual (minutes) 90   PT Mode of treatment - Concurrent (minutes) 0   PT Mode of treatment - Group (minutes) 0   PT Mode of treatment - Co-treat (minutes) 0   PT Mode of Treatment - Total time(minutes) 90 minutes   PT Cumulative Minutes 90   Cumulative Minutes   Cumulative therapy minutes 180

## 2022-07-29 NOTE — PROGRESS NOTES
OT LTGs     07/29/22 0700   Rehab Team Goals   ADL Team Goal Patient will require supervision with ADLs with least restrictive device upon completion of rehab program   Rehab Team Interventions   OT Interventions Self Care;Home Management; Therapeutic Exercise;Cognitive Retraining;Community Reintegration;Cognitive Reintegration; Energy Conservation;Patient/Family Education   Eating Goal   Eating Goal 06  Independent - Patient completes the activity by him/herself with no assistance from a helper  Meal Complete All meals   Status Target goal - two weeks   Interventions Optimal Position   Grooming Goal   Oral Hygiene Goal 04  Supervision or touching assistance- [place] provides VERBAL CUES or supervision throughout activity  Task Wash/Dry Face;Wash/Dry Hands;Brush Teeth;Comb Hair; Shave; Initiate Task;Complete Groom; Acquire Items   Environment Seated in Chair;Unsupported sit; Seated at Sink;[place] [facility]   Status Target goal - two weeks   Intervention Assistive Device;Balance Work;Neuromuscular Education; Therapeutic Exercise; Tolerance Work   Tub/Shower Transfer Goal   Method Tub Shower  (Goal: S)   Assist Device Seat with out Back;Seat with [employer]   Status Target goal - two weeks   Interventions Assistive Device; ADL Training;Neuromuscular Education   Bathing Goal   Shower/bathe self Goal 04  Supervision or touching assistance- [place] provides VERBAL CUES or supervision throughout activity  Environment Standing;Seated;Sponge Bath;Tub   Adaptive Equipment Grab Bar;Hand Held Shower;Seat with back; Seat without back   Status Target goal - two weeks   Intervention ADL Training;Assistive Device; Neuromuscular Education; Therapeutic Exercise   Upper Body Dressing Goal   Upper body dressing Goal 05  Setup or clean-up assistance - [place] SETS UP or CLEANS UP, patient completes activity  [place] assists only prior to or following the activity  Task Upper Body;Arms in/out; Over Head;Button Down;Bra; Fasteners Environment Seated   Status Target goal - two weeks   Intervention Assistive Device;Balance Work;Neuromuscular Education; Tolerance Work; Therapeutic Exercise   Lower Body Dressing Goal   Lower body dressing Goal 04  Supervision or touching assistance- [place] provides VERBAL CUES or supervision throughout activity  Putting on/taking off footwear Goal 04  Supervision or touching assistance- [place] provides VERBAL CUES or supervision throughout activity  Task Lower Body;Shoe/Slipper;Socks;Pants; Undergarment; Fasteners   Environment Seated;Standing   Status Target goal - two weeks   Intervention Assistive Device;Balance Work;Neuromuscular Education; Therapeutic Exercise; Tolerance Work   Toileting Transfer Goal   Toilet transfer Goal 04  Supervision or touching assistance- [place] provides VERBAL CUES or supervision throughout activity  Assistive Device Grab Bar;Raised Toilet Seat;Bedside Commode   Status Target goal - two weeks   Intervention ADL Training;Balance Work;Assistive Device   Toileting Goal   Toileting hygiene Goal 04  Supervision or touching assistance- [place] provides VERBAL CUES or supervision throughout activity     Task Pants Up;Pants Down;Hygiene   Safety Balance;Use a Bedside Commode at Night;Use a Bedside Commode during day   Status Target goal - two weeks   Intervention ADL Training;Balance Work;Assistive Device   Meal Prep and Kitchen Mobility   Assist Level Supervision  (light, cold meal prep)   Status Target goal - two weeks   Medication Management   Assist Level Minimum Assist   Status Target goal - two weeks   Laundry   Assist Level Supervision   Status Target goal - two weeks   Finance Management   Assist Level Minimum Assist   Status Target goal - two weeks     Idalia Perry MS, OTR/L

## 2022-07-29 NOTE — CASE MANAGEMENT
Met w/pts dtr Ashlyn while pt slept  Pt resides alone in a ranch home with 1 brandi in the front and two in the back  Pt was completely independent pta, including driving, laundry, cooking, volunteering at [employer] and going to the senior center to play games and have lunch  Pt has no prior hx with hhc, outpt therapy or dme  pts dtr are suppotive and each live a block from each other, and about a mile from pt  Sesar Young does not work and pt could stay with her on dc although ideally she feels pt is going to want to go to her home  pts spouse passed away august 2021 after an extensive matthews with prostate cancer  Pt took care of him until he passed and then resumed her volunteer services and independence  Pt uses [facility] for rx needs  Sesar Young is concerned about pts cognition and stated she and her sister had noticed pt was forgetting minor things and getting some things confused  Sesar Young inquired how much of current cog state could be related to alzheimers vs the stroke  Cm deferred to the medical team to address  Cm reviewed team mtg process and potential los

## 2022-07-29 NOTE — PROGRESS NOTES
SLP Language and Dysphagia Assessments       07/29/22 0836   Pain Assessment   Pain Assessment Tool 0-10   Pain Score No Pain   Restrictions/Precautions   Precautions Aphasia; Aspiration;Bed/chair alarms;Cognitive; Fall Risk;Impulsive;Supervision on toilet/commode   Cognitive Linguisitic Assessments   Cognitive Linquistic Quick Test (CLQT) Refer to below for full details  Comprehension   Assist Devices Glasses   Auditory Complex;Basic   Visual Basic;Complex   Findings Pt engaged in completing both formal and informal language assessments to date, where pt currently demonstrates MODERATE language deficits both in receptive and expressive language skills  QI: Comprehension 2  Sometimes Understands: Understands only basic conversations or simple, direct phrases  Frequently requires cues to understand   Comprehension (FIM) 3 - Needs parts of sentences repeated   Expression   Verbal Basic   Non-Verbal Basic   Intelligibility Phrase   Findings Pt engaged in completing both formal and informal language assessments to date, where pt currently demonstrates MODERATE language deficits both in receptive and expressive language skills  QI: Expression 2  Frequently exhibits difficulty with expressing needs and ideas   Expression (FIM) 2 - Uses only simple expressions or gestures (waves, hello)   Social Interaction   Cooperation with staff   Participation Individual   Behaviors observed Appropriate   Findings Pt engaged in completing both formal and informal language assessments to date, where pt currently demonstrates MODERATE language deficits both in receptive and expressive language skills     Social Interaction (FIM) 5 - Interacts appropriately with others 90% of time   Problem Solving   Complex Manages finances;Manages discharge planning   Findings Pt engaged in completing both formal and informal language assessments to date, where pt currently demonstrates MODERATE language deficits both in receptive and expressive language skills  Problem solving (FIM) 2 - Needs direction more than ½ time to initiate, plan or complete simple tasks   Memory   Remember Routine No   Initiates Tasks Yes   Short-Term Impaired   Long Term Impaired   Recalls Precaution No   Findings Pt engaged in completing both formal and informal language assessments to date, where pt currently demonstrates MODERATE language deficits both in receptive and expressive language skills  Memory (FIM) 2 - Recalls 1 of 2 steps   Language Assessments   Western Aphasia Battery (WAB) Pt completing the Bedside Judie Aphasia Battery (WAB)  Able to complete the following subtests: Spontaneous Speech: Content: /10; Spontaneous Speech: Fluency: /10; Auditory Verbal Comprehension: Yes/No Questions: /10; Sequential Commands: /10; Repetition: 6 /10; Object Namin 10  Bedside Aphasia Score was 56 7 which indicates moderately impaired language deficits  The pt demonstrates Broca's type aphasia as per Bedside Aphasia Classification Criteria, when comparing the pt's Fluency  Auditory Verbal Comprehension, Repetition scores  In addition to completing the Bedside WAB, pt also able to complete portions of the Informal Language Assessment  Pt was able to answer the following orientation information: Name (+); Date (- for month, date, year); Place (+ for hospital but stated "[facility] " as the name; + city/state); Situation (-, needing verbal binary choice for reasoning of 'stroke')  Ability to answer LTM Biographical Information is as follows:  (- '[birth year]55[birth year]' was elicited noting increased difficulty to cue verbally  Able to ID given written binary choices); Age (- stating '22' but verbalizing the error when stated remaining unable to self correct  Again required written binary choices w/ accuracy in ID); Marital Status/Name of Significant Other (+); Children/Names (+ naming 3 dtrs, but increased difficulty in stating grandchildren); Address (+ for full address);  Prior IADL's completed (+ pt did report living alone, completing all I ADL's prior to admission)  Auditory Comprehension Tasks: Auditory Commands: 1-step: (2/5 accurate); 2-step (2/10 accurate in counting each step); Verbal Expression Tasks: Automatic Speech: Counting (max verbal cues eventually needing written cue to count 1-10, where pt spontaneously stated '1-9'); NISH (7/7 accurate w/o cues); ANCA (spontaneously stated Jan-Aug, needing minimal cues for Sept-Dec); Responsive Naming: (5/10 accurate); Phrase Completion: Opposites: (6/10 accurate); Simple Phrases: (5/10 accurate); Overall pt is demonstrating moderately impaired language deficits noted in the following areas: moderate receptive language deficits, exhibited by difficulty in following basic verbal commands as well as when increased in difficulty given mutli-step commands, difficulty given moderately complex yes/no ?'s as well as noted difficulty in comprehension given more complex questions  Additionally, pt does also exhibit moderate expressive language deficits, noting perseveration in verbal responses, neologisms, paraphasia errors, jargon at times, noting inconsistency in recognition given verbalized errors when eliciting spontaneous speech  Currently pt will benefit from SLP services at this time to maximize overall functional independence of both receptive and expressive language skills as well as targeting functional cognitive linguistic skills while in the acute rehab center  Speech/Language/Cognition Assessmetn   Treatment Assessment Pt engaged in completing both formal and informal language assessments to date, where pt currently demonstrates MODERATE language deficits both in receptive and expressive language skills  Per the Bedside WAB, pt's scores were comparable to Broca's type aphasia   It was noted throughout assessment that pt exhibited by difficulty in following basic verbal commands as well as when increased in difficulty given mutli-step commands, difficulty given moderately complex yes/no ?'s as well as noted difficulty in comprehension given more complex questions  Additionally, pt does also exhibit moderate expressive language deficits, noting perseveration in verbal responses, neologisms, paraphasia errors, jargon at times, noting inconsistency in recognition given verbalized errors when eliciting spontaneous speech  While cognitive skills were not assessed, pt did also demonstrate decreased LT and ST memory recall, decreased executive function skills (problem solving, reasoning, judgement, sequencing) and decreased insight/safety to overall functioning at this time  Due to noted deficits, recommendations at time of discharge will be for 24/7 supervision at this time  Currently pt will benefit from SLP services at this time to maximize overall functional independence of both receptive and expressive language skills as well as targeting functional cognitive linguistic skills while in the acute rehab center     Eating   Type of Assistance Needed Set-up / clean-up;Supervision;Verbal cues   Physical Assistance Level No physical assistance   Eating CARE Score 4   Swallow Assessment   Swallow Treatment Assessment Bedside Dysphagia Evaluation      Patient Name: Sahara Ramirez    QDIXM'O Date: 7/29/2022     Problem List  Principal Problem:    Acute ischemic left MCA stroke [facility])  Active Problems:    History of endometrial cancer    Vitamin D deficiency    Mild episode of recurrent major depressive disorder ([facility] [address] )    History of DVT (deep vein thrombosis)    Dysphagia      Past Medical History  Past Medical History:   Diagnosis Date    Deep vein thrombosis ([facility] [address] ) 1/28/2016    Diverticulosis     last assessed 05/29/13    Fibrocystic breast     History of DVT (deep vein thrombosis)     Xarelto, 4/2015 thru April 2016    Impaired fasting glucose     last assessed 06/04/14    Inguinal lymphadenopathy     Multiple benign polyps of large intestine     Obesity Uterine cancer [facility])        Past Surgical History  Past Surgical History:   Procedure Laterality Date    HYSTERECTOMY      LYMPH NODE BIOPSY      OOPHORECTOMY      PELVIC LAPAROSCOPY      TONSILLECTOMY      with adenoidectomy         Summary   Pt presented with s/s suggestive of mild-moderate oral and suspected mild pharyngeal dysphagia  Concerns included: Mastication was observed to be moderately prolonged given eggs, due to piecemeal bites taken as well as faster intake from bolus to bolus which lead to mild-moderate oral residual over time  SLP did have to provide pt moderate verbal cues to slow intake rate as well as use liquid wash to decrease oral residual which did occur over time  Lip seal around fork, tsp, straw and finger foods was noted to be [facility] along w/ appropriate bolus retrieval across items  No anterior loss was observed  Bolus formation and transfer were functional given yogurt and thin liquids today with no significant oral residue noted given these textures  No overt s/s reduced oral control observed w/ thin liquids by straw today  Swallowing initiation appeared prompt given puree/thin textures and observing mild delay w/ soft solids (banana, eggs) due to increased oral mastication as well as piecemeal bites taken  Laryngeal rise was palpated and judged to be within functional limits across textures observed  No coughing, throat clearing, change in vocal quality or respiratory status noted during assessment today  Risk/s for Aspiration: Mild risk at this time    Due to observed oral residual given meal today, SLP completing oral care after meal, in which NO increased pocketing/residual was removed from oral cavity       Recommendations:  Recommended Diet: mechanically altered/level 2 diet and thin liquids   Recommended Form of Meds: as pt best tolerates   Aspiration precautions and swallowing strategies: upright posture, only feed when fully alert, slow rate of feeding, small bites/sips, effortful swallow, quiet environment (tv off, limit talking, door closed, etc ), alternating bites and sips and OOB for ALL meals          FULL or DISTANT supervision w/ meals  Consider consult with:    Results reviewed with:  patient and RN   Aspiration precautions posted  F/u ST tx: Pt will continue to benefit from ongoing skilled dysphagia tx sessions to establish safest least restrictive diet w/o increased oropharyngeal or aspiration sxs as well as monitor ability to carryover swallow strategies independently  Current Medical Status  Pt is a 76 y o  female with a PMH significant for h/o endometrial cancer, depression, h/o DVT, vitamin D deficency and OA, who presented to the [address] [place] [address] ED on 07/23 with a stroke alert  She was in contact with family the night prior when the next day family could not contact her and called EMS  Patient was noted to have altered mental status upon EMS arrival and was experiencing right facial droop  Neurology was consulted  [facility] ordered without evidence of acute intracranial hemorrhage  CTA with left distal m2/m3 occlusion  SLP assessed and also noted patient to have dysphagia  MRI brain with acute to early subacute ischemia involving the left MCA territory with a larger infarct and multiple smaller infarcts  No hemorrhagic transformation identified  With her history of endometrial cancer the MRI of the brain also ruled out mets  ECHO obtained with EF of 70%  Neurology recommending outpatient loop recorder placement  She will continue ASA and Lipitor  PM&R consulted and recommended patient for ARC  She participated with PT/OT/ST and was deemed appropriate for post-acute rehabilitation services after demonstrating the ability to tolerate three hours of therapy per day  She was accepted to [facility] and arrived on 7/28/22       Allergies:  No known food allergies    Past medical history:  Please see H&P for details    Special Studies:  none    Social/Education/Vocational Hx:  Pt lives alone    Swallow Information   Current Risks for Dysphagia & Aspiration: CVA, AMS and decreased alertness, weak throat clear, general debilitation, new neuro event, brain injury, positioning issues  Current Symptoms/Concerns: coughing with po and pocketing food, difficulty chewing, holding food in mouth  Current Diet: mechanically altered/level 2 diet and thin liquids   Baseline Diet: regular diet and thin liquids      Baseline Assessment   Behavior/Cognition: alert  Speech/Language Status: able to participate in basic conversation and able to follow commands inconsistently due to receptive language deficits  Patient Positioning: upright in recliner  Pain Status/Interventions/Response to Interventions: No report of or nonverbal indications of pain  Swallow Mechanism Exam  Facial: right facial droop  Labial: decreased ROM right side  Lingual: decreased ROM, bilateral decreased strength and decreased coordination, but midline upon protrusion  Velum: unable to visualize  Mandible: adequate ROM  Dentition: upper dentures and natural dentition lower  Vocal quality:clear/adequate   Volitional Cough: strong/productive   Respiratory Status: on RA         Consistencies Assessed and Total Amount Consumed:  Consistencies Administered: thin liquids, puree, mechanical soft solids and soft solids  Materials administered included : scrambled eggs, banana, yogurt, juice and milk by straw      Oral Stage: mild-moderate  Mastication was observed to be moderately prolonged given eggs, due to piecemeal bites taken as well as faster intake from bolus to bolus which lead to mild-moderate oral residual over time  SLP did have to provide pt moderate verbal cues to slow intake rate as well as use liquid wash to decrease oral residual which did occur over time   Lip seal around fork, tsp, straw and finger foods was noted to be [facility] along w/ appropriate bolus retrieval across items  No anterior loss was observed  Bolus formation and transfer were functional given yogurt and thin liquids today with no significant oral residue noted given these textures  No overt s/s reduced oral control observed w/ thin liquids by straw today  Pharyngeal Stage: mild  Swallowing initiation appeared prompt given puree/thin textures and observing mild delay w/ soft solids (banana, eggs) due to increased oral mastication as well as piecemeal bites taken  Laryngeal rise was palpated and judged to be within functional limits across textures observed  No coughing, throat clearing, change in vocal quality or respiratory status noted during assessment today  Esophageal Concerns: none reported    Strategies and Efficacy: verbal cues needed to slow intake and alternate solids/liquids    Summary and Recommendations (see above)   Swallow Assessment Prognosis   Prognosis Good   Prognosis Considerations Age; Co-morbidities; Medical diagnosis; Medical prognosis; Severity of impairments;New learning ability;Ability to carry over   SLP Therapy Minutes   SLP Time In 0830   SLP Time Out 0930   SLP Total Time (minutes) 60   SLP Mode of treatment - Individual (minutes) 60   SLP Mode of treatment - Concurrent (minutes) 0   SLP Mode of treatment - Group (minutes) 0   SLP Mode of treatment - Co-treat (minutes) 0   SLP Mode of Treatment - Total time(minutes) 60 minutes   SLP Cumulative Minutes 60   Therapy Time missed   Time missed?  No

## 2022-07-30 PROCEDURE — 97530 THERAPEUTIC ACTIVITIES: CPT

## 2022-07-30 PROCEDURE — 97535 SELF CARE MNGMENT TRAINING: CPT

## 2022-07-30 PROCEDURE — 97130 THER IVNTJ EA ADDL 15 MIN: CPT

## 2022-07-30 PROCEDURE — 99232 SBSQ HOSP IP/OBS MODERATE 35: CPT | Performed by: NURSE PRACTITIONER

## 2022-07-30 PROCEDURE — 97112 NEUROMUSCULAR REEDUCATION: CPT

## 2022-07-30 PROCEDURE — 97129 THER IVNTJ 1ST 15 MIN: CPT

## 2022-07-30 RX ADMIN — DOCUSATE SODIUM 100 MG: 100 CAPSULE, LIQUID FILLED ORAL at 09:09

## 2022-07-30 RX ADMIN — Medication 1 TABLET: at 09:12

## 2022-07-30 RX ADMIN — SERTRALINE HYDROCHLORIDE 75 MG: 50 TABLET ORAL at 09:08

## 2022-07-30 RX ADMIN — OXYCODONE HYDROCHLORIDE AND ACETAMINOPHEN 500 MG: 500 TABLET ORAL at 09:09

## 2022-07-30 RX ADMIN — ASPIRIN 81 MG CHEWABLE TABLET 81 MG: 81 TABLET CHEWABLE at 09:09

## 2022-07-30 RX ADMIN — POLYETHYLENE GLYCOL 3350 17 G: 17 POWDER, FOR SOLUTION ORAL at 09:07

## 2022-07-30 RX ADMIN — SENNOSIDES 17.2 MG: 8.6 TABLET, FILM COATED ORAL at 09:08

## 2022-07-30 RX ADMIN — MULTIPLE VITAMINS W/ MINERALS TAB 1 TABLET: TAB ORAL at 09:09

## 2022-07-30 RX ADMIN — ATORVASTATIN CALCIUM 40 MG: 40 TABLET, FILM COATED ORAL at 17:00

## 2022-07-30 RX ADMIN — ENOXAPARIN SODIUM 40 MG: 40 INJECTION SUBCUTANEOUS at 09:10

## 2022-07-30 RX ADMIN — Medication 2000 UNITS: at 09:09

## 2022-07-30 NOTE — PROGRESS NOTES
07/30/22 0900   Pain Assessment   Pain Assessment Tool 0-10   Restrictions/Precautions   Precautions Bed/chair alarms;Cognitive; Fall Risk;Aphasia; Impulsive;Supervision on toilet/commode   Subjective   Subjective pt agreeable to perform skilled PT   Lying to Sitting on Side of Bed   Type of Assistance Needed Incidental touching   Lying to Sitting on Side of Bed CARE Score 4   Sit to Stand   Type of Assistance Needed Physical assistance   Physical Assistance Level 25% or less   Comment cues for proper hand placement  Sit to Stand CARE Score 3   Bed-Chair Transfer   Type of Assistance Needed Physical assistance   Physical Assistance Level 25% or less   Comment gait belt HHA   Chair/Bed-to-Chair Transfer CARE Score 3   Walk 10 Feet   Type of Assistance Needed Physical assistance   Physical Assistance Level 25% or less   Walk 10 Feet CARE Score 3   Walk 50 Feet with Two Turns   Type of Assistance Needed Physical assistance   Physical Assistance Level 25% or less   Walk 50 Feet with Two Turns CARE Score 3   Walk 150 Feet   Type of Assistance Needed Physical assistance   Physical Assistance Level 25% or less   Walk 150 Feet CARE Score 3   Walking 10 Feet on Uneven Surfaces   Type of Assistance Needed Physical assistance   Physical Assistance Level 51%-75%   Comment floor mat soft   Walking 10 Feet on Uneven Surfaces CARE Score 2   Ambulation   Does the patient walk? 2   Yes   Primary Mode of Locomotion Prior to Admission Walk   Distance Walked (feet) 150 ft  (x3)   Assist Device Other;Hand Hold  (gait belt)   Curb or Single Stair   Style negotiated Single stair   Type of Assistance Needed Physical assistance   Physical Assistance Level 26%-50%   1 Step (Curb) CARE Score 3   4 Steps   Type of Assistance Needed Physical assistance   Physical Assistance Level 26%-50%   4 Steps CARE Score 3   12 Steps   Type of Assistance Needed Physical assistance   Physical Assistance Level 26%-50%   12 Steps CARE Score 3   Stairs   Type Stairs;Curb   # of Steps 12   Weight Bearing Precautions Fall Risk   Assist Devices Single Rail;Hand Hold   Findings gait belt FF staps non-reciopl/   Therapeutic Interventions   Flexibility calf and HS stretch manual stretch   Balance dynamic balance on red and green disc   Assessment   Treatment Assessment pt focus on overall NPP and inc dynamcin balance activities with inc speed walking with just gait belt to inc motor learning and functional outcome   Pt also camila to perform stepping over objects and standing on round disc to inc dynamic balance activities and dec fall risk  Pt also peform non-reciopl FF stairs single HR and HHA , next FF stair inc to recoipl stepping if pt can understand task , pt is globel aphasia and dec cog for safety awareness   Spoke with Collins Ramirez her dgther about skilled PT   Pt return to her room with all needs in reach and alarm on chair   Cont POC   Barriers to Discharge Inaccessible home environment;Decreased caregiver support   Plan   Progress Progressing toward goals   PT Therapy Minutes   PT Time In 0900   PT Time Out 1030   PT Total Time (minutes) 90   PT Mode of treatment - Individual (minutes) 90   PT Mode of treatment - Concurrent (minutes) 0   PT Mode of treatment - Group (minutes) 0   PT Mode of treatment - Co-treat (minutes) 0   PT Mode of Treatment - Total time(minutes) 90 minutes   PT Cumulative Minutes 180   Therapy Time missed   Time missed?  No

## 2022-07-30 NOTE — PLAN OF CARE
Problem: INFECTION - ADULT  Goal: Absence or prevention of progression during hospitalization  Description: INTERVENTIONS:  - Assess and monitor for signs and symptoms of infection  - Monitor lab/diagnostic results  - Monitor all insertion sites, i e  indwelling lines, tubes, and drains  - Monitor endotracheal if appropriate and nasal secretions for changes in amount and color  - Sunflower appropriate cooling/warming therapies per order  - Administer medications as ordered  - Instruct and encourage patient and family to use good hand hygiene technique  - Identify and instruct in appropriate isolation precautions for identified infection/condition  Outcome: Progressing

## 2022-07-30 NOTE — QUICK NOTE
Patient seen today within 24 hours of admission  Please see H&P for today's eval      Reviewed labs, this AM with hyperkalemia, but repeat labs showed K 4 3  Continue to monitor      Sangeeta Musa MD  Physical Medicine and Rehabilitation

## 2022-07-30 NOTE — PROGRESS NOTES
07/30/22 1300   Pain Assessment   Pain Assessment Tool 0-10   Pain Score No Pain   Restrictions/Precautions   Precautions Bed/chair alarms;Cognitive; Aphasia; Aspiration; Fall Risk;Visual deficit;Supervision on toilet/commode   Weight Bearing Restrictions No   ROM Restrictions No   Lifestyle   Autonomy "It is different " when asked about vision   Light Housekeeping   Light Housekeeping Level Other (Comment)  (no AD)   Light Housekeeping Level of Assistance Minimum assistance   Light Housekeeping CGA-Min A to steady while in stance at table for laundry folding to focus on dynamic standing balance, endurance, and light IADL retraining, req vc's to maximize R head turns  Functional Standing Tolerance   Time 6 minutes   Activity Pt engaged in parquetry puzzle ther act while in stance at table w/ focus on fxnl reach crossing midline, BUE release, standing endurance, fxnl problem solving, and static standing balance  Tolerated well w/ CGA-Min A to steady, req demo w/ Mod vc's to maximize problem-solving for puzzle piece orientation  Slight improvement w/ repetition and task progression, however pt cont to req vc's to maximize frustration tolerance prior to selecting new piece  Cognition   Overall Cognitive Status Impaired   Arousal/Participation Alert; Cooperative   Attention Difficulty dividing attention   Orientation Level Oriented to person;Oriented to situation;Disoriented to place; Disoriented to time   Memory Decreased short term memory;Decreased recall of recent events;Decreased recall of precautions   Following Commands Follows one step commands with increased time or repetition   Comments With repetitive reorientation t/o session, pt able to recall place, city, situation, and age without use of memory aide  Cont w/ inc difficulty recall current month despite repetition, pt states April which is her birth month which demonstrates impaired comprehension  Engaged in ID common ADL and household items and use   With inc time, pt did well w/ ADL items ie  toothbrush and comb req Min gestures  Inc difficulty noted w/ household items, ie  Fork, spoon  With kitchen item ID, asked pt to recall 3 food items for each utensil  Req Mod gestures and prompts w/ extended time provided w/ pt able to achieve answers  Perseveration noted at times w/ poor divided attention, req frequent redirection  Add'lly focused on household safety card scenarios, tasking pt to ID safe vs unsafe and rationale  Pt able to ID ~75%, inc difficulty w/ rationale req Min-Mod prompts to inc attention to contextual cues  Perseveration noted w/ fire safety regardless of picture card presented, req redirection  Vision   Vision Comments During fxnl act, req cues to inc R head turn  Pt denied diplopia, photosensitivty, or blurriness  Reports "dark spots" in upper and lower RQ  May have R visual field cut  Unable to fully assess 2* cognitive deficits and difficulty following directions, will cont to assess during course of OT  Assessment   Treatment Assessment Pt seen for skilled OT session focusing on fxnl cognitive retraining, fxnl standing tolerance, toileting, short distance fxnl mobility, and light IADL management  Cont to be limited by aphasia, impaired problem-solving, dec judgement, impaired insight, poor memory, dec cognitive flexibility, and cognitive fatigue; requires frequent gestures and prompts during fxnl act  Req cues to maximize R cervical rotation/visual scanning, possible R field cut however visual assessment limited 2* cognition at this time  Cont OT POC: endurance work, ADL retraining, fxnl cognitive retraining, repetitive safety training, and fxnl visual scanning  Pt was left resting in bed w/ all needs within reach and alarm activated  Prognosis Good   Problem List Decreased strength;Decreased endurance; Impaired balance;Decreased mobility; Decreased cognition; Impaired judgement;Decreased safety awareness   Plan   Treatment/Interventions ADL retraining;Functional transfer training; Therapeutic exercise; Endurance training;Cognitive reorientation;Patient/family training   Progress Progressing toward goals   Recommendation   OT Discharge Recommendation   (pending progress)   OT Therapy Minutes   OT Time In 1300   OT Time Out 1430   OT Total Time (minutes) 90   OT Mode of treatment - Individual (minutes) 90   OT Mode of treatment - Concurrent (minutes) 0   OT Mode of treatment - Group (minutes) 0   OT Mode of treatment - Co-treat (minutes) 0   OT Mode of Treatment - Total time(minutes) 90 minutes   OT Cumulative Minutes 180   Therapy Time missed   Time missed?  No

## 2022-07-30 NOTE — PROGRESS NOTES
Internal Medicine Progress Note  Patient: Naresh Wilkerson  Age/sex: 76 y o  female  Medical Record #: [id number]      ASSESSMENT/PLAN: (Interval History)  Naresh Wilkerson is seen and examined and management for following issues:    Lt MCA CVA  · Continue ASA 81mg daily and atorvastatin 40mg daily  · Therapy per primary service  · Outpt follow-up with Neurology  · Outpt follow-up with Cardiology for loop recorder  · Pt's daughter refused this admission  Depression  · Continue sertraline  · Recommend Neuropsychology consult  DC planning: TBD  The above assessment and plan was reviewed and updated as determined by my evaluation of the patient on 7/30/2022      Labs:   Results from last 7 days   Lab Units 07/29/22  0645 07/24/22 2000   WBC Thousand/uL 7 66 9 24   HEMOGLOBIN g/dL 13 7 13 9   HEMATOCRIT % 42 1 43 5   PLATELETS Thousands/uL 217 257     Results from last 7 days   Lab Units 07/29/22  1059 07/29/22  0645   SODIUM mmol/L 138 137   POTASSIUM mmol/L 4 3 5 6*   CHLORIDE mmol/L 109* 109*   CO2 mmol/L 25 28   BUN mg/dL 19 18   CREATININE mg/dL 0 69 0 58*   CALCIUM mg/dL 9 3 9 3     Results from last 7 days   Lab Units 07/25/22  0415   HEMOGLOBIN A1C % 5 4     Results from last 7 days   Lab Units 07/24/22 2000   INR  0 97     Results from last 7 days   Lab Units 07/24/22  1949   POC GLUCOSE mg/dl 101       Review of Scheduled Meds:  Current Facility-Administered Medications   Medication Dose Route Frequency Provider Last Rate    acetaminophen  650 mg Oral Q6H PRN Dawn Lira PA-C      aluminum-magnesium hydroxide-simethicone  30 mL Oral Q6H PRN Dawn Lira PA-C      ascorbic acid  500 mg Oral Daily Dawn Lira PA-C      aspirin  81 mg Oral Daily Dawn Lira PA-C      atorvastatin  40 mg Oral QPM Dawn Lira PA-C      bisacodyl  10 mg Rectal Daily PRN Dawn Lira PA-C      calcium carbonate-vitamin D  1 tablet Oral Daily With Breakfast Dawn Lira PA-C  cholecalciferol  2,000 Units Oral Daily Janet Meyer PA-C      docusate sodium  100 mg Oral BID Janet Meyer PA-C      enoxaparin  40 mg Subcutaneous Daily Janet Meyer PA-C      multivitamin-minerals  1 tablet Oral Daily Janet Meyer PA-C      ondansetron  4 mg Oral Q6H PRN Janet Meyer PA-C      polyethylene glycol  17 g Oral Daily PRN Janet Meyer PA-C      senna  2 tablet Oral BID Janet Meyer PA-C      sertraline  75 mg Oral Daily Janet Meyer PA-C         Subjective/ HPI: Patient seen and examined  Patients overnight issues or events were reviewed with nursing or staff during rounds or morning huddle session  New or overnight issues include the following:     Pt seen in her room with her daughters  Long d/w both daughters and patient regarding her current situation  All questions answered    ROS:   A 10 point ROS was performed; negative except as noted above         Imaging:     No orders to display       *Labs /Radiology studies Reviewed  *Medications  reviewed and reconciled as needed  *Please refer to order section for additional ordered labs studies  *Case discussed with primary attending during morning huddle case rounds    Physical Examination:  Vitals:   Vitals:    07/29/22 0631 07/29/22 1547 07/29/22 2145 07/30/22 0538   BP: 119/63 148/70 120/77 148/65   BP Location: Left arm Right arm Right arm Right arm   Pulse: 58 59 59 (!) 54   Resp: 16 17 16 16   Temp: 97 6 °F (36 4 °C) 98 6 °F (37 °C) 98 °F (36 7 °C) 97 6 °F (36 4 °C)   TempSrc: Oral Oral Oral Oral   SpO2: 96% 95% 94% 97%   Weight:       Height:         GEN: No apparent distress, interactive  NEURO: Alert and oriented x3; both expressive/receptive improving aphasia; impulsive  HEENT: Pupils are equal and reactive, EOMI, mucous membranes are moist, face asymmetrical  CV: S1 S2 regular, no MRG, no peripheral edema noted  RESP: Lungs are clear bilaterally, no wheezes, rales or rhonchi noted, on room air, respirations easy and non labored  GI: Flat, soft non tender, non distended; +BS x4  : Voiding without difficulty  MUSC: Moves all extremities; mild right hemiparesis  SKIN: pink, warm and dry, normal turgor, no rashes, lesions        The above physical exam was reviewed and updated as determined by my evaluation of the patient on 7/30/2022  Invasive Devices  Report    None                    VTE Pharmacologic Prophylaxis: Enoxaparin  Code Status: Level 3 - DNAR and DNI  Current Length of Stay: 2 day(s)      Total time spent:  30 minutes with more than 50% spent counseling/coordinating care  Counseling includes discussion with patient re: progress  and discussion with patient of his/her current medical state/information  Coordination of patient's care was performed in conjunction with primary service  Time invested included review of patient's labs, vitals, and management of their comorbidities with continued monitoring  In addition, this patient was discussed with medical team including physician and advanced extenders  The care of the patient was extensively discussed and appropriate treatment plan was formulated unique for this patient  ** Please Note:  voice to text software may have been used in the creation of this document   Although proof errors in transcription or interpretation are a potential of such software**

## 2022-07-30 NOTE — PLAN OF CARE
Problem: SAFETY ADULT  Goal: Patient will remain free of falls  Description: INTERVENTIONS:  - Educate patient/family on patient safety including physical limitations  - Instruct patient to call for assistance with activity   - Consult OT/PT to assist with strengthening/mobility   - Keep Call bell within reach  - Keep bed low and locked with side rails adjusted as appropriate  - Keep care items and personal belongings within reach  - Initiate and maintain comfort rounds  - Make Fall Risk Sign visible to staff  - Offer Toileting every 2-4 Hours, in advance of need  - Initiate/Maintain alarm  - Obtain necessary fall risk management equipment:   - Apply yellow socks and bracelet for high fall risk patients  - Consider moving patient to room near nurses station  Outcome: Progressing

## 2022-07-31 PROCEDURE — 97110 THERAPEUTIC EXERCISES: CPT

## 2022-07-31 PROCEDURE — 97530 THERAPEUTIC ACTIVITIES: CPT

## 2022-07-31 PROCEDURE — 97129 THER IVNTJ 1ST 15 MIN: CPT

## 2022-07-31 PROCEDURE — 99232 SBSQ HOSP IP/OBS MODERATE 35: CPT | Performed by: INTERNAL MEDICINE

## 2022-07-31 PROCEDURE — 97535 SELF CARE MNGMENT TRAINING: CPT

## 2022-07-31 RX ADMIN — Medication 1 TABLET: at 09:26

## 2022-07-31 RX ADMIN — ATORVASTATIN CALCIUM 40 MG: 40 TABLET, FILM COATED ORAL at 18:41

## 2022-07-31 RX ADMIN — SENNOSIDES 17.2 MG: 8.6 TABLET, FILM COATED ORAL at 18:41

## 2022-07-31 RX ADMIN — Medication 2000 UNITS: at 09:27

## 2022-07-31 RX ADMIN — ASPIRIN 81 MG CHEWABLE TABLET 81 MG: 81 TABLET CHEWABLE at 09:27

## 2022-07-31 RX ADMIN — DOCUSATE SODIUM 100 MG: 100 CAPSULE, LIQUID FILLED ORAL at 09:27

## 2022-07-31 RX ADMIN — OXYCODONE HYDROCHLORIDE AND ACETAMINOPHEN 500 MG: 500 TABLET ORAL at 09:27

## 2022-07-31 RX ADMIN — SERTRALINE HYDROCHLORIDE 75 MG: 50 TABLET ORAL at 09:27

## 2022-07-31 RX ADMIN — SENNOSIDES 17.2 MG: 8.6 TABLET, FILM COATED ORAL at 09:26

## 2022-07-31 RX ADMIN — MULTIPLE VITAMINS W/ MINERALS TAB 1 TABLET: TAB ORAL at 09:26

## 2022-07-31 RX ADMIN — DOCUSATE SODIUM 100 MG: 100 CAPSULE, LIQUID FILLED ORAL at 18:41

## 2022-07-31 RX ADMIN — ENOXAPARIN SODIUM 40 MG: 40 INJECTION SUBCUTANEOUS at 09:27

## 2022-07-31 NOTE — PROGRESS NOTES
Internal Medicine Progress Note  Patient: Evelio Gr  Age/sex: 76 y o  female  Medical Record #: [id number]      ASSESSMENT/PLAN: (Interval History)  Evelio Gr is seen and examined and management for following issues:    Lt MCA CVA  · Continue ASA 81mg daily and atorvastatin 40mg daily  · Therapy per primary service  · Outpt follow-up with Neurology  · Outpt follow-up with Cardiology for loop recorder  · Pt's daughter refused this admission  Depression  · Continue sertraline  · Recommend Neuropsychology consult  DC planning: TBD  The above assessment and plan was reviewed and updated as determined by my evaluation of the patient on 7/31/2022      Labs:   Results from last 7 days   Lab Units 07/29/22  0645 07/24/22 2000   WBC Thousand/uL 7 66 9 24   HEMOGLOBIN g/dL 13 7 13 9   HEMATOCRIT % 42 1 43 5   PLATELETS Thousands/uL 217 257     Results from last 7 days   Lab Units 07/29/22  1059 07/29/22  0645   SODIUM mmol/L 138 137   POTASSIUM mmol/L 4 3 5 6*   CHLORIDE mmol/L 109* 109*   CO2 mmol/L 25 28   BUN mg/dL 19 18   CREATININE mg/dL 0 69 0 58*   CALCIUM mg/dL 9 3 9 3     Results from last 7 days   Lab Units 07/25/22  0415   HEMOGLOBIN A1C % 5 4     Results from last 7 days   Lab Units 07/24/22 2000   INR  0 97     Results from last 7 days   Lab Units 07/24/22  1949   POC GLUCOSE mg/dl 101       Review of Scheduled Meds:  Current Facility-Administered Medications   Medication Dose Route Frequency Provider Last Rate    acetaminophen  650 mg Oral Q6H PRN Para Allen, PRISCA      aluminum-magnesium hydroxide-simethicone  30 mL Oral Q6H PRN Para Allen, PA-HELEN      ascorbic acid  500 mg Oral Daily Para AllenPRISCA schneider      aspirin  81 mg Oral Daily Para AllenPRISCA schneider      atorvastatin  40 mg Oral QPM Para PRISCA Allen      bisacodyl  10 mg Rectal Daily PRN Para Allen, PRISCA      calcium carbonate-vitamin D  1 tablet Oral Daily With Breakfast Para PRISCA Allen  cholecalciferol  2,000 Units Oral Daily Lorriane Bear, PA-C      docusate sodium  100 mg Oral BID Lorriane Bear, PA-C      enoxaparin  40 mg Subcutaneous Daily Lorriane Bear, PA-C      multivitamin-minerals  1 tablet Oral Daily Lorriane Bear, PA-C      ondansetron  4 mg Oral Q6H PRN Lorriane Bear, PA-C      polyethylene glycol  17 g Oral Daily PRN Lorriane Bear, PA-C      senna  2 tablet Oral BID Lorriane Bear, PA-C      sertraline  75 mg Oral Daily Lorriane Bear, PA-C         Subjective/ HPI: Patient seen and examined  Patients overnight issues or events were reviewed with nursing or staff during rounds or morning huddle session  New or overnight issues include the following:     Pt seen in her room, states she slept well overnight  No complaints this am      ROS:   A 10 point ROS was performed; negative except as noted above         Imaging:     No orders to display       *Labs /Radiology studies Reviewed  *Medications  reviewed and reconciled as needed  *Please refer to order section for additional ordered labs studies  *Case discussed with primary attending during morning huddle case rounds    Physical Examination:  Vitals:   Vitals:    07/30/22 1312 07/30/22 2108 07/31/22 0500 07/31/22 0918   BP: 122/68 128/58 132/59 109/58   BP Location: Right arm Right arm Right arm Right arm   Pulse: 62 (!) 54 (!) 52 (!) 53   Resp: 18 16 16    Temp: 98 °F (36 7 °C) 97 8 °F (36 6 °C) 98 °F (36 7 °C)    TempSrc: Oral Oral Oral    SpO2: 96% 96% 97%    Weight:       Height:         GEN: No apparent distress, interactive  NEURO: Alert and oriented x3; mild dysarthria  HEENT: Pupils are equal and reactive, EOMI, mucous membranes are moist, face symmetrical  CV: S1 S2 regular, no MRG, no peripheral edema noted  RESP: Lungs are clear bilaterally, no wheezes, rales or rhonchi noted, on room air, respirations easy and non labored  GI: Flat, soft non tender, non distended; +BS x4  : Voiding without difficulty  MUSC: Moves all extremities; mild right hemiparesis  SKIN: pink, warm and dry, normal turgor, no rashes, lesions      The above physical exam was reviewed and updated as determined by my evaluation of the patient on 7/31/2022  Invasive Devices  Report    None                    VTE Pharmacologic Prophylaxis: Enoxaparin  Code Status: Level 3 - DNAR and DNI  Current Length of Stay: 3 day(s)      Total time spent:  30 minutes with more than 50% spent counseling/coordinating care  Counseling includes discussion with patient re: progress  and discussion with patient of his/her current medical state/information  Coordination of patient's care was performed in conjunction with primary service  Time invested included review of patient's labs, vitals, and management of their comorbidities with continued monitoring  In addition, this patient was discussed with medical team including physician and advanced extenders  The care of the patient was extensively discussed and appropriate treatment plan was formulated unique for this patient  ** Please Note:  voice to text software may have been used in the creation of this document   Although proof errors in transcription or interpretation are a potential of such software**

## 2022-07-31 NOTE — PROGRESS NOTES
07/31/22 0710   Pain Assessment   Pain Assessment Tool 0-10   Pain Score No Pain   Restrictions/Precautions   Precautions Bed/chair alarms;Cognitive; Fall Risk;Supervision on toilet/commode;Visual deficit   Weight Bearing Restrictions No   ROM Restrictions No   Lifestyle   Autonomy "I had a stroke "   Eating   Type of Assistance Needed Independent   Physical Assistance Level No physical assistance   Eating CARE Score 6   Oral Hygiene   Type of Assistance Needed Incidental touching   Physical Assistance Level No physical assistance   Comment in stance at sink, req vc's for sequencing despite inc time provided to maximize problem-solving  Oral Hygiene CARE Score 4   Shower/Bathe Self   Type of Assistance Needed Physical assistance   Physical Assistance Level 25% or less   Comment + shower orders  req vc's for task initiation and sequencing  A ble to bathe BUE and BLE while seated w/ inc time  CGA-Min A to steady while in stance to bathe arsenio and buttocks, req vc's and gestures to maximize thoroughness  Shower/Bathe Self CARE Score 3   Tub/Shower Transfer   Adaptive Equipment Grab Bars;Seat with out Back   Assessed Shower   Findings CGA, Min vc's to inc safety   Upper Body Dressing   Type of Assistance Needed Physical assistance   Physical Assistance Level 25% or less   Comment A to clasp bra  CGA in stance to don shirt  Upper Body Dressing CARE Score 3   Lower Body Dressing   Type of Assistance Needed Incidental touching   Physical Assistance Level No physical assistance   Comment able to thread BLE while seated, CGA in stance for clothing management  Inc time provided to maximize problem-solving and sequencing  Lower Body Dressing CARE Score 4   Putting On/Taking Off Footwear   Type of Assistance Needed Set-up / clean-up;Supervision;Verbal cues   Physical Assistance Level No physical assistance   Comment vc's for task initiation and sequencing  Able to don/doff socks and shoes     Putting On/Taking Off Footwear CARE Score 4   Lying to Sitting on Side of Bed   Type of Assistance Needed Supervision   Physical Assistance Level No physical assistance   Comment CS   Lying to Sitting on Side of Bed CARE Score 4   Sit to Stand   Type of Assistance Needed Incidental touching   Physical Assistance Level No physical assistance   Sit to Stand CARE Score 4   Bed-Chair Transfer   Type of Assistance Needed Physical assistance   Physical Assistance Level 25% or less   Comment CGA-Min A, without AD  ambulated<>bathroom   Chair/Bed-to-Chair Transfer CARE Score 3   Toileting Hygiene   Type of Assistance Needed Physical assistance   Physical Assistance Level 25% or less   Comment seated for arsenio hygiene, CGA-Min A for clothing management  Toileting Hygiene CARE Score 3   Toilet Transfer   Type of Assistance Needed Physical assistance   Physical Assistance Level 25% or less   Comment CGA-Min A   Toilet Transfer CARE Score 3   Cognition   Overall Cognitive Status Impaired   Arousal/Participation Alert; Cooperative   Attention Difficulty dividing attention   Orientation Level Oriented to person;Oriented to place;Oriented to situation;Disoriented to time   Memory Decreased short term memory;Decreased recall of recent events;Decreased recall of precautions   Following Commands Follows one step commands with increased time or repetition   Comments Repetitive reorientation training, pt able to recall place and city without memory aide  Req Mod prompts for month, pt stating 2021 as year  Reviewed places w/ date to include daily therapy schedule, whiteboard, and ST memory aide (month to be changed tomorrow)  Cont to req vc's and prompts for proper sequencing and inc time to problem-solve  Assessment   Treatment Assessment Pt seen for skilled OT session focusing on self-care management and fxnl cognitive retraining  Details on ADL noted above, req inc time to maximize problem-solving and Min vc's and gestures to maximize ADL sequencing  Improvement noted w/ item ID when asked during fxnl task/ADL  Improved frustration tolerance compared to eval, however cont to req vc's to reattempt following error and maximize participation  Please further assess vision next session as able, specifically for R visual field cut  Cont OT POC: dynamic standing balance, visual scanning to pt's R, fxnl cognitive retraining, and repetitive safety training  Set-up for meal while seated in recliner, alarm activated  Prognosis Good   Problem List Decreased strength;Decreased endurance; Impaired balance;Decreased mobility; Decreased cognition; Impaired judgement;Decreased safety awareness   Plan   Treatment/Interventions ADL retraining;Functional transfer training; Therapeutic exercise; Endurance training;Cognitive reorientation;Patient/family training;Equipment eval/education   Progress Progressing toward goals   Recommendation   OT Discharge Recommendation   (pending progress)   OT Therapy Minutes   OT Time In 0710   OT Time Out 0835   OT Total Time (minutes) 85   OT Mode of treatment - Individual (minutes) 85   OT Mode of treatment - Concurrent (minutes) 0   OT Mode of treatment - Group (minutes) 0   OT Mode of treatment - Co-treat (minutes) 0   OT Mode of Treatment - Total time(minutes) 85 minutes   OT Cumulative Minutes 265   Therapy Time missed   Time missed?  No

## 2022-07-31 NOTE — PROGRESS NOTES
07/31/22 1100   Pain Assessment   Pain Assessment Tool 0-10   Pain Score No Pain   Restrictions/Precautions   Precautions Bed/chair alarms;Cognitive; Fall Risk;Impulsive;Supervision on toilet/commode;Visual deficit   Subjective   Subjective Pt agreeable to perform skilled PT   Roll Left and Right   Type of Assistance Needed Supervision   Roll Left and Right CARE Score 4   Sit to Lying   Type of Assistance Needed Incidental touching   Sit to Lying CARE Score 4   Lying to Sitting on Side of Bed   Type of Assistance Needed Incidental touching   Lying to Sitting on Side of Bed CARE Score 4   Sit to Stand   Type of Assistance Needed Incidental touching   Sit to Stand CARE Score 4   Bed-Chair Transfer   Type of Assistance Needed Physical assistance   Physical Assistance Level 25% or less   Comment gait belt HHA   Chair/Bed-to-Chair Transfer CARE Score 3   Walk 10 Feet   Type of Assistance Needed Physical assistance   Physical Assistance Level 25% or less   Walk 10 Feet CARE Score 3   Walk 50 Feet with Two Turns   Type of Assistance Needed Physical assistance   Physical Assistance Level 25% or less   Walk 50 Feet with Two Turns CARE Score 3   Walk 150 Feet   Type of Assistance Needed Physical assistance   Physical Assistance Level 25% or less   Comment gait belt HHA   Walk 150 Feet CARE Score 3   Ambulation   Does the patient walk? 2  Yes   Primary Mode of Locomotion Prior to Admission Walk   Distance Walked (feet) 200 ft   Assist Device Hand Hold   Equipment Use   NuStep lvl 2 for 14 min   Assessment   Treatment Assessment Pt progressing with strengthening and conditoning at times if not to fatigue   pt barriers cont with poor cog/ understanding for fall risk and safety awareness , Pt overall will cont to benefit with skilled PT   Pt confused about next step post BM on commode for cleaning    Pt will cont toward skilled PT keep working on DC goals   Barriers to Discharge Inaccessible home environment;Decreased caregiver support   Plan   Progress Progressing toward goals   PT Therapy Minutes   PT Time In 1100   PT Time Out 1130   PT Total Time (minutes) 30   PT Mode of treatment - Individual (minutes) 30   PT Mode of treatment - Concurrent (minutes) 0   PT Mode of treatment - Group (minutes) 0   PT Mode of treatment - Co-treat (minutes) 0   PT Mode of Treatment - Total time(minutes) 30 minutes   PT Cumulative Minutes 210   Therapy Time missed   Time missed?  No

## 2022-07-31 NOTE — PLAN OF CARE
Reviewed    Problem: Prexisting or High Potential for Compromised Skin Integrity  Goal: Skin integrity is maintained or improved  Description: INTERVENTIONS:  - Identify patients at risk for skin breakdown  - Assess and monitor skin integrity  - Assess and monitor nutrition and hydration status  - Monitor labs   - Assess for incontinence   - Turn and reposition patient  - Assist with mobility/ambulation  - Relieve pressure over bony prominences  - Avoid friction and shearing  - Provide appropriate hygiene as needed including keeping skin clean and dry  - Evaluate need for skin moisturizer/barrier cream  - Collaborate with interdisciplinary team   - Patient/family teaching  - Consider wound care consult   Outcome: Progressing     Problem: PAIN - ADULT  Goal: Verbalizes/displays adequate comfort level or baseline comfort level  Description: Interventions:  - Encourage patient to monitor pain and request assistance  - Assess pain using appropriate pain scale  - Administer analgesics based on type and severity of pain and evaluate response  - Implement non-pharmacological measures as appropriate and evaluate response  - Consider cultural and social influences on pain and pain management  - Notify physician/advanced practitioner if interventions unsuccessful or patient reports new pain  Outcome: Progressing     Problem: INFECTION - ADULT  Goal: Absence or prevention of progression during hospitalization  Description: INTERVENTIONS:  - Assess and monitor for signs and symptoms of infection  - Monitor lab/diagnostic results  - Monitor all insertion sites, i e  indwelling lines, tubes, and drains  - Monitor endotracheal if appropriate and nasal secretions for changes in amount and color  - [place] appropriate cooling/warming therapies per order  - Administer medications as ordered  - Instruct and encourage patient and family to use good hand hygiene technique  - Identify and instruct in appropriate isolation precautions for identified infection/condition  Outcome: Progressing  Goal: Absence of fever/infection during neutropenic period  Description: INTERVENTIONS:  - Monitor WBC    Outcome: Progressing     Problem: SAFETY ADULT  Goal: Patient will remain free of falls  Description: INTERVENTIONS:  - Educate patient/family on patient safety including physical limitations  - Instruct patient to call for assistance with activity   - Consult OT/PT to assist with strengthening/mobility   - Keep Call bell within reach  - Keep bed low and locked with side rails adjusted as appropriate  - Keep care items and personal belongings within reach  - Initiate and maintain comfort rounds  - Make Fall Risk Sign visible to staff  - Offer Toileting every 4 Hours, in advance of need  - Initiate/Maintain bed and chair alarm  - Apply yellow socks and bracelet for high fall risk patients  - Consider moving patient to room near nurses station  Outcome: Progressing  Goal: Maintain or return to baseline ADL function  Description: INTERVENTIONS:  -  Assess patient's ability to carry out ADLs; assess patient's baseline for ADL function and identify physical deficits which impact ability to perform ADLs (bathing, care of mouth/teeth, toileting, grooming, dressing, etc )  - Assess/evaluate cause of self-care deficits   - Assess range of motion  - Assess patient's mobility; develop plan if impaired  - Assess patient's need for assistive devices and provide as appropriate  - Encourage maximum independence but intervene and supervise when necessary  - Involve family in performance of ADLs  - Assess for home care needs following discharge   - Consider OT consult to assist with ADL evaluation and planning for discharge  - Provide patient education as appropriate  Outcome: Progressing  Goal: Maintains/Returns to pre admission functional level  Description: INTERVENTIONS:  - Set and communicate daily mobility goal to care team and patient/family/caregiver     - Collaborate with rehabilitation services on mobility goals if consulted  - Out of bed for toileting  - Record patient progress and toleration of activity level   Outcome: Progressing     Problem: DISCHARGE PLANNING  Goal: Discharge to home or other facility with appropriate resources  Description: INTERVENTIONS:  - Identify barriers to discharge w/patient and caregiver  - Arrange for needed discharge resources and transportation as appropriate  - Identify discharge learning needs (meds, wound care, etc )  - Arrange for interpretive services to assist at discharge as needed  - Refer to Case Management Department for coordinating discharge planning if the patient needs post-hospital services based on physician/advanced practitioner order or complex needs related to functional status, cognitive ability, or social support system  Outcome: Progressing     Problem: Neurological Deficit  Goal: Neurological status is stable or improving  Description: Interventions:  - Monitor and assess patient's level of consciousness, motor function, sensory function, and level of assistance needed for ADLs  - Monitor and report changes from baseline  Collaborate with interdisciplinary team to initiate plan and implement interventions as ordered  - Provide and maintain a safe environment  - Consider seizure precautions  - Consider fall precautions  - Consider aspiration precautions  - Consider bleeding precautions  Outcome: Progressing     Problem: Activity Intolerance/Impaired Mobility  Goal: Mobility/activity is maintained at optimum level for patient  Description: Interventions:  - Assess and monitor patient  barriers to mobility and need for assistive/adaptive devices  - Assess patient's emotional response to limitations  - Collaborate with interdisciplinary team and initiate plans and interventions as ordered  - Encourage independent activity per ability   - Maintain proper body alignment    - Perform active/passive rom as tolerated/ordered  - Plan activities to conserve energy   - Turn patient as appropriate  Outcome: Progressing     Problem: Communication Impairment  Goal: Ability to express needs and understand communication  Description: Assess patient's communication skills and ability to understand information  Patient will demonstrate use of effective communication techniques, alternative methods of communication and understanding even if not able to speak  - Encourage communication and provide alternate methods of communication as needed  - Collaborate with case management/ for discharge needs  - Include patient/family/caregiver in decisions related to communication  Outcome: Progressing     Problem: Potential for Aspiration  Goal: Non-ventilated patient's risk of aspiration is minimized  Description: Assess and monitor vital signs, respiratory status, and labs (WBC)  Monitor for signs of aspiration (tachypnea, cough, rales, wheezing, cyanosis, fever)  - Assess and monitor patient's ability to swallow  - Place patient up in chair to eat if possible  - HOB up at 90 degrees to eat if unable to get patient up into chair   - Supervise patient during oral intake  - Instruct patient/ family to take small bites  - Instruct patient/ family to take small single sips when taking liquids  - Follow patient-specific strategies generated by speech pathologist   Outcome: Progressing  Goal: Ventilated patient's risk of aspiration is minimized  Description: Assess and monitor vital signs, respiratory status, airway cuff pressure, and labs (WBC)  Monitor for signs of aspiration (tachypnea, cough, rales, wheezing, cyanosis, fever)  - Elevate head of bed 30 degrees if patient has tube feeding   - Monitor tube feeding    Outcome: Progressing     Problem: Nutrition  Goal: Nutrition/Hydration status is improving  Description: Monitor and assess patient's nutrition/hydration status for malnutrition (ex- brittle hair, bruises, dry skin, pale skin and conjunctiva, muscle wasting, smooth red tongue, and disorientation)  Collaborate with interdisciplinary team and initiate plan and interventions as ordered  Monitor patient's weight and dietary intake as ordered or per policy  Utilize nutrition screening tool and intervene per policy  Determine patient's food preferences and provide high-protein, high-caloric foods as appropriate  - Assist patient with eating   - Allow adequate time for meals   - Encourage patient to take dietary supplement as ordered  - Collaborate with clinical nutritionist   - Include patient/family/caregiver in decisions related to nutrition  Outcome: Progressing     Problem: Nutrition/Hydration-ADULT  Goal: Nutrient/Hydration intake appropriate for improving, restoring or maintaining nutritional needs  Description: Monitor and assess patient's nutrition/hydration status for malnutrition  Collaborate with interdisciplinary team and initiate plan and interventions as ordered  Monitor patient's weight and dietary intake as ordered or per policy  Utilize nutrition screening tool and intervene as necessary  Determine patient's food preferences and provide high-protein, high-caloric foods as appropriate       INTERVENTIONS:  - Monitor oral intake, urinary output, labs, and treatment plans  - Assess nutrition and hydration status and recommend course of action  - Evaluate amount of meals eaten  - Assist patient with eating if necessary   - Allow adequate time for meals  - Recommend/ encourage appropriate diets, oral nutritional supplements, and vitamin/mineral supplements  - Order, calculate, and assess calorie counts as needed  - Recommend, monitor, and adjust tube feedings and TPN/PPN based on assessed needs  - Assess need for intravenous fluids  - Provide specific nutrition/hydration education as appropriate  - Include patient/family/caregiver in decisions related to nutrition  Outcome: Progressing

## 2022-08-01 PROBLEM — I49.5 SA NODE DYSFUNCTION (HCC): Status: ACTIVE | Noted: 2022-08-01

## 2022-08-01 LAB
ANION GAP SERPL CALCULATED.3IONS-SCNC: 3 MMOL/L (ref 4–13)
ATRIAL RATE: 58 BPM
BASOPHILS # BLD AUTO: 0.04 THOUSANDS/ΜL (ref 0–0.1)
BASOPHILS NFR BLD AUTO: 1 % (ref 0–1)
BUN SERPL-MCNC: 17 MG/DL (ref 5–25)
CALCIUM SERPL-MCNC: 9.2 MG/DL (ref 8.3–10.1)
CHLORIDE SERPL-SCNC: 107 MMOL/L (ref 96–108)
CO2 SERPL-SCNC: 29 MMOL/L (ref 21–32)
CREAT SERPL-MCNC: 0.64 MG/DL (ref 0.6–1.3)
EOSINOPHIL # BLD AUTO: 0.25 THOUSAND/ΜL (ref 0–0.61)
EOSINOPHIL NFR BLD AUTO: 3 % (ref 0–6)
ERYTHROCYTE [DISTWIDTH] IN BLOOD BY AUTOMATED COUNT: 13.1 % (ref 11.6–15.1)
GFR SERPL CREATININE-BSD FRML MDRD: 85 ML/MIN/1.73SQ M
GLUCOSE P FAST SERPL-MCNC: 94 MG/DL (ref 65–99)
GLUCOSE SERPL-MCNC: 94 MG/DL (ref 65–140)
HCT VFR BLD AUTO: 43.3 % (ref 34.8–46.1)
HGB BLD-MCNC: 14 G/DL (ref 11.5–15.4)
IMM GRANULOCYTES # BLD AUTO: 0.02 THOUSAND/UL (ref 0–0.2)
IMM GRANULOCYTES NFR BLD AUTO: 0 % (ref 0–2)
LYMPHOCYTES # BLD AUTO: 3.07 THOUSANDS/ΜL (ref 0.6–4.47)
LYMPHOCYTES NFR BLD AUTO: 38 % (ref 14–44)
MAGNESIUM SERPL-MCNC: 2 MG/DL (ref 1.6–2.6)
MCH RBC QN AUTO: 29.4 PG (ref 26.8–34.3)
MCHC RBC AUTO-ENTMCNC: 32.3 G/DL (ref 31.4–37.4)
MCV RBC AUTO: 91 FL (ref 82–98)
MONOCYTES # BLD AUTO: 0.84 THOUSAND/ΜL (ref 0.17–1.22)
MONOCYTES NFR BLD AUTO: 10 % (ref 4–12)
NEUTROPHILS # BLD AUTO: 3.96 THOUSANDS/ΜL (ref 1.85–7.62)
NEUTS SEG NFR BLD AUTO: 48 % (ref 43–75)
NRBC BLD AUTO-RTO: 0 /100 WBCS
PLATELET # BLD AUTO: 241 THOUSANDS/UL (ref 149–390)
PMV BLD AUTO: 10.7 FL (ref 8.9–12.7)
POTASSIUM SERPL-SCNC: 4.2 MMOL/L (ref 3.5–5.3)
QRS AXIS: -30 DEGREES
QRSD INTERVAL: 80 MS
QT INTERVAL: 414 MS
QTC INTERVAL: 399 MS
RBC # BLD AUTO: 4.77 MILLION/UL (ref 3.81–5.12)
SODIUM SERPL-SCNC: 139 MMOL/L (ref 135–147)
T WAVE AXIS: 4 DEGREES
VENTRICULAR RATE: 56 BPM
WBC # BLD AUTO: 8.18 THOUSAND/UL (ref 4.31–10.16)

## 2022-08-01 PROCEDURE — 99232 SBSQ HOSP IP/OBS MODERATE 35: CPT | Performed by: INTERNAL MEDICINE

## 2022-08-01 PROCEDURE — 92507 TX SP LANG VOICE COMM INDIV: CPT

## 2022-08-01 PROCEDURE — 99222 1ST HOSP IP/OBS MODERATE 55: CPT | Performed by: INTERNAL MEDICINE

## 2022-08-01 PROCEDURE — 83735 ASSAY OF MAGNESIUM: CPT | Performed by: PHYSICAL MEDICINE & REHABILITATION

## 2022-08-01 PROCEDURE — 97129 THER IVNTJ 1ST 15 MIN: CPT

## 2022-08-01 PROCEDURE — 92526 ORAL FUNCTION THERAPY: CPT

## 2022-08-01 PROCEDURE — 97130 THER IVNTJ EA ADDL 15 MIN: CPT

## 2022-08-01 PROCEDURE — 97112 NEUROMUSCULAR REEDUCATION: CPT

## 2022-08-01 PROCEDURE — 93010 ELECTROCARDIOGRAM REPORT: CPT | Performed by: INTERNAL MEDICINE

## 2022-08-01 PROCEDURE — 99232 SBSQ HOSP IP/OBS MODERATE 35: CPT | Performed by: PHYSICAL MEDICINE & REHABILITATION

## 2022-08-01 PROCEDURE — 97535 SELF CARE MNGMENT TRAINING: CPT

## 2022-08-01 PROCEDURE — 85025 COMPLETE CBC W/AUTO DIFF WBC: CPT | Performed by: NURSE PRACTITIONER

## 2022-08-01 PROCEDURE — 93005 ELECTROCARDIOGRAM TRACING: CPT

## 2022-08-01 PROCEDURE — 97530 THERAPEUTIC ACTIVITIES: CPT

## 2022-08-01 PROCEDURE — 80048 BASIC METABOLIC PNL TOTAL CA: CPT | Performed by: NURSE PRACTITIONER

## 2022-08-01 PROCEDURE — 97110 THERAPEUTIC EXERCISES: CPT

## 2022-08-01 RX ADMIN — SERTRALINE HYDROCHLORIDE 75 MG: 50 TABLET ORAL at 09:25

## 2022-08-01 RX ADMIN — OXYCODONE HYDROCHLORIDE AND ACETAMINOPHEN 500 MG: 500 TABLET ORAL at 09:24

## 2022-08-01 RX ADMIN — ENOXAPARIN SODIUM 40 MG: 40 INJECTION SUBCUTANEOUS at 09:25

## 2022-08-01 RX ADMIN — SENNOSIDES 17.2 MG: 8.6 TABLET, FILM COATED ORAL at 17:04

## 2022-08-01 RX ADMIN — MULTIPLE VITAMINS W/ MINERALS TAB 1 TABLET: TAB ORAL at 09:24

## 2022-08-01 RX ADMIN — ATORVASTATIN CALCIUM 40 MG: 40 TABLET, FILM COATED ORAL at 17:04

## 2022-08-01 RX ADMIN — Medication 2000 UNITS: at 09:24

## 2022-08-01 RX ADMIN — Medication 1 TABLET: at 09:24

## 2022-08-01 RX ADMIN — DOCUSATE SODIUM 100 MG: 100 CAPSULE, LIQUID FILLED ORAL at 17:04

## 2022-08-01 RX ADMIN — SENNOSIDES 17.2 MG: 8.6 TABLET, FILM COATED ORAL at 09:24

## 2022-08-01 RX ADMIN — ASPIRIN 81 MG CHEWABLE TABLET 81 MG: 81 TABLET CHEWABLE at 09:24

## 2022-08-01 NOTE — PROGRESS NOTES
Physical Medicine and Rehabilitation Progress Note  Xochitl Batista 76 y o  female MRN: [id number]  Unit/Bed#: -01 Encounter: [id number]    HPI: Xochitl Batista is a 76 y o  female with a PMH significant for h/o endometrial cancer, depression, h/o DVT, vitamin D deficency and OA, who presented to the [address] [place] [address] ED on 07/23 with a stroke alert  She was in contact with family the night prior when the next day family could not contact her and called EMS  Patient was noted to have altered mental status upon EMS arrival and was experiencing right facial droop  Neurology was consulted  [facility] ordered without evidence of acute intracranial hemorrhage  CTA with left distal m2/m3 occlusion  SLP assessed and also noted patient to have dysphagia  MRI brain with acute to early subacute ischemia involving the left MCA territory with a larger infarct and multiple smaller infarcts  No hemorrhagic transformation identified  With her history of endometrial cancer the MRI of the brain also ruled out mets  ECHO obtained with EF of 70%  Neurology recommending outpatient loop recorder placement  She will continue ASA and Lipitor  PM&R consulted and recommended patient for ARC  She participated with PT/OT/ST and was deemed appropriate for post-acute rehabilitation services after demonstrating the ability to tolerate three hours of therapy per day  She was accepted to [facility] and arrived on 7/28/22  Chief Complaint: No new issues  Interval History/Subjective:  No acute events over the weekend  Denies any CP, SOB, fevers, chills, N/V, abdominal pain  Last BM 8/1  Participating in therapies  Continues with aphasia  ROS:  A 10 point review of systems was negative except for what is noted in the HPI  Today's Changes:  1  Noted to have irregular rhythm on exam today   EKG with junctional bradycardia/SA node dysfunction   - Discussed with Cardiology and IM (consutled Cards)   - Plan to discuss with family possible placement of loop recorded while here  Otherwise, they could have Zio patch ordered and placed while here until she follows-up with EP as an outpatient  2  Continue current plan of care  3  Added Magnesium to this AM's labs  Which was 2  Total visit time: 25 minutes, with more than 50% spent counseling/coordinating care  Counseling includes discussion with patient re: progress in therapies, functional issues observed by therapy staff, and discussion with patient regarding their current medical state and wellbeing  Coordination of patient's care was performed in conjunction with Internal Medicine service to monitor patient's labs, vitals, and management of their comorbidities  Assessment/Plan:    * Acute ischemic left MCA stroke [facility])  Assessment & Plan  · Presented to [facility] ED with right facial droop and aphasia  · Outside tPA window  · CT without evidence of acute intracranial hemorrhage  Left frontal temporal and low attenuation suggesting acute-subacute ischemia  · CTA H/N with distal Left M2/M3 MCA occlusion   · MRI brain- acute to early subacute ischemia identified involving the left MCA territory with one larger infarct and multiple additional smaller infarcts   No hemorrhagic transformation identified   · MRI brain negative for metastasis in setting of h/o endometrial cancer  · ECHO unremarkable   · Continue ASA/Statin therapy per neurology recs  · Dysphagia diet; SLP following  · Neurology also recommended Loop recorder as OP with OP FU in 4-6 weeks  · Daughter deferred inpatient placement of loop recorder; aware of need to follow up with Cardiology as OP  · Acute chomprehensive interdisciplinary inpatient rehabilitation to include intensive skilled therapies as outlines with oversight and management by a rehabilitation Physician Assistant overseen by rehabilitation physician as well as inpatient rehabilitation nursing, case management and weekly interdisciplinary team meeting    SA node dysfunction [facility])  Assessment & Plan  With junctional bradycardia new on exam today  - HR fairly stable compared to previous  - K > 4, Mag > 2  - Will discuss with family possible EP consult and placement of loop recorder while on the ARC  Dysphagia  Assessment & Plan  · Dysphagia level 2 diet with thin liquids  · SLP to follow    History of DVT (deep vein thrombosis)  Assessment & Plan  · Potential h/o xarelto use for several months; no longer taking  · Currently Lovenox SQ Daily and SCD       Mild episode of recurrent major depressive disorder (HCC)  Assessment & Plan  · Continue Zoloft 75 mg daily      Vitamin D deficiency  Assessment & Plan  · Continue Cholecalciferol       History of endometrial cancer  Assessment & Plan  · Stage IA, grade 3 s/p completion of adjuvant chemotherapy and radiation in April 2015  · Follows with GYN-ONC as OP        Health Maintenance  #Delirium/Sleep: At risk  First night with some impulsivity, but due to needing to go to the bathroom  Added timed voids with improvement  Continue to optimize pain, bowel, bladder, sleep-wake cycle management  #Pain: Tylenol PRN  #Bowel: Last BM 8/1  On Senna 2 tabs BID and docusate BID  Will try to de-escalate while here  #Bladder: Timed voids to help with continence PVRx all < 150cc  #Skin/Pressure Injury Prevention: Turn Q2hr in bed, with weight shifts [id number] in wheelchair  #DVT Prophylaxis: Lovenox, SCDs  #GI Prophylaxis: PO diet  #Code Status:  DNR/DNI  #FEN: Level 2/thins  #Dispo: ADD TBD  ELOS 7-14 days  Goals Sup to discharge to daughter's home to start  Objective:    Functional Update:  PT: CGA with all mobility including ambulation and stairs  OT:  CGA for ADLs  SLP: SLP eval pending       Allergies per EMR    Physical Exam:  Temp:  [97 7 °F (36 5 °C)-99 °F (37 2 °C)] 99 °F (37 2 °C)  HR:  [58-64] 64  Resp:  [17-18] 18  BP: (119-132)/(54-59) 132/54  Oxygen Therapy  SpO2: 95 %    Gen: No acute distress, Well-nourished, well-appearing  HEENT: Moist mucus membranes  Cardiovascular: Irregular? Extra beats  HR 50-60s  Heme/Extr: No edema  Pulmonary: Non-labored breathing  : No pantoja  GI: Soft, non-tender, non-distended  Integumentary: Skin is warm, dry  Neuro: Awake, alert  Expressive aphasia  Able to verbalize that she would contact nursing if she needed to get up  Psych: Normal mood and affect  Diagnostic Studies: reviewed, no new imaging  No results found      Laboratory:  Reviewed   Results from last 7 days   Lab Units 08/01/22  0505 07/29/22  0645   HEMOGLOBIN g/dL 14 0 13 7   HEMATOCRIT % 43 3 42 1   WBC Thousand/uL 8 18 7 66     Results from last 7 days   Lab Units 08/01/22  0505 07/29/22  1059 07/29/22  0645   BUN mg/dL 17 19 18   POTASSIUM mmol/L 4 2 4 3 5 6*   CHLORIDE mmol/L 107 109* 109*   CREATININE mg/dL 0 64 0 69 0 58*   AST U/L  --   --  46*   ALT U/L  --   --  19            Patient Active Problem List   Diagnosis    History of endometrial cancer    Endometrial cancer ([facility] [address] )    Inguinal lymphadenopathy    Multinodular thyroid    Obesity    Osteoarthritis of hip    Osteoarthritis of knee    Vitamin D deficiency    Osteopenia of lumbar spine    Mild episode of recurrent major depressive disorder (HCC)    Acute ischemic left MCA stroke (HCC)    History of DVT (deep vein thrombosis)    Dysphagia         Medications  Current Facility-Administered Medications   Medication Dose Route Frequency Provider Last Rate    acetaminophen  650 mg Oral Q6H PRN Jennifer Asencio PA-C      aluminum-magnesium hydroxide-simethicone  30 mL Oral Q6H PRN Jennifer Asencio PA-C      ascorbic acid  500 mg Oral Daily ERIK Longoria-HELEN      aspirin  81 mg Oral Daily ERIK Longoria-HELEN      atorvastatin  40 mg Oral QPM Jennifer Asencio PA-C      bisacodyl  10 mg Rectal Daily PRN Jennifer Asencio PA-C      calcium carbonate-vitamin D  1 tablet Oral Daily With Breakfast Jennifer Asencio PA-C      cholecalciferol  2,000 Units Oral Daily Álvarez Cedaredge, PA-C      docusate sodium  100 mg Oral BID Álvarez Cedaredge, PA-C      enoxaparin  40 mg Subcutaneous Daily Álvarez Cedaredge, PA-C      multivitamin-minerals  1 tablet Oral Daily Álvarez Cedaredge, PA-C      ondansetron  4 mg Oral Q6H PRN Álvarez Cedaredge, PA-C      polyethylene glycol  17 g Oral Daily PRN Álvarez Cedaredge, PA-C      senna  2 tablet Oral BID Álvarez Cedaredge, PA-C      sertraline  75 mg Oral Daily Álvarez Aly, PA-C          ** Please Note: Fluency Direct voice to text software may have been used in the creation of this document   **

## 2022-08-01 NOTE — ASSESSMENT & PLAN NOTE
With junctional bradycardia new on exam 8/2; 8/4 more regular  - Avoiding AV kiera agents during course   - Seen by Cards in ARC - cleared for d/c  - Family would like to defer any monitoring until outpatient (Calixto Caceres)    - Risk/benefits/alternatives presented in detail  - OP cards follow-up

## 2022-08-01 NOTE — PROGRESS NOTES
08/01/22 1100   Pain Assessment   Pain Assessment Tool 0-10   Pain Score No Pain   Restrictions/Precautions   Precautions Bed/chair alarms;Cognitive; Fall Risk;Aspiration; Aphasia;Pain;Pressure Ulcer; Impulsive;Supervision on toilet/commode   Subjective   Subjective pt agreeable to perform skilled PT   Roll Left and Right   Type of Assistance Needed Supervision   Roll Left and Right CARE Score 4   Lying to Sitting on Side of Bed   Type of Assistance Needed Supervision   Lying to Sitting on Side of Bed CARE Score 4   Sit to Stand   Type of Assistance Needed Incidental touching   Sit to Stand CARE Score 4   Bed-Chair Transfer   Type of Assistance Needed Physical assistance   Physical Assistance Level 25% or less   Comment gait belt   Chair/Bed-to-Chair Transfer CARE Score 3   Transfer Bed/Chair/Wheelchair   Adaptive Equipment   (gait belt)   Walk 10 Feet   Type of Assistance Needed Physical assistance   Physical Assistance Level 25% or less   Walk 10 Feet CARE Score 3   Walk 50 Feet with Two Turns   Type of Assistance Needed Physical assistance   Physical Assistance Level 25% or less   Walk 50 Feet with Two Turns CARE Score 3   Walk 150 Feet   Type of Assistance Needed Physical assistance   Physical Assistance Level 25% or less   Comment gait belt   Walk 150 Feet CARE Score 3   Ambulation   Does the patient walk? 2  Yes   Primary Mode of Locomotion Prior to Admission Walk   Distance Walked (feet) 350 ft   Assist Device   (gait belt)   Toilet Transfer   Type of Assistance Needed Physical assistance   Physical Assistance Level 25% or less   Comment walking to bathroom   Toilet Transfer CARE Score 3   Toilet Transfer   Surface Assessed Standard Toilet   Equipment Use   NuStep lvl 2 for 14 min   Assessment   Treatment Assessment pt focus short 30 min skilled PT , inc ambulatin gait belt and strengthening and conditioning    pt also inc speed pattern gait and overall progressing , plus cont withyes or no question at this time   Cont POC pt back in her room with alarm on in chair with all needs in reach   Cont POC   Barriers to Discharge Inaccessible home environment;Decreased caregiver support   Plan   Progress Progressing toward goals   PT Therapy Minutes   PT Time In 1100   PT Time Out 1130   PT Total Time (minutes) 30   PT Mode of treatment - Individual (minutes) 30   PT Mode of treatment - Concurrent (minutes) 0   PT Mode of treatment - Group (minutes) 0   PT Mode of treatment - Co-treat (minutes) 0   PT Mode of Treatment - Total time(minutes) 30 minutes   PT Cumulative Minutes 240   Therapy Time missed   Time missed?  No

## 2022-08-01 NOTE — QUICK NOTE
Discussed with daughter, Terri Chand about the findings on her EKG today and our discussion with Cardiology  She and her sister out of town were fine with loop recorder, but her younger sister wanted the patient to focus all her energy on therapies  They suspect they would prefer to go the route of the Zio patch, but she will give me their final answer tomorrow      Clara Lai MD  Physical Medicine and Rehabilitation

## 2022-08-01 NOTE — PROGRESS NOTES
Internal Medicine Progress Note  Patient: Aleks Flor  Age/sex: 76 y o  female  Medical Record #: [id number]      ASSESSMENT/PLAN: (Interval History)  Aleks Flor is seen and examined and management for following issues:    Lt MCA CVA  · Continue ASA 81mg daily and atorvastatin 40mg daily  · Outpt follow-up with Neurology  · Outpt follow-up with Cardiology for loop recorder  · Pt's daughter refused this admission  Irregular apical rhythm  · For EKG     Depression  · Continue sertraline        DC planning: TBD    The above assessment and plan was reviewed and updated as determined by my evaluation of the patient on 8/1/2022      Labs:   Results from last 7 days   Lab Units 08/01/22  0505 07/29/22  0645   WBC Thousand/uL 8 18 7 66   HEMOGLOBIN g/dL 14 0 13 7   HEMATOCRIT % 43 3 42 1   PLATELETS Thousands/uL 241 217     Results from last 7 days   Lab Units 08/01/22  0505 07/29/22  1059   SODIUM mmol/L 139 138   POTASSIUM mmol/L 4 2 4 3   CHLORIDE mmol/L 107 109*   CO2 mmol/L 29 25   BUN mg/dL 17 19   CREATININE mg/dL 0 64 0 69   CALCIUM mg/dL 9 2 9 3                   Review of Scheduled Meds:  Current Facility-Administered Medications   Medication Dose Route Frequency Provider Last Rate    acetaminophen  650 mg Oral Q6H PRN Marijane Signs, PA-C      aluminum-magnesium hydroxide-simethicone  30 mL Oral Q6H PRN Marijane Signs, PA-C      ascorbic acid  500 mg Oral Daily Marijane Signs, PA-C      aspirin  81 mg Oral Daily Marijane Signs, PA-C      atorvastatin  40 mg Oral QPM Marijane Signs, PA-C      bisacodyl  10 mg Rectal Daily PRN Marijane Signs, PA-C      calcium carbonate-vitamin D  1 tablet Oral Daily With Breakfast Marijane Signs, PA-C      cholecalciferol  2,000 Units Oral Daily Marijane Signs, PA-C      docusate sodium  100 mg Oral BID Marijane Signs, PA-C      enoxaparin  40 mg Subcutaneous Daily Marijane Signs, PA-C      multivitamin-minerals  1 tablet Oral Daily Marcia Schulz PA-C      ondansetron  4 mg Oral Q6H PRN Marcia Schulz PA-C      polyethylene glycol  17 g Oral Daily PRN Marcia Schulz PA-C      senna  2 tablet Oral BID Marcia Schulz PA-C      sertraline  75 mg Oral Daily Marcia Schulz PA-C         Subjective/ HPI: Patient seen and examined  Patients overnight issues or events were reviewed with nursing or staff during rounds or morning huddle session  New or overnight issues include the following:     No new or overnight issues  Offers no complaints    ROS:   A 10 point ROS was performed; negative except as noted above  Imaging:     No orders to display       *Labs /Radiology studies reviewed  *Medications reviewed and reconciled as needed  *Please refer to order section for additional ordered labs studies  *Case discussed with primary attending during morning huddle case rounds    Physical Examination:  Vitals:   Vitals:    07/31/22 0918 07/31/22 1324 07/31/22 2059 08/01/22 0614   BP: 109/58 132/58 125/59 119/58   BP Location: Right arm Right arm Right arm Left arm   Pulse: (!) 53 (!) 54 61 58   Resp:  17 17 18   Temp:  98 °F (36 7 °C) 97 8 °F (36 6 °C) 97 7 °F (36 5 °C)   TempSrc:  Oral Oral Oral   SpO2:  98% 98% 98%   Weight:       Height:           General Appearance: no distress, conversive  HEENT: PERRLA, conjuctiva normal; oropharynx clear; mucous membranes moist   Neck:  Supple, normal ROM  Lungs: CTA, normal respiratory effort, no retractions, expiratory effort normal  CV: irregular rate and rhythm; no rubs/murmurs/gallops, PMI normal   ABD: soft; ND/NT; +BS  EXT: no edema  Skin: normal turgor, normal texture, no rashes  Psych: affect normal, mood normal  Neuro: AAO      The above physical exam was reviewed and updated as determined by my evaluation of the patient on 8/1/2022      Invasive Devices  Report    None                    VTE Pharmacologic Prophylaxis: Enoxaparin  Code Status: Level 3 - DNAR and DNI  Current Length of Stay: 4 day(s)      Total time spent:  30 minutes with more than 50% spent counseling/coordinating care  Counseling includes discussion with patient re: progress  and discussion with patient of his/her current medical state/information  Coordination of patient's care was performed in conjunction with primary service  Time invested included review of patient's labs, vitals, and management of their comorbidities with continued monitoring  In addition, this patient was discussed with medical team including physician and advanced extenders  The care of the patient was extensively discussed and appropriate treatment plan was formulated unique for this patient  ** Please Note:  voice to text software may have been used in the creation of this document   Although proof errors in transcription or interpretation are a potential of such software**

## 2022-08-01 NOTE — PROGRESS NOTES
22 1130   Pain Assessment   Pain Assessment Tool 0-10   Pain Score No Pain   Restrictions/Precautions   Precautions Aphasia; Aspiration;Bed/chair alarms;Cognitive; Fall Risk;Supervision on toilet/commode   Comprehension   Comprehension (FIM) 3 - Understands basic info/conversation 50-74% of time   Expression   Expression (FIM) 2 - Uses only simple expressions or gestures (waves, hello)   Social Interaction   Social Interaction (FIM) 5 - Interacts appropriately with others 90% of time   Problem Solving   Problem solving (FIM) 2 - Needs direction more than ½ time to initiate, plan or complete simple tasks   Memory   Memory (FIM) 2 - Recalls 1 of 2 steps   Speech/Language/Cognition Assessment   Treatment Assessment Pt participated in skilled ST session focusing on expressive and receptive language skills  At beginning of session, pt's daughter, Dejah Crain was present and conveyed concern about not having speech therapy over the weekend  SLP informed pt's daughter of plan for this week to resume typical schedule, including pt to be seen more than 60 min today and goal for 90 min tomorrow  As this SLP is new to pt's care, session began with brief rapport building which also focused on LTM recall and orientation  Pt accurately stated her full name, , address and names of her children  Although errored initially in her age, pt promptly corrected this info when given a verbal cue  When pt attempted to provide additional details about her children, pt demonstrated increased word finding difficulties, noting haulting speech patterns, hesitations, use of paraphasias and frequent reattempts at target words  Regarding orientation, pt was oriented to month, place and situation but with difficulty in eliciting the current year (stated   David Tolentino , etc) and ultimately benefited from a [id number] written choices to determine the current year   Targeting basic verbal expression, pt accurately named 28/36 pictures of common objects, noting her to begin to perseverate on previously targeted words as the task progressed  Pt benefited from min to moderate verbal cuing to include verbal cues x3, phonemic cues x3 and phrase completion cues x2  Engaged in a word deduction task, which is of higher complexity, pt determined a described item based on clues for 8/12 trials, improving to 12/12 when supported with moderate cuing through a combination of semantic, verbal, phonemic and phrase completion cues  As pt appeared to respond well to phrase completion cues, pt was then presented with simple opened ended phrases and simple sentences in which she elicited semantically and syntactically appropriate words to complete sentences for 8/15 trials  Pt required semantic, phrase completion cues, phonemic cues, and gestural cues to increase word finding to complete task  Remainder of session focused on reading comprehension skills  Presented with a [address] [employer] words and common items located within the room, pt was 3/5 accurate in matching the correct word to the item-required moderate cuing to improve comprehension  Given a matching task from [facility] written words aligned on the left and Fo5 pictures on the left, pt 3/10 accurate in matching words as she demonstrated greater difficulty with this task, suspecting possibly due to the set up and difficulty in attending to items  More moderate to max cuing was needed to improve comprehension of info  Lastly, when presented with a picture of an item and only a Fo2 written words, pt was 14/16 accurate in matching the correct name of the item as she was much more successful in this task and required only min verbal cuing to complete remaining trials  At this time, pt continues to present with expressive and receptive aphasia to include written and verbal expression deficits and reading and auditory comprehension deficits   Pt is currently  recommended for further skilled SLP services to improve language skills in order to increase functional communication abilities  Eating   Type of Assistance Needed Set-up / [place]; Verbal cues   Physical Assistance Level No physical assistance   Eating CARE Score 4   Swallow Assessment   Swallow Treatment Assessment   Daily Dysphagia Tx Note     Current Risks for Dysphagia & Aspiration: Dysarthria;General debilitation;New Neuro event;Brain injury;Cognitive deficit    Current Symptoms/Concerns: Cough;Clear throat;During meals; Difficulty chewing;Holding food in mouth;Pockets food;    Current diet:mechanically altered/level 2 diet and thin liquids     Premorbid diet::regular diet and thin liquids     Positioning: upright in chair    Items administered:Consistencies Administered: thin liquids, mechanical soft solids and soft solids  Materials administered included:   Dysphagia level 2: ground chicken with gravy, diced green beans, diced pears, yogurt  Dysphagia level 3 trials: toast with butter/jelly  Thins by cup/straw    Total amount of meal consumed:   100% of solids  240cc thins by cup/straw      Oral stage:mild (with items this meal)  Lip closure: complete and functional for bolus retrieval  Anterior spillage: none  Mastication: mildly prolonged but appeared to occur when taking larger and rapid bites which impacted timing of mastication-appeared overall effective for breakdown  Bolus formation: appeared functional  Bolus control: suspected to be adequate with solids and liquids  Transfer: timely with liquids and when taking single bites-delayed with larger bites and faster rate as pt appeared to need more time to manage boluses  Oral residue: no  Pocketing: no    Pharyngeal stage:minimal and mild  Swallow promptness: suspected to be fairly prompt, again possible delay when mouth was overfull in attempts to coordinate larger boluses  Hyolaryngeal elevation: present to palpation   Wet voice: no  Throat clear: x1 with initial bite of chicken with gravy  Cough: no  Secondary swallows: occasional but not consistent  Audible swallows: no    Esophageal stage:  No overt s/s observed by SLP or reported by patient this meal     Summary:  Pt is presenting with s/s suggestive of mild oral and minimal to mild pharyngeal dysphagia across current meal, characterized by times of prolonged mastication and increased time needed for solid bolus prep and transfers, which predominantly appeared due to faster rate of intake and larger bolus sizes  When pt was cued to slow her rate of intake and to take smaller bites, overall oral prep, processing and transfers of solids appeared much prompter  Pt benefited from verbal and occasional gestural cues to slow rate, as well as to alternate solids and liquids  Pt elicited single throat clear at beginning of meal but no further overt s/s aspiration present  Of note, pt trialed level 3 toast with butter and jelly, noting effective bolus breakdown and movement of boluses  Seemed to use slower pacing as she needed to bite off pieces herself rather than use of utensils for feeding  However, pt did continue to benefit from verbal cues for slowing rate of intake and smaller bites  Pt presented with overall functional oral and pharyngeal swallow skills when taking liquids by cup and straw sips and in both single and consecutive sips this meal         Recommendations:  Diet: mechanically altered/level 2 diet and thin liquids  Meds: as pt tolerates  Strategies: upright posture, only feed when fully alert, slow rate of feeding, small bites/sips, effortful swallow, quiet environment (tv off, limit talking, door closed, etc ), alternating bites and sips and OOB for ALL meals    DISTANT supervision with meals  Results reviewed with:  patient and RN   Aspiration precautions posted  F/u ST tx: Pt is recommended for continued skilled SLP services for dysphagia therapy to maximize swallow function while safely supporting intake, as well as to improve independent carryover of safe swallow strategies  Plan: trial dysphagia level 3 items with SLP only in next session   SLP Therapy Minutes   SLP Time In 1130   SLP Time Out 1300   SLP Total Time (minutes) 90   SLP Mode of treatment - Individual (minutes) 90   SLP Mode of treatment - Concurrent (minutes) 0   SLP Mode of treatment - Group (minutes) 0   SLP Mode of treatment - Co-treat (minutes) 0   SLP Mode of Treatment - Total time(minutes) 90 minutes   SLP Cumulative Minutes 150   Therapy Time missed   Time missed?  No

## 2022-08-01 NOTE — PROGRESS NOTES
08/01/22 1300   Pain Assessment   Pain Assessment Tool 0-10   Pain Score No Pain   Restrictions/Precautions   Precautions Bed/chair alarms;Cognitive; Fall Risk;Aphasia; Aspiration; Impulsive;Supervision on toilet/commode   Subjective   Subjective pt agreeable to perform skilled PT   Sit to Stand   Type of Assistance Needed Incidental touching   Sit to Stand CARE Score 4   Bed-Chair Transfer   Type of Assistance Needed Physical assistance   Physical Assistance Level 25% or less   Comment gait belt   Chair/Bed-to-Chair Transfer CARE Score 3   Transfer Bed/Chair/Wheelchair   Adaptive Equipment   (gait belt)   Car Transfer   Type of Assistance Needed Physical assistance   Physical Assistance Level 25% or less   Car Transfer CARE Score 3   Walk 10 Feet   Type of Assistance Needed Physical assistance   Physical Assistance Level 25% or less   Walk 10 Feet CARE Score 3   Walk 50 Feet with Two Turns   Type of Assistance Needed Physical assistance   Physical Assistance Level 25% or less   Walk 50 Feet with Two Turns CARE Score 3   Walk 150 Feet   Type of Assistance Needed Physical assistance   Physical Assistance Level 25% or less   Comment gait belt   Walk 150 Feet CARE Score 3   Walking 10 Feet on Uneven Surfaces   Type of Assistance Needed Physical assistance   Physical Assistance Level 26%-50%   Comment floor mat and disc   Walking 10 Feet on Uneven Surfaces CARE Score 3   Ambulation   Does the patient walk? 2   Yes   Primary Mode of Locomotion Prior to Admission Walk   Distance Walked (feet) 350 ft  (x3)   Wheel 50 Feet with Two Turns   Reason if not Attempted Activity not applicable   Wheel 50 Feet with Two Turns CARE Score 9   Wheel 150 Feet   Reason if not Attempted Activity not applicable   Wheel 580 Feet CARE Score 9   Curb or Single Stair   Style negotiated Single stair   Type of Assistance Needed Physical assistance   Physical Assistance Level 25% or less   1 Step (Curb) CARE Score 3   4 Steps   Type of Assistance Needed Physical assistance   Physical Assistance Level 25% or less   4 Steps CARE Score 3   12 Steps   Type of Assistance Needed Physical assistance   Physical Assistance Level 25% or less   Comment FF with single HR   12 Steps CARE Score 3   Stairs   Type Stairs   # of Steps 24   Assist Devices Single Rail   Toilet Transfer   Type of Assistance Needed Physical assistance   Physical Assistance Level 25% or less   Toilet Transfer CARE Score 3   Assessment   Treatment Assessment Pt focus ambulation NPP and HHA inc speed walking and gait pattern , pt also working on stepping over objects and dynamic balance activities  VC 's for head turns and quick stop and go a little LOB , pt challenge with inc speed walking and stepping over objects in hallway , pt overall CgA to Mayra with gait belt on for safety  Pt improving with NPP and inc motor learning , but poor cog insight of deficits of fall risk and safety awareness  Cont POC and NPP   Barriers to Discharge Inaccessible home environment;Decreased caregiver support   Plan   Progress Progressing toward goals   PT Therapy Minutes   PT Time In 1300   PT Time Out 1400   PT Total Time (minutes) 60   PT Mode of treatment - Individual (minutes) 60   PT Mode of treatment - Concurrent (minutes) 0   PT Mode of treatment - Group (minutes) 0   PT Mode of treatment - Co-treat (minutes) 0   PT Mode of Treatment - Total time(minutes) 60 minutes   PT Cumulative Minutes 300   Therapy Time missed   Time missed?  No

## 2022-08-01 NOTE — PROGRESS NOTES
[facility] Occupational Therapy Daily Note  Patient Active Problem List   Diagnosis    History of endometrial cancer    Endometrial cancer ([facility] )    Inguinal lymphadenopathy    Multinodular thyroid    Obesity    Osteoarthritis of hip    Osteoarthritis of knee    Vitamin D deficiency    Osteopenia of lumbar spine    Mild episode of recurrent major depressive disorder ([facility] )    Acute ischemic left MCA stroke ([facility] )    History of DVT (deep vein thrombosis)    Dysphagia       Past Medical History:   Diagnosis Date    Deep vein thrombosis ([place] [address] ) 1/28/2016    Diverticulosis     last assessed 05/29/13    Fibrocystic breast     History of DVT (deep vein thrombosis)     Xarelto, 4/2015 thru April 2016    Impaired fasting glucose     last assessed 06/04/14    Inguinal lymphadenopathy     Multiple benign polyps of large intestine     Obesity     Uterine cancer (HCC)      Etiologic Diagnosis: left mca distributioninfarctions  Restrictions/Precautions  Precautions: Bed/chair alarms, Aspiration, Aphasia, Cognitive, Fall Risk, Supervision on toilet/commode  Weight Bearing Restrictions: No  ROM Restrictions: No  ADL Team Goal: Patient will require supervision with ADLs with least restrictive device upon completion of rehab program  Occupational Therapy LTG's  Eating Oral care Bathing LB dress UB dress   Eating Goal: 06  Independent - Patient completes the activity by him/herself with no assistance from a helper  Oral Hygiene Goal: 04  Supervision or touching assistance- [place] provides VERBAL CUES or supervision throughout activity  Shower/bathe self Goal: 04  Supervision or touching assistance- [place] provides VERBAL CUES or supervision throughout activity  Lower body dressing Goal: 04  Supervision or touching assistance- [place] provides VERBAL CUES or supervision throughout activity  Upper body dressing Goal: 05  Setup or clean-up assistance - [place] SETS UP or CLEANS UP, patient completes activity   [place] assists only prior to or following the activity  Toileting Toilet txf Func txf IADL Med    Toileting hygiene Goal: 04  Supervision or touching assistance- [place] provides VERBAL CUES or supervision throughout activity  Toilet transfer Goal: 04  Supervision or touching assistance- [place] provides VERBAL CUES or supervision throughout activity  Assist Level: Supervision (light, cold meal prep) Assist Level: Minimum Assist   OT interventions: Treatment/Interventions: (P) ADL retraining, Functional transfer training, LE strengthening/ROM, Therapeutic exercise, Endurance training, Cognitive reorientation  Discharge Plan:  OT Discharge Recommendation: (P)  (Home with Supervision 24/7, OUT NEURO)   DME: Equipment Recommended: (P)  (TBD),  ,       08/01/22 0730   Pain Assessment   Pain Assessment Tool 0-10   Pain Score No Pain   Restrictions/Precautions   Precautions Bed/chair alarms;Aspiration; Aphasia;Cognitive; Fall Risk;Supervision on toilet/commode   Lifestyle   Autonomy "I couldnt read "   Oral Hygiene   Type of Assistance Needed Supervision   Comment Pt able to initiate and sequence oral care in stance at sink  Oral Hygiene CARE Score 4   Shower/Bathe Self   Type of Assistance Needed Physical assistance   Physical Assistance Level 25% or less   Comment Pt able to iniate sequnce for shower with use of water  pt does only wet LE in sitting and states "Im done " Required Vc's for intiiate of soap use, then progresses bathing task therafter  Shower/Bathe Self CARE Score 3   Upper Body Dressing   Physical Assistance Level 25% or less   Comment CGA during item retreival from dresser  able to select appropriate clothing  improved sequencing for donning clothing with no Vc's for orientaion  Assist for clasing bra     Upper Body Dressing CARE Score 3   Lower Body Dressing   Type of Assistance Needed Physical assistance   Physical Assistance Level 25% or less   Lower Body Dressing CARE Score 3   Putting On/Taking Off Footwear   Type of Assistance Needed Physical assistance   Physical Assistance Level 25% or less   Comment CGA in stance for doffing socks  initaites in stance with reaching for towel bar  Putting On/Taking Off Footwear CARE Score 3   Lying to Sitting on Side of Bed   Type of Assistance Needed Supervision   Lying to Sitting on Side of Bed CARE Score 4   Sit to Stand   Type of Assistance Needed Incidental touching   Sit to Stand CARE Score 4   Bed-Chair Transfer   Type of Assistance Needed Physical assistance   Physical Assistance Level 25% or less   Comment CGA with R side deviations  Hitting items on L in doorways  Chair/Bed-to-Chair Transfer CARE Score 3   Toileting Hygiene   Type of Assistance Needed Physical assistance   Physical Assistance Level 25% or less   Comment CGa in stance for dynamic reaching  does initiate tasks however lacks thoroughness with bowel movement  Toileting Hygiene CARE Score 3   Toilet Transfer   Type of Assistance Needed Physical assistance   Physical Assistance Level 25% or less   Toilet Transfer CARE Score 3   Coordination   Fine Motor 9 Hole Peg test: R hand dominant  R: 31 53, L 33 14  Movements are fluid, and controlled, no droppage  Cognition   Overall Cognitive Status Impaired   Arousal/Participation Alert; Cooperative   Attention Attends with cues to redirect   Orientation Level Oriented to person   Memory Decreased recall of recent events;Decreased short term memory;Decreased recall of precautions   Following Commands Follows one step commands with increased time or repetition   Comments Improved initiation of basic ADL tasks today  Cognitive tasks included Draw a clock test with severe impairments  Pt dividing [place] with 19 point marks, includes no numbers, draws a square when prompted to set time  Pt with no awareness of errors  Attempted Trail making Part A  Pt unable to complete with severe errors and required 75% assist for following numerical sequence   Unable to follow simple letter cancellation task  Perseverates on drawing line from previous trail making  Time spent with basic orientation training with repetitive training for date carryover  Set up visual calendar for month of august, as she was more accurate with visual of calendar rather than written date  Vision   Vision Comments continue to question R lower field cut vs inattention  difficulty with locating items on trail making consistantly with Rlower Quadrant  Assessment   Treatment Assessment Improved initiation of basic ADL tasks today  Cognitive tasks included Draw a clock test with severe impairments  Pt dividing [place] with 19 point marks, includes no numbers, draws a square when prompted to set time  Pt with no awareness of errors  Attempted Trail making Part A  Pt unable to complete with severe errors and required 75% assist for following numerical sequence  Unable to follow simple letter cancellation task  Perseverates on drawing line from previous trail making  Time spent with basic orientation training with repetitive training for date carryover  Set up visual calendar for month of august, as she was more accurate with visual of calendar rather than written date  Pt participated in skilled OT treatment session with treatment focus on ADL re-training, fxnl xfers, fxnl cognition, fxnl attention, standing balance and FMC/GMC  Pt tolerated session well, with improved sequencing for Dressing tasks, however still requires assistance for management of bathing and toileting thoroughness  Pt continues with severe cognitive impairments that will impact abilities to perform all medication management, and higher executive functions  Pt continues to require skilled acute rehab OT services to increase overall functional independence and safety w/ I/ADLs and functional transfers  Continue with plan for discharge as outpatient OT upon return home with increased family support   Continued plan of care for OT sessions to focus on the following areas:  ADL Retraining , IADL training , Kitchen Mobilty, Meal preparation, Medication management , Functional Transfers, Functional Cognition, Functional Attention, Assessment of Cognitive function, ACLS, Standing tolerance, Standing balance , UE NMR right, R attention, Visual perceptual skills, Visual scanning, DME training/education, Family training/education and Leisure and social pursuits  Prognosis Good   Problem List Decreased strength;Decreased endurance; Impaired balance;Decreased mobility; Decreased cognition;Decreased safety awareness; Impaired judgement; Impaired vision   Plan   Treatment/Interventions ADL retraining;Functional transfer training;LE strengthening/ROM; Therapeutic exercise; Endurance training;Cognitive reorientation   Recommendation   OT Discharge Recommendation   (Home with Supervision 24/7, OUT NEURO)   Equipment Recommended   (TBD)   OT Therapy Minutes   OT Time In 0730   OT Time Out 0915   OT Total Time (minutes) 105   OT Mode of treatment - Individual (minutes) 105   OT Mode of treatment - Concurrent (minutes) 0   OT Mode of treatment - Group (minutes) 0   OT Mode of treatment - Co-treat (minutes) 0   OT Mode of Treatment - Total time(minutes) 105 minutes   OT Cumulative Minutes 370

## 2022-08-01 NOTE — CONSULTS
Cardiology   Gabino Okeefe 76 y o  female MRN: [id number]  Unit/Bed#: [facility] 972-01 Encounter: [id number]      Reason for Consult / Principal Problem:  CVA  Physician Requesting Consult:  Genesis Brown MD    Outpatient Cardiologist:  None  Assessment  1  Junctional bradycardia / SA node dysfunction  -Asymptomatic - no prior report of presyncopal symptoms or prior syncope  -ECG on admission 7/24 demonstrated NSR, rate 65 ppm  -ECG 8/1/22 - appears to be a junctional rhythm w/ PACs, rate 56 bpm   -Not currently on telemetry while in acute rehab   -Not previously on AV kiera blocking agents   -Electrolytes stable on BMP   -TSH level 1 5  2  Acute left MCA CVA  -Previously followed by the [address] [place] [address] Neurology team earlier this admission, was deemed medically stable and now rehabbing in [facility]   -On low-dose aspirin and high-intensity statin therapy  Plan  No urgent indication for ppm at this time given that hemodynamics are stable and patient is currently asymptomatic from a cardiac perspective  Would avoid utilizing AV kiera blocking agents  If Pt/family willing to proceed with loop recorder implantation this admission, this would help assist in long-term detection of any significant joel or tachyarrhythmias  If not willing to proceed this admission and would prefer to have this done as an outpatient would recommend ZIO patch application in the interm until loop recorder can be implanted in the outpatient setting  Will also arrange for the patient to have follow-up with the EP service upon discharge for further treatment recommendations/management  HPI: Gabino Okeefe 76y o  year old female with no significant prior cardiac history  Medical history includes depression, prior DVT (not currently on systemic anticoagulation, and endometrial CA (s/p chemo/radiation in 2015)  Nonsmoker, denies excessive alcohol or recreational drug use  No significant family history for heart disease    She presented to the [facility] on 7/28/2022 with complaints of a right side facial droop and aphasia  Subsequently on MRI imaging was found to have acute to early subacute ischemia involving the left MCA territory with one large infarct and multiple additional smaller infarcts  ECG on admission demonstrated NSR and per documentation per the primary team no evidence of atrial fibrillation had been detected on telemetry review during her hospitalization  She was placed on anti-platelet and high-intensity statin therapy in reference to her newly diagnosed CVA  Per the neuro team there was concern for possible cardioembolic etiology and thus loop recorder implantation was requested for long-term atrial fibrillation surveillance  She had been evaluated by the EP service earlier this admission and were agreeable to implanting the loop recorder prior to discharge, however the family had opted to pursue this in the outpatient setting  She was deemed to be medically cleared by the internal medicine and neurology team and was transferred to the acute rehab center on 7/28  Today the acute rehab nurse practitioner was evaluating the patient and noted an irregular heart rhythm upon auscultation  A ECG was subsequently obtained which demonstrated what appears to be a junctional rhythm with PACs, rate around 55 beats per minute  A consult to Cardiology was requested for further treatment recommendations/management        Family History:   Family History   Problem Relation Age of Onset    Hypothyroidism Mother     Diabetes Maternal Grandfather      Historical Information   Past Medical History:   Diagnosis Date    Deep vein thrombosis ([facility] ) 1/28/2016    Diverticulosis     last assessed 05/29/13    Fibrocystic breast     History of DVT (deep vein thrombosis)     Xarelto, 4/2015 thru April 2016    Impaired fasting glucose     last assessed 06/04/14    Inguinal lymphadenopathy     Multiple benign polyps of large intestine     Obesity     Uterine cancer ([facility] )      Past Surgical History:   Procedure Laterality Date    HYSTERECTOMY      LYMPH NODE BIOPSY      OOPHORECTOMY      PELVIC LAPAROSCOPY      TONSILLECTOMY      with adenoidectomy     Social History   Social History     Substance and Sexual Activity   Alcohol Use Not Currently     Social History     Substance and Sexual Activity   Drug Use No     Social History     Tobacco Use   Smoking Status Never Smoker   Smokeless Tobacco Never Used     Family History:   Family History   Problem Relation Age of Onset    Hypothyroidism Mother     Diabetes Maternal Grandfather        Review of Systems:  Review of Systems   Constitutional: Positive for fatigue  Negative for chills and fever  Respiratory: Negative for cough, chest tightness and shortness of breath  Cardiovascular: Negative for chest pain, palpitations and leg swelling  Gastrointestinal: Negative for abdominal pain  Neurological: Positive for speech difficulty  Negative for dizziness, light-headedness and headaches  All other systems reviewed and are negative            Scheduled Meds:  Current Facility-Administered Medications   Medication Dose Route Frequency Provider Last Rate    acetaminophen  650 mg Oral Q6H PRN Kathye Hearing, PA-C      aluminum-magnesium hydroxide-simethicone  30 mL Oral Q6H PRN Kathye Hearing, PA-C      ascorbic acid  500 mg Oral Daily Kathye Hearing, PA-C      aspirin  81 mg Oral Daily Kathye Hearing, PA-C      atorvastatin  40 mg Oral QPM Kathye Hearing, PA-C      bisacodyl  10 mg Rectal Daily PRN Kathye Hearing, PA-C      calcium carbonate-vitamin D  1 tablet Oral Daily With Breakfast Kathye Hearing, PA-C      cholecalciferol  2,000 Units Oral Daily Kathye Hearing, PA-C      docusate sodium  100 mg Oral BID Kathye Hearing, PA-C      enoxaparin  40 mg Subcutaneous Daily Kathye Hearing, PA-C      multivitamin-minerals  1 tablet Oral Daily Kathye Hearing, PA-C      ondansetron  4 mg Oral Q6H PRN Marijane Signs, PA-C      polyethylene glycol  17 g Oral Daily PRN Marijane Signs, PA-C      senna  2 tablet Oral BID Marijane Signs, PA-C      sertraline  75 mg Oral Daily Marijane Signs, PA-C       Continuous Infusions:   PRN Meds:   acetaminophen    aluminum-magnesium hydroxide-simethicone    bisacodyl    ondansetron    polyethylene glycol  all current active meds have been reviewed and current meds:   Current Facility-Administered Medications   Medication Dose Route Frequency    acetaminophen (TYLENOL) tablet 650 mg  650 mg Oral Q6H PRN    aluminum-magnesium hydroxide-simethicone (MYLANTA) oral suspension 30 mL  30 mL Oral Q6H PRN    ascorbic acid (VITAMIN C) tablet 500 mg  500 mg Oral Daily    aspirin chewable tablet 81 mg  81 mg Oral Daily    atorvastatin (LIPITOR) tablet 40 mg  40 mg Oral QPM    bisacodyl (DULCOLAX) rectal suppository 10 mg  10 mg Rectal Daily PRN    calcium carbonate-vitamin D (OSCAL-D) 500 mg-200 units per tablet 1 tablet  1 tablet Oral Daily With Breakfast    cholecalciferol (VITAMIN D3) tablet 2,000 Units  2,000 Units Oral Daily    docusate sodium (COLACE) capsule 100 mg  100 mg Oral BID    enoxaparin (LOVENOX) subcutaneous injection 40 mg  40 mg Subcutaneous Daily    multivitamin-minerals (CENTRUM) tablet 1 tablet  1 tablet Oral Daily    ondansetron (ZOFRAN-ODT) dispersible tablet 4 mg  4 mg Oral Q6H PRN    polyethylene glycol (MIRALAX) packet 17 g  17 g Oral Daily PRN    senna (SENOKOT) tablet 17 2 mg  2 tablet Oral BID    sertraline (ZOLOFT) tablet 75 mg  75 mg Oral Daily       No Known Allergies    Objective   Vitals: Blood pressure 119/58, pulse 58, temperature 97 7 °F (36 5 °C), temperature source Oral, resp  rate 18, height 5' 7" (1 702 m), weight 85 6 kg (188 lb 12 8 oz), SpO2 98 %  , Body mass index is 29 57 kg/m² ,   Orthostatic Blood Pressures    Flowsheet Row Most Recent Value   Blood Pressure 119/58 filed at 08/01/2022 8668   Patient Position - Orthostatic VS Lying filed at 08/01/2022 5533            Intake/Output Summary (Last 24 hours) at 8/1/2022 1432  Last data filed at 8/1/2022 1100  Gross per 24 hour   Intake 476 ml   Output --   Net 476 ml       Invasive Devices  Report    None               Physical Exam:  Physical Exam  Vitals and nursing note reviewed  Constitutional:       General: She is not in acute distress  Appearance: She is not diaphoretic  HENT:      Head: Normocephalic and atraumatic  Mouth/Throat:      Mouth: Mucous membranes are moist    Eyes:      General: No scleral icterus  Cardiovascular:      Rate and Rhythm: Normal rate  Rhythm irregular  Pulses: Normal pulses  Heart sounds: Normal heart sounds  No murmur heard  Pulmonary:      Effort: Pulmonary effort is normal       Breath sounds: Normal breath sounds  No wheezing or rales  Abdominal:      General: Bowel sounds are normal       Palpations: Abdomen is soft  Tenderness: There is no abdominal tenderness  Musculoskeletal:      Cervical back: Neck supple  Right lower leg: No edema  Left lower leg: No edema  Skin:     General: Skin is warm and dry  Capillary Refill: Capillary refill takes less than 2 seconds  Neurological:      Mental Status: She is alert and oriented to person, place, and time  Motor: No weakness  Comments: Expressive aphasia     Psychiatric:         Mood and Affect: Mood normal          Lab Results:   Recent Results (from the past 24 hour(s))   CBC and differential    Collection Time: 08/01/22  5:05 AM   Result Value Ref Range    WBC 8 18 4 31 - 10 16 Thousand/uL    RBC 4 77 3 81 - 5 12 Million/uL    Hemoglobin 14 0 11 5 - 15 4 g/dL    Hematocrit 43 3 34 8 - 46 1 %    MCV 91 82 - 98 fL    MCH 29 4 26 8 - 34 3 pg    MCHC 32 3 31 4 - 37 4 g/dL    RDW 13 1 11 6 - 15 1 %    MPV 10 7 8 9 - 12 7 fL    Platelets 063 518 - 045 Thousands/uL    nRBC 0 /100 WBCs    Neutrophils Relative 48 43 - 75 %    Immat GRANS % 0 0 - 2 %    Lymphocytes Relative 38 14 - 44 %    Monocytes Relative 10 4 - 12 %    Eosinophils Relative 3 0 - 6 %    Basophils Relative 1 0 - 1 %    Neutrophils Absolute 3 96 1 85 - 7 62 Thousands/µL    Immature Grans Absolute 0 02 0 00 - 0 20 Thousand/uL    Lymphocytes Absolute 3 07 0 60 - 4 47 Thousands/µL    Monocytes Absolute 0 84 0 17 - 1 22 Thousand/µL    Eosinophils Absolute 0 25 0 00 - 0 61 Thousand/µL    Basophils Absolute 0 04 0 00 - 0 10 Thousands/µL   Basic metabolic panel    Collection Time: 08/01/22  5:05 AM   Result Value Ref Range    Sodium 139 135 - 147 mmol/L    Potassium 4 2 3 5 - 5 3 mmol/L    Chloride 107 96 - 108 mmol/L    CO2 29 21 - 32 mmol/L    ANION GAP 3 (L) 4 - 13 mmol/L    BUN 17 5 - 25 mg/dL    Creatinine 0 64 0 60 - 1 30 mg/dL    Glucose 94 65 - 140 mg/dL    Glucose, Fasting 94 65 - 99 mg/dL    Calcium 9 2 8 3 - 10 1 mg/dL    eGFR 85 ml/min/1 73sq m   ECG 12 lead    Collection Time: 08/01/22  2:03 PM   Result Value Ref Range    Ventricular Rate 56 BPM    Atrial Rate 58 BPM    KY Interval  ms    QRSD Interval 80 ms    QT Interval 414 ms    QTC Interval 399 ms    P Axis  degrees    QRS Axis -30 degrees    T Wave Axis 4 degrees     Imaging: I have personally reviewed pertinent reports  and I have personally reviewed pertinent films in PACS  Code Status: LL 3 DNR/DNI  Epic/ Allscripts/Care Everywhere records reviewed: Yes    * Please Note: Fluency DirectDictation voice to text software may have been used in the creation of this document   **

## 2022-08-02 LAB
ATRIAL RATE: 66 BPM
P AXIS: 33 DEGREES
PR INTERVAL: 232 MS
QRS AXIS: -34 DEGREES
QRSD INTERVAL: 92 MS
QT INTERVAL: 408 MS
QTC INTERVAL: 427 MS
T WAVE AXIS: 1 DEGREES
VENTRICULAR RATE: 66 BPM

## 2022-08-02 PROCEDURE — 97112 NEUROMUSCULAR REEDUCATION: CPT

## 2022-08-02 PROCEDURE — 97130 THER IVNTJ EA ADDL 15 MIN: CPT

## 2022-08-02 PROCEDURE — 97129 THER IVNTJ 1ST 15 MIN: CPT

## 2022-08-02 PROCEDURE — 99231 SBSQ HOSP IP/OBS SF/LOW 25: CPT | Performed by: INTERNAL MEDICINE

## 2022-08-02 PROCEDURE — 99232 SBSQ HOSP IP/OBS MODERATE 35: CPT | Performed by: PHYSICAL MEDICINE & REHABILITATION

## 2022-08-02 PROCEDURE — 99232 SBSQ HOSP IP/OBS MODERATE 35: CPT | Performed by: INTERNAL MEDICINE

## 2022-08-02 PROCEDURE — 97110 THERAPEUTIC EXERCISES: CPT

## 2022-08-02 PROCEDURE — 93005 ELECTROCARDIOGRAM TRACING: CPT

## 2022-08-02 PROCEDURE — 92507 TX SP LANG VOICE COMM INDIV: CPT

## 2022-08-02 PROCEDURE — 93010 ELECTROCARDIOGRAM REPORT: CPT | Performed by: INTERNAL MEDICINE

## 2022-08-02 PROCEDURE — 97530 THERAPEUTIC ACTIVITIES: CPT

## 2022-08-02 PROCEDURE — 92526 ORAL FUNCTION THERAPY: CPT

## 2022-08-02 RX ADMIN — SENNOSIDES 17.2 MG: 8.6 TABLET, FILM COATED ORAL at 18:05

## 2022-08-02 RX ADMIN — ATORVASTATIN CALCIUM 40 MG: 40 TABLET, FILM COATED ORAL at 18:04

## 2022-08-02 RX ADMIN — MULTIPLE VITAMINS W/ MINERALS TAB 1 TABLET: TAB ORAL at 10:07

## 2022-08-02 RX ADMIN — SERTRALINE HYDROCHLORIDE 75 MG: 50 TABLET ORAL at 10:08

## 2022-08-02 RX ADMIN — ASPIRIN 81 MG CHEWABLE TABLET 81 MG: 81 TABLET CHEWABLE at 10:07

## 2022-08-02 RX ADMIN — Medication 1 TABLET: at 07:40

## 2022-08-02 RX ADMIN — OXYCODONE HYDROCHLORIDE AND ACETAMINOPHEN 500 MG: 500 TABLET ORAL at 10:08

## 2022-08-02 RX ADMIN — ENOXAPARIN SODIUM 40 MG: 40 INJECTION SUBCUTANEOUS at 10:08

## 2022-08-02 RX ADMIN — Medication 2000 UNITS: at 10:09

## 2022-08-02 RX ADMIN — DOCUSATE SODIUM 100 MG: 100 CAPSULE, LIQUID FILLED ORAL at 10:08

## 2022-08-02 RX ADMIN — DOCUSATE SODIUM 100 MG: 100 CAPSULE, LIQUID FILLED ORAL at 18:05

## 2022-08-02 NOTE — PCC PHYSICAL THERAPY
Patient making progress with skilled PT intervention thusfar  Family training initiated 8/5 and remains ongoing  Training being completed with patient's daughters, Fidencio Chandler and Danay Cullen    Plan to discharge Friday with OPPT services and 24 hour S/A  Danay Cullen confirming discharge to occur Friday; Amber Lobo acting CM, to follow up via phone call  Patient continues to benefit from skilled PT intervention to maximize her balance and safety  24 hour S will remain the recommendation for cognitive and linguistic barriers  Prior to d/c, PT to focus walking around objects as she has 3 benton retrievers that can pose an increased risk for falls  Danay Cullen to come in again Thursday and Friday and participate in the hands on/cueing portion of the training as she previously has observed therapy  Fidencio Chandler stating that she will be in to visit but was unaware of the time   PT also to make a HEP for strength and balance to be completed at home

## 2022-08-02 NOTE — PROGRESS NOTES
Internal Medicine Progress Note  Patient: Gala James  Age/sex: 76 y o  female  Medical Record #: [id number]      ASSESSMENT/PLAN: (Interval History)  Gala James is seen and examined and management for following issues:    Lt MCA CVA  · Continue ASA 81mg daily/atorvastatin 40mg daily  · Outpt follow-up with Neurology  · Outpt follow-up with Cardiology     Junctional rhythm/PACs  · Previous EKGs were sinus  · Cards following now  · rec LOOP now vs Zio patch now and then LOOP as OP  · Family aware and will give decision today  · AVOID ANY AVN BLOCKING AGENTS  · For repeat EKG today per Cards     Depression  · Continue sertraline         DC planning: team    The above assessment and plan was reviewed and updated as determined by my evaluation of the patient on 8/2/2022      Labs:   Results from last 7 days   Lab Units 08/01/22  0505 07/29/22  0645   WBC Thousand/uL 8 18 7 66   HEMOGLOBIN g/dL 14 0 13 7   HEMATOCRIT % 43 3 42 1   PLATELETS Thousands/uL 241 217     Results from last 7 days   Lab Units 08/01/22  0505 07/29/22  1059   SODIUM mmol/L 139 138   POTASSIUM mmol/L 4 2 4 3   CHLORIDE mmol/L 107 109*   CO2 mmol/L 29 25   BUN mg/dL 17 19   CREATININE mg/dL 0 64 0 69   CALCIUM mg/dL 9 2 9 3                   Review of Scheduled Meds:  Current Facility-Administered Medications   Medication Dose Route Frequency Provider Last Rate    acetaminophen  650 mg Oral Q6H PRN ERIK Harrison-C      aluminum-magnesium hydroxide-simethicone  30 mL Oral Q6H PRN ERIK Harrison-C      ascorbic acid  500 mg Oral Daily ERIK Harrison-C      aspirin  81 mg Oral Daily ERIK Harrison-C      atorvastatin  40 mg Oral QPM ERIK Harrison-C      bisacodyl  10 mg Rectal Daily PRN Severiano Lewis, PA-C      calcium carbonate-vitamin D  1 tablet Oral Daily With Breakfast ERIK Harrison-HELEN      cholecalciferol  2,000 Units Oral Daily ERIK Harrison-C      docusate sodium  100 mg Oral BID Vevelyespinoza Garza, PA-HELEN      enoxaparin  40 mg Subcutaneous Daily Vevelyespinoza Garza, PA-C      multivitamin-minerals  1 tablet Oral Daily Vevelyespinoza Garza, PA-HELEN      ondansetron  4 mg Oral Q6H PRN Vevelyespinoza Garza, PA-C      polyethylene glycol  17 g Oral Daily PRN Vevelyespinoza Garza, PA-HELEN      senna  2 tablet Oral BID Vevelyespinoza Garza, PA-C      sertraline  75 mg Oral Daily Sudhavelyespinoza Garza, PRISCA         Subjective/ HPI: Patient seen and examined  Patients overnight issues or events were reviewed with nursing or staff during rounds or morning huddle session  New or overnight issues include the following:     No new or overnight issues  Offers no complaints    ROS:   A 10 point ROS was performed; negative except as noted above         Imaging:     No orders to display       *Labs /Radiology studies reviewed  *Medications reviewed and reconciled as needed  *Please refer to order section for additional ordered labs studies  *Case discussed with primary attending during morning huddle case rounds    Physical Examination:  Vitals:   Vitals:    08/01/22 0614 08/01/22 1518 08/01/22 2038 08/02/22 0550   BP: 119/58 132/54 125/60 120/60   BP Location: Left arm Right arm Right arm Left arm   Pulse: 58 64 57 55   Resp: 18 18 18 16   Temp: 97 7 °F (36 5 °C) 99 °F (37 2 °C) 97 7 °F (36 5 °C) 97 7 °F (36 5 °C)   TempSrc: Oral Oral Oral Oral   SpO2: 98% 95% 95% 98%   Weight:       Height:           General Appearance: no distress, conversive  HEENT: PERRLA, conjuctiva normal; oropharynx clear; mucous membranes moist   Neck:  Supple, normal ROM  Lungs: CTA, normal respiratory effort, no retractions, expiratory effort normal  CV: irregular rate and rhythm; no rubs/murmurs/gallops, PMI normal   ABD: soft; ND/NT; +BS  EXT: no edema  Skin: normal turgor, normal texture, no rashes  Psych: affect normal, mood normal  Neuro: AAO      The above physical exam was reviewed and updated as determined by my evaluation of the patient on 8/2/2022  Invasive Devices  Report    None                    VTE Pharmacologic Prophylaxis: Enoxaparin  Code Status: Level 3 - DNAR and DNI  Current Length of Stay: 5 day(s)      Total time spent:  30 minutes with more than 50% spent counseling/coordinating care  Counseling includes discussion with patient re: progress  and discussion with patient of his/her current medical state/information  Coordination of patient's care was performed in conjunction with primary service  Time invested included review of patient's labs, vitals, and management of their comorbidities with continued monitoring  In addition, this patient was discussed with medical team including physician and advanced extenders  The care of the patient was extensively discussed and appropriate treatment plan was formulated unique for this patient  ** Please Note:  voice to text software may have been used in the creation of this document   Although proof errors in transcription or interpretation are a potential of such software**

## 2022-08-02 NOTE — QUICK NOTE
Spoke w/the patient's daughters at length regarding the types of and purposes of outpatient cardiac monitoring for Afib surveillance  At this time the patient's daughters have declined to move forward with outpatient cardiac monitoring and are requesting to revisit this topic in the outpatient setting with their cardiology provider

## 2022-08-02 NOTE — PCC CARE MANAGEMENT
Pt continues to participate well and is expected to return home later this week  Family education/training has occurred and they are aware of recommendations for assist  It is recommended pt continue with outpt pt ot and speech services  Following to arrange recommended services

## 2022-08-02 NOTE — PLAN OF CARE
Problem: Prexisting or High Potential for Compromised Skin Integrity  Goal: Skin integrity is maintained or improved  Description: INTERVENTIONS:  - Identify patients at risk for skin breakdown  - Assess and monitor skin integrity  - Assess and monitor nutrition and hydration status  - Monitor labs   - Assess for incontinence   - Turn and reposition patient  - Assist with mobility/ambulation  - Relieve pressure over bony prominences  - Avoid friction and shearing  - Provide appropriate hygiene as needed including keeping skin clean and dry  - Evaluate need for skin moisturizer/barrier cream  - Collaborate with interdisciplinary team   - Patient/family teaching  - Consider wound care consult   Outcome: Progressing     Problem: PAIN - ADULT  Goal: Verbalizes/displays adequate comfort level or baseline comfort level  Description: Interventions:  - Encourage patient to monitor pain and request assistance  - Assess pain using appropriate pain scale  - Administer analgesics based on type and severity of pain and evaluate response  - Implement non-pharmacological measures as appropriate and evaluate response  - Consider cultural and social influences on pain and pain management  - Notify physician/advanced practitioner if interventions unsuccessful or patient reports new pain  Outcome: Progressing     Problem: INFECTION - ADULT  Goal: Absence or prevention of progression during hospitalization  Description: INTERVENTIONS:  - Assess and monitor for signs and symptoms of infection  - Monitor lab/diagnostic results  - Monitor all insertion sites, i e  indwelling lines, tubes, and drains  - Monitor endotracheal if appropriate and nasal secretions for changes in amount and color  - [place] appropriate cooling/warming therapies per order  - Administer medications as ordered  - Instruct and encourage patient and family to use good hand hygiene technique  - Identify and instruct in appropriate isolation precautions for identified infection/condition  Outcome: Progressing  Goal: Absence of fever/infection during neutropenic period  Description: INTERVENTIONS:  - Monitor WBC    Outcome: Progressing     Problem: SAFETY ADULT  Goal: Patient will remain free of falls  Description: INTERVENTIONS:  - Educate patient/family on patient safety including physical limitations  - Instruct patient to call for assistance with activity   - Consult OT/PT to assist with strengthening/mobility   - Keep Call bell within reach  - Keep bed low and locked with side rails adjusted as appropriate  - Keep care items and personal belongings within reach  - Initiate and maintain comfort rounds  - Make Fall Risk Sign visible to staff  - Offer Toileting every 2 Hours, in advance of need  - Initiate/Maintain bed and chair alarm  - Obtain necessary fall risk management equipment: alarms  - Apply yellow socks and bracelet for high fall risk patients  - Consider moving patient to room near nurses station  Outcome: Progressing  Goal: Maintain or return to baseline ADL function  Description: INTERVENTIONS:  -  Assess patient's ability to carry out ADLs; assess patient's baseline for ADL function and identify physical deficits which impact ability to perform ADLs (bathing, care of mouth/teeth, toileting, grooming, dressing, etc )  - Assess/evaluate cause of self-care deficits   - Assess range of motion  - Assess patient's mobility; develop plan if impaired  - Assess patient's need for assistive devices and provide as appropriate  - Encourage maximum independence but intervene and supervise when necessary  - Involve family in performance of ADLs  - Assess for home care needs following discharge   - Consider OT consult to assist with ADL evaluation and planning for discharge  - Provide patient education as appropriate  Outcome: Progressing  Goal: Maintains/Returns to pre admission functional level  Description: INTERVENTIONS:  - Perform BMAT or MOVE assessment daily  - Set and communicate daily mobility goal to care team and patient/family/caregiver  - Collaborate with rehabilitation services on mobility goals if consulted    - Reposition patient every 2 hours  - Dangle patient 3 times a day  - Stand patient 3 times a day  - Ambulate patient 3 times a day  - Out of bed to chair 3 times a day   - Out of bed for meals 3 times a day  - Out of bed for toileting  - Record patient progress and toleration of activity level   Outcome: Progressing     Problem: DISCHARGE PLANNING  Goal: Discharge to home or other facility with appropriate resources  Description: INTERVENTIONS:  - Identify barriers to discharge w/patient and caregiver  - Arrange for needed discharge resources and transportation as appropriate  - Identify discharge learning needs (meds, wound care, etc )  - Arrange for interpretive services to assist at discharge as needed  - Refer to Case Management Department for coordinating discharge planning if the patient needs post-hospital services based on physician/advanced practitioner order or complex needs related to functional status, cognitive ability, or social support system  Outcome: Progressing     Problem: Neurological Deficit  Goal: Neurological status is stable or improving  Description: Interventions:  - Monitor and assess patient's level of consciousness, motor function, sensory function, and level of assistance needed for ADLs  - Monitor and report changes from baseline  Collaborate with interdisciplinary team to initiate plan and implement interventions as ordered  - Provide and maintain a safe environment  - Consider seizure precautions  - Consider fall precautions  - Consider aspiration precautions  - Consider bleeding precautions  Outcome: Progressing     Problem:  Activity Intolerance/Impaired Mobility  Goal: Mobility/activity is maintained at optimum level for patient  Description: Interventions:  - Assess and monitor patient  barriers to mobility and need for assistive/adaptive devices  - Assess patient's emotional response to limitations  - Collaborate with interdisciplinary team and initiate plans and interventions as ordered  - Encourage independent activity per ability   - Maintain proper body alignment  - Perform active/passive rom as tolerated/ordered  - Plan activities to conserve energy   - Turn patient as appropriate  Outcome: Progressing     Problem: Communication Impairment  Goal: Ability to express needs and understand communication  Description: Assess patient's communication skills and ability to understand information  Patient will demonstrate use of effective communication techniques, alternative methods of communication and understanding even if not able to speak  - Encourage communication and provide alternate methods of communication as needed  - Collaborate with case management/ for discharge needs  - Include patient/family/caregiver in decisions related to communication  Outcome: Progressing     Problem: Potential for Aspiration  Goal: Non-ventilated patient's risk of aspiration is minimized  Description: Assess and monitor vital signs, respiratory status, and labs (WBC)  Monitor for signs of aspiration (tachypnea, cough, rales, wheezing, cyanosis, fever)  - Assess and monitor patient's ability to swallow  - Place patient up in chair to eat if possible  - HOB up at 90 degrees to eat if unable to get patient up into chair   - Supervise patient during oral intake  - Instruct patient/ family to take small bites  - Instruct patient/ family to take small single sips when taking liquids  - Follow patient-specific strategies generated by speech pathologist   Outcome: Progressing  Goal: Ventilated patient's risk of aspiration is minimized  Description: Assess and monitor vital signs, respiratory status, airway cuff pressure, and labs (WBC)    Monitor for signs of aspiration (tachypnea, cough, rales, wheezing, cyanosis, fever)  - Elevate head of bed 30 degrees if patient has tube feeding   - Monitor tube feeding  Outcome: Progressing     Problem: Nutrition  Goal: Nutrition/Hydration status is improving  Description: Monitor and assess patient's nutrition/hydration status for malnutrition (ex- brittle hair, bruises, dry skin, pale skin and conjunctiva, muscle wasting, smooth red tongue, and disorientation)  Collaborate with interdisciplinary team and initiate plan and interventions as ordered  Monitor patient's weight and dietary intake as ordered or per policy  Utilize nutrition screening tool and intervene per policy  Determine patient's food preferences and provide high-protein, high-caloric foods as appropriate  - Assist patient with eating   - Allow adequate time for meals   - Encourage patient to take dietary supplement as ordered  - Collaborate with clinical nutritionist   - Include patient/family/caregiver in decisions related to nutrition  Outcome: Progressing     Problem: Nutrition/Hydration-ADULT  Goal: Nutrient/Hydration intake appropriate for improving, restoring or maintaining nutritional needs  Description: Monitor and assess patient's nutrition/hydration status for malnutrition  Collaborate with interdisciplinary team and initiate plan and interventions as ordered  Monitor patient's weight and dietary intake as ordered or per policy  Utilize nutrition screening tool and intervene as necessary  Determine patient's food preferences and provide high-protein, high-caloric foods as appropriate       INTERVENTIONS:  - Monitor oral intake, urinary output, labs, and treatment plans  - Assess nutrition and hydration status and recommend course of action  - Evaluate amount of meals eaten  - Assist patient with eating if necessary   - Allow adequate time for meals  - Recommend/ encourage appropriate diets, oral nutritional supplements, and vitamin/mineral supplements  - Order, calculate, and assess calorie counts as needed  - Recommend, monitor, and adjust tube feedings and TPN/PPN based on assessed needs  - Assess need for intravenous fluids  - Provide specific nutrition/hydration education as appropriate  - Include patient/family/caregiver in decisions related to nutrition  Outcome: Progressing     Problem: Potential for Falls  Goal: Patient will remain free of falls  Description: INTERVENTIONS:  - Educate patient/family on patient safety including physical limitations  - Instruct patient to call for assistance with activity   - Consult OT/PT to assist with strengthening/mobility   - Keep Call bell within reach  - Keep bed low and locked with side rails adjusted as appropriate  - Keep care items and personal belongings within reach  - Initiate and maintain comfort rounds  - Make Fall Risk Sign visible to staff  - Offer Toileting every 2 Hours, in advance of need  - Initiate/Maintain bed and chair alarm  - Obtain necessary fall risk management equipment: alarms    - Apply yellow socks and bracelet for high fall risk patients  - Consider moving patient to room near nurses station  Outcome: Progressing

## 2022-08-02 NOTE — PROGRESS NOTES
08/02/22 1015   Pain Assessment   Pain Assessment Tool 0-10   Pain Score No Pain   Restrictions/Precautions   Precautions Bed/chair alarms;Cognitive; Fall Risk;Supervision on toilet/commode   Weight Bearing Restrictions No   ROM Restrictions No   Sit to Stand   Type of Assistance Needed Incidental touching   Physical Assistance Level No physical assistance   Sit to Stand CARE Score 4   Bed-Chair Transfer   Type of Assistance Needed Physical assistance   Physical Assistance Level 25% or less   Comment CGA/[place] HHA/no AD   Chair/Bed-to-Chair Transfer CARE Score 3   Functional Standing Tolerance   Time 16'34, 8'24   Activity Pt engaged in card scan board activity while in stance at tabletop to promote visual scanning and standing tolerance  Pt does require increased time for all task completion  Noted to omit cards when scanning to lower quadrant and requires cues to continue progression of task without compensation  Pt requires ~25 minutes in total to locate all 52 cards  Does not c/o any light headedness, dizziness, double vision, or headache  Cognition   Overall Cognitive Status Impaired   Arousal/Participation Alert; Cooperative   Attention Attends with cues to redirect   Orientation Level Oriented to person;Disoriented to time;Disoriented to situation   Memory Decreased recall of recent events;Decreased short term memory;Decreased recall of precautions   Following Commands Follows one step commands with increased time or repetition   Vision   Vision Comments Pt engaged in letter targeting task with focus on visual scanning on activity "Task 1" With 22 total target letters and 85 letters in total  Pt was noted to omit 1 target letter, select 3 non target letters, with a total of 4 total errors  Pt requires a total of 2'48" for task completion  Pt was able to locate and correct 3 non target letters with increased time   Pt's errors appear to be perseverative in nature where pt continually began to [place] letters in a row until realizing mistake and then additional perseverated on previous lines target letters not changing to next lines target letter  Additional Activities   Additional Activities   (Functional money management  (activity m2 and m-11))   Additional Activities Comments Pt given coins & money worksheets where pt was asked to draw a line matching the coins and bills on the left with values on the R  Pt trials coin activity first and then progressed to bills  On coin ID activity pt was noted to complete 2/5 accurately  Of note pt noted to begin task at bottom of page and work her way up, noted jerky lines, and at times perseveration on line drawing  When progressing to bills pt was able to complete 5/6 accurately and was able to correct error  Activity Tolerance   Activity Tolerance Patient tolerated treatment well   Assessment   Treatment Assessment Pt participated in skilled OT services with focus on functional cognition, functional mobility, and visual scanning, informal family training with pt's daughter Karla Smith who was present at start of session  Pt's daughter Karla Smith inquiring about role of OT, educated pt's daughter and pt both on pt's current deficits and role of OT in addressing current deficits to increase pt safety and independence  Of note pt's daughter states on the side "I don't think she'll ever live alone again" demonstrating insight into pt's current language and cognitive deficits  Pt presents with dec comprehension at times during session  Noted to perseverate on tasks at times and requires cues for termination  Pt will continue to benefit from skilled OT services with focus on functional cognition, visual scanning, obstacle negotiation, and I/ADL retraining  Prognosis Good   Problem List Decreased strength;Decreased endurance; Impaired balance;Decreased mobility; Decreased cognition;Decreased safety awareness; Impaired judgement; Impaired vision   Plan   Treatment/Interventions ADL retraining;Functional transfer training; Therapeutic exercise; Endurance training;Cognitive reorientation;Patient/family training;Equipment eval/education; Bed mobility; Compensatory technique education;Spoke to nursing   Progress Progressing toward goals   Recommendation   OT Discharge Recommendation   (pending progress)   OT Therapy Minutes   OT Time In 1015   OT Time Out 1130   OT Total Time (minutes) 75   OT Mode of treatment - Individual (minutes) 75   OT Mode of treatment - Concurrent (minutes) 0   OT Mode of treatment - Group (minutes) 0   OT Mode of treatment - Co-treat (minutes) 0   OT Mode of Treatment - Total time(minutes) 75 minutes   OT Cumulative Minutes 445   Therapy Time missed   Time missed?  No

## 2022-08-02 NOTE — PCC OCCUPATIONAL THERAPY
Pt us functioning at overall CS for ADLs w/ Min-Mod vc's and CS for fxnl mobility without AD  Limited by impaired insight into deficits, impaired insight, receptive>expressive aphasia, dec STM, impaired divided attention to task, impaired cognitive flexibility, cognitive fatigue, impaired problem-solving, impaired balance, impaired safety awareness, dec endurance, and ADL dysfunction  Plan to d/c to daughterPaige's home and will have 24 hour S  Ongoing FT occurring w/ both daughters  On track for d/c home Friday w/ 24 hour S and A w/ IADLs  Recommending OP neuro OT w/ focus on fxnl cognitive retraining

## 2022-08-02 NOTE — PROGRESS NOTES
22 0800   Pain Assessment   Pain Assessment Tool 0-10   Pain Score No Pain   Restrictions/Precautions   Precautions Aphasia; Aspiration;Bed/chair alarms;Cognitive; Fall Risk;Impulsive;Supervision on toilet/commode;Visual deficit   Comprehension   Comprehension (FIM) 3 - Understands basic info/conversation 50-74% of time   Expression   Expression (FIM) 2 - Uses only simple expressions or gestures (waves, hello)   Social Interaction   Social Interaction (FIM) 5 - Interacts appropriately with others 90% of time   Problem Solving   Problem solving (FIM) 2 - Needs direction more than ½ time to initiate, plan or complete simple tasks   Memory   Memory (FIM) 2 - Recognizes, recalls/performs 25-49%   Speech/Language/Cognition Assessmetn   Treatment Assessment Pt engaged in language tx session w/ pt's dtr, Zari Flannery present where SLP providing education as to tasks being targeted as well as cueing methods which can improve pt's comprehension and expression abilities  Initiated session by targeting pt's biographical and orientation abilities  It was noted that pt was able to spontaneously elicit the following: full name, , age, address today w/o cues  However, when targeting orientation pt was able to state current city, current year and reasoning for hospitalization  Needing binary verbal choices for name of hospital which pt continues to think it's [facility] not [facility] and  Year stated as 'April' but when cued for the other month which begins w/ the letter A, pt was able to state 'August ' Since pt's dtr was present, did complete a review of family names  Pt is still consistent w/ spontaneously stating all 3 dtrs names, but continued to demonstrate difficulty in stating grandchildren vs great grandchildren names  Pt's dtr able to assist w/ all names of grandchildren and great grandchildren   Additionally, SLP did education dtr about bringing in family photos which can be used to aid in pt's recall of names which dtr highly receptive to this recommendation  Otherwise, targeting word to picture matching, SLP provided pt w/ 5 word and then 5 pictures to match to the corresponding words  On the page, the word list was on the L side of the page to pictures on the R side of the page  Pt's ability to match word to picture was 3/5 accurate, noting difficulty given 2 words  It was noted that pt did verbally state correct words, but had difficulty in ID the picture to correlate to the words  However, when completing this task for an additional 2 more times, pt was noted to match the words to the correct pictures w/o cues  SLP increasing the difficulty given this task by now providing a semantic description to match w/ the picture on the R side  Pt continued to orally read the phrases accurately BUT overall comprehension for the description given to the picture was 0/5 accurate  SLP then having pt name all the pictures, which did NOT increase pt's ability to recognize the correct pictures to match the description, still remaining 0/5 accurate  Switching back to just picture-word task where there was a picture presented w/ FO3 words to choose from to name the pictures  Pt was 11/16 accurate in ability to independently ID the word noting that the errors presented were semantically related, to where upon review, pt was able to then choose correct word to match to the pictures  Switching to expressive skills, SLP engaged pt in opposite phrase completion task  Pt's ability to spontaneously complete the phrases was 6/12 accurate, but increased to 7/12 upon repetition of one phrase  The remaining errors even when attempted verbal repetition did not improve  SLP using written cues and FO2 words to choose from to complete the phrases, where pt was able to determine answers increasing to 12/12 accuracy  Continue to question and suspect some visual perceptual component impacting skills observed in session today   Revisiting auditory command following which was 1-step  Pt was 12/15 accurate in completing today  Further targeting L vs R discrimination given body parts where pt was 1/7 given ID of L vs R as well as correct body part  When SLP targeting just body ID, pt did demonstrate some improvement where now pt was 6/10 accurate in ID, benefiting from repetition of body part being targeted to increase to 8/10 accuracy  At end os session, dtr Lety Lucero asking appropriate questions about engaging pt outside of therapies  SLP encouraged pt participation, but did education that if pt is tired, the likelihood of engagement can be decreased as well as her comprehension/expression being more impacted due to fatigue  Currently pt will highly benefit from ongoing skilled SLP services targeting functional language skills, including receptive and expressive language as well as cognitive linguistic skills in attempts to decrease caregiver burden at time of discharge  Eating   Type of Assistance Needed Set-up / clean-up;Supervision;Verbal cues   Physical Assistance Level No physical assistance   Eating CARE Score 4   Swallow Assessment   Swallow Treatment Assessment Daily Dysphagia Tx Note     Patient Name: Alannah Ruby    [id number]'[id number] Date: 8/2/2022      Current Risks for Dysphagia & Aspiration: Dysarthria;General debilitation;New Neuro event;Brain injury;Cognitive deficit     Current Symptoms/Concerns: Cough;Clear throat;During meals; Difficulty chewing;Holding food in mouth;Pockets food;     Current diet:mechanically altered/level 2 diet and thin liquids      Premorbid diet::regular diet and thin liquids      Positioning: upright in chair      Items administered:Consistencies Administered: thin liquids, mechanical soft solids and soft solids  Materials administered included : dysphagia level 3 Khmer toast, scrambled eggs, chopped sausage, canned peaches, juice and soda by cup    Total amount of meal consumed: 100% of meal and 360cc of thin liquids      Oral stage:minimal and mild  Lip closure: functional given tsp and cup  Anterior spillage: none  Mastication: fluctuated between timely vs increased time when taking larger bites and faster rate of intake given soft solids  Bolus formation: slightly decreased w/ more rapid intake and larger bites  Bolus control: appeared functional  Transfer: some delay in transfer given softer solids due to larger boluses and faster intake rate but was prompt w/ liquids  Oral residue: mild residual noting R>L when faster intake/larger bites  Pocketing: none         Pharyngeal stage:minimal  Swallow promptness:fairly timely across textures, but noted minimal delay when larger bites taken  Hyolaryngeal elevation: appearing to be functional upon palpation across consistencies  Wet voice: none  Throat clear: none  Cough: weaker cough x1 noted given canned peaches, suspect 2* ambient fluid  Secondary swallows: given larger boluses   Audible swallows: none       Esophageal stage:WFL--> no observed sxs noted given meal        Summary:     Pt presenting with minimal to mild oropharyngeal dysphagia as noted by increased mastication time when taking larger bites or in addition faster rate of intake  It was observed that initially, overall intake rate was timely, allowing for full and functional mastication given softer solids  However, as meal did progress, pt noting faster intake rate, larger bites being taken which did lead to some decreased bolus formation leading to mild oral residual R>L needing moderate verbal cues from SLP to slow intake rate and to use liquid wash to aid in breakdown of textures  Pt did not demonstrate carryover given liquid wash strategy throughout meal  However, lip seal and bolus retrieval across textures was functional to where no anterior loss observed  Bolus control/transfer of thins was prompt  Swallow initiation continues to be timely but when taking larger bites, minimal delay   Hyolaryngeal excursion upon palpation was functional  Weak cough elicited x1 given canned peaches, suspecting due to 'slurping' given liquids mixed w/ fruit  No further signs/sxs of aspiration observed throughout meal      At end of meal, pt's dtr asking SLP about diet advancement, to where education provided to dtr in regard to observing pt across a number of different meals, providing education/training to them in cueing methods due to risk of aspiration which can present at this time if rapid intake rate and larger bites taken, not having full ability to breakdown these items which could increase risk of choking/aspiration  Dtr verbalizing understanding of this and agreeable to ongoing plan for continued trials of level 3 diet under SLP supervision at this time  Recommendations:  Diet: mechanically altered/level 2 diet and thin liquids  Meds: as pt tolerates  Strategies: upright posture, only feed when fully alert, slow rate of feeding, small bites/sips, effortful swallow, quiet environment (tv off, limit talking, door closed, etc ), alternating bites and sips and OOB for ALL meals     DISTANT supervision with meals  Results reviewed with:  patient and family, Dtr Ashlyn  Aspiration precautions posted  F/u ST tx: Pt will continue to benefit from ongoing skilled dysphagia tx sessions to establish safest least restrictive diet w/o increased oropharyngeal or aspiration sxs as well as monitor ability to carryover swallow strategies independently  Plan: continue ongoing trials of dysphagia level 3 diet food items under SLP supervision only at this time, w/ hopes for diet advancement in upcoming sessions  Swallow Assessment Prognosis   Prognosis Good   Prognosis Considerations Age; Co-morbidities; Family/community support;Medical diagnosis; Medical prognosis; Severity of impairments;New learning ability;Ability to carry over; Cooperation   SLP Therapy Minutes   SLP Time In 0800   SLP Time Out 0930   SLP Total Time (minutes) 90   SLP Mode of treatment - Individual (minutes) 90   SLP Mode of treatment - Concurrent (minutes) 0   SLP Mode of treatment - Group (minutes) 0   SLP Mode of treatment - Co-treat (minutes) 0   SLP Mode of Treatment - Total time(minutes) 90 minutes   SLP Cumulative Minutes 240   Therapy Time missed   Time missed?  No

## 2022-08-02 NOTE — PROGRESS NOTES
08/02/22 1400   Pain Assessment   Pain Assessment Tool 0-10   Pain Score No Pain   Restrictions/Precautions   Precautions Bed/chair alarms;Cognitive; Fall Risk;Supervision on toilet/commode   Weight Bearing Restrictions No   ROM Restrictions No   Cognition   Overall Cognitive Status Impaired   Arousal/Participation Alert; Cooperative   Attention Attends with cues to redirect   Orientation Level Oriented to place;Oriented to person;Oriented to time   Memory Decreased short term memory;Decreased recall of recent events;Decreased recall of precautions   Following Commands Follows one step commands with increased time or repetition   Subjective   Subjective Patient ready to particiapte  Family at bedside   Lying to Sitting on Side of Bed   Type of Assistance Needed Supervision   Physical Assistance Level No physical assistance   Lying to Sitting on Side of Bed CARE Score 4   Sit to Stand   Type of Assistance Needed Incidental touching   Physical Assistance Level No physical assistance   Comment CGA/CS   Sit to Stand CARE Score 4   Bed-Chair Transfer   Type of Assistance Needed Incidental touching   Physical Assistance Level No physical assistance   Comment CGA/CS   Chair/Bed-to-Chair Transfer CARE Score 4   Transfer Bed/Chair/Wheelchair   Limitations Noted In Balance; Endurance;LE Strength   Adaptive Equipment None   Walk 10 Feet   Type of Assistance Needed Incidental touching   Physical Assistance Level No physical assistance   Comment CGA/CS no AD   Walk 10 Feet CARE Score 4   Walk 50 Feet with Two Turns   Type of Assistance Needed Incidental touching   Physical Assistance Level No physical assistance   Comment CGA/CS no AD   Walk 50 Feet with Two Turns CARE Score 4   Walk 150 Feet   Type of Assistance Needed Incidental touching   Physical Assistance Level No physical assistance   Comment CGA/CS no AD   Walk 150 Feet CARE Score 4   Ambulation   Does the patient walk? 2   Yes   Primary Mode of Locomotion Prior to Admission Walk   Distance Walked (feet) 300 ft  (x5)   Gait Pattern Narrow ZAID; Trendelenburg; Improper weight shift;Decreased R stance  (slight R leg circumducion)   Limitations Noted In Balance; Heel Strike;Swing;Strength   Provided Assistance with: Balance;Direction   Wheel 50 Feet with Two Turns   Reason if not Attempted Activity not applicable   Wheel 50 Feet with Two Turns CARE Score 9   Wheel 150 Feet   Reason if not Attempted Activity not applicable   Wheel 781 Feet CARE Score 9   Curb or Single Stair   Style negotiated Curb   Type of Assistance Needed Physical assistance   Physical Assistance Level 25% or less   Comment Jaguar 6" curb step with no AD; light L HHA   1 Step (Curb) CARE Score 3   Picking Up Object   Type of Assistance Needed Incidental touching   Physical Assistance Level No physical assistance   Comment CGA   Picking Up Object CARE Score 4   Toilet Transfer   Type of Assistance Needed Incidental touching   Physical Assistance Level No physical assistance   Comment CGA/CS with no AD   Toilet Transfer CARE Score 4   Therapeutic Interventions   Flexibility BLE heel cord and HS gentle stretch TERT 4 minutes   Neuromuscular Re-Education 200' forwards + 200' backwards walking ball toss; side stepping 60' each way; Functional balance- placing pillow cases on 4 pillows; carrying filled water mug then washing mug in sink; item scavenger hunt in gym and around unit; 300' RUE walking ball bounce   Other Stroke 101: see below   Equipment Use   NuStep L2x15 minutes BUE/LE   Assessment   Treatment Assessment Patient making progress with skilled PT intervention thusfar  She presents with occasional missteps during functional activities needing CGA to correct  PT incorporating dual tasking and divided attention into balance tasks to further assess and improve patient's balance and righting reactions   D/C recommendations will be pending cognitive recommendations of speech and OT as patient's balance likely to improve to a DS level using no AD  Problem List Decreased endurance; Impaired balance;Decreased mobility; Decreased cognition;Decreased safety awareness   Barriers to Discharge Decreased caregiver support   PT Barriers   Functional Limitation Stair negotiation; Walking   Plan   Treatment/Interventions Functional transfer training;LE strengthening/ROM; Elevations; Therapeutic exercise; Endurance training;Cognitive reorientation;Patient/family training;Equipment eval/education; Bed mobility; Compensatory technique education;Gait training   Progress Progressing toward goals   PT Therapy Minutes   PT Time In 1400   PT Time Out 1530   PT Total Time (minutes) 90   PT Mode of treatment - Individual (minutes) 90   PT Mode of treatment - Concurrent (minutes) 0   PT Mode of treatment - Group (minutes) 0   PT Mode of treatment - Co-treat (minutes) 0   PT Mode of Treatment - Total time(minutes) 90 minutes   PT Cumulative Minutes 390         Stroke Education Series    Pt participated in skilled Stroke Education Series in an individual setting to address the topic of Stroke 101: Understanding the Basics of Stroke in both verbal and written formats  Education within this session reviewed the basic structural and functional components of the brain and included information on the causes of stroke, related signs/symptoms, risk factors, and the process of stroke rehabilitation  The goal of this education was to provide the patient with general understanding of how the brain functions and how a stroke can impact his/her functional mobility and independence  In addition, the intention of this education is to provide the patient with the information to reduce the risk of a second stroke  Following education, pt's response to education is: verbalizes understanding and demonstrates understanding  Patient's daughters present as well       To individualize the education, the following topics were included based upon the patients' past and current medical history: ruling out atrial fibrillation, carotid stenosis and hyperlipidemia  Additional topics that were covered include weakness, decreased sensation, decreased coordination, aphasia, dysarthria, dysphagia, cognitive changes and fall prevention         Juma Blanc, PT

## 2022-08-02 NOTE — PROGRESS NOTES
Physical Medicine and Rehabilitation Progress Note  Virgil Du 76 y o  female MRN: [id number]  Unit/Bed#: -01 Encounter: [id number]    HPI: Virgil Du is a 76 y o  female with a PMH significant for h/o endometrial cancer, depression, h/o DVT, vitamin D deficency and OA, who presented to the [address] [place] [address] ED on 07/23 with a stroke alert  She was in contact with family the night prior when the next day family could not contact her and called EMS  Patient was noted to have altered mental status upon EMS arrival and was experiencing right facial droop  Neurology was consulted  [facility] ordered without evidence of acute intracranial hemorrhage  CTA with left distal m2/m3 occlusion  SLP assessed and also noted patient to have dysphagia  MRI brain with acute to early subacute ischemia involving the left MCA territory with a larger infarct and multiple smaller infarcts  No hemorrhagic transformation identified  With her history of endometrial cancer the MRI of the brain also ruled out mets  ECHO obtained with EF of 70%  Neurology recommending outpatient loop recorder placement  She will continue ASA and Lipitor  PM&R consulted and recommended patient for ARC  She participated with PT/OT/ST and was deemed appropriate for post-acute rehabilitation services after demonstrating the ability to tolerate three hours of therapy per day  She was accepted to [address] [place] [address] and arrived on 7/28/22  Chief Complaint: No new issues    Interval History/Subjective:  No acute events overnight  Sleeping well  Participating in therapy  Discussed as a family and in more detail with Cardiology - family would like to decline any cardiac monitoring at this time, and follow-up as an outpatient  Last BM 8/1  No other new concerns  ROS:  A 10 point review of systems was negative except for what is noted in the HPI  Today's Changes:  1  Appreciate Cards effort and recs       Total visit time: 25 minutes, with more than 50% spent counseling/coordinating care  Counseling includes discussion with patient re: progress in therapies, functional issues observed by therapy staff, and discussion with patient regarding their current medical state and wellbeing  Coordination of patient's care was performed in conjunction with Internal Medicine service to monitor patient's labs, vitals, and management of their comorbidities  Assessment/Plan:    * Acute ischemic left MCA stroke [facility])  Assessment & Plan  · Presented to [facility] ED with right facial droop and aphasia  · Outside tPA window  · CT without evidence of acute intracranial hemorrhage  Left frontal temporal and low attenuation suggesting acute-subacute ischemia  · CTA H/N with distal Left M2/M3 MCA occlusion   · MRI brain- acute to early subacute ischemia identified involving the left MCA territory with one larger infarct and multiple additional smaller infarcts  No hemorrhagic transformation identified   · MRI brain negative for metastasis in setting of h/o endometrial cancer  · ECHO unremarkable   · Continue ASA/Statin therapy per neurology recs  · Dysphagia diet; SLP following  · Neurology also recommended Loop recorder as OP with OP FU in 4-6 weeks  · Daughter deferred inpatient placement of loop recorder; aware of need to follow up with Cardiology as OP  · Acute chomprehensive interdisciplinary inpatient rehabilitation to include intensive skilled therapies as outlines with oversight and management by a rehabilitation Physician Assistant overseen by rehabilitation physician as well as inpatient rehabilitation nursing, case management and weekly interdisciplinary team meeting    SA node dysfunction [facility])  Assessment & Plan  With junctional bradycardia new on exam today  - Seen by Cards  - HR fairly stable compared to previous  - K > 4, Mag > 2  - Family would like to defer any monitoring until outpatient    - Risk/benefits/alternatives presented in detail  Dysphagia  Assessment & Plan  · Dysphagia level 2 diet with thin liquids  · SLP to follow    History of DVT (deep vein thrombosis)  Assessment & Plan  · Potential h/o xarelto use for several months; no longer taking  · Currently Lovenox SQ Daily and SCD       Mild episode of recurrent major depressive disorder (HCC)  Assessment & Plan  · Continue Zoloft 75 mg daily      Vitamin D deficiency  Assessment & Plan  · Continue Cholecalciferol       History of endometrial cancer  Assessment & Plan  · Stage IA, grade 3 s/p completion of adjuvant chemotherapy and radiation in April 2015  · Follows with GYN-ONC as OP        Health Maintenance  #Delirium/Sleep: At risk  First night with some impulsivity, but due to needing to go to the bathroom  Added timed voids with improvement  Continue to optimize pain, bowel, bladder, sleep-wake cycle management  #Pain: Tylenol PRN  #Bowel: Last BM 8/1  On Senna 2 tabs BID and docusate BID  Will try to de-escalate while here  #Bladder: Timed voids to help with continence PVRx all < 150cc  #Skin/Pressure Injury Prevention: Turn Q2hr in bed, with weight shifts [id number] in wheelchair  #DVT Prophylaxis: Lovenox, SCDs  #GI Prophylaxis: PO diet  #Code Status:  DNR/DNI  #FEN: Level 2/thins  #Dispo: ADD TBD  ELOS 7-14 days  Goals Sup to discharge to daughter's home to start  Objective:    Functional Update:  PT: CGA with all mobility including ambulation and stairs  OT:  CGA for ADLs  SLP: SLP eval pending  Allergies per EMR    Physical Exam:  Temp:  [97 7 °F (36 5 °C)-98 5 °F (36 9 °C)] 98 5 °F (36 9 °C)  HR:  [55-64] 64  Resp:  [16-18] 18  BP: (120-126)/(60-68) 126/68  Oxygen Therapy  SpO2: 97 %    Gen: No acute distress, Well-nourished, well-appearing  HEENT: Moist mucus membranes, Normocephalic/Atraumatic  Cardiovascular: Irregular, joel at times  Heme/Extr: No edema  Pulmonary: Non-labored breathing   Lungs CTAB  : No pantoja  GI: Soft, non-tender, non-distended  Integumentary: Skin is warm, dry  Neuro: Awake, alert, expressive and receptive aphasia  Motor planning difficulties  Noted by therapies to have marked executive function and memory impairments as well  Psych: Normal mood and affect  Diagnostic Studies: Reviewed, no new imaging      Laboratory:  Reviewed   Results from last 7 days   Lab Units 08/01/22  0505 07/29/22  0645   HEMOGLOBIN g/dL 14 0 13 7   HEMATOCRIT % 43 3 42 1   WBC Thousand/uL 8 18 7 66     Results from last 7 days   Lab Units 08/01/22  0505 07/29/22  1059 07/29/22  0645   BUN mg/dL 17 19 18   POTASSIUM mmol/L 4 2 4 3 5 6*   CHLORIDE mmol/L 107 109* 109*   CREATININE mg/dL 0 64 0 69 0 58*   AST U/L  --   --  46*   ALT U/L  --   --  19            Patient Active Problem List   Diagnosis    History of endometrial cancer    Endometrial cancer ([facility] [address] )    Inguinal lymphadenopathy    Multinodular thyroid    Obesity    Osteoarthritis of hip    Osteoarthritis of knee    Vitamin D deficiency    Osteopenia of lumbar spine    Mild episode of recurrent major depressive disorder (HCC)    Acute ischemic left MCA stroke (HCC)    History of DVT (deep vein thrombosis)    Dysphagia    SA node dysfunction (HCC)         Medications  Current Facility-Administered Medications   Medication Dose Route Frequency Provider Last Rate    acetaminophen  650 mg Oral Q6H PRN VevelyERIK Sheikh-HELEN      aluminum-magnesium hydroxide-simethicone  30 mL Oral Q6H PRN Vevelyespinoza Garza, PA-C      ascorbic acid  500 mg Oral Daily VevelyERIK Sheikh-C      aspirin  81 mg Oral Daily VevelyERIK Sheikh-HELEN      atorvastatin  40 mg Oral QPM Vevelyespinoza Garza PA-C      bisacodyl  10 mg Rectal Daily PRN Vevelyespinoza Garza PA-C      calcium carbonate-vitamin D  1 tablet Oral Daily With Breakfast Bay Garza PA-C      cholecalciferol  2,000 Units Oral Daily Vevelyespinoza Garza PA-C      docusate sodium  100 mg Oral BID Vebrandy Garza PA-C      enoxaparin  40 mg Subcutaneous Daily Monique Moore, PRISCA      multivitamin-minerals  1 tablet Oral Daily Monique Moore, PRISCA      ondansetron  4 mg Oral Q6H PRN Monique Moore, PRISCA      polyethylene glycol  17 g Oral Daily PRN Monique Moore, PRISCA      senna  2 tablet Oral BID Monique Moore, PRISCA      sertraline  75 mg Oral Daily Monique Moore PA-C          ** Please Note: Fluency Direct voice to text software may have been used in the creation of this document   **

## 2022-08-02 NOTE — PROGRESS NOTES
General Cardiology   Progress Note -  Team One   Ivy Eden 76 y o  female MRN: [id number]    Unit/Bed#: [facility] 972-01 Encounter: [id number]    Assessment  1  Junctional rhythm w/PACs vs ectopic atrial rhythm  No evidence of afib  -Asymptomatic - no prior report of presyncopal symptoms or prior syncope  -ECG on admission 7/24 demonstrated NSR, rate 65 ppm  -ECG 8/1/22 - appears to be a junctional rhythm w/ PACs vs ectopic atrial rhythm rate 56 bpm   -Not currently on telemetry while in acute rehab   -Not previously on AV kiera blocking agents   -Electrolytes stable on BMP   -TSH level 1 5  2  Acute left MCA CVA  -Previously followed by the [address] [place] [address] Neurology team earlier this admission, was deemed medically stable and now rehabbing in [facility]   -On low-dose aspirin and high-intensity statin therapy      Plan  -Pt feels well this a m w/o any specific cardiac complaints  -Repeat ECG this a m to reassess cardiac rhythm  -Would avoid utilizing AV kiera blocking agents  If Pt/family willing to proceed with loop recorder implantation this admission, this would help assist in long-term detection of any significant joel or tachyarrhythmias  If not willing to proceed this admission and would prefer to have this done as an outpatient would recommend ZIO patch application in the interm until loop recorder can be implanted in the outpatient setting  Will also arrange for the patient to have follow-up with the EP service upon discharge for further treatment recommendations/management  Subjective  Review of Systems   Constitutional: Negative for chills, fever and malaise/fatigue  Eyes: Negative for visual disturbance  Cardiovascular: Negative for chest pain, dyspnea on exertion, leg swelling, orthopnea and palpitations  Respiratory: Negative for cough and shortness of breath  Gastrointestinal: Negative for abdominal pain  Neurological: Negative for dizziness, headaches and light-headedness          +speech difficulty Objective:   Physical Exam  Vitals and nursing note reviewed  Constitutional:       General: She is not in acute distress  Appearance: She is not diaphoretic  HENT:      Head: Normocephalic and atraumatic  Mouth/Throat:      Mouth: Mucous membranes are moist    Eyes:      General: No scleral icterus  Cardiovascular:      Rate and Rhythm: Normal rate  Rhythm irregular  Pulses: Normal pulses  Heart sounds: Normal heart sounds  No murmur heard  Pulmonary:      Effort: Pulmonary effort is normal       Breath sounds: Normal breath sounds  No wheezing or rales  Abdominal:      Palpations: Abdomen is soft  Musculoskeletal:      Cervical back: Neck supple  Right lower leg: No edema  Left lower leg: No edema  Skin:     General: Skin is warm and dry  Capillary Refill: Capillary refill takes less than 2 seconds  Neurological:      Mental Status: She is alert and oriented to person, place, and time  Comments: +expressive aphasia   Psychiatric:         Mood and Affect: Mood normal          Vitals: Blood pressure 120/60, pulse 55, temperature 97 7 °F (36 5 °C), temperature source Oral, resp  rate 16, height 5' 7" (1 702 m), weight 85 6 kg (188 lb 12 8 oz), SpO2 98 %  ,     Body mass index is 29 57 kg/m²  ,   Systolic (10KGX), RKH:014 , Min:120 , WFO:982     Diastolic (68EPC), SDZ:65, Min:54, Max:60      Intake/Output Summary (Last 24 hours) at 8/2/2022 0852  Last data filed at 8/1/2022 1803  Gross per 24 hour   Intake 720 ml   Output --   Net 720 ml     Weight (last 2 days)     None          LABORATORY RESULTS      CBC with diff:   Results from last 7 days   Lab Units 08/01/22  0505 07/29/22  0645   WBC Thousand/uL 8 18 7 66   HEMOGLOBIN g/dL 14 0 13 7   HEMATOCRIT % 43 3 42 1   MCV fL 91 91   PLATELETS Thousands/uL 241 217   MCH pg 29 4 29 6   MCHC g/dL 32 3 32 5   RDW % 13 1 13 2   MPV fL 10 7 10 6   NRBC AUTO /100 WBCs 0 0       CMP:  Results from last 7 days   Lab Units 08/01/22  0505 07/29/22  1059 07/29/22  0645   POTASSIUM mmol/L 4 2 4 3 5 6*   CHLORIDE mmol/L 107 109* 109*   CO2 mmol/L 29 25 28   BUN mg/dL 17 19 18   CREATININE mg/dL 0 64 0 69 0 58*   CALCIUM mg/dL 9 2 9 3 9 3   AST U/L  --   --  46*   ALT U/L  --   --  19   ALK PHOS U/L  --   --  70   EGFR ml/min/1 73sq m 85 83 87       BMP:  Results from last 7 days   Lab Units 08/01/22  0505 07/29/22  1059 07/29/22  0645   POTASSIUM mmol/L 4 2 4 3 5 6*   CHLORIDE mmol/L 107 109* 109*   CO2 mmol/L 29 25 28   BUN mg/dL 17 19 18   CREATININE mg/dL 0 64 0 69 0 58*   CALCIUM mg/dL 9 2 9 3 9 3       No results found for: NTBNP     Results from last 7 days   Lab Units 08/01/22  0505   MAGNESIUM mg/dL 2 0                         Lipid Profile:   No results found for: CHOL  Lab Results   Component Value Date    HDL 53 07/25/2022    HDL 72 11/11/2020     Lab Results   Component Value Date    LDLCALC 72 07/25/2022    LDLCALC 99 11/11/2020     Lab Results   Component Value Date    TRIG 103 07/25/2022    TRIG 76 11/11/2020       Cardiac testing:   No results found for this or any previous visit  No results found for this or any previous visit  No results found for this or any previous visit  No valid procedures specified  No results found for this or any previous visit        Meds/Allergies   all current active meds have been reviewed and current meds:   Current Facility-Administered Medications   Medication Dose Route Frequency    acetaminophen (TYLENOL) tablet 650 mg  650 mg Oral Q6H PRN    aluminum-magnesium hydroxide-simethicone (MYLANTA) oral suspension 30 mL  30 mL Oral Q6H PRN    ascorbic acid (VITAMIN C) tablet 500 mg  500 mg Oral Daily    aspirin chewable tablet 81 mg  81 mg Oral Daily    atorvastatin (LIPITOR) tablet 40 mg  40 mg Oral QPM    bisacodyl (DULCOLAX) rectal suppository 10 mg  10 mg Rectal Daily PRN    calcium carbonate-vitamin D (OSCAL-D) 500 mg-200 units per tablet 1 tablet  1 tablet Oral Daily With Breakfast    cholecalciferol (VITAMIN D3) tablet 2,000 Units  2,000 Units Oral Daily    docusate sodium (COLACE) capsule 100 mg  100 mg Oral BID    enoxaparin (LOVENOX) subcutaneous injection 40 mg  40 mg Subcutaneous Daily    multivitamin-minerals (CENTRUM) tablet 1 tablet  1 tablet Oral Daily    ondansetron (ZOFRAN-ODT) dispersible tablet 4 mg  4 mg Oral Q6H PRN    polyethylene glycol (MIRALAX) packet 17 g  17 g Oral Daily PRN    senna (SENOKOT) tablet 17 2 mg  2 tablet Oral BID    sertraline (ZOLOFT) tablet 75 mg  75 mg Oral Daily            EKG personally reviewed by JF Tolbert    Assessment:  Principal Problem:    Acute ischemic left MCA stroke ([facility] [address] )  Active Problems:    History of endometrial cancer    Vitamin D deficiency    Mild episode of recurrent major depressive disorder (HCC)    History of DVT (deep vein thrombosis)    Dysphagia    SA node dysfunction (HCC)    Counseling / Coordination of Care  Total floor / unit time spent today 20 minutes  Greater than 50% of total time was spent with the patient and / or family counseling and / or coordination of care  ** Please Note: Dragon 360 Dictation voice to text software may have been used in the creation of this document   **

## 2022-08-03 LAB
ATRIAL RATE: 67 BPM
P AXIS: 46 DEGREES
PR INTERVAL: 236 MS
QRS AXIS: -37 DEGREES
QRSD INTERVAL: 90 MS
QT INTERVAL: 408 MS
QTC INTERVAL: 431 MS
T WAVE AXIS: -5 DEGREES
VENTRICULAR RATE: 67 BPM

## 2022-08-03 PROCEDURE — 93010 ELECTROCARDIOGRAM REPORT: CPT | Performed by: INTERNAL MEDICINE

## 2022-08-03 PROCEDURE — 99232 SBSQ HOSP IP/OBS MODERATE 35: CPT | Performed by: INTERNAL MEDICINE

## 2022-08-03 PROCEDURE — 92526 ORAL FUNCTION THERAPY: CPT

## 2022-08-03 PROCEDURE — 97530 THERAPEUTIC ACTIVITIES: CPT

## 2022-08-03 PROCEDURE — 97110 THERAPEUTIC EXERCISES: CPT

## 2022-08-03 PROCEDURE — 92507 TX SP LANG VOICE COMM INDIV: CPT

## 2022-08-03 PROCEDURE — 97112 NEUROMUSCULAR REEDUCATION: CPT

## 2022-08-03 PROCEDURE — 99233 SBSQ HOSP IP/OBS HIGH 50: CPT | Performed by: PHYSICAL MEDICINE & REHABILITATION

## 2022-08-03 RX ADMIN — ENOXAPARIN SODIUM 40 MG: 40 INJECTION SUBCUTANEOUS at 09:58

## 2022-08-03 RX ADMIN — ASPIRIN 81 MG CHEWABLE TABLET 81 MG: 81 TABLET CHEWABLE at 09:58

## 2022-08-03 RX ADMIN — OXYCODONE HYDROCHLORIDE AND ACETAMINOPHEN 500 MG: 500 TABLET ORAL at 09:58

## 2022-08-03 RX ADMIN — MULTIPLE VITAMINS W/ MINERALS TAB 1 TABLET: TAB ORAL at 09:58

## 2022-08-03 RX ADMIN — SERTRALINE HYDROCHLORIDE 75 MG: 50 TABLET ORAL at 09:58

## 2022-08-03 RX ADMIN — SENNOSIDES 17.2 MG: 8.6 TABLET, FILM COATED ORAL at 17:11

## 2022-08-03 RX ADMIN — DOCUSATE SODIUM 100 MG: 100 CAPSULE, LIQUID FILLED ORAL at 09:57

## 2022-08-03 RX ADMIN — Medication 2000 UNITS: at 09:57

## 2022-08-03 RX ADMIN — DOCUSATE SODIUM 100 MG: 100 CAPSULE, LIQUID FILLED ORAL at 17:11

## 2022-08-03 RX ADMIN — Medication 1 TABLET: at 09:58

## 2022-08-03 RX ADMIN — SENNOSIDES 17.2 MG: 8.6 TABLET, FILM COATED ORAL at 09:58

## 2022-08-03 RX ADMIN — ATORVASTATIN CALCIUM 40 MG: 40 TABLET, FILM COATED ORAL at 17:11

## 2022-08-03 NOTE — PCC NURSING
Lt MCA CVA - Continue ASA 81mg daily/atorvastatin 40mg daily,Outpt follow-up with Neurology, Outpt follow-up with Cardiology  Junctional rhythm/PACs - Previous EKGs were sinus, Cards following now, rec LOOP now vs Zio patch now and then LOOP as OP, Family aware and will give decision today, AVOID ANY AVN BLOCKING AGENTS, For repeat EKG today per Cards  Depression - Continue sertraline  Pt is contient of both bowel and bladder  Pt requires alarms for safety  8/9- Family and pt decline zio patch or loop at present  Will f/u as outpt  Diet advanced to regular- will attempt to advance for giving meds as well  Cont to be impulsive and requires alarms for safety  Has stress inc, pt changed peripads independently  This week we will monitor vital signs and lab values  We will educate on the importance of turning and offloading in order to prevent skin breakdown and preform routine skin checks  We will preform safe transfers and keep the pt free from falls  We will encourage indepedence with ADLs  We will monitor for constipation and medicate per bowel protocol

## 2022-08-03 NOTE — PROGRESS NOTES
Internal Medicine Progress Note  Patient: Marylene Maryland  Age/sex: 76 y o  female  Medical Record #: [id number]      ASSESSMENT/PLAN: (Interval History)  Marylene Maryland is seen and examined and management for following issues:    Lt MCA CVA  · Continue ASA 81mg daily/atorvastatin 40mg daily  · Outpt follow-up with Neurology  · Outpt follow-up with Cardiology     Junctional rhythm/PACs  · Previous EKGs were sinus  · Cards saw here and rec'd LOOP now vs Zio patch now and then LOOP as OP but family declined both 8/2/22 noting they want to revisit the topic in the OP setting  · AVOID ANY AVN BLOCKING AGENTS  · Repeat EKG 8/2 was NSR with 1st degree AVB but currently sounds by exam to be back in JR     Depression  · Continue sertraline         DC planning: team    The above assessment and plan was reviewed and updated as determined by my evaluation of the patient on 8/3/2022      Labs:   Results from last 7 days   Lab Units 08/01/22  0505 07/29/22  0645   WBC Thousand/uL 8 18 7 66   HEMOGLOBIN g/dL 14 0 13 7   HEMATOCRIT % 43 3 42 1   PLATELETS Thousands/uL 241 217     Results from last 7 days   Lab Units 08/01/22  0505 07/29/22  1059   SODIUM mmol/L 139 138   POTASSIUM mmol/L 4 2 4 3   CHLORIDE mmol/L 107 109*   CO2 mmol/L 29 25   BUN mg/dL 17 19   CREATININE mg/dL 0 64 0 69   CALCIUM mg/dL 9 2 9 3                   Review of Scheduled Meds:  Current Facility-Administered Medications   Medication Dose Route Frequency Provider Last Rate    acetaminophen  650 mg Oral Q6H PRN Kaylen Mcgarry PA-C      aluminum-magnesium hydroxide-simethicone  30 mL Oral Q6H PRN Kaylen Mcgarry PA-C      ascorbic acid  500 mg Oral Daily Kaylen Mcgarry PA-C      aspirin  81 mg Oral Daily Kaylen Mcgarry PA-C      atorvastatin  40 mg Oral QPM Kaylen Mcgarry PA-C      bisacodyl  10 mg Rectal Daily PRN Kaylen Mcgarry PA-C      calcium carbonate-vitamin D  1 tablet Oral Daily With Breakfast Kaylen Mcgarry PA-C     [employer] cholecalciferol  2,000 Units Oral Daily Lorriane Bear, PA-C      docusate sodium  100 mg Oral BID Lorriane Bear, PA-C      enoxaparin  40 mg Subcutaneous Daily Lorriane Bear, PA-C      multivitamin-minerals  1 tablet Oral Daily Lorriane Bear, PA-C      ondansetron  4 mg Oral Q6H PRN Lorriane Bear, PA-C      polyethylene glycol  17 g Oral Daily PRN Lorriane Bear, PA-C      senna  2 tablet Oral BID Lorriane Bear, PA-C      sertraline  75 mg Oral Daily Lorriane Bear, PA-C         Subjective/ HPI: Patient seen and examined  Patients overnight issues or events were reviewed with nursing or staff during rounds or morning huddle session  New or overnight issues include the following:     No new or overnight issues  Offers no complaints    ROS:   A 10 point ROS was performed; negative except as noted above         Imaging:     No orders to display       *Labs /Radiology studies reviewed  *Medications reviewed and reconciled as needed  *Please refer to order section for additional ordered labs studies  *Case discussed with primary attending during morning huddle case rounds    Physical Examination:  Vitals:   Vitals:    08/02/22 0550 08/02/22 1336 08/02/22 2100 08/03/22 0445   BP: 120/60 126/68 120/59 114/52   BP Location: Left arm Right arm Right arm Right arm   Pulse: 55 64 62 66   Resp: 16 18 18 16   Temp: 97 7 °F (36 5 °C) 98 5 °F (36 9 °C) 97 7 °F (36 5 °C) 97 8 °F (36 6 °C)   TempSrc: Oral Oral Oral Oral   SpO2: 98% 97% 94% 96%   Weight:    86 kg (189 lb 9 5 oz)   Height:           General Appearance: no distress, conversive  HEENT: PERRLA, conjuctiva normal; oropharynx clear; mucous membranes moist   Neck:  Supple, normal ROM  Lungs: CTA, normal respiratory effort, no retractions, expiratory effort normal  CV: irregular rate and rhythm; no rubs/murmurs/gallops, PMI normal   ABD: soft; ND/NT; +BS  EXT: no edema  Skin: normal turgor, normal texture, no rashes  Psych: affect normal, mood normal  Neuro: AAO      The above physical exam was reviewed and updated as determined by my evaluation of the patient on 8/3/2022  Invasive Devices  Report    None                    VTE Pharmacologic Prophylaxis: Enoxaparin  Code Status: Level 3 - DNAR and DNI  Current Length of Stay: 6 day(s)      Total time spent:  30 minutes with more than 50% spent counseling/coordinating care  Counseling includes discussion with patient re: progress  and discussion with patient of his/her current medical state/information  Coordination of patient's care was performed in conjunction with primary service  Time invested included review of patient's labs, vitals, and management of their comorbidities with continued monitoring  In addition, this patient was discussed with medical team including physician and advanced extenders  The care of the patient was extensively discussed and appropriate treatment plan was formulated unique for this patient  ** Please Note:  voice to text software may have been used in the creation of this document   Although proof errors in transcription or interpretation are a potential of such software**

## 2022-08-03 NOTE — PROGRESS NOTES
[facility] Occupational Therapy Daily Note  Patient Active Problem List   Diagnosis    History of endometrial cancer    Endometrial cancer ([facility] )    Inguinal lymphadenopathy    Multinodular thyroid    Obesity    Osteoarthritis of hip    Osteoarthritis of knee    Vitamin D deficiency    Osteopenia of lumbar spine    Mild episode of recurrent major depressive disorder ([facility] )    Acute ischemic left MCA stroke ([facility] )    History of DVT (deep vein thrombosis)    Dysphagia    SA node dysfunction (HCC)       Past Medical History:   Diagnosis Date    Deep vein thrombosis ([facility] ) 1/28/2016    Diverticulosis     last assessed 05/29/13    Fibrocystic breast     History of DVT (deep vein thrombosis)     Xarelto, 4/2015 thru April 2016    Impaired fasting glucose     last assessed 06/04/14    Inguinal lymphadenopathy     Multiple benign polyps of large intestine     Obesity     Uterine cancer (HCC)      Etiologic Diagnosis: left mca distributioninfarctions  Restrictions/Precautions  Precautions: Bed/chair alarms, Cognitive, Fall Risk, Supervision on toilet/commode  Weight Bearing Restrictions: No  ROM Restrictions: No  ADL Team Goal: Patient will require supervision with ADLs with least restrictive device upon completion of rehab program  Occupational Therapy LTG's  Eating Oral care Bathing LB dress UB dress   Eating Goal: 06  Independent - Patient completes the activity by him/herself with no assistance from a helper  Oral Hygiene Goal: 04  Supervision or touching assistance- [place] provides VERBAL CUES or supervision throughout activity  Shower/bathe self Goal: 04  Supervision or touching assistance- [place] provides VERBAL CUES or supervision throughout activity  Lower body dressing Goal: 04  Supervision or touching assistance- [place] provides VERBAL CUES or supervision throughout activity  Upper body dressing Goal: 05  Setup or clean-up assistance - [place] SETS UP or CLEANS UP, patient completes activity   [place] assists only prior to or following the activity  Toileting Toilet txf Func txf IADL Med    Toileting hygiene Goal: 04  Supervision or touching assistance- [place] provides VERBAL CUES or supervision throughout activity  Toilet transfer Goal: 04  Supervision or touching assistance- [place] provides VERBAL CUES or supervision throughout activity  Assist Level: Supervision (light, cold meal prep) Assist Level: Minimum Assist   OT interventions: Treatment/Interventions: ADL retraining, Functional transfer training, Therapeutic exercise, Endurance training, Cognitive reorientation, Patient/family training, Equipment eval/education, Bed mobility, Compensatory technique education, Spoke to nursing  Discharge Plan:  OT Discharge Recommendation:  (pending progress)   DME: Equipment Recommended:  (TBD),  ,       08/03/22 1330   Pain Assessment   Pain Assessment Tool 0-10   Pain Score No Pain   Lifestyle   Autonomy "I had strawberries "   Cognition   Overall Cognitive Status Impaired   Arousal/Participation Cooperative; Alert   Attention Attends with cues to redirect   Orientation Level Oriented to person  (oriented to month and year (written))   Comments trailed clock construction again today, with great improvement  Pt initiates placement of numbers with first placement of anchor numbers, and fill in the rest  Pt sets into perseveration for writing continuous numerical order and doubled numbers  Pt asked for insight to correct the clock nd states "It looks fine " once prompted pt able to identify errors  Then pt able to construct another clock with correct number sequence, but orientation off  Able to set time for whole times as three o'clock, but severe difficulty with obscure times such as ten past eleven  Pt able to verbalize and state accurate time from clock in room  Additional Activities   Additional Activities Comments Pt engages in functional activity focusing on utensil use, and sequencing with IADLs   Pt able to verbalize and select appropriate utensils for varied food items  Pt instructed to make peanut butter and jelly sandwich among other items  Pt camila to select proper tools and ingredients and construct sandwich  There after pt perseverated on peanut and butter jelly use for all other instructions such as "put honey in the oatmeal"  At times pt required attention to singly select appropriate items with deduction  Question R side visual neglect as she consistently did not utilize items in R visual field  Pt instructed to make toast with butter  Again pt reverting to perseveration with jelly  This time utensils were not provided to see spontaneous selection of utensils  Pt omitted use and spread items with her fingers  Required overall MOD A for sequencing tasks with verbal instruction  Assessment   Treatment Assessment Pt participated in skilled OT treatment session with treatment focus on functional cognition, and sequencing  Pt tolerated session well, with supportive daughter present  Pt does at times appear to get frustrated with errors  Pt still continue to demo limited insight into errors and deficits, until physically prompted  Pt continues to demonstrate verbal and physical/motor perseveration during activities  Continue with focus on self identification of errors  OT Family training done with: supportive daughter Eunice Elders present  Family educated on Role of Occupational Therapy in Acute rehab setting  Pt family updated on current level of function for ADLs at this time  Daughter indicating d/c plan was still pending   Prognosis Good   Plan   Progress Progressing toward goals   OT Therapy Minutes   OT Time In 1330   OT Time Out 1430   OT Total Time (minutes) 60   OT Mode of treatment - Individual (minutes) 60   OT Mode of treatment - Concurrent (minutes) 0   OT Mode of treatment - Group (minutes) 0   OT Mode of treatment - Co-treat (minutes) 0   OT Mode of Treatment - Total time(minutes) 60 minutes   OT Cumulative Minutes 505

## 2022-08-03 NOTE — PLAN OF CARE
Problem: Prexisting or High Potential for Compromised Skin Integrity  Goal: Skin integrity is maintained or improved  Description: INTERVENTIONS:  - Identify patients at risk for skin breakdown  - Assess and monitor skin integrity  - Assess and monitor nutrition and hydration status  - Monitor labs   - Assess for incontinence   - Turn and reposition patient  - Assist with mobility/ambulation  - Relieve pressure over bony prominences  - Avoid friction and shearing  - Provide appropriate hygiene as needed including keeping skin clean and dry  - Evaluate need for skin moisturizer/barrier cream  - Collaborate with interdisciplinary team   - Patient/family teaching  - Consider wound care consult   Outcome: Progressing     Problem: PAIN - ADULT  Goal: Verbalizes/displays adequate comfort level or baseline comfort level  Description: Interventions:  - Encourage patient to monitor pain and request assistance  - Assess pain using appropriate pain scale  - Administer analgesics based on type and severity of pain and evaluate response  - Implement non-pharmacological measures as appropriate and evaluate response  - Consider cultural and social influences on pain and pain management  - Notify physician/advanced practitioner if interventions unsuccessful or patient reports new pain  Outcome: Progressing     Problem: INFECTION - ADULT  Goal: Absence or prevention of progression during hospitalization  Description: INTERVENTIONS:  - Assess and monitor for signs and symptoms of infection  - Monitor lab/diagnostic results  - Monitor all insertion sites, i e  indwelling lines, tubes, and drains  - Monitor endotracheal if appropriate and nasal secretions for changes in amount and color  - [place] appropriate cooling/warming therapies per order  - Administer medications as ordered  - Instruct and encourage patient and family to use good hand hygiene technique  - Identify and instruct in appropriate isolation precautions for identified infection/condition  Outcome: Progressing  Goal: Absence of fever/infection during neutropenic period  Description: INTERVENTIONS:  - Monitor WBC    Outcome: Progressing     Problem: SAFETY ADULT  Goal: Patient will remain free of falls  Description: INTERVENTIONS:  - Educate patient/family on patient safety including physical limitations  - Instruct patient to call for assistance with activity   - Consult OT/PT to assist with strengthening/mobility   - Keep Call bell within reach  - Keep bed low and locked with side rails adjusted as appropriate  - Keep care items and personal belongings within reach  - Initiate and maintain comfort rounds  - Make Fall Risk Sign visible to staff  - Offer Toileting every 2 Hours, in advance of need  - Initiate/Maintain bed/chair alarm  - Obtain necessary fall risk management equipment: nonskid socks  - Apply yellow socks and bracelet for high fall risk patients  - Consider moving patient to room near nurses station  Outcome: Progressing  Goal: Maintain or return to baseline ADL function  Description: INTERVENTIONS:  -  Assess patient's ability to carry out ADLs; assess patient's baseline for ADL function and identify physical deficits which impact ability to perform ADLs (bathing, care of mouth/teeth, toileting, grooming, dressing, etc )  - Assess/evaluate cause of self-care deficits   - Assess range of motion  - Assess patient's mobility; develop plan if impaired  - Assess patient's need for assistive devices and provide as appropriate  - Encourage maximum independence but intervene and supervise when necessary  - Involve family in performance of ADLs  - Assess for home care needs following discharge   - Consider OT consult to assist with ADL evaluation and planning for discharge  - Provide patient education as appropriate  Outcome: Progressing  Goal: Maintains/Returns to pre admission functional level  Description: INTERVENTIONS:  - Perform BMAT or MOVE assessment daily    - Set and communicate daily mobility goal to care team and patient/family/caregiver  - Collaborate with rehabilitation services on mobility goals if consulted  - Perform Range of Motion 3 times a day  - Reposition patient every 2 hours  - Dangle patient 3 times a day  - Stand patient 3 times a day  - Ambulate patient 3 times a day  - Out of bed to chair 3 times a day   - Out of bed for meals 3 times a day  - Out of bed for toileting  - Record patient progress and toleration of activity level   Outcome: Progressing     Problem: DISCHARGE PLANNING  Goal: Discharge to home or other facility with appropriate resources  Description: INTERVENTIONS:  - Identify barriers to discharge w/patient and caregiver  - Arrange for needed discharge resources and transportation as appropriate  - Identify discharge learning needs (meds, wound care, etc )  - Arrange for interpretive services to assist at discharge as needed  - Refer to Case Management Department for coordinating discharge planning if the patient needs post-hospital services based on physician/advanced practitioner order or complex needs related to functional status, cognitive ability, or social support system  Outcome: Progressing     Problem: Neurological Deficit  Goal: Neurological status is stable or improving  Description: Interventions:  - Monitor and assess patient's level of consciousness, motor function, sensory function, and level of assistance needed for ADLs  - Monitor and report changes from baseline  Collaborate with interdisciplinary team to initiate plan and implement interventions as ordered  - Provide and maintain a safe environment  - Consider seizure precautions  - Consider fall precautions  - Consider aspiration precautions  - Consider bleeding precautions  Outcome: Progressing     Problem:  Activity Intolerance/Impaired Mobility  Goal: Mobility/activity is maintained at optimum level for patient  Description: Interventions:  - Assess and monitor patient  barriers to mobility and need for assistive/adaptive devices  - Assess patient's emotional response to limitations  - Collaborate with interdisciplinary team and initiate plans and interventions as ordered  - Encourage independent activity per ability   - Maintain proper body alignment  - Perform active/passive rom as tolerated/ordered  - Plan activities to conserve energy   - Turn patient as appropriate  Outcome: Progressing     Problem: Communication Impairment  Goal: Ability to express needs and understand communication  Description: Assess patient's communication skills and ability to understand information  Patient will demonstrate use of effective communication techniques, alternative methods of communication and understanding even if not able to speak  - Encourage communication and provide alternate methods of communication as needed  - Collaborate with case management/ for discharge needs  - Include patient/family/caregiver in decisions related to communication  Outcome: Progressing     Problem: Potential for Aspiration  Goal: Non-ventilated patient's risk of aspiration is minimized  Description: Assess and monitor vital signs, respiratory status, and labs (WBC)  Monitor for signs of aspiration (tachypnea, cough, rales, wheezing, cyanosis, fever)  - Assess and monitor patient's ability to swallow  - Place patient up in chair to eat if possible  - HOB up at 90 degrees to eat if unable to get patient up into chair   - Supervise patient during oral intake  - Instruct patient/ family to take small bites  - Instruct patient/ family to take small single sips when taking liquids  - Follow patient-specific strategies generated by speech pathologist   Outcome: Progressing  Goal: Ventilated patient's risk of aspiration is minimized  Description: Assess and monitor vital signs, respiratory status, airway cuff pressure, and labs (WBC)    Monitor for signs of aspiration (tachypnea, cough, rales, wheezing, cyanosis, fever)  - Elevate head of bed 30 degrees if patient has tube feeding   - Monitor tube feeding  Outcome: Progressing     Problem: Nutrition  Goal: Nutrition/Hydration status is improving  Description: Monitor and assess patient's nutrition/hydration status for malnutrition (ex- brittle hair, bruises, dry skin, pale skin and conjunctiva, muscle wasting, smooth red tongue, and disorientation)  Collaborate with interdisciplinary team and initiate plan and interventions as ordered  Monitor patient's weight and dietary intake as ordered or per policy  Utilize nutrition screening tool and intervene per policy  Determine patient's food preferences and provide high-protein, high-caloric foods as appropriate  - Assist patient with eating   - Allow adequate time for meals   - Encourage patient to take dietary supplement as ordered  - Collaborate with clinical nutritionist   - Include patient/family/caregiver in decisions related to nutrition  Outcome: Progressing     Problem: Nutrition/Hydration-ADULT  Goal: Nutrient/Hydration intake appropriate for improving, restoring or maintaining nutritional needs  Description: Monitor and assess patient's nutrition/hydration status for malnutrition  Collaborate with interdisciplinary team and initiate plan and interventions as ordered  Monitor patient's weight and dietary intake as ordered or per policy  Utilize nutrition screening tool and intervene as necessary  Determine patient's food preferences and provide high-protein, high-caloric foods as appropriate       INTERVENTIONS:  - Monitor oral intake, urinary output, labs, and treatment plans  - Assess nutrition and hydration status and recommend course of action  - Evaluate amount of meals eaten  - Assist patient with eating if necessary   - Allow adequate time for meals  - Recommend/ encourage appropriate diets, oral nutritional supplements, and vitamin/mineral supplements  - Order, calculate, and assess calorie counts as needed  - Recommend, monitor, and adjust tube feedings and TPN/PPN based on assessed needs  - Assess need for intravenous fluids  - Provide specific nutrition/hydration education as appropriate  - Include patient/family/caregiver in decisions related to nutrition  Outcome: Progressing     Problem: Potential for Falls  Goal: Patient will remain free of falls  Description: INTERVENTIONS:  - Educate patient/family on patient safety including physical limitations  - Instruct patient to call for assistance with activity   - Consult OT/PT to assist with strengthening/mobility   - Keep Call bell within reach  - Keep bed low and locked with side rails adjusted as appropriate  - Keep care items and personal belongings within reach  - Initiate and maintain comfort rounds  - Make Fall Risk Sign visible to staff  - Offer Toileting every 2 Hours, in advance of need  - Initiate/Maintain bed/chair alarm  - Obtain necessary fall risk management equipment: nonskid socks  - Apply yellow socks and bracelet for high fall risk patients  - Consider moving patient to room near nurses station  Outcome: Progressing

## 2022-08-03 NOTE — TEAM CONFERENCE
Acute RehabilitationTeam Conference Note  Date: 8/3/2022   Time: 11:26 AM       Patient Name:  Alannah Ruby       Medical Record Number: [id number]   YOB: 1945  Sex: Female          [facility]Bed:  [facility]  Payor Info:  Payor: MEDICARE / Plan: MEDICARE A AND B / Product Type: Medicare A & B Fee for Service /      Admitting Diagnosis: Stroke ([place] [address] ) [I63 9]   Admit Date/Time:  7/28/2022  2:28 PM  Admission Comments: No comment available     Primary Diagnosis:  Acute ischemic left MCA stroke (HCC)  Principal Problem: Acute ischemic left MCA stroke [facility])    Patient Active Problem List    Diagnosis Date Noted    SA node dysfunction ([place] [address] ) 08/01/2022    Dysphagia 07/28/2022    History of DVT (deep vein thrombosis) 07/25/2022    Acute ischemic left MCA stroke ([place] [address] ) 07/24/2022    Mild episode of recurrent major depressive disorder ([place] [address] ) 10/07/2021    Osteopenia of lumbar spine 09/17/2019    History of endometrial cancer 03/14/2018    Multinodular thyroid 01/09/2017    Inguinal lymphadenopathy 04/17/2015    Endometrial cancer ([place] [address] ) 11/17/2014    Obesity 05/29/2013    Osteoarthritis of hip 05/29/2013    Osteoarthritis of knee 05/29/2013    Vitamin D deficiency 05/29/2013       Physical Therapy:    Weight Bearing Status: Full Weight Bearing  Transfers: Incidental Touching  Bed Mobility: Supervision  Amulation Distance (ft): 300 feet  Ambulation: Incidental Touching  Number of Stairs: 22  Assistive Device for Stairs: Bilateral Hand Rails  Stair Assistance: Incidental Touching  Discharge Recommendations: Home with:  [address] with[de-identified] 24 Hour Supervision, Outpatient Physical Therapy    Patient making progress with skilled PT intervention thusfar  She presents with occasional missteps during functional activities needing CGA to correct  PT incorporating dual tasking and divided attention into balance tasks to further assess and improve patient's balance and righting reactions   With long distance ambulation, patient presenting with more antalgic gait pattern possibly related to R knee  She denies any pain or issues however some instability noted  Family present yesterday stating chronic R knee issues which could also attribute to her altered gait mechanics of slight circumduction  She was offered and took seated rest breaks throughout community mobility  During community reintegration tasks, we focused on safety with mobility  She did demonstrate good safety awareness of looking both directions before crossing streets and appropriate maneuvering around people crossing, etc  She did demonstrate favoring R side of pathway and when walking near L shaped pathway, near stepped off despite having bountiful space on walkway  Questionable visual deficits present however difficulty with assessment due to aphasia  She also demonstrates some apraxia, grabbing mouthwash 2nd day in a row to wash her hands  Will further discuss these deficits with SLP and OT to develop strategies to improve upon in the future  D/C recommendations will be pending cognitive recommendations of speech and OT as patient's balance likely to improve to a DS level using no AD  Occupational Therapy:  Eating: Independent  Grooming: Supervision  Bathing: Minimal Assistance  Bathing: Minimal Assistance  Upper Body Dressing: Minimal Assistance  Lower Body Dressing: Minimal Assistance  Toileting: Minimal Assistance  Tub/Shower Transfer: Minimal Assistance  Toilet Transfer: Minimal Assistance  Cognition: Exceptions to WNL  Cognition: Decreased Memory, Decreased Executive Functions, Decreased Comprehension  Orientation: Person, Place, Time, Situation  Discharge Recommendations: Home with:  [address] with[de-identified] 24 Hour Supervision, 24 Hour Assistance, Family Support, Outpatient Occupational Therapy         Pt making gradual progress towards achieving OT goals  Currently functioning at an overall [employer] level for ADL tasks   PT continues to be limited by dec strength, dec endurance, dec standing balance, dec attention, dec safety awareness, aphasia, dec comprehension, and visual deficits  Pt will continue to benefit from skilled OT services following POC with focus on above mentioned deficits to increase safety and independence with ELOS of 2 weeks to achieve supervision level goals  Speech Therapy:  Mode of Communication: Verbal  Speech/Language:  (receptive and expressive aphasia)  Cognition: Exceptions to WNL  Cognition: Decreased Memory, Decreased Executive Functions, Decreased Attention, Decreased Comprehension, Decreased Safety, Impulsive  Orientation: Person, Situation  Swallowing: Exceptions to WNL  Swallowing: Oral Dysphagia, Pharyngeal Dysphagia, Aspiration Risk  Diet Recommendations: Level 2/Mechanically Altered, Thin  Discharge Recommendations:  (pending pt progress)  Pt currently being followed for dysphagia and language/cognitive tx sessions  In regards to dysphagia, current diet is dysphagia level 2 w/  thin liquids  Upon completion of initial dysphagia bedside assessment, pt presented with s/s suggestive of mild-moderate oral and suspected mild pharyngeal dysphagia  Concerns included: Mastication was observed to be moderately prolonged given eggs, due to piecemeal bites taken as well as faster intake from bolus to bolus which lead to mild-moderate oral residual over time  SLP did have to provide pt moderate verbal cues to slow intake rate as well as use liquid wash to decrease oral residual which did occur over time  Lip seal around fork, tsp, straw and finger foods was noted to be [facility] along w/ appropriate bolus retrieval across items  No anterior loss was observed  Bolus formation and transfer were functional given yogurt and thin liquids today with no significant oral residue noted given these textures  No overt s/s reduced oral control observed w/ thin liquids by straw today   Swallowing initiation appeared prompt given puree/thin textures and observing mild delay w/ soft solids (banana, eggs) due to increased oral mastication as well as piecemeal bites taken  Laryngeal rise was palpated and judged to be within functional limits across textures observed  No coughing, throat clearing, change in vocal quality or respiratory status noted during assessment today  While some improvement has been observed in f/u meals given trials of level 3 food items, concerns more lay in regards to pt's overall rate of intake as well as larger boluses taken which increased oral residual  Pt's decreased comprehension skills impact consistent use of swallow strategies needing moderate verbal/visual cues to alternate solids/liquids, slowing rate  Education provided to pt's dtrs as to why diet advancement has not occurred at this time, and will plan for family training to ensure they are able to provide appropriate cues for meals  Continued diet recommendations remain to be dysphagia level 2 diet w/ thin liquids, but hopes for advancement can occur in upcoming sessions  In regards to pt's language and cognitive skills, pt engaged in completing both formal and informal language assessments to date, where pt currently demonstrates MODERATE language deficits both in receptive and expressive language skills  Per the Bedside WAB, pt's scores were comparable to Broca's type aphasia  It was noted throughout assessment that pt exhibited by difficulty in following basic verbal commands as well as when increased in difficulty given mutli-step commands, difficulty given moderately complex yes/no ?'s as well as noted difficulty in comprehension given more complex questions  Additionally, pt does also exhibit moderate expressive language deficits, noting perseveration in verbal responses, neologisms, paraphasia errors, jargon at times, noting inconsistency in recognition given verbalized errors when eliciting spontaneous speech   While cognitive skills were not assessed, pt did also demonstrate decreased LT and ST memory recall, decreased executive function skills (problem solving, reasoning, judgement, sequencing) and decreased insight/safety to overall functioning at this time  Additionally suspect some visual perceptual deficits but due to comprehension given more complex directions, likely unable to full assess at this time  Due to noted deficits, recommendations at time of discharge will be for 24/7 supervision at this time  Currently pt will benefit from SLP services at this time to maximize overall functional independence of both receptive and expressive language skills as well as targeting functional cognitive linguistic skills while in the acute rehab center  Nursing Notes:  Appetite: Good  Diet Type: Dysphagia II, Thin Liquids                      Diet Patient/Family Education Complete: Yes                         Type of Wound Patient/Family Education: No  Bladder: Continent     Bladder Patient/Family Education: Yes  Bowel: Continent     Bowel Patient/Family Education: Yes     Pain Score: 0                       Hospital Pain Intervention(s): Declines  Pain Patient/Family Education: Yes  Medication Management/Safety  Injectable: Lovenox  Safe Administration: Yes (by staff)  Medication Patient/Family Education Complete: No (on going)    Lt MCA CVA - Continue ASA 81mg daily/atorvastatin 40mg daily,Outpt follow-up with Neurology, Outpt follow-up with Cardiology  Junctional rhythm/PACs - Previous EKGs were sinus, Cards following now, rec LOOP now vs Zio patch now and then LOOP as OP, Family aware and will give decision today, AVOID ANY AVN BLOCKING AGENTS, For repeat EKG today per Cards  Depression - Continue sertraline  Pt is contient of both bowel and bladder  Pt requires alarms for safety  This week we will monitor vital signs and lab values  We will educate on the importance of turning and offloading in order to prevent skin breakdown and preform routine skin checks    We will preform safe transfers and keep the pt free from falls  We will encourage indepedence with ADLs  We will monitor for constipation and medicate per bowel protocol  Case Management:     Discharge Planning  Living Arrangements: Lives Alone  Support Systems: Family members  Assistance Needed: TBD  Type of Current Residence: Private residence  Current Home Care Services: No  Pt admitted s/p stroke and was living alone  Pt has supportive daughters with who she may be able to stay with on dc  Family has been made aware of potential los and recommendations for contd therapy services  Is the patient actively participating in therapies? yes  List any modifications to the treatment plan:     Barriers Interventions   Moderate receptive and expressive language deficits, perseverative Speech therapy exercises   dysphagia Modified diet   Lives alone To dc to daughters home   Safety, insight, problem solving Safety education exercises   Gait dysfunction Therapy exercises, use of device     Is the patient making expected progress toward goals?  yes  List any update or changes to goals:     Medical Goals: Patient will be medically stable for discharge to [facility] restrictive envrionment upon completion of rehab program and Patient will be able to manage medical conditions and comorbid conditions with medications and follow up upon completion of rehab program    Weekly Team Goals:   Rehab Team Goals  ADL Team Goal: Patient will require supervision with ADLs with least restrictive device upon completion of rehab program  Transfer Team Goal: Patient will require supervision with transfers with least restrictive device upon completion of rehab program  Locomotion Team Goal: Patient will require supervision with locomotion with least restrictive device upon completion of rehab program  Cognitive Team Goal: Patient will require supervision for basic and complex tasks upon completion of rehab program    Discussion: pt presents with the above barriers and is participating very well  Family has been made aware pt will require supervision on dc  Pt is functioning at contact guard/close supervision with transfers and ambulation  Pt is mod to max with overall cog and language ability  Pt is on level 2 with thin liquids with anticipation to advance to level 3 in upcoming sessions  Overall adls min to mod a with need for cues and gestures for comprehension for task  Estimated los an additional 8 days  Family training to begin for their understanding of pts abilities  Recommendations are for contd outpt pt ot and sp    Anticipated Discharge Date:  8/12/22  [facility] Team Members Present: The following team members are supervising care for this patient and were present during this Weekly Team Conference      Physician: Dr Ananda Vega MD  : Clarisse Oppenheim, MSW  Registered Nurse: Zoë Topete, ROBER  Physical Therapist: Amandeep Castro DPT  Occupational Therapist: Jacek Rodriguez MS, OTR/L  Speech Therapist: Austen Fox [place], [address] [place] [address]

## 2022-08-03 NOTE — CASE MANAGEMENT
Met w/pts dtr srikanth and reviewed dc potential for the end of next week, 8/12  Alpb Enriquez stated some surprise but is aware that is 10 days away  Cm reviewed recommendations for either the day program at [facility] [address] or regular outpt services at [address]  Alpb Enriquez was made aware pt would need to have someone with her at all times even at night  Pamela Enriquez stated her 5year old grandson is coming on the 9th from [place] as he does every august and remaining the rest of the month  Pamela Enriquez stated there is a trip scheduled to take him to [facility] and she doesn't wish to cancel  Pamela Enriquez stated she and her sister would need to work something out as they only live a block apart  Pamela Enriquez asked if someone would sit down with her and her sister to review things and cm confirmed and also informed that therapy would have them in for training

## 2022-08-03 NOTE — PROGRESS NOTES
08/03/22 0830   Pain Assessment   Pain Assessment Tool 0-10   Pain Score No Pain   Restrictions/Precautions   Precautions Bed/chair alarms;Cognitive; Fall Risk;Supervision on toilet/commode   Weight Bearing Restrictions No   ROM Restrictions No   Cognition   Overall Cognitive Status Impaired   Arousal/Participation Alert; Cooperative   Attention Attends with cues to redirect   Subjective   Subjective Patient ready to participate in PT session   Sit to Stand   Type of Assistance Needed Supervision   Physical Assistance Level No physical assistance   Comment CS   Sit to Stand CARE Score 4   Bed-Chair Transfer   Type of Assistance Needed Supervision   Physical Assistance Level No physical assistance   Comment CS   Chair/Bed-to-Chair Transfer CARE Score 4   Transfer Bed/Chair/Wheelchair   Limitations Noted In Balance;LE Strength; Endurance   Adaptive Equipment None   Car Transfer   Type of Assistance Needed Incidental touching   Physical Assistance Level No physical assistance   Comment CGA with no AD   Car Transfer CARE Score 4   Walk 10 Feet   Type of Assistance Needed Incidental touching   Physical Assistance Level No physical assistance   Comment CGA/CS with no AD   Walk 10 Feet CARE Score 4   Walk 50 Feet with Two Turns   Type of Assistance Needed Incidental touching   Physical Assistance Level No physical assistance   Comment CGA/CS with no AD   Walk 50 Feet with Two Turns CARE Score 4   Walk 150 Feet   Type of Assistance Needed Incidental touching   Physical Assistance Level No physical assistance   Comment CGA/CS with no AD   Walk 150 Feet CARE Score 4   Walking 10 Feet on Uneven Surfaces   Type of Assistance Needed Incidental touching   Physical Assistance Level No physical assistance   Comment CGA outside on sidewalks, ramps, etc   Walking 10 Feet on Uneven Surfaces CARE Score 4   Ambulation   Does the patient walk? 2   Yes   Primary Mode of Locomotion Prior to Admission Walk   Distance Walked (feet) 300 ft  (x6)   Gait Pattern Narrow ZAID; Antalgic; Inconsistant Jesi;Decreased foot clearance; Step through; Improper weight shift   Limitations Noted In Balance; Endurance; Heel Strike;Speed;Strength;Swing   Provided Assistance with: Balance;Direction   Findings Ambulation performed NW9 to ground floor, around walkway, up/down ramp, through gift shop, taking seated rest as needed   Wheel 50 Feet with Two Turns   Reason if not Attempted Activity not applicable   Wheel 50 Feet with Two Turns CARE Score 9   Wheel 150 Feet   Reason if not Attempted Activity not applicable   Wheel 051 Feet CARE Score 9   Curb or Single Stair   Style negotiated Curb   Type of Assistance Needed Incidental touching   Physical Assistance Level No physical assistance   Comment CGA on 6" curb step; did not require HHA for stability this session   1 Step (Curb) CARE Score 4   4 Steps   Type of Assistance Needed Incidental touching   Physical Assistance Level No physical assistance   Comment CGA on FF outside using R rail down and L rail up   4 Steps CARE Score 4   12 Steps   Type of Assistance Needed Incidental touching   Physical Assistance Level No physical assistance   Comment CGA on FF outside using R rail down and L rail up   12 Steps CARE Score 4   Stairs   Type Stairs;Curb;Ramp   # of Steps 12  (+ 6" curb + ramp outside)   Weight Bearing Precautions Fall Risk   Toilet Transfer   Type of Assistance Needed Incidental touching   Physical Assistance Level No physical assistance   Comment CGA/CS with no AD   Toilet Transfer CARE Score 4   Therapeutic Interventions   Flexibility BLE heel cord and HS gentle stretch TERT 4 minutes   Neuromuscular Re-Education obstacle course stepping over weighted dowels and bolsters performed 2x then 4x carrying water cup in RUE; patient then cleaned objects and wiped down mat   Equipment Use   NuStep L2x15 minutes   Assessment   Treatment Assessment Patient engaged in PT treatment session focused on balance, LE strengthening and community reintegration  With long distance ambulation, patient presenting with more antalgic gait pattern possibly related to R knee  She denies any pain or issues however some instability noted  Family present yesterday stating chronic R knee issues which could also attribute to her altered gait mechanics of slight circumduction  She was offered and took seated rest breaks throughout community mobility  During community reintegration tasks, we focused on safety with mobility  She did demonstrate good safety awareness of looking both directions before crossing streets and appropriate maneuvering around people crossing, etc  She did demonstrate favoring R side of pathway and when walking near L shaped pathway, near stepped off despite having bountiful space on walkway  Questionable visual deficits present however difficulty with assessment due to aphasia  She also demonstrates some apraxia, grabbing mouthwash 2nd day in a row to wash her hands  Will further discuss these deficits with SLP and OT to develop strategies to improve upon in the future  At this time, patient continues to benefit from PT intervention to maximize her function and safety  Problem List Decreased strength;Decreased endurance; Impaired balance;Decreased cognition;Decreased safety awareness;Orthopedic restrictions   Barriers to Discharge Inaccessible home environment;Decreased caregiver support   Plan   Treatment/Interventions Functional transfer training;Elevations;LE strengthening/ROM; Therapeutic exercise; Endurance training;Cognitive reorientation;Equipment eval/education; Bed mobility;Gait training;Patient/family training   Progress Progressing toward goals   PT Therapy Minutes   PT Time In 0830   PT Time Out 1000   PT Total Time (minutes) 90   PT Mode of treatment - Individual (minutes) 90   PT Mode of treatment - Concurrent (minutes) 0   PT Mode of treatment - Group (minutes) 0   PT Mode of treatment - Co-treat (minutes) 0 PT Mode of Treatment - Total time(minutes) 90 minutes   PT Cumulative Minutes 480

## 2022-08-03 NOTE — PROGRESS NOTES
Physical Medicine and Rehabilitation Progress Note  Maddy Molina 76 y o  female MRN: [id number]  Unit/Bed#: -01 Encounter: [id number]    HPI: Maddy Molina is a 76 y o  female with a PMH significant for h/o endometrial cancer, depression, h/o DVT, vitamin D deficency and OA, who presented to the [facility] ED on 07/23 with a stroke alert  She was in contact with family the night prior when the next day family could not contact her and called EMS  Patient was noted to have altered mental status upon EMS arrival and was experiencing right facial droop  Neurology was consulted  [address] ordered without evidence of acute intracranial hemorrhage  CTA with left distal m2/m3 occlusion  SLP assessed and also noted patient to have dysphagia  MRI brain with acute to early subacute ischemia involving the left MCA territory with a larger infarct and multiple smaller infarcts  No hemorrhagic transformation identified  With her history of endometrial cancer the MRI of the brain also ruled out mets  ECHO obtained with EF of 70%  Neurology recommending outpatient loop recorder placement  She will continue ASA and Lipitor  PM&R consulted and recommended patient for ARC  She participated with PT/OT/ST and was deemed appropriate for post-acute rehabilitation services after demonstrating the ability to tolerate three hours of therapy per day  She was accepted to [facility] and arrived on 7/28/22  Chief Complaint: No new issues  Interval History/Subjective:  No acute events overnight  Last BM 8/3  Feeling well, happy with progress in therapy  Denies any headaches, changes in vision, CP, SOB, fevers, chills, N/V, abdominal pain  ROS:  A 10 point review of systems was negative except for what is noted in the HPI  Today's Changes: 1  Continue current plan of care  2  Team Conference    3  Advance diet to Level 3    Total time spent:  35 minutes, with more than 50% spent counseling/coordinating care  Counseling includes discussion with patient re: progress in therapies, functional issues observed by therapy staff, and discussion with patient his/her current medical state/wellbeing  Coordination of patient's care was performed in conjunction with Internal Medicine service to monitor patient's labs, vitals, and management of their comorbidities  In addition, this patient was discussed by the interdisciplinary team in weekly case conference today  The care of the patient was extensively discussed with all care providers and an appropriate rehabilitation plan was formulated unique for this patient  Barriers were identified preventing progression of therapy and appropriate interventions were discussed with each discipline  Please see the team note for input from all disciplines regarding barriers, intervention, and discharge planning  Assessment/Plan:    * Acute ischemic left MCA stroke [facility])  Assessment & Plan  · [facility] with right facial droop and aphasia  · Outside tPA window  · CT without evidence of acute intracranial hemorrhage  Left frontal temporal and low attenuation suggesting acute-subacute ischemia  · CTA H/N with distal Left M2/M3 MCA occlusion   · MRI brain- acute to early subacute ischemia identified involving the left MCA territory with one larger infarct and multiple additional smaller infarcts   No hemorrhagic transformation identified   · MRI brain negative for metastasis in setting of h/o endometrial cancer  · ECHO unremarkable   · Continue ASA/Statin therapy per neurology recs  · Dysphagia diet; SLP following  · Neurology also recommended Loop recorder as OP with OP FU in 4-6 weeks  · Daughter deferred inpatient placement of loop recorder; aware of need to follow up with Cardiology as OP  · Acute chomprehensive interdisciplinary inpatient rehabilitation to include intensive skilled therapies as outlines with oversight and management by a rehabilitation Physician Assistant overseen by rehabilitation physician as well as inpatient rehabilitation nursing, case management and weekly interdisciplinary team meeting    SA node dysfunction [facility])  Assessment & Plan  With junctional bradycardia new on exam today  - Seen by Cards  - HR fairly stable compared to previous  - K > 4, Mag > 2  - Family would like to defer any monitoring until outpatient    - Risk/benefits/alternatives presented in detail  Dysphagia  Assessment & Plan  · Dysphagia level 3 diet with thin liquids  · SLP to follow    History of DVT (deep vein thrombosis)  Assessment & Plan  · Potential h/o xarelto use for several months; no longer taking  · Currently Lovenox SQ Daily and SCD       Mild episode of recurrent major depressive disorder (HCC)  Assessment & Plan  · Continue Zoloft 75 mg daily      Vitamin D deficiency  Assessment & Plan  · Continue Cholecalciferol       History of endometrial cancer  Assessment & Plan  · Stage IA, grade 3 s/p completion of adjuvant chemotherapy and radiation in April 2015  · Follows with GYN-ONC as OP        Health Maintenance  #Delirium/Sleep: At risk  First night with some impulsivity, but due to needing to go to the bathroom  Added timed voids with improvement  Continue to optimize pain, bowel, bladder, sleep-wake cycle management  #Pain: Tylenol PRN  #Bowel: Last BM 8/3  On Senna 2 tabs BID and docusate BID  Will try to de-escalate while here  #Bladder: Timed voids to help with continence PVRx all < 150cc  #Skin/Pressure Injury Prevention: Turn Q2hr in bed, with weight shifts [id number] in wheelchair  #DVT Prophylaxis: Lovenox, SCDs  #GI Prophylaxis: PO diet  #Code Status:  DNR/DNI  #FEN: Level 3/thins  #Dispo: Team Conference 8/3: ADD 8/12 to her daughter's home with supervision and PT/OT/SLP         Objective:    Functional Update:  PT: CGA transfers, Sup bed mobility, CGA ambulation 300', CGA stairs  OT:  Ind eating, Sup grooming, Jaguar bathing, Jaguar UB dressing, Jaguar LB dressing, Jagura toileting, Jaguar tub/shower transfer, Jaguar toilet transfers  SLP: Level 3/thins advanced  Continuse with moderate language deficits in receptive/expressive language skills  Allergies per EMR    Physical Exam:  Temp:  [97 7 °F (36 5 °C)-97 8 °F (36 6 °C)] 97 8 °F (36 6 °C)  HR:  [62-69] 69  Resp:  [16-20] 20  BP: (114-152)/(52-73) 152/73  Oxygen Therapy  SpO2: 98 %    Gen: No acute distress, Well-nourished, well-appearing  HEENT: Moist mucus membranes, Normocephalic/Atraumatic  Cardiovascular: Irregular, HR normal    Heme/Extr: No edema  Pulmonary: Non-labored breathing  Lungs CTAB  : No pantoja  GI: Soft, non-tender, non-distended  Integumentary: Skin is warm, dry  Neuro: AAOx3, Expressive and receptive speech deficits  L/R discrimination impaired  Possible inattention/apraxia? Unclear  Psych: Normal mood and affect  Diagnostic Studies: Reviewed, no new imaging      Laboratory:  Reviewed   Results from last 7 days   Lab Units 08/01/22  0505 07/29/22  0645   HEMOGLOBIN g/dL 14 0 13 7   HEMATOCRIT % 43 3 42 1   WBC Thousand/uL 8 18 7 66     Results from last 7 days   Lab Units 08/01/22  0505 07/29/22  1059 07/29/22  0645   BUN mg/dL 17 19 18   POTASSIUM mmol/L 4 2 4 3 5 6*   CHLORIDE mmol/L 107 109* 109*   CREATININE mg/dL 0 64 0 69 0 58*   AST U/L  --   --  46*   ALT U/L  --   --  19            Patient Active Problem List   Diagnosis    History of endometrial cancer    Endometrial cancer (HCC)    Inguinal lymphadenopathy    Multinodular thyroid    Obesity    Osteoarthritis of hip    Osteoarthritis of knee    Vitamin D deficiency    Osteopenia of lumbar spine    Mild episode of recurrent major depressive disorder (HCC)    Acute ischemic left MCA stroke (HCC)    History of DVT (deep vein thrombosis)    Dysphagia    SA node dysfunction (HCC)         Medications  Current Facility-Administered Medications   Medication Dose Route Frequency Provider Last Rate    acetaminophen  650 mg Oral Q6H PRN Gala Crum PA-C      aluminum-magnesium hydroxide-simethicone  30 mL Oral Q6H PRN Gala Crum PA-C      ascorbic acid  500 mg Oral Daily Gala Crum PA-C      aspirin  81 mg Oral Daily Gala Crum PA-C      atorvastatin  40 mg Oral QPM Gala Crum PA-C      bisacodyl  10 mg Rectal Daily PRN Gala Crum PA-C      calcium carbonate-vitamin D  1 tablet Oral Daily With Breakfast Gala Crum PA-C      cholecalciferol  2,000 Units Oral Daily Gala Crum PA-C      docusate sodium  100 mg Oral BID Gala Crum PA-C      enoxaparin  40 mg Subcutaneous Daily Gala Crum PA-C      multivitamin-minerals  1 tablet Oral Daily Gala Crum PA-C      ondansetron  4 mg Oral Q6H PRN Gala Crum PA-C      polyethylene glycol  17 g Oral Daily PRN Gala Crum PA-C      senna  2 tablet Oral BID Gala Crum PA-C      sertraline  75 mg Oral Daily Gala Crum PA-C          ** Please Note: Fluency Direct voice to text software may have been used in the creation of this document   **

## 2022-08-03 NOTE — PROGRESS NOTES
08/03/22 1200   Pain Assessment   Pain Assessment Tool 0-10   Pain Score No Pain   Restrictions/Precautions   Precautions Aphasia; Aspiration;Bed/chair alarms;Cognitive; Fall Risk;Supervision on toilet/commode;Visual deficit   Comprehension   Comprehension (FIM) 4 - Understands basic info/conversation 75-90% of time   Expression   Expression (FIM) 3 - Expresses basic info/needs 50-74% of time   Social Interaction   Social Interaction (FIM) 5 - Interacts appropriately with others 90% of time   Problem Solving   Problem solving (FIM) 3 - Solves basic problmes 50-74% of time   Memory   Memory (FIM) 3 - Recognizes, recalls/performs 50-74%   Speech/Language/Cognition Assessmetn   Treatment Assessment For pt's language session, today focusing on reading comprehension at word and increased to sentence level provided increased complexity of sentence structures  Using the Advanced [employer] Cards, SLP using level A which targeted word to be matched to a field of 2 or 3 pictures  When completing word-picture match given FO2 pictures, pt was 7/9 accurate, increasing to 9/9 given review of errors  Increasing the number of pictures for pt to choose from to [facility] pictures to match to word, pt was 8/11 accurate, but again increased to 11/11 when reviewing items which were errored  Increased to Level B cards which now pt was to match a sentence (with object-location) to FO2 or FO3 pictures  Again, began w/ FO2 pictures to match to the sentences, where pt was 3/3 accurate, where SLP immediately increased the range of pictures to [facility] items  When doing so, pt was 12/17 accurate, appearing to notice that pt was somewhat aware of errors, but unable to self correct  Also suspected was decreased visual scanning to R side which contained pictures as well  When reviewing the errors, pt was able to then match correct sentence to pictures increasing to 17/17 accurate   The last setup of card presented were Level C in which the sentences presented included object-action to match to [employer]  Pt was 15/18 accurate, but when reviewing the errors, pt continued to have difficulty in matching correct sentence to picture  SLP engaged pt in use of verbally stating the sentences which increase ability to match 1 out of the 3 errors, needing increased cues for last 2 pictures-sentences to match  However, by this point in the session, suspect some level of mental fatigue which impacting abilities  Last task which targeting picture naming was use of the [address]  Pt's ability to spontaneously name pictures was 40/49  For another 4 pictures, pt was able to provide a description of the use/function given pictures, needing phonemic cues to increase to 44/49 accurate  As for the items which pt demonstrated most difficulty, pt was noted to perseverate on words already provided in naming pictures (ie: hammer or hand towel in attempts to state cards) or neologism for words (bello for razor) and even reversal of word (oven toast for toaster oven)  Pt needed phrase cues to elicit the true target names for these pictures  Pt's dtr Lauri Guadarrama was present for most of this session where ongoing education and demonstration of cues to elicit target words was provided  At this time, pt will continue to highly benefit from ongoing skilled SLP services targeting functional language skills, including receptive and expressive language as well as cognitive linguistic skills in attempts to decrease caregiver burden at time of discharge      Eating   Type of Assistance Needed Set-up / clean-up;Supervision;Verbal cues   Physical Assistance Level No physical assistance   Eating CARE Score 4   Swallow Assessment   Swallow Treatment Assessment Daily Dysphagia Tx Note     Patient Name: Kareem Martinez    [id number]'V Date: 8/3/2022      Current Risks for Dysphagia & Aspiration: Dysarthria;General debilitation;New Neuro event;Brain injury;Cognitive deficit     Current Symptoms/Concerns: Cough;Clear throat;During meals; Difficulty chewing;Holding food in mouth;Pockets food;     Current diet:mechanically altered/level 2 diet and thin liquids      Premorbid diet::regular diet and thin liquids      Positioning: upright in recliner    Items administered:Consistencies Administered: thin liquids, soft solids, hard solids and mixed consistency  Materials administered included : chicken salad sandwich, vegetable soup, sliced peaches, juice by cup    Total amount of meal consumed: 100% of meal and 240cc of thin liquids        Oral stage:minimal to mild  Lip closure: WFL around fork, tsp, cup  Anterior spillage: none  Mastication: overall mastication was noted to be timely and functional when taking smaller boluses and allowing time between bolus presentations  Bolus formation: fairly functional today given textures  Bolus control: question decreased w/ ambient fluid given soup and larger sips of thin liquids due to head tilt greater than 90 degrees  Transfer: minimal delay w/ mixed consistencies  Oral residue: minimal  Pocketing: none         Pharyngeal stage:minimal to mild  Swallow promptness: improvement in timing noted given soft/solids, BUT suspect incoordinated timing w/ ambient fluid from soup and later sips of thins  Hyolaryngeal elevation: functional upon palpation  Wet voice: none  Throat clear: none  Cough: cough x1 given ambient fluid from soup and cough x3 at last sips of thin liquids   Secondary swallows: none  Audible swallows: none       Esophageal stage:WFL        Summary:     Pt presenting with minimal to mild oropharyngeal dysphagia as noted by inconsistent bolus control/transfer and initiation given swallow given ambient fluids of mixed consistency (soup) and thin liquids by cup given head flexion greater than 90 degrees, which lead to increased coughing more so noted at end of meal  Otherwise, rate of intake was improved as well as bite sizes taken   Mastication was noted to be improved in timing and was noted to be functional to where minimal oral residual was observed throughout meal  Transfer of soft/solids was prompt and swallow initiation was noted to be more timely w/ these textures as well  No other signs/sxs of aspiration were observed given soft/solid trials during meal  Again, suspect some incoordination and timing of swallow initiation leading to the coughing events (x1 w/ soup and x3 given last sips of thins by cup)  It was noted that pt was able to demonstrate more independence given swallow strategies including slower rate of intake, smaller bites and use of liquid wash given dry textures  Pt's dtr, David Button was present for meal today in which SLP providing education about swallow function, needing verbal cues to slow rate of intake, small bites, alternating solids/liquids to maximize tolerance of diet advancement  Plan is to now advance diet to dysphagia level 3 items, continuing thin liquids but will monitor more w/ thin liquids as this increased cough had not been observed in prior meals w/ current SLP  Dtr did verbalize that pt has exhibited cough w/ liquids > meals throughout pt's stay  Recommendations:  Diet: soft/level 3 diet and thin liquids  Meds: as pt tolerates  Strategies: upright posture, only feed when fully alert, slow rate of feeding, small bites/sips, effortful swallow, quiet environment (tv off, limit talking, door closed, etc ), alternating bites and sips and OOB for ALL meals     DISTANT supervision with meals  Results reviewed with:  patient, MD- Dr Henna Armenta, nsg and family, dtrs David Button and Ashlyn  Aspiration precautions posted  F/u ST tx: Pt will continue to benefit from ongoing skilled dysphagia tx sessions to establish safest least restrictive diet w/o increased oropharyngeal or aspiration sxs as well as monitor ability to carryover swallow strategies independently          Plan: Monitor tolerance of upgraded diet to dysphagia level 3 diet food items w/o exhibiting increased or overt aspiration sxs  Swallow Assessment Prognosis   Prognosis Good   Prognosis Considerations Age; Co-morbidities; Family/community support;Medical diagnosis; Medical prognosis; Severity of impairments;New learning ability;Ability to carry over   SLP Therapy Minutes   SLP Time In 1200   SLP Time Out 1330   SLP Total Time (minutes) 90   SLP Mode of treatment - Individual (minutes) 90   SLP Mode of treatment - Concurrent (minutes) 0   SLP Mode of treatment - Group (minutes) 0   SLP Mode of treatment - Co-treat (minutes) 0   SLP Mode of Treatment - Total time(minutes) 90 minutes   SLP Cumulative Minutes 330   Therapy Time missed   Time missed?  No

## 2022-08-04 PROCEDURE — 97530 THERAPEUTIC ACTIVITIES: CPT

## 2022-08-04 PROCEDURE — 97112 NEUROMUSCULAR REEDUCATION: CPT

## 2022-08-04 PROCEDURE — 92507 TX SP LANG VOICE COMM INDIV: CPT

## 2022-08-04 PROCEDURE — 99232 SBSQ HOSP IP/OBS MODERATE 35: CPT | Performed by: PHYSICAL MEDICINE & REHABILITATION

## 2022-08-04 PROCEDURE — 97110 THERAPEUTIC EXERCISES: CPT

## 2022-08-04 PROCEDURE — 97130 THER IVNTJ EA ADDL 15 MIN: CPT

## 2022-08-04 PROCEDURE — 97535 SELF CARE MNGMENT TRAINING: CPT

## 2022-08-04 PROCEDURE — 97129 THER IVNTJ 1ST 15 MIN: CPT

## 2022-08-04 PROCEDURE — 99232 SBSQ HOSP IP/OBS MODERATE 35: CPT | Performed by: INTERNAL MEDICINE

## 2022-08-04 PROCEDURE — 92526 ORAL FUNCTION THERAPY: CPT

## 2022-08-04 RX ADMIN — MULTIPLE VITAMINS W/ MINERALS TAB 1 TABLET: TAB ORAL at 08:13

## 2022-08-04 RX ADMIN — ASPIRIN 81 MG CHEWABLE TABLET 81 MG: 81 TABLET CHEWABLE at 08:12

## 2022-08-04 RX ADMIN — Medication 2000 UNITS: at 08:12

## 2022-08-04 RX ADMIN — SERTRALINE HYDROCHLORIDE 75 MG: 50 TABLET ORAL at 08:13

## 2022-08-04 RX ADMIN — Medication 1 TABLET: at 07:05

## 2022-08-04 RX ADMIN — SENNOSIDES 17.2 MG: 8.6 TABLET, FILM COATED ORAL at 08:13

## 2022-08-04 RX ADMIN — DOCUSATE SODIUM 100 MG: 100 CAPSULE, LIQUID FILLED ORAL at 08:13

## 2022-08-04 RX ADMIN — ATORVASTATIN CALCIUM 40 MG: 40 TABLET, FILM COATED ORAL at 17:45

## 2022-08-04 RX ADMIN — ENOXAPARIN SODIUM 40 MG: 40 INJECTION SUBCUTANEOUS at 08:12

## 2022-08-04 RX ADMIN — DOCUSATE SODIUM 100 MG: 100 CAPSULE, LIQUID FILLED ORAL at 17:45

## 2022-08-04 RX ADMIN — OXYCODONE HYDROCHLORIDE AND ACETAMINOPHEN 500 MG: 500 TABLET ORAL at 08:13

## 2022-08-04 RX ADMIN — SENNOSIDES 17.2 MG: 8.6 TABLET, FILM COATED ORAL at 17:45

## 2022-08-04 NOTE — PROGRESS NOTES
Physical Medicine and Rehabilitation Progress Note  Vasu Irby 76 y o  female MRN: [id number]  Unit/Bed#: -01 Encounter: [id number]    HPI: Vasu Irby is a 76 y o  female with a PMH significant for h/o endometrial cancer, depression, h/o DVT, vitamin D deficency and OA, who presented to the [address] [place] [address] ED on 07/23 with a stroke alert  She was in contact with family the night prior when the next day family could not contact her and called EMS  Patient was noted to have altered mental status upon EMS arrival and was experiencing right facial droop  Neurology was consulted  [address] [facility] [address] ordered without evidence of acute intracranial hemorrhage  CTA with left distal m2/m3 occlusion  SLP assessed and also noted patient to have dysphagia  MRI brain with acute to early subacute ischemia involving the left MCA territory with a larger infarct and multiple smaller infarcts  No hemorrhagic transformation identified  With her history of endometrial cancer the MRI of the brain also ruled out mets  ECHO obtained with EF of 70%  Neurology recommending outpatient loop recorder placement  She will continue ASA and Lipitor  PM&R consulted and recommended patient for ARC  She participated with PT/OT/ST and was deemed appropriate for post-acute rehabilitation services after demonstrating the ability to tolerate three hours of therapy per day  She was accepted to [facility] and arrived on 7/28/22  Chief Complaint: No new issues  Interval History/Subjective:  No acute events overnight  Seen in therapy  Denies any trouble sleeping, pain, CP, SOB, headaches, N/V, abdominal pain, fevers, chills  Last BM 8/4  ROS:  A 10 point review of systems was negative except for what is noted in the HPI  Today's Changes: 1  Continue current plan of care  2  Family/dispo planning  Total visit time: 25 minutes, with more than 50% spent counseling/coordinating care   Counseling includes discussion with patient re: progress in therapies, functional issues observed by therapy staff, and discussion with patient regarding their current medical state and wellbeing  Coordination of patient's care was performed in conjunction with Internal Medicine service to monitor patient's labs, vitals, and management of their comorbidities  Assessment/Plan:    * Acute ischemic left MCA stroke [facility])  Assessment & Plan  · Presented to [facility] ED with right facial droop and aphasia  · Outside tPA window  · CT without evidence of acute intracranial hemorrhage  Left frontal temporal and low attenuation suggesting acute-subacute ischemia  · CTA H/N with distal Left M2/M3 MCA occlusion   · MRI brain- acute to early subacute ischemia identified involving the left MCA territory with one larger infarct and multiple additional smaller infarcts  No hemorrhagic transformation identified   · MRI brain negative for metastasis in setting of h/o endometrial cancer  · ECHO unremarkable   · Continue ASA/Statin therapy per neurology recs  · Dysphagia diet; SLP following  · Neurology also recommended Loop recorder as OP with OP FU in 4-6 weeks  · Daughter deferred inpatient placement of loop recorder; aware of need to follow up with Cardiology as OP  · Acute chomprehensive interdisciplinary inpatient rehabilitation to include intensive skilled therapies as outlines with oversight and management by a rehabilitation Physician Assistant overseen by rehabilitation physician as well as inpatient rehabilitation nursing, case management and weekly interdisciplinary team meeting    SA node dysfunction [facility])  Assessment & Plan  With junctional bradycardia new on exam 8/2 8/4 more regular  Monitor EKG  - Seen by Cards  - HR fairly stable compared to previous  - K > 4, Mag > 2  - Family would like to defer any monitoring until outpatient    - Risk/benefits/alternatives presented in detail       Dysphagia  Assessment & Plan  · Dysphagia level 3 diet with thin liquids  · SLP to follow    History of DVT (deep vein thrombosis)  Assessment & Plan  · Potential h/o xarelto use for several months; no longer taking  · Currently Lovenox SQ Daily and SCD       Mild episode of recurrent major depressive disorder (HCC)  Assessment & Plan  · Continue Zoloft 75 mg daily      Vitamin D deficiency  Assessment & Plan  · Continue Cholecalciferol       History of endometrial cancer  Assessment & Plan  · Stage IA, grade 3 s/p completion of adjuvant chemotherapy and radiation in April 2015  · Follows with GYN-ONC as OP        Health Maintenance  #Delirium/Sleep: At risk  First night with some impulsivity, but due to needing to go to the bathroom  Added timed voids with improvement  Continue to optimize pain, bowel, bladder, sleep-wake cycle management  #Pain: Tylenol PRN  #Bowel: Last BM 8/4  On Senna 2 tabs BID and docusate BID  Will try to de-escalate while here  #Bladder: Timed voids to help with continence PVRx all < 150cc  #Skin/Pressure Injury Prevention: Turn Q2hr in bed, with weight shifts [id number] in wheelchair  #DVT Prophylaxis: Lovenox, SCDs  #GI Prophylaxis: PO diet  #Code Status:  DNR/DNI  #FEN: Level 3/thins  #Dispo: Team Conference 8/3: ADD 8/12 to her daughter's home with supervision and PT/OT/SLP  Objective:    Functional Update:  PT: CGA transfers, Sup bed mobility, CGA ambulation 300', CGA stairs  OT:  Ind eating, Sup grooming, Jaguar bathing, Jaguar UB dressing, Jaguar LB dressing, Jaguar toileting, Jaguar tub/shower transfer, Jaguar toilet transfers  SLP: Level 3/thins advanced  Continuse with moderate language deficits in receptive/expressive language skills  Allergies per EMR    Physical Exam:  Temp:  [98 2 °F (36 8 °C)-98 8 °F (37 1 °C)] 98 3 °F (36 8 °C)  HR:  [57-65] 65  Resp:  [17-18] 18  BP: (132-158)/(61-79) 158/77  Oxygen Therapy  SpO2: 97 %    Gen: No acute distress, Well-nourished, well-appearing    HEENT: Moist mucus membranes, Normocephalic/Atraumatic  Cardiovascular: Regular rate, rhythm, S1/S2  Distal pulses palpable  Heme/Extr: No edema  Pulmonary: Non-labored breathing  Lungs CTAB  : No pantoja  GI: Soft, non-tender, non-distended  Integumentary: Skin is warm, dry  Neuro: AAOx3, Expressive and receptive aphasia  Better L/R discrimination on exam today  Psych: Normal mood and affect  Diagnostic Studies: Reviewed, no new imaging      Laboratory:  Reviewed   Results from last 7 days   Lab Units 08/01/22  0505 07/29/22  0645   HEMOGLOBIN g/dL 14 0 13 7   HEMATOCRIT % 43 3 42 1   WBC Thousand/uL 8 18 7 66     Results from last 7 days   Lab Units 08/01/22  0505 07/29/22  1059 07/29/22  0645   BUN mg/dL 17 19 18   POTASSIUM mmol/L 4 2 4 3 5 6*   CHLORIDE mmol/L 107 109* 109*   CREATININE mg/dL 0 64 0 69 0 58*   AST U/L  --   --  46*   ALT U/L  --   --  19            Patient Active Problem List   Diagnosis    History of endometrial cancer    Endometrial cancer ([facility] [address] )    Inguinal lymphadenopathy    Multinodular thyroid    Obesity    Osteoarthritis of hip    Osteoarthritis of knee    Vitamin D deficiency    Osteopenia of lumbar spine    Mild episode of recurrent major depressive disorder (HCC)    Acute ischemic left MCA stroke (HCC)    History of DVT (deep vein thrombosis)    Dysphagia    SA node dysfunction (HCC)         Medications  Current Facility-Administered Medications   Medication Dose Route Frequency Provider Last Rate    acetaminophen  650 mg Oral Q6H PRN Jennifer Asencio PA-C      aluminum-magnesium hydroxide-simethicone  30 mL Oral Q6H PRN Jennifer Asencio PA-C      ascorbic acid  500 mg Oral Daily Jennifer Asencio PA-C      aspirin  81 mg Oral Daily Jennifer Asencio PA-C      atorvastatin  40 mg Oral QPM Jennifer Asencio PA-C      bisacodyl  10 mg Rectal Daily PRN Jennifer Asencio PA-C      calcium carbonate-vitamin D  1 tablet Oral Daily With Breakfast Jennifer Asencio PA-C      cholecalciferol 2,000 Units Oral Daily Candis Ross PA-C      docusate sodium  100 mg Oral BID Candis Ross PA-C      enoxaparin  40 mg Subcutaneous Daily Candis Ross PA-C      multivitamin-minerals  1 tablet Oral Daily Candis Ross PA-C      ondansetron  4 mg Oral Q6H PRN Candis Ross PA-C      polyethylene glycol  17 g Oral Daily PRN Candis Ross PA-C      senna  2 tablet Oral BID Candis Ross PA-C      sertraline  75 mg Oral Daily Candis Ross PA-C          ** Please Note: Fluency Direct voice to text software may have been used in the creation of this document   **

## 2022-08-04 NOTE — PROGRESS NOTES
Internal Medicine Progress Note  Patient: Virgil Du  Age/sex: 76 y o  female  Medical Record #: [id number]      ASSESSMENT/PLAN: (Interval History)  Virgil Du is seen and examined and management for following issues:    Lt MCA CVA  · Continue ASA 81mg daily/atorvastatin 40mg daily  · Outpt follow-up with Neurology  · Outpt follow-up with Cardiology     Junctional rhythm/PACs  · Previous EKGs were sinus  · Cards saw here and rec'd LOOP now vs Zio patch now and then LOOP as OP but family declined both 22 noting they want to revisit the topic in the OP setting  · AVOID ANY AVN BLOCKING AGENTS  · Repeat EKG  was NSR with 1st degree AVB but currently sounded by exam to be back in [address] on 8/3/22 and today 22     Depression  · Continue sertraline         DC plannin22    The above assessment and plan was reviewed and updated as determined by my evaluation of the patient on 2022      Labs:   Results from last 7 days   Lab Units 22  0505 22  0645   WBC Thousand/uL 8 18 7 66   HEMOGLOBIN g/dL 14 0 13 7   HEMATOCRIT % 43 3 42 1   PLATELETS Thousands/uL 241 217     Results from last 7 days   Lab Units 22  0505 22  1059   SODIUM mmol/L 139 138   POTASSIUM mmol/L 4 2 4 3   CHLORIDE mmol/L 107 109*   CO2 mmol/L 29 25   BUN mg/dL 17 19   CREATININE mg/dL 0 64 0 69   CALCIUM mg/dL 9 2 9 3                   Review of Scheduled Meds:  Current Facility-Administered Medications   Medication Dose Route Frequency Provider Last Rate    acetaminophen  650 mg Oral Q6H PRN Frank Huffman PA-C      aluminum-magnesium hydroxide-simethicone  30 mL Oral Q6H PRN Frank Huffman PA-C      ascorbic acid  500 mg Oral Daily Frank Huffman PA-C      aspirin  81 mg Oral Daily Frank Huffman PA-C      atorvastatin  40 mg Oral QPM Frank Huffman PA-C      bisacodyl  10 mg Rectal Daily PRN Frank Huffman PA-C      calcium carbonate-vitamin D  1 tablet Oral Daily With Breakfast Ml [employer], PA-C      cholecalciferol  2,000 Units Oral Daily Ml Godfrey, PA-C      docusate sodium  100 mg Oral BID Ml [employer], PA-C      enoxaparin  40 mg Subcutaneous Daily Ml [employer], PA-C      multivitamin-minerals  1 tablet Oral Daily Ml [employer], PA-C      ondansetron  4 mg Oral Q6H PRN Ml [employer], PA-C      polyethylene glycol  17 g Oral Daily PRN Ml Godfrey, PA-C      senna  2 tablet Oral BID Ml Godfrey, PA-C      sertraline  75 mg Oral Daily Ml [employer], PA-C         Subjective/ HPI: Patient seen and examined  Patients overnight issues or events were reviewed with nursing or staff during rounds or morning huddle session  New or overnight issues include the following:     No new or overnight issues  Offers no complaints    ROS:   A 10 point ROS was performed; negative except as noted above         Imaging:     No orders to display       *Labs /Radiology studies reviewed  *Medications reviewed and reconciled as needed  *Please refer to order section for additional ordered labs studies  *Case discussed with primary attending during morning huddle case rounds    Physical Examination:  Vitals:   Vitals:    08/03/22 0445 08/03/22 1300 08/03/22 2112 08/04/22 0543   BP: 114/52 152/73 138/79 132/61   BP Location: Right arm Right arm Right arm Right arm   Pulse: 66 69 57 57   Resp: 16 20 18 17   Temp: 97 8 °F (36 6 °C) 97 8 °F (36 6 °C) 98 8 °F (37 1 °C) 98 2 °F (36 8 °C)   TempSrc: Oral Oral Oral Oral   SpO2: 96% 98% 96% 96%   Weight: 86 kg (189 lb 9 5 oz)      Height:           General Appearance: no distress, conversive  HEENT: PERRLA, conjuctiva normal; oropharynx clear; mucous membranes moist   Neck:  Supple, normal ROM  Lungs: CTA, normal respiratory effort, no retractions, expiratory effort normal  CV: irregular rate and rhythm; no rubs/murmurs/gallops, PMI normal   ABD: soft; ND/NT; +BS  EXT: no edema  Skin: normal turgor, normal texture, no rashes  Psych: affect normal, mood normal  Neuro: AAO      The above physical exam was reviewed and updated as determined by my evaluation of the patient on 8/4/2022  Invasive Devices  Report    None                    VTE Pharmacologic Prophylaxis: Enoxaparin  Code Status: Level 3 - DNAR and DNI  Current Length of Stay: 7 day(s)      Total time spent:  30 minutes with more than 50% spent counseling/coordinating care  Counseling includes discussion with patient re: progress  and discussion with patient of his/her current medical state/information  Coordination of patient's care was performed in conjunction with primary service  Time invested included review of patient's labs, vitals, and management of their comorbidities with continued monitoring  In addition, this patient was discussed with medical team including physician and advanced extenders  The care of the patient was extensively discussed and appropriate treatment plan was formulated unique for this patient  ** Please Note:  voice to text software may have been used in the creation of this document   Although proof errors in transcription or interpretation are a potential of such software**

## 2022-08-04 NOTE — PROGRESS NOTES
08/04/22 1030   Pain Assessment   Pain Assessment Tool 0-10   Pain Score No Pain   Restrictions/Precautions   Precautions Aphasia; Aspiration;Bed/chair alarms;Cognitive; Fall Risk;Supervision on toilet/commode   Weight Bearing Restrictions No   ROM Restrictions No   Cognition   Overall Cognitive Status Impaired   Subjective   Subjective Patient ready to particiapte in PT session   Sit to Stand   Type of Assistance Needed Supervision   Physical Assistance Level No physical assistance   Comment CS   Sit to Stand CARE Score 4   Bed-Chair Transfer   Type of Assistance Needed Supervision   Physical Assistance Level No physical assistance   Comment CS   Chair/Bed-to-Chair Transfer CARE Score 4   Transfer Bed/Chair/Wheelchair   Limitations Noted In Balance;LE Strength; Coordination   Walk 10 Feet   Type of Assistance Needed Incidental touching   Physical Assistance Level No physical assistance   Comment overall CS with ocasional CGA needed   Walk 10 Feet CARE Score 4   Walk 50 Feet with Two Turns   Type of Assistance Needed Incidental touching   Physical Assistance Level No physical assistance   Comment overall CS with ocasional CGA needed   Walk 50 Feet with Two Turns CARE Score 4   Walk 150 Feet   Type of Assistance Needed Incidental touching   Physical Assistance Level No physical assistance   Comment overall CS with ocasional CGA needed   Walk 150 Feet CARE Score 4   Ambulation   Does the patient walk? 2  Yes   Primary Mode of Locomotion Prior to Admission Walk   Distance Walked (feet) 200 ft  (x4)   Gait Pattern Decreased foot clearance; Inconsistant Jesi; Improper weight shift;Decreased R stance;Narrow ZAID  (slight R leg circumducion; dec L step length)   Limitations Noted In Balance; Endurance; Heel Strike; Sequencing;Speed;Strength;Swing   Provided Assistance with: Balance;Direction   Wheel 50 Feet with Two Turns   Reason if not Attempted Activity not applicable   Wheel 50 Feet with Two Turns CARE Score 9   Wheel 150 Feet   Reason if not Attempted Activity not applicable   Wheel 222 Feet CARE Score 9   Curb or Single Stair   Style negotiated Curb   Type of Assistance Needed Incidental touching   Physical Assistance Level No physical assistance   1 Step (Curb) CARE Score 4   4 Steps   Type of Assistance Needed Incidental touching   Physical Assistance Level No physical assistance   Comment carrying grocies in contralteral hand   4 Steps CARE Score 4   12 Steps   Type of Assistance Needed Incidental touching   Physical Assistance Level No physical assistance   Comment carrying grocies in contralteral hand   12 Steps CARE Score 4   Stairs   Type Stairs;Curb   # of Steps 12  (+ curb)   Weight Bearing Precautions Fall Risk   Assist Devices Single Rail   Picking Up Object   Type of Assistance Needed Incidental touching   Physical Assistance Level No physical assistance   Comment CGA with no reacher   Picking Up Object CARE Score 4   Therapeutic Interventions   Strengthening BLE LAQ 20x each quintin during active rest   Flexibility BLE heel cord and HS gentle stretch   Neuromuscular Re-Education patient making bed in room with CGA/CS and verbal cues; cone retrieval activity naming colors out loud and searching for each color then plcing into bag and carrying items around unit; reading signs in hallway out load   Equipment Use   zhiwoep L2x15 minutes   Assessment   Treatment Assessment Patient engaged in PT treatment session focused on dual tasking, balance activities, and LE strengthening  During functional activities, patient with occasional scissoring that cause her to misstep but not present with a LOB  She is provided CGA during these times but demonstrates fair righting reactions  Otherwise, patient S  She does need continuums verbal instruction following directions 2* apraxia and aphasia  Plan for family meeting tomorrow with daughters with plan for family training all next week   Patient scheduled for discharge 8/12/22 with OPPT services  Patient continues to present well below baseline and will benefit from continued skilled PT intervention to maximize function and safety  Problem List Impaired balance;Decreased mobility; Decreased coordination;Decreased cognition; Impaired judgement;Decreased safety awareness   PT Barriers   Functional Limitation Car transfers; Ramp negotiation;Stair negotiation;Standing;Walking;Transfers   Plan   Treatment/Interventions Functional transfer training; Therapeutic exercise;Elevations;LE strengthening/ROM; Endurance training;Cognitive reorientation;Gait training;Bed mobility; Equipment eval/education;Patient/family training   Progress Progressing toward goals   PT Therapy Minutes   PT Time In 1030   PT Time Out 1130   PT Total Time (minutes) 60   PT Mode of treatment - Individual (minutes) 60   PT Mode of treatment - Concurrent (minutes) 0   PT Mode of treatment - Group (minutes) 0   PT Mode of treatment - Co-treat (minutes) 0   PT Mode of Treatment - Total time(minutes) 60 minutes   PT Cumulative Minutes 540

## 2022-08-04 NOTE — PROGRESS NOTES
08/04/22 1300   Pain Assessment   Pain Assessment Tool 0-10   Pain Score No Pain   Restrictions/Precautions   Precautions Aphasia;Bed/chair alarms;Cognitive; Fall Risk;Supervision on toilet/commode   Weight Bearing Restrictions No   ROM Restrictions No   Lifestyle   Autonomy "A stroke is when your brain loses flavor "   Grooming   Able To Wash/Dry Hands   Findings CS in stance at sink, prompts for sequencing  Sit to Stand   Type of Assistance Needed Supervision   Physical Assistance Level No physical assistance   Comment CS   Sit to Stand CARE Score 4   Bed-Chair Transfer   Type of Assistance Needed Supervision   Physical Assistance Level No physical assistance   Comment CS without AD   Chair/Bed-to-Chair Transfer CARE Score 4   Toileting Hygiene   Type of Assistance Needed Verbal cues; Supervision   Physical Assistance Level No physical assistance   Comment CS in stance for hygiene and clothing management  Pt attempted to manage clothing prior to fully managing bowel hygiene, req Min-Mod vc's for thoroughness  Toileting Hygiene CARE Score 4   Toilet Transfer   Type of Assistance Needed Supervision; Adaptive equipment   Physical Assistance Level No physical assistance   Comment  w/ use of grab bars  Toilet Transfer CARE Score 4   Functional Standing Tolerance   Activity Simulated shopping task focusing on fxnl cognitive retraining to follow 2-step directions, short distance fxnl mobility w/ quick turns, common clothing ID, and divided attention to task/dual-tasking  OTR provided w/ contextual cues, pt tasked w/ ID clothing and challenged w/ name of clothing and wear appropriateness  Initially req Mod prompts, vc's and gestures, initially demo difficulty scanning to R  With task progression completed w/ occ-Min vc's w/ inc time provided and no longer req vc's to follow the directions  Cues req to initiated seated rest breaks to manage fatigue   Stand tolerance 3x 4-6 minutes at  without AD and no LOB while carrying bag of clothing  Cognition   Overall Cognitive Status Impaired   Arousal/Participation Alert; Cooperative   Attention Difficulty dividing attention   Orientation Level Oriented to person;Oriented to place;Oriented to situation;Disoriented to time   Memory Decreased short term memory;Decreased recall of recent events;Decreased recall of precautions   Following Commands Follows one step commands with increased time or repetition   Comments Oriented to person, place, city, and situation w/ inc time provided  Completed telling time exercise #4 and #5 from OT cognitive workbook  With inc time to process question and repetition of question, able to answer appropriately for example "what time do you eat breakfast " Then asked to place hands on blank clock face to match answer to question, able to place hands in correct location for times on the hour (800), however hands of clock same length and pt did not place arrows on hands req Mod cues/propmts to correct errors  Much difficulty w/ other times (930, 1130), task perseverated on hour hand which was accurate and difficulty noted w/ minute hand req redirection  Extended time spent on body part ID and body scheme awareness  Initially noted w/ poor L/R discrimination, w/ repetition pt demo G carryover w/ inc time provided for processing and problem-solving  Upgraded task to ID L/R basic body parts (arm, hand, leg, foot), able to ID w/ 75% accuracy w/ repetitive task  Upgraded again to ID to L/R facial features and joints (ear, nose, elbow, wrist, knee, ankle), inc difficulty noted w/ 75% accuracy however inc difficulty noted w/ L/R discrimination at times w/ correct joint ID  Will cont to benefit from daily repetitive training to maximize carryover  Vision   Vision Comments Shape matching worksheet, completed w/ inc time without challenge  Visual scanning worksheet, tasked w/ circling specific letters, 4 sections   Completed w/ 75% accuracy, req vc's to point out errors  Even with errors identified, pt req OTR to initiate scanning strategies w/ Min vc's and gestures to ease task, extended time req w/ pt able to complete w/ extended time  Inc difficulty when asked to locate 2 letters in 2nd section  Pt denied visual deficit since stroke, will cont to assess during fxnl act as pt cont to demo R neglect at times however not consistent w/ R visual field cut and may be related to cognition: undivided vs divided attention to task  Assessment   Treatment Assessment Pt seen for skilled OT session focusing on fxnl cognitive retraining specifically body scheme awareness and visual scanning, refer above for details  Compared to eval, improvements noted when pt provided w/ repetition and inc time to process  Requires less gestures overall, however noted w/ cognitive fatigue at end of session w/ pt reporting to be tired  Improvements noted w/ body part awareness w/ repetitive training, will benefit from daily repetition to maximize carryover  Add'lly limited by R field cut vs attention deficit, due to language and cognitive deficit formal visual assessment limited; cont to assess functionally  Plan for meeting w/ pt's daughter tomorrow morning  Cont OT POC: fxnl cognitive retraining, body scheme awareness, repetitive safety training, dynamic standing balance, and ADL retraining  Highly anticipate need for 24 hour S/A, w/ ongoing FT until d/c currently planned for 8/12, OP neuro OT  Prognosis Good   Problem List Impaired balance;Decreased mobility; Decreased coordination;Decreased cognition; Impaired judgement;Decreased safety awareness   Plan   Treatment/Interventions ADL retraining;Functional transfer training; Therapeutic exercise;Cognitive reorientation;Patient/family training   Progress Progressing toward goals   Recommendation   OT Discharge Recommendation Home with outpatient rehabilitation  (24 hour S/A)   OT Therapy Minutes   OT Time In 1300   OT Time Out 1430   OT Total Time (minutes) 90   OT Mode of treatment - Individual (minutes) 90   OT Mode of treatment - Concurrent (minutes) 0   OT Mode of treatment - Group (minutes) 0   OT Mode of treatment - Co-treat (minutes) 0   OT Mode of Treatment - Total time(minutes) 90 minutes   OT Cumulative Minutes 595   Therapy Time missed   Time missed?  No

## 2022-08-04 NOTE — PROGRESS NOTES
08/04/22 0859   Pain Assessment   Pain Assessment Tool 0-10   Pain Score No Pain   Restrictions/Precautions   Precautions Aphasia; Aspiration;Bed/chair alarms;Cognitive; Fall Risk;Impulsive;Supervision on toilet/commode;Visual deficit   Comprehension   Comprehension (FIM) 3 - Understands basic info/conversation 50-74% of time   Expression   Expression (FIM) 3 - Expresses basic info/needs 50-74% of time   Social Interaction   Social Interaction (FIM) 5 - Interacts appropriately with others 90% of time   Problem Solving   Problem solving (FIM) 3 - Solves basic problmes 50-74% of time   Memory   Memory (FIM) 3 - Recognizes, recalls/performs 50-74%   Speech/Language/Cognition Assessmetn   Treatment Assessment As for pt's language session today, SLP targeting all aspects of language, including receptive reading skills as well as expressive language skills, including verbal and written skills  Circled back to task which was presented to pt a few sessions ago, where there was a description provided located on the L side of the page and pt was to match the description to the correct picture which was placed on the R side of the page  On the initial page provided, pt was 3/5 accurate in matching simple description to the pictures  Pt did require SLP to verbally read description which pt was able to improve comprehension to match the last 2 pictures  However, when completing this same task again, pt's overall comprehension given descriptions to match to the pictures was poor, noting increased perseveration on 2 pictures to match to all the descriptions  SLP needed to provide verbal reading of the description as well as then placing description as a phrase cue to match 2 pictures for 2/5 accuracy   For the last 3 items, SLP requiring maximal cues eventually asking yes/no questions to pt for determining if the picture fit the description which did increase ability to recognize correct pictures to match to the description provided  When completing this task again, pt did exhibit improvement and was 4/5 accurate in matching pictures to the descriptions, needing increased verbal statement of the one item, BUT this time, pt was noted to have difficulty in visually scanning the picture which goes w/ the description where eventually pt was able to locate picture  Next task which SLP engaged pt in was an opposite phrase completion task, focusing on pt's reading comprehension  The carrier phrase was provided as well as 3 possible words to complete each phrase  Pt was to identify the word which was the opposite given the carrier phrase (ie: brother and ___; before and ___, shiny and ____)  Ability for pt to recognize the opposite word to complete the phrases was 9/24 accuracy, noting that pt was choosing words which were similar in words presented in carrier phrase  Upon review and SLP verbally stating carrier phrase, pt was able to recognize the opposite words increasing to 19/24 accurate  For the errored words, only by process of elimination was pt able to determine the opposite word  Again question visual scanning to the very R side of the page to locate some of the word choices to complete the sentences  But suspect that likely there was more difficulty in comprehension of this task to choose the opposite word in meaning  SLP did switch to more 'common' sentence completion task (ie: open the ___, drive a ____), but continued same format to where the sentence began and [address], [address] words provide to complete each sentence  Pt did not carryover from last task needing to [place] the target word, but pt was writing in the words to complete the sentences  It was noted that pt's ability to choose correct word to complete the sentences was 28/35 accurate, to where errors observed were misspelling for 3 items and 2 items which were wrong choices   When showing the pt the misspellings, pt was able to self correct, increasing to 33/35, but for the words which did not make sense to complete the sentences, pt needing increased visual scanning cues to locate words listed along the R side  When completing sentence completion task by spontaneously writing in the words, pt was 15/20 accurate, noting that pt verbally states the correct word to complete the sentences but demonstrating agraphia in writing the words  To further check on spontaneous written expression, SLP had pt write biographical information where pt was able to accurately write full name, address,  and dtr's names  Pt did have errors in phone number but when verbally stating phone number pt was accurate  At this time, pt will continue to benefit from ongoing skilled SLP services targeting functional language skills, including receptive and expressive language as well as cognitive linguistic skills in attempts to decrease caregiver burden at time of discharge  Eating   Type of Assistance Needed Set-up / clean-up;Supervision;Verbal cues   Physical Assistance Level No physical assistance   Eating CARE Score 4   Swallow Assessment   Swallow Treatment Assessment Daily Dysphagia Tx Note     Patient Name: Marge WOLFE Date: 2022        Current Risks for Dysphagia & Aspiration: Dysarthria;General debilitation;New Neuro event;Brain injury;Cognitive deficit     Current Symptoms/Concerns: Cough;Clear throat;During meals; Difficulty chewing;Holding food in mouth;Pockets food;     Current diet:mechanically altered/level 2 diet and thin liquids      Premorbid diet::regular diet and thin liquids      Positioning: upright in recliner      Items administered:Consistencies Administered: thin liquids, puree and soft solids  Materials administered included: scrambled eggs, oatmeal, yogurt, thins by cup    Total amount of meal consumed:   100% meal and 120cc of thins      Oral stage:minimal and mild  Lip closure: wfl  Anterior spillage: nonoe  Mastication: mildly prolonged mastication noted given softer solids inconsistently when faster rate of intake  Bolus formation: fairly functional  Bolus control: overall appeared to prompt  Transfer: fairly prompt across consistencies  Oral residue: minimal but eventually cleared given use of liquid wash or puree items  Pocketing: none         Pharyngeal stage:minimal and mild  Swallow promptness: overall fairly timely, slight delaye w/ soft solids  Hyolaryngeal elevation: functional upon palpation  Wet voice: none  Throat clear: throat clear x1 during meal   Cough: none exhibited during meal but increased coughing was observed after meal x5   Secondary swallows: none observed   Audible swallows: none observed       Esophageal stage:WFL--> no overt sxs observed given meal         Summary:      Pt presenting with minimal and mild oropharyngeal dysphagia as noted by minimally prolonged mastication given softer solids when increasing rate of intake but pt responding better to verbal cues for slowing rate of intake and demonstrated more independence to use with liquid or puree as a wash for dry eggs which decreased overall oral residual  Lip seal around tsp, fork, cup remains to be wfl along w/ adequate bolus retrieval across consistencies  No anterior loss observed  No overt pocketing observed given soft and puree textures and noted to demonstrate prompt transfer of puree/thins when consumed  Swallow initiation remains to be prompt given puree/thins but mildly delayed w/ soft solids  Hyolaryngeal excursion remains to be functional  Throat clear x1 during meal was observed, BUT at end of meal, pt exhibiting increased cough x5  Questioning whether there is pharyngeal retention which can spill into airway to cause cough vs possibility of reflux (or bottom up aspiration)  Will continue to monitor this in upcoming sessions           Recommendations:  Diet: dysphagia level 3 diet and thin liquids  Meds: as pt tolerates  Strategies: upright posture, only feed when fully alert, slow rate of feeding, small bites/sips, effortful swallow, quiet environment (tv off, limit talking, door closed, etc ), alternating bites and sips and OOB for ALL meals     DISTANT supervision with meals  Results reviewed with:  patient, nsg   Aspiration precautions posted  F/u ST tx: Pt will continue to benefit from ongoing skilled dysphagia tx sessions to establish safest least restrictive diet w/o increased oropharyngeal or aspiration sxs as well as monitor ability to carryover swallow strategies independently  Plan: Monitor tolerance of upgraded diet to dysphagia level 3 diet food items w/o exhibiting increased or overt aspiration sxs as well as initiation given regular diet items in future sessions  Swallow Assessment Prognosis   Prognosis Good   Prognosis Considerations Age; Co-morbidities; Family/community support;Medical diagnosis; Medical prognosis; Severity of impairments;New learning ability;Ability to carry over   SLP Therapy Minutes   SLP Time In 0830   SLP Time Out 1000   SLP Total Time (minutes) 90   SLP Mode of treatment - Individual (minutes) 90   SLP Mode of treatment - Concurrent (minutes) 0   SLP Mode of treatment - Group (minutes) 0   SLP Mode of treatment - Co-treat (minutes) 0   SLP Mode of Treatment - Total time(minutes) 90 minutes   SLP Cumulative Minutes 420   Therapy Time missed   Time missed?  No

## 2022-08-04 NOTE — CASE MANAGEMENT
Rick Lowe phoned and said her sister is available tomorrow before 10am or after 3 30  Cm informed therapy and they will work with pt and family from [facility]  Cm phoned Rick Lowe back and made her aware

## 2022-08-04 NOTE — PLAN OF CARE
Problem: Prexisting or High Potential for Compromised Skin Integrity  Goal: Skin integrity is maintained or improved  Description: INTERVENTIONS:  - Identify patients at risk for skin breakdown  - Assess and monitor skin integrity  - Assess and monitor nutrition and hydration status  - Monitor labs   - Assess for incontinence   - Turn and reposition patient  - Assist with mobility/ambulation  - Relieve pressure over bony prominences  - Avoid friction and shearing  - Provide appropriate hygiene as needed including keeping skin clean and dry  - Evaluate need for skin moisturizer/barrier cream  - Collaborate with interdisciplinary team   - Patient/family teaching  - Consider wound care consult   Outcome: Progressing     Problem: PAIN - ADULT  Goal: Verbalizes/displays adequate comfort level or baseline comfort level  Description: Interventions:  - Encourage patient to monitor pain and request assistance  - Assess pain using appropriate pain scale  - Administer analgesics based on type and severity of pain and evaluate response  - Implement non-pharmacological measures as appropriate and evaluate response  - Consider cultural and social influences on pain and pain management  - Notify physician/advanced practitioner if interventions unsuccessful or patient reports new pain  Outcome: Progressing     Problem: INFECTION - ADULT  Goal: Absence or prevention of progression during hospitalization  Description: INTERVENTIONS:  - Assess and monitor for signs and symptoms of infection  - Monitor lab/diagnostic results  - Monitor all insertion sites, i e  indwelling lines, tubes, and drains  - Monitor endotracheal if appropriate and nasal secretions for changes in amount and color  - [place] appropriate cooling/warming therapies per order  - Administer medications as ordered  - Instruct and encourage patient and family to use good hand hygiene technique  - Identify and instruct in appropriate isolation precautions for identified infection/condition  Outcome: Progressing  Goal: Absence of fever/infection during neutropenic period  Description: INTERVENTIONS:  - Monitor WBC    Outcome: Progressing     Problem: SAFETY ADULT  Goal: Patient will remain free of falls  Description: INTERVENTIONS:  - Educate patient/family on patient safety including physical limitations  - Instruct patient to call for assistance with activity   - Consult OT/PT to assist with strengthening/mobility   - Keep Call bell within reach  - Keep bed low and locked with side rails adjusted as appropriate  - Keep care items and personal belongings within reach  - Initiate and maintain comfort rounds  - Make Fall Risk Sign visible to staff  - Offer Toileting every 2 Hours, in advance of need  - Initiate/Maintain bed/chair alarm  - Obtain necessary fall risk management equipment: nonskid socks  - Apply yellow socks and bracelet for high fall risk patients  - Consider moving patient to room near nurses station  Outcome: Progressing  Goal: Maintain or return to baseline ADL function  Description: INTERVENTIONS:  -  Assess patient's ability to carry out ADLs; assess patient's baseline for ADL function and identify physical deficits which impact ability to perform ADLs (bathing, care of mouth/teeth, toileting, grooming, dressing, etc )  - Assess/evaluate cause of self-care deficits   - Assess range of motion  - Assess patient's mobility; develop plan if impaired  - Assess patient's need for assistive devices and provide as appropriate  - Encourage maximum independence but intervene and supervise when necessary  - Involve family in performance of ADLs  - Assess for home care needs following discharge   - Consider OT consult to assist with ADL evaluation and planning for discharge  - Provide patient education as appropriate  Outcome: Progressing  Goal: Maintains/Returns to pre admission functional level  Description: INTERVENTIONS:  - Perform BMAT or MOVE assessment daily    - Set and communicate daily mobility goal to care team and patient/family/caregiver  - Collaborate with rehabilitation services on mobility goals if consulted  - Perform Range of Motion 3 times a day  - Reposition patient every 2 hours  - Dangle patient 3 times a day  - Stand patient 3 times a day  - Ambulate patient 3 times a day  - Out of bed to chair 3 times a day   - Out of bed for meals 3 times a day  - Out of bed for toileting  - Record patient progress and toleration of activity level   Outcome: Progressing     Problem: DISCHARGE PLANNING  Goal: Discharge to home or other facility with appropriate resources  Description: INTERVENTIONS:  - Identify barriers to discharge w/patient and caregiver  - Arrange for needed discharge resources and transportation as appropriate  - Identify discharge learning needs (meds, wound care, etc )  - Arrange for interpretive services to assist at discharge as needed  - Refer to Case Management Department for coordinating discharge planning if the patient needs post-hospital services based on physician/advanced practitioner order or complex needs related to functional status, cognitive ability, or social support system  Outcome: Progressing     Problem: Neurological Deficit  Goal: Neurological status is stable or improving  Description: Interventions:  - Monitor and assess patient's level of consciousness, motor function, sensory function, and level of assistance needed for ADLs  - Monitor and report changes from baseline  Collaborate with interdisciplinary team to initiate plan and implement interventions as ordered  - Provide and maintain a safe environment  - Consider seizure precautions  - Consider fall precautions  - Consider aspiration precautions  - Consider bleeding precautions  Outcome: Progressing     Problem:  Activity Intolerance/Impaired Mobility  Goal: Mobility/activity is maintained at optimum level for patient  Description: Interventions:  - Assess and monitor patient  barriers to mobility and need for assistive/adaptive devices  - Assess patient's emotional response to limitations  - Collaborate with interdisciplinary team and initiate plans and interventions as ordered  - Encourage independent activity per ability   - Maintain proper body alignment  - Perform active/passive rom as tolerated/ordered  - Plan activities to conserve energy   - Turn patient as appropriate  Outcome: Progressing     Problem: Communication Impairment  Goal: Ability to express needs and understand communication  Description: Assess patient's communication skills and ability to understand information  Patient will demonstrate use of effective communication techniques, alternative methods of communication and understanding even if not able to speak  - Encourage communication and provide alternate methods of communication as needed  - Collaborate with case management/ for discharge needs  - Include patient/family/caregiver in decisions related to communication  Outcome: Progressing     Problem: Potential for Aspiration  Goal: Non-ventilated patient's risk of aspiration is minimized  Description: Assess and monitor vital signs, respiratory status, and labs (WBC)  Monitor for signs of aspiration (tachypnea, cough, rales, wheezing, cyanosis, fever)  - Assess and monitor patient's ability to swallow  - Place patient up in chair to eat if possible  - HOB up at 90 degrees to eat if unable to get patient up into chair   - Supervise patient during oral intake  - Instruct patient/ family to take small bites  - Instruct patient/ family to take small single sips when taking liquids  - Follow patient-specific strategies generated by speech pathologist   Outcome: Progressing  Goal: Ventilated patient's risk of aspiration is minimized  Description: Assess and monitor vital signs, respiratory status, airway cuff pressure, and labs (WBC)    Monitor for signs of aspiration (tachypnea, cough, rales, wheezing, cyanosis, fever)  - Elevate head of bed 30 degrees if patient has tube feeding   - Monitor tube feeding  Outcome: Progressing     Problem: Nutrition  Goal: Nutrition/Hydration status is improving  Description: Monitor and assess patient's nutrition/hydration status for malnutrition (ex- brittle hair, bruises, dry skin, pale skin and conjunctiva, muscle wasting, smooth red tongue, and disorientation)  Collaborate with interdisciplinary team and initiate plan and interventions as ordered  Monitor patient's weight and dietary intake as ordered or per policy  Utilize nutrition screening tool and intervene per policy  Determine patient's food preferences and provide high-protein, high-caloric foods as appropriate  - Assist patient with eating   - Allow adequate time for meals   - Encourage patient to take dietary supplement as ordered  - Collaborate with clinical nutritionist   - Include patient/family/caregiver in decisions related to nutrition  Outcome: Progressing     Problem: Nutrition/Hydration-ADULT  Goal: Nutrient/Hydration intake appropriate for improving, restoring or maintaining nutritional needs  Description: Monitor and assess patient's nutrition/hydration status for malnutrition  Collaborate with interdisciplinary team and initiate plan and interventions as ordered  Monitor patient's weight and dietary intake as ordered or per policy  Utilize nutrition screening tool and intervene as necessary  Determine patient's food preferences and provide high-protein, high-caloric foods as appropriate       INTERVENTIONS:  - Monitor oral intake, urinary output, labs, and treatment plans  - Assess nutrition and hydration status and recommend course of action  - Evaluate amount of meals eaten  - Assist patient with eating if necessary   - Allow adequate time for meals  - Recommend/ encourage appropriate diets, oral nutritional supplements, and vitamin/mineral supplements  - Order, calculate, and assess calorie counts as needed  - Recommend, monitor, and adjust tube feedings and TPN/PPN based on assessed needs  - Assess need for intravenous fluids  - Provide specific nutrition/hydration education as appropriate  - Include patient/family/caregiver in decisions related to nutrition  Outcome: Progressing     Problem: Potential for Falls  Goal: Patient will remain free of falls  Description: INTERVENTIONS:  - Educate patient/family on patient safety including physical limitations  - Instruct patient to call for assistance with activity   - Consult OT/PT to assist with strengthening/mobility   - Keep Call bell within reach  - Keep bed low and locked with side rails adjusted as appropriate  - Keep care items and personal belongings within reach  - Initiate and maintain comfort rounds  - Make Fall Risk Sign visible to staff  - Offer Toileting every 2 Hours, in advance of need  - Initiate/Maintain bed  chair alarm  - Obtain necessary fall risk management equipment: nonskid socks  - Apply yellow socks and bracelet for high fall risk patients  - Consider moving patient to room near nurses station  Outcome: Progressing

## 2022-08-05 ENCOUNTER — PATIENT OUTREACH (OUTPATIENT)
Dept: CASE MANAGEMENT | Facility: OTHER | Age: 77
End: 2022-08-05

## 2022-08-05 PROCEDURE — 97129 THER IVNTJ 1ST 15 MIN: CPT

## 2022-08-05 PROCEDURE — 97110 THERAPEUTIC EXERCISES: CPT

## 2022-08-05 PROCEDURE — 97535 SELF CARE MNGMENT TRAINING: CPT

## 2022-08-05 PROCEDURE — 97130 THER IVNTJ EA ADDL 15 MIN: CPT

## 2022-08-05 PROCEDURE — 99233 SBSQ HOSP IP/OBS HIGH 50: CPT | Performed by: PHYSICAL MEDICINE & REHABILITATION

## 2022-08-05 PROCEDURE — 97112 NEUROMUSCULAR REEDUCATION: CPT

## 2022-08-05 PROCEDURE — 97530 THERAPEUTIC ACTIVITIES: CPT

## 2022-08-05 PROCEDURE — 92526 ORAL FUNCTION THERAPY: CPT

## 2022-08-05 PROCEDURE — 99232 SBSQ HOSP IP/OBS MODERATE 35: CPT | Performed by: INTERNAL MEDICINE

## 2022-08-05 PROCEDURE — 92507 TX SP LANG VOICE COMM INDIV: CPT

## 2022-08-05 RX ADMIN — ENOXAPARIN SODIUM 40 MG: 40 INJECTION SUBCUTANEOUS at 09:45

## 2022-08-05 RX ADMIN — ATORVASTATIN CALCIUM 40 MG: 40 TABLET, FILM COATED ORAL at 18:46

## 2022-08-05 RX ADMIN — Medication 2000 UNITS: at 09:48

## 2022-08-05 RX ADMIN — Medication 1 TABLET: at 09:45

## 2022-08-05 RX ADMIN — DOCUSATE SODIUM 100 MG: 100 CAPSULE, LIQUID FILLED ORAL at 18:46

## 2022-08-05 RX ADMIN — MULTIPLE VITAMINS W/ MINERALS TAB 1 TABLET: TAB ORAL at 09:45

## 2022-08-05 RX ADMIN — ASPIRIN 81 MG CHEWABLE TABLET 81 MG: 81 TABLET CHEWABLE at 09:45

## 2022-08-05 RX ADMIN — SENNOSIDES 17.2 MG: 8.6 TABLET, FILM COATED ORAL at 18:46

## 2022-08-05 RX ADMIN — SERTRALINE HYDROCHLORIDE 75 MG: 50 TABLET ORAL at 09:45

## 2022-08-05 RX ADMIN — OXYCODONE HYDROCHLORIDE AND ACETAMINOPHEN 500 MG: 500 TABLET ORAL at 09:45

## 2022-08-05 NOTE — PROGRESS NOTES
Chart review complete the patient has a anticipated discharge of 8/12/22  For therapy updates see team conference note on 8/3/22  This care manager assistant will continue to monitor via chart review throughout bundle episode

## 2022-08-05 NOTE — PROGRESS NOTES
08/05/22 1430   Pain Assessment   Pain Assessment Tool 0-10   Pain Score No Pain   Restrictions/Precautions   Precautions Aphasia; Aspiration;Bed/chair alarms;Cognitive; Fall Risk;Supervision on toilet/commode;Visual deficit   Cognition   Overall Cognitive Status Impaired   Subjective   Subjective Patient ready for PT session   Sit to Stand   Type of Assistance Needed Supervision   Physical Assistance Level No physical assistance   Comment CS   Sit to Stand CARE Score 4   Bed-Chair Transfer   Type of Assistance Needed Supervision   Physical Assistance Level No physical assistance   Comment CS   Chair/Bed-to-Chair Transfer CARE Score 4   Transfer Bed/Chair/Wheelchair   Limitations Noted In Balance;LE Strength   Adaptive Equipment None   Walk 10 Feet   Type of Assistance Needed Incidental touching   Physical Assistance Level No physical assistance   Comment CGA during balance activities otherwise CS   Walk 10 Feet CARE Score 4   Walk 50 Feet with Two Turns   Type of Assistance Needed Incidental touching   Physical Assistance Level No physical assistance   Comment CGA during balance activities otherwise CS   Walk 50 Feet with Two Turns CARE Score 4   Walk 150 Feet   Type of Assistance Needed Incidental touching   Physical Assistance Level No physical assistance   Comment CGA during balance activities otherwise CS   Walk 150 Feet CARE Score 4   Walking 10 Feet on Uneven Surfaces   Type of Assistance Needed Incidental touching   Physical Assistance Level No physical assistance   Walking 10 Feet on Uneven Surfaces CARE Score 4   Ambulation   Does the patient walk? 2  Yes   Primary Mode of Locomotion Prior to Admission Walk   Distance Walked (feet) 300 ft  (x4)   Gait Pattern Inconsistant Jesi;Decreased foot clearance;Trendelenburg; Antalgic  (slight RLE circumduction)   Limitations Noted In Balance; Endurance; Heel Strike;Speed;Strength;Swing   Provided Assistance with: Balance   Wheel 50 Feet with Two Turns   Reason if not Attempted Activity not applicable   Wheel 50 Feet with Two Turns CARE Score 9   Wheel 150 Feet   Reason if not Attempted Activity not applicable   Wheel 756 Feet CARE Score 9   Curb or Single Stair   Style negotiated Single stair   Type of Assistance Needed Incidental touching   Physical Assistance Level No physical assistance   Comment CGA/CS on FF   1 Step (Curb) CARE Score 4   4 Steps   Type of Assistance Needed Incidental touching   Physical Assistance Level No physical assistance   Comment CGA/CS on FF   4 Steps CARE Score 4   12 Steps   Type of Assistance Needed Incidental touching   Physical Assistance Level No physical assistance   Comment CGA/CS on FF   12 Steps CARE Score 4   Stairs   Type Stairs   # of Steps 12   Weight Bearing Precautions Fall Risk   Assist Devices Single Rail   Therapeutic Interventions   Flexibility BLE heel cord and HS gentle stretch TERT 2 minutes   Balance Spraying and wiping down objects used in therapy focused on using RUE, side stepping, forwards and backwards stepping and forwards and down wards reach   Neuromuscular Re-Education Patient unable to recall on 3 separate occasions the way back to her room and took the long way each time  She was able to recall her room nuber of 972 but needed prompting to use signs in hallways for directions   Other Initiated conversation regarding fall risk  When asked "what would you do if you feel and got hurt?" patient replied "call the ambulance"  When asked what number she would call to reach the ambulance, patient did not know  She was provided prompts but was not able to provide correct answer  When told that she would dial "911" to call for help she stated, "Oh is that the number?"  She was asked this question later in the session and was able to state, "I would call 911" when asked who she would call if she fell and got hurt   We did attempt a floor transfer however patient resistance 2* R knee pain and task was terminated by PT for safety  Equipment Use   NuStep L2x20 minutes   Assessment   Treatment Assessment Patient continues to require skilled PT intervention as she remains with deficits in balance and righting reactions during dual and divided attention tasks  She ambulates with slight antalgic pattern and reported R knee pain during attempted floor transfer this PM  Her instability in R knee causes her to misstep at times and reach for external support which she is observed doing especially when walking through narrow passages/ around obstacles  May consider use of SPC if she cannot fully progress to S only with activities  POC to continue to focus on implementing cognitive strategies and cueing to safely negotiate environment and situations that could pose harm at discharge  DC pending family support  Training to be initiated Monday with daughters; see additional note from AM family meeting  Problem List Impaired balance;Orthopedic restrictions;Decreased coordination;Decreased cognition; Impaired judgement;Decreased safety awareness   Barriers to Discharge Decreased caregiver support   PT Barriers   Functional Limitation Stair negotiation; Walking   Plan   Treatment/Interventions Functional transfer training;Elevations;LE strengthening/ROM; Therapeutic exercise; Endurance training;Cognitive reorientation;Patient/family training;Equipment eval/education; Bed mobility;Gait training; Compensatory technique education   Progress Progressing toward goals   PT Therapy Minutes   PT Time In 1430   PT Time Out 1525   PT Total Time (minutes) 55   PT Mode of treatment - Individual (minutes) 45   PT Mode of treatment - Concurrent (minutes) 10   PT Mode of treatment - Group (minutes) 0   PT Mode of treatment - Co-treat (minutes) 0   PT Mode of Treatment - Total time(minutes) 55 minutes   PT Cumulative Minutes 595

## 2022-08-05 NOTE — PROGRESS NOTES
Occupational Therapy Treatment Note         08/05/22 9110   Pain Assessment   Pain Assessment Tool 0-10   Pain Score No Pain   Restrictions/Precautions   Precautions Aphasia;Bed/chair alarms;Cognitive; Fall Risk;Supervision on toilet/commode;Visual deficit   Lifestyle   Autonomy "I need to tell my  it's ok he left" - August is 1 year anniversary of pt's  passing away  pt brings this up during session, reporting it has been difficult but she would like to visit his cemetary and tell him that it is ok he left her   Eating   Type of Assistance Needed Verbal cues; Supervision   Physical Assistance Level No physical assistance   Comment pt attempts to spread butter using spoon, when this is difficult OT prompts "what other utensil could you use", but pt does not choose knife and instead stays with spoon  OT then provides pt with knife, which pt uses to spread onto pancakes  OT also cued pt to use fork/knife to cut pancakes, instead of spoon  Pt noted with decreased ability to find her orange juice on lower R quadrant of tray, required cue to identify   Eating CARE Score 4   Oral Hygiene   Type of Assistance Needed Supervision   Physical Assistance Level No physical assistance   Comment in stance at sink  pt puts in dentures but then requires instruction to still brush teeth with toothpaste and brush, pt is able to utilize these items correctly though  Oral Hygiene CARE Score 4   Shower/Bathe Self   Type of Assistance Needed Incidental touching; Adaptive equipment   Physical Assistance Level No physical assistance   Comment Pt requires cues for sequencing during bathing tasks  Pt rinses self, but then requires cues "what do you need to clean yourself with" to identify need to use soap  Pt completes UB and LE bathing while seated with supervision, and then requires cues to initiate standing to bathe buttock/groin with incidental touching for standing balance   Pt then rinses hair while seated and states "ok" - that she was finished  OT provided indirect cue "what do you need to wash your hair with?" for pt to identify need to use shampoo  OT provided verbal cues for thoroughness throughout shower, overall verbal cues provided > 50% of the time during shower  Shower/Bathe Self CARE Score 4   Tub/Shower Transfer   Findings min assist hand hold assist walk in shower  Upper Body Dressing   Type of Assistance Needed Physical assistance   Physical Assistance Level 25% or less   Comment sitting pt attempts to don bra by clasping in back x2 minutes, unable to complete  required OT to say "is there another way you could do this?" for pt to then identify clasping bra in front  Pt required assist place bra around back, but then she was able to clasp   pt stands to don tshirt overhead with incidential touching   Upper Body Dressing CARE Score 3   Lower Body Dressing   Type of Assistance Needed Incidental touching   Physical Assistance Level No physical assistance   Comment verbal cues for pt to sit to thread/unthread LEs, in stance pt manages clothing over hips with incidenital touching   Lower Body Dressing CARE Score 4   Putting On/Taking Off Footwear   Type of Assistance Needed Supervision   Physical Assistance Level No physical assistance   Comment seated to don/doff socks and sneakers   Putting On/Taking Off Footwear CARE Score 4   Lying to Sitting on Side of Bed   Type of Assistance Needed Supervision   Physical Assistance Level No physical assistance   Lying to Sitting on Side of Bed CARE Score 4   Sit to Stand   Type of Assistance Needed Supervision   Physical Assistance Level No physical assistance   Sit to Stand CARE Score 4   Bed-Chair Transfer   Type of Assistance Needed Supervision   Physical Assistance Level No physical assistance   Comment close supervision without device   Chair/Bed-to-Chair Transfer CARE Score 4   Toileting Hygiene   Type of Assistance Needed Supervision   Physical Assistance Level No physical assistance   Comment in stance for clothing management   Toileting Hygiene CARE Score 4   Toilet Transfer   Type of Assistance Needed Supervision   Physical Assistance Level No physical assistance   Comment pt does use L grab bar, but anticipate pt can complete without grab bars  will trial   Toilet Transfer CARE Score 4   Cognition   Overall Cognitive Status Impaired   Arousal/Participation Alert   Attention Attends with cues to redirect   Orientation Level Oriented to person;Oriented to situation;Oriented to place   Memory Decreased short term memory   Following Commands Follows one step commands with increased time or repetition   Comments Pt presents with impaired sequencing and problem solving during ADL routine  Pt also presents with impaired comprehension, but she is noted to repeat instructions to herself  Ex: OT states "go to the dresser and pick out your clothing" and pt reports "pick out clothing" and stands up, but then does not walk to [employer]  OT then repeats instructions and pt states "ok walk to dresser and get clothing"  Pt seeks cues to initiate next part of tasks  After toileting pt states "ok so   " looking for what is next  OT provides indirect cues stating "what do you do after you use the toilet?" and pt states "oh, go to the sink"  Once at sink, pt required verbal cue to use soap for thoroughness   Activity Tolerance   Activity Tolerance Patient tolerated treatment well   Assessment   Treatment Assessment Pt participated in skilled OT tx session focused on ADL routine and cognitive retraining during ADL  See above for further details on functional performance  Then 30 minutes of session was family training with pt's daughters Rob Canada and Hernandez Marin  OT explained pt's deficits in cognition, as well as language (but SLP will go into more detail), and pt does present with possible R peripheral field cut/visual deficits   OT educated with examples of pt's ADL routine and how pt requires verbal cues and will be requiring supervision for safety  Pt had 30 minute break, and then OT, along with SLP Sanchez Ramos and PT Natanael Gomez, were present to discuss as a team pt's deficits and d/c recommendations  This occurred outside of pt's room, and Dr Thao Vaughn was present for part of meeting as well  Pt's family uncertain if they could provide 24/7 supervision beginning next Friday, they feel pt needs more time in acute or subacute rehab beyond an additional week  Team explained barriers with insurance and continued stay/acceptance into subacute rehab  Provided home health aide list, educated on continued neuro rehab day program as outpatient, and even discussed in future assisted living facilities  Family recommended to begin calling home health aides as soon as possible, as even if pt is accepted to subacute rehab, pt will require supervision upon d/c from there as well 2* cognitive and language deficits  Plan for continued family training Monday and Tuesday from 7-1130  Plan will be for OT to focus on completing full ADL and shower routine with pt's daughters  Daughters will be coming in at different times, but both Monday and Tuesday morning  Plan   Treatment/Interventions ADL retraining;Functional transfer training; Therapeutic exercise; Endurance training;Cognitive reorientation;Patient/family training;Equipment eval/education; Bed mobility; Compensatory technique education   Progress Progressing toward goals   Recommendation   OT Discharge Recommendation   (24/7 supervision)   OT Therapy Minutes   OT Time In 0710   OT Time Out 0910   OT Total Time (minutes) 120   OT Mode of treatment - Individual (minutes) 120   OT Mode of treatment - Concurrent (minutes) 0   OT Mode of treatment - Group (minutes) 0   OT Mode of treatment - Co-treat (minutes) 0   OT Mode of Treatment - Total time(minutes) 120 minutes   OT Cumulative Minutes 715   Therapy Time missed   Time missed?  No

## 2022-08-05 NOTE — PROGRESS NOTES
Internal Medicine Progress Note  Patient: Felix Gongora  Age/sex: 76 y o  female  Medical Record #: [id number]      ASSESSMENT/PLAN: (Interval History)  Felix Gongora is seen and examined and management for following issues:    Lt MCA CVA  · Continue ASA 81mg daily/atorvastatin 40mg daily  · Outpt follow-up with Neurology  · Outpt follow-up with Cardiology     Junctional rhythm/PACs  · Previous EKGs were sinus  · Cards saw here and rec'd LOOP now vs Zio patch now and then LOOP as OP but family declined both 22 noting they want to revisit the topic in the OP setting  · AVOID ANY AVN BLOCKING AGENTS  · Repeat EKG  was NSR with 1st degree AVB but currently sounded by exam to be back in NSR on exam today      Depression  · Continue sertraline         DC plannin22    The above assessment and plan was reviewed and updated as determined by my evaluation of the patient on 2022      Labs:   Results from last 7 days   Lab Units 22  0505   WBC Thousand/uL 8 18   HEMOGLOBIN g/dL 14 0   HEMATOCRIT % 43 3   PLATELETS Thousands/uL 241     Results from last 7 days   Lab Units 22  0505 22  1059   SODIUM mmol/L 139 138   POTASSIUM mmol/L 4 2 4 3   CHLORIDE mmol/L 107 109*   CO2 mmol/L 29 25   BUN mg/dL 17 19   CREATININE mg/dL 0 64 0 69   CALCIUM mg/dL 9 2 9 3                   Review of Scheduled Meds:  Current Facility-Administered Medications   Medication Dose Route Frequency Provider Last Rate    acetaminophen  650 mg Oral Q6H PRN Dolphus Lacrosse, PA-C      aluminum-magnesium hydroxide-simethicone  30 mL Oral Q6H PRN Dolphus Lacrosse, PA-C      ascorbic acid  500 mg Oral Daily Dolphus Lacrosse, PA-C      aspirin  81 mg Oral Daily Dolphus Lacrosse, PA-C      atorvastatin  40 mg Oral QPM Dolphus Lacrosse, PA-C      bisacodyl  10 mg Rectal Daily PRN Dolphus Lacrosse, PA-C      calcium carbonate-vitamin D  1 tablet Oral Daily With Breakfast Dolphus Lacrosse, PA-C     Tyree Gomez cholecalciferol  2,000 Units Oral Daily Lidya Taylor, PA-C      docusate sodium  100 mg Oral BID Lidya Taylor, PA-C      enoxaparin  40 mg Subcutaneous Daily Lidya Taylor, PA-C      multivitamin-minerals  1 tablet Oral Daily Lidya Taylor, PA-C      ondansetron  4 mg Oral Q6H PRN Lidya Taylor, PA-C      polyethylene glycol  17 g Oral Daily PRN Lidya Taylor, PA-C      senna  2 tablet Oral BID Lidya Taylor, PA-C      sertraline  75 mg Oral Daily Lidya Taylor, PA-C         Subjective/ HPI: Patient seen and examined  Patients overnight issues or events were reviewed with nursing or staff during rounds or morning huddle session  New or overnight issues include the following:     No new or overnight issues  Offers no complaints    ROS:   A 10 point ROS was performed; negative except as noted above         Imaging:     No orders to display       *Labs /Radiology studies reviewed  *Medications reviewed and reconciled as needed  *Please refer to order section for additional ordered labs studies  *Case discussed with primary attending during morning huddle case rounds    Physical Examination:  Vitals:   Vitals:    08/04/22 1331 08/04/22 1332 08/04/22 2153 08/05/22 0618   BP: 155/74 158/77 135/63 121/59   BP Location: Right leg Right arm Right arm Right arm   Pulse:  65 65 (!) 52   Resp:  18 18 18   Temp:  98 3 °F (36 8 °C) 98 °F (36 7 °C) 98 2 °F (36 8 °C)   TempSrc:  Oral Oral Oral   SpO2:  97% 98% 97%   Weight:       Height:           General Appearance: no distress, conversive  HEENT: PERRLA, conjuctiva normal; oropharynx clear; mucous membranes moist   Neck:  Supple, normal ROM  Lungs: CTA, normal respiratory effort, no retractions, expiratory effort normal  CV: regular rate and rhythm; no rubs/murmurs/gallops, PMI normal   ABD: soft; ND/NT; +BS  EXT: no edema  Skin: normal turgor, normal texture, no rashes  Psych: affect normal, mood normal  Neuro: AAO     The above physical exam was reviewed and updated as determined by my evaluation of the patient on 8/5/2022  Invasive Devices  Report    None                    VTE Pharmacologic Prophylaxis: Enoxaparin  Code Status: Level 3 - DNAR and DNI  Current Length of Stay: 8 day(s)      Total time spent:  30 minutes with more than 50% spent counseling/coordinating care  Counseling includes discussion with patient re: progress  and discussion with patient of his/her current medical state/information  Coordination of patient's care was performed in conjunction with primary service  Time invested included review of patient's labs, vitals, and management of their comorbidities with continued monitoring  In addition, this patient was discussed with medical team including physician and advanced extenders  The care of the patient was extensively discussed and appropriate treatment plan was formulated unique for this patient  ** Please Note:  voice to text software may have been used in the creation of this document   Although proof errors in transcription or interpretation are a potential of such software**

## 2022-08-05 NOTE — PHYSICAL THERAPY NOTE
Family meeting held with patient's daughters, Mata Felipe and Shahla Presume  Time spent in meeting was appx 9:30-10:30  Present from staff for meeting was myself PT, Bertha MCALLISTER and Raleigh THOMPSON  Present for portion of meeting was Kasandra Davis  Update provided on TEAM meeting (previously done by Jimi Collado, [address]) in regards to upcoming discharge plan (was set in [address] for Friday 8/12 with OP therapies)   Family at this time expressing concerns for the need for 24 hour care don't believe they will be able to accommodate this at time of discharge  Spoke with acting CM who is to seek out additional time for rehab stay at [facility] and to SNF facilities  List provided on SNF options at the end of session  aditya JOHNSON CM, to follow up Monday  Education also provided regarding the barriers to discharging to SNF  Stressed the need for alternative planning in event there is a denial  Also informed them that if there is an acceptance, it would likely be for a short period of time, and they would still be faced with 24 hour discharge planning as patient's cognition and language deficits could be present for months, if not longer  Handout provided on non-skilled HHA list with education provided on out of pocket expense and questions to ask each company  Stressed the need to reach out to these agencies ASAP and see if family could devise schedule that could allow for dec hours needed by an agency  Additional topics discussed were patient's cognitive and linguistic barriers that lead to patient needing this level of assistance as well as the POC for all disciplines  Questions asked and addressed by the team  Daughters to come in for FT starting Monday 8/8 and Tuesday 8/9 from 7-11:30      Janene Wooten, PT

## 2022-08-05 NOTE — PROGRESS NOTES
Physical Medicine and Rehabilitation Progress Note  Laure Barone 76 y o  female MRN: [id number]  Unit/Bed#: -01 Encounter: [id number]    HPI: Laure Barone is a 76 y o  female with a PMH significant for h/o endometrial cancer, depression, h/o DVT, vitamin D deficency and OA, who presented to the [address] [place] [address] Drive ED on 07/23 with a stroke alert  She was in contact with family the night prior when the next day family could not contact her and called EMS  Patient was noted to have altered mental status upon EMS arrival and was experiencing right facial droop  Neurology was consulted  [facility] ordered without evidence of acute intracranial hemorrhage  CTA with left distal m2/m3 occlusion  SLP assessed and also noted patient to have dysphagia  MRI brain with acute to early subacute ischemia involving the left MCA territory with a larger infarct and multiple smaller infarcts  No hemorrhagic transformation identified  With her history of endometrial cancer the MRI of the brain also ruled out mets  ECHO obtained with EF of 70%  Neurology recommending outpatient loop recorder placement  She will continue ASA and Lipitor  PM&R consulted and recommended patient for ARC  She participated with PT/OT/ST and was deemed appropriate for post-acute rehabilitation services after demonstrating the ability to tolerate three hours of therapy per day  She was accepted to [address] [place] [address] and arrived on 7/28/22  Chief Complaint: No new concerns  Interval History/Subjective:  No acute events overnight  Sleeping well  No new headaches, CP, SOB, fevers, chills, N/V, abdominal pain  SLP did notice more coughing yesterday after breakfast - they are looking into that  Patient feels well today  ROS:  A 10 point review of systems was negative except for what is noted in the HPI  Today's Changes: 1  Attended portion of family meeting between therapies and patient's daughters Kriss Jacobson and DERICK   Reviewed options for discharge, and current barriers  Provided HHA list  They are planning on family training throughout next week  Total time spent:  35 minutes, with more than 50% spent counseling/coordinating care  Counseling includes discussion with patient re: progress in therapies, functional issues observed by therapy staff, and discussion with patient his/her current medical state/wellbeing  Coordination of patient's care was performed in conjunction with Internal Medicine service to monitor patient's labs, vitals, and management of their comorbidities  Assessment/Plan:    * Acute ischemic left MCA stroke [facility])  Assessment & Plan  · Presented to [facility] ED with right facial droop and aphasia  · Outside tPA window  · CT without evidence of acute intracranial hemorrhage  Left frontal temporal and low attenuation suggesting acute-subacute ischemia  · CTA H/N with distal Left M2/M3 MCA occlusion   · MRI brain- acute to early subacute ischemia identified involving the left MCA territory with one larger infarct and multiple additional smaller infarcts  No hemorrhagic transformation identified   · MRI brain negative for metastasis in setting of h/o endometrial cancer  · ECHO unremarkable   · Continue ASA/Statin therapy per neurology recs  · Dysphagia diet; SLP following  · Neurology also recommended Loop recorder as OP with OP FU in 4-6 weeks  · Daughter deferred inpatient placement of loop recorder; aware of need to follow up with Cardiology as OP  · Acute chomprehensive interdisciplinary inpatient rehabilitation to include intensive skilled therapies as outlines with oversight and management by a rehabilitation Physician Assistant overseen by rehabilitation physician as well as inpatient rehabilitation nursing, case management and weekly interdisciplinary team meeting    SA node dysfunction [facility])  Assessment & Plan  With junctional bradycardia new on exam 8/2  8/4 more regular  Monitor EKG  - Seen by Cards     - HR fairly stable compared to previous  - K > 4, Mag > 2  - Family would like to defer any monitoring until outpatient    - Risk/benefits/alternatives presented in detail  Dysphagia  Assessment & Plan  · Dysphagia level 3 diet with thin liquids  · SLP to follow    History of DVT (deep vein thrombosis)  Assessment & Plan  · Potential h/o xarelto use for several months; no longer taking  · Currently Lovenox SQ Daily and SCD       Mild episode of recurrent major depressive disorder (HCC)  Assessment & Plan  · Continue Zoloft 75 mg daily      Vitamin D deficiency  Assessment & Plan  · Continue Cholecalciferol       History of endometrial cancer  Assessment & Plan  · Stage IA, grade 3 s/p completion of adjuvant chemotherapy and radiation in April 2015  · Follows with GYN-ONC as OP        Health Maintenance  #Delirium/Sleep: At risk  First night with some impulsivity, but due to needing to go to the bathroom  Added timed voids with improvement  Continue to optimize pain, bowel, bladder, sleep-wake cycle management  #Pain: Tylenol PRN  #Bowel: Last BM 8/5  On Senna 2 tabs BID and docusate BID  Will try to de-escalate while here  #Bladder: Timed voids to help with continence PVRx all < 150cc  #Skin/Pressure Injury Prevention: Turn Q2hr in bed, with weight shifts [id number] in wheelchair  #DVT Prophylaxis: Lovenox, SCDs  #GI Prophylaxis: PO diet  #Code Status:  DNR/DNI  #FEN: Level 3/thins  #Dispo: Team Conference 8/3: ADD 8/12 to her daughter's home with supervision and PT/OT/SLP  Objective:    Functional Update:  PT: CGA transfers, Sup bed mobility, CGA ambulation 300', CGA stairs  OT:  Ind eating, Sup grooming, Jaguar bathing, Jaguar UB dressing, Jaguar LB dressing, Jaguar toileting, Jaguar tub/shower transfer, Jaguar toilet transfers  SLP: Level 3/thins advanced  Continuse with moderate language deficits in receptive/expressive language skills       Allergies per EMR    Physical Exam:  Temp:  [98 °F (36 7 °C)-98 3 °F (36 8 °C)] 98 2 °F (36 8 °C)  HR:  [52-65] 52  Resp:  [18] 18  BP: (121-158)/(59-77) 121/59  Oxygen Therapy  SpO2: 97 %    Gen: No acute distress, Well-nourished, well-appearing  HEENT: Moist mucus membranes, Normocephalic/Atraumatic  Cardiovascular: Regular rate, rhythm, S1/S2  Distal pulses palpable  Heme/Extr: No edema  Pulmonary: Non-labored breathing  Lungs CTAB  : No pantoja  GI: Soft, non-tender, non-distended  Integumentary: Skin is warm, dry  Neuro: AAOx3, Expressive and receptive aphasia  Cooperative  Pleasant  Psych: Normal mood and affect  Diagnostic Studies: Reviewed, no new imaging      Laboratory:  Reviewed   Results from last 7 days   Lab Units 08/01/22  0505   HEMOGLOBIN g/dL 14 0   HEMATOCRIT % 43 3   WBC Thousand/uL 8 18     Results from last 7 days   Lab Units 08/01/22  0505   BUN mg/dL 17   POTASSIUM mmol/L 4 2   CHLORIDE mmol/L 107   CREATININE mg/dL 0 64            Patient Active Problem List   Diagnosis    History of endometrial cancer    Endometrial cancer ([facility] [address] )    Inguinal lymphadenopathy    Multinodular thyroid    Obesity    Osteoarthritis of hip    Osteoarthritis of knee    Vitamin D deficiency    Osteopenia of lumbar spine    Mild episode of recurrent major depressive disorder (HCC)    Acute ischemic left MCA stroke (HCC)    History of DVT (deep vein thrombosis)    Dysphagia    SA node dysfunction (HCC)         Medications  Current Facility-Administered Medications   Medication Dose Route Frequency Provider Last Rate    acetaminophen  650 mg Oral Q6H PRN Dawn Lira PA-C      aluminum-magnesium hydroxide-simethicone  30 mL Oral Q6H PRN Dawn Lira PA-C      ascorbic acid  500 mg Oral Daily Dawn Lira PA-C      aspirin  81 mg Oral Daily Dawn Lira PA-C      atorvastatin  40 mg Oral QPM Dawn Lira PA-C      bisacodyl  10 mg Rectal Daily PRN Dawn Lira PA-C      calcium carbonate-vitamin D  1 tablet Oral Daily With Breakfast Luis M Larve, PA-C      cholecalciferol  2,000 Units Oral Daily Luis M Larve, PA-C      docusate sodium  100 mg Oral BID Luis M Larve, PA-C      enoxaparin  40 mg Subcutaneous Daily Luis M Larve, PA-C      multivitamin-minerals  1 tablet Oral Daily Luis M Larve, PA-C      ondansetron  4 mg Oral Q6H PRN Luis M Larve, PA-C      polyethylene glycol  17 g Oral Daily PRN Luis M Larve, PA-C      senna  2 tablet Oral BID Luis M Larve, PA-C      sertraline  75 mg Oral Daily Luis M Larve, PA-C          ** Please Note: [employer] voice to text software may have been used in the creation of this document   **

## 2022-08-05 NOTE — PROGRESS NOTES
08/05/22 1030   Pain Assessment   Pain Assessment Tool 0-10   Pain Score No Pain   Restrictions/Precautions   Precautions Aphasia; Aspiration;Bed/chair alarms;Cognitive; Fall Risk;Impulsive;Supervision on toilet/commode;Visual deficit   Comprehension   Comprehension (FIM) 3 - Understands basic info/conversation 50-74% of time   Expression   Expression (FIM) 3 - Expresses basic info/needs 50-74% of time   Social Interaction   Social Interaction (FIM) 5 - Interacts appropriately with others 90% of time   Problem Solving   Problem solving (FIM) 3 - Solves basic problmes 50-74% of time   Memory   Memory (FIM) 3 - Recognizes, recalls/performs 50-74%   Speech/Language/Cognition Assessmetn   Treatment Assessment Pt was awake, alert and engaged for session in which pt's Tenzin mehta was present for session, noting increased participation and providing pt cues during both language and dysphagia tx session  Began session by targeting expressive language skills  SLP engaged pt in a word deduction task by presenting 4 clue words which describe an item  Pt was to determine the item being described given the clue words  Pt's ability to independently state the item just provided the 4 words was 5/10 accurate, increasing to 9/10 when SLP provided the 4 words within a more directive definition for the item  For the last item, despite all attempts as provide, pt required phonemic cue to state last item  The errored responses provided during this task were perseverations from last word stated or noted paraphsias and neologisms, requiring the increased need for more direct clues than just 4 words presented  Circling back to activity was had been initiated in yesterday's session, which focused on reading comprehension/sentence completion by providing pt FO3 word choices to complete each sentence   Of note in the task today, the length and complexity given the sentences increased, but the context was to elicit more familiar choices (ie: money doesn't grow on ___, I rode the city ___, etc)  SLP instructed pt to write in the responses again during this task where pt was able to spell words accurately today, BUT pt exhibiting wrong word selections when completing  Pt was 9/15 accurate, to where the errors noted were more due to pt's difficulty in not looking to the right side of the page where the [address] words to complete the sentence were located  Additionally, observed was that pt was not looking to the beginning of the sentences as well when orally reading the sentences to self in attempts for improved comprehension  Of note, pt's dtr was providing cues for pt to look to the R of the page for the 'answers' which did help pt to complete scanning to the R side of the page  Ongoing education provided to dtr throughout session about continued fluctuations given comprehension both verbal and written and well as expressive language both verbal an written pending fatigue, needing rest breaks, etc  At this time, pt will continue to benefit from ongoing skilled SLP services targeting functional language skills, including receptive and expressive language as well as cognitive linguistic skills in attempts to decrease caregiver burden at time of discharge  Eating   Type of Assistance Needed Set-up / clean-up;Supervision;Verbal cues   Physical Assistance Level No physical assistance   Eating CARE Score 4   Swallow Assessment   Swallow Treatment Assessment Daily Dysphagia Tx Note     Patient Name: Linda Washington    QPROP'D Date: 8/5/2022      Current Risks for Dysphagia & Aspiration: Dysarthria;General debilitation;New Neuro event;Brain injury;Cognitive deficit     Current Symptoms/Concerns: Cough;Clear throat;During meals; Difficulty chewing;Holding food in mouth;Pockets food;     Current diet:mechanically altered/level 2 diet and thin liquids      Premorbid diet::regular diet and thin liquids      Positioning: upright in recliner    Items administered:Consistencies Administered: thin liquids and soft solids  Materials administered included : crustless chicken salad sandwich, tomato soup, ice cream, thins by cup    Total amount of meal consumed:   100% and 120cc of thin liquids by cup  Oral stage:minimal to mild  Lip closure: wfl  Anterior spillage: none   Mastication: mildly prolonged initially due to rapid intake rate and larger bites, but upon cues, able to demonstrate improvement in pacing allowing for functional mastication given soft textures  Bolus formation: functional  Bolus control: functional given soup and thin liquids by cup  Transfer: prompt today  Oral residue: minimal initially due to faster rate of intake but as meal progressed no overt residual  Pocketing: none         Pharyngeal stage:minimal to [facility]  Swallow promptness: prompter across consisntenices  Hyolaryngeal elevation: present and functional upon palpation   Wet voice: none  Throat clear: none  Cough: none  Secondary swallows: none  Audible swallows: none       Esophageal stage:WFL        Summary:     Pt presenting with minimal to mild oropharyngeal dysphagia as noted by increased mastication noted only more in the beginning of meal due to larger boluses taken as well as faster intake rate given sandwich  Provided moderate verbal prompting by both SLP and pt's dtr, Dejah Crain to take small bites and to 'slow down' eating  Pt was demonstrating independence in use of liquid wash given soup or liquids given sandwich  It was observed that pt did carryover improved pacing and more appropriate bite sizes as meal progressed not requiring verbal cues from SLP or dtr  Some increased time in mastication was observed initially w/ sandwich but functional mastication did present w/o increased oral residual/pocketing  Bolus manipulation/transfer and control of soup, liquids and ice cream were all noted to be prompt   Lip seal around tsp, cup and finger foods remains functional along w/ bolus retrieval w/o exhibiting anterior loss  Swallow initiation was improved today where pt's hyolaryngeal excursion is functional upon palpation  No overt signs/sxs of aspiration noted across meal today, nor after meal completed (~5 min after today)  Will begin to initiate regular trials under SLP supervision at this time  Recommendations:  Diet: dysphagia level 3 diet and thin liquids  Meds: as pt tolerates  Strategies: upright posture, only feed when fully alert, slow rate of feeding, small bites/sips, effortful swallow, quiet environment (tv off, limit talking, door closed, etc ), alternating bites and sips and OOB for ALL meals     DISTANT supervision with meals  Results reviewed with:  patient, nsg   Aspiration precautions posted  F/u ST tx: Pt will continue to benefit from ongoing skilled dysphagia tx sessions to establish safest least restrictive diet w/o increased oropharyngeal or aspiration sxs as well as monitor ability to carryover swallow strategies independently  Plan: Monitor tolerance of upgraded diet to dysphagia level 3 diet food items w/o exhibiting increased or overt aspiration sxs as well as initiation given regular diet items in future sessions  Swallow Assessment Prognosis   Prognosis Good   Prognosis Considerations Age; Co-morbidities; Family/community support;Medical diagnosis; Medical prognosis; Severity of impairments;New learning ability;Ability to carry over   SLP Therapy Minutes   SLP Time In 1030   SLP Time Out 1130   SLP Total Time (minutes) 60   SLP Mode of treatment - Individual (minutes) 60   SLP Mode of treatment - Concurrent (minutes) 0   SLP Mode of treatment - Group (minutes) 0   SLP Mode of treatment - Co-treat (minutes) 0   SLP Mode of Treatment - Total time(minutes) 60 minutes   SLP Cumulative Minutes 480   Therapy Time missed   Time missed?  No           Prior to this SLP's tx session, family meeting was held w/ pt's dtrs, Karla Smith and Collins Ramirez to answer questions which they had in regards to the pt's care  Therapy team member present for this meeting included Kika Johnson OTR-MIKALA and myself, SLP  MD, Dr Benuel Gosselin was present for last half hour of meeting (9:30-10:30)  Therapy team d/w dtrs about current recommendations for 24/7 supervision at time of discharge due to current moderate-severe language and cognitive deficits which present at this time  However, dtrs did verablize the therapy team that 24/7 supervision is not an option for the pt at time of discharge, inquiring about additional time on the acute rehab center, or transitioning to subacute level of care  Therapy team and MD did provide education to dtrs that even if pt is transitioned to subacute level of care, recommendations will likely be for continued 24/7 supervision by that time as well  Additional resources were provided to dtrs about HHA which are an out of pocket expense as well as being provided a list of subacute facilities, to which both dtrs are to contact to determine if either items are feasible  Due to the increased need for supervision for pt when completing ADL and I ADL tasks, multiple examples were provided by OT and SLP in regard to deficits and the importance as to why supervision would be needed at discharge  Ongoing family training and education have been planned beginning on Monday, 8/8 and Tuesday 8/9  From 7-11 w/ both dtrs   Additionally, SLP providing written handouts regarding further education/information about  "What is Aphasia" as well as providing bot dtrs handout on "How to Communicate with my Loved One with Aphasia "

## 2022-08-06 PROCEDURE — 99232 SBSQ HOSP IP/OBS MODERATE 35: CPT | Performed by: NURSE PRACTITIONER

## 2022-08-06 PROCEDURE — 97130 THER IVNTJ EA ADDL 15 MIN: CPT

## 2022-08-06 PROCEDURE — 97112 NEUROMUSCULAR REEDUCATION: CPT

## 2022-08-06 PROCEDURE — 97116 GAIT TRAINING THERAPY: CPT

## 2022-08-06 PROCEDURE — 97129 THER IVNTJ 1ST 15 MIN: CPT

## 2022-08-06 PROCEDURE — 97110 THERAPEUTIC EXERCISES: CPT

## 2022-08-06 PROCEDURE — 97530 THERAPEUTIC ACTIVITIES: CPT

## 2022-08-06 RX ADMIN — DOCUSATE SODIUM 100 MG: 100 CAPSULE, LIQUID FILLED ORAL at 08:35

## 2022-08-06 RX ADMIN — Medication 2000 UNITS: at 08:35

## 2022-08-06 RX ADMIN — ENOXAPARIN SODIUM 40 MG: 40 INJECTION SUBCUTANEOUS at 08:35

## 2022-08-06 RX ADMIN — SERTRALINE HYDROCHLORIDE 75 MG: 50 TABLET ORAL at 08:35

## 2022-08-06 RX ADMIN — ASPIRIN 81 MG CHEWABLE TABLET 81 MG: 81 TABLET CHEWABLE at 08:35

## 2022-08-06 RX ADMIN — OXYCODONE HYDROCHLORIDE AND ACETAMINOPHEN 500 MG: 500 TABLET ORAL at 08:35

## 2022-08-06 RX ADMIN — MULTIPLE VITAMINS W/ MINERALS TAB 1 TABLET: TAB ORAL at 08:35

## 2022-08-06 RX ADMIN — Medication 1 TABLET: at 08:35

## 2022-08-06 RX ADMIN — ATORVASTATIN CALCIUM 40 MG: 40 TABLET, FILM COATED ORAL at 17:59

## 2022-08-06 RX ADMIN — SENNOSIDES 17.2 MG: 8.6 TABLET, FILM COATED ORAL at 08:35

## 2022-08-06 NOTE — PROGRESS NOTES
Internal Medicine Progress Note  Patient: Андрей Lagunas  Age/sex: 76 y o  female  Medical Record #: [id number]      ASSESSMENT/PLAN: (Interval History)  Андрей Lagunas is seen and examined and management for following issues:    Lt MCA CVA  · Continue ASA 81mg daily/atorvastatin 40mg daily  · Outpt follow-up with Neurology  · Outpt follow-up with Cardiology     Junctional rhythm/PACs  · Previous EKGs were sinus  · Cards saw here and rec'd LOOP now vs Zio patch now and then LOOP as OP but family declined both 22 noting they want to revisit the topic in the OP setting  · AVOID ANY AVN BLOCKING AGENTS  · Repeat EKG  was NSR with 1st degree AVB but currently sounded by exam to be back in [address] on exam today  with rates in 50's     Depression  · Continue sertraline         DC plannin22    The above assessment and plan was reviewed and updated as determined by my evaluation of the patient on 2022      Labs:   Results from last 7 days   Lab Units 22  0505   WBC Thousand/uL 8 18   HEMOGLOBIN g/dL 14 0   HEMATOCRIT % 43 3   PLATELETS Thousands/uL 241     Results from last 7 days   Lab Units 22  0505   SODIUM mmol/L 139   POTASSIUM mmol/L 4 2   CHLORIDE mmol/L 107   CO2 mmol/L 29   BUN mg/dL 17   CREATININE mg/dL 0 64   CALCIUM mg/dL 9 2                   Review of Scheduled Meds:  Current Facility-Administered Medications   Medication Dose Route Frequency Provider Last Rate    acetaminophen  650 mg Oral Q6H PRN Duarte Juarez PA-C      aluminum-magnesium hydroxide-simethicone  30 mL Oral Q6H PRN Duarte Juarez PA-C      ascorbic acid  500 mg Oral Daily Duarte Juarez PA-C      aspirin  81 mg Oral Daily Duarte Juarez PA-C      atorvastatin  40 mg Oral QPM Duarte Juarez PA-C      bisacodyl  10 mg Rectal Daily PRN Duarte Juarez PA-C      calcium carbonate-vitamin D  1 tablet Oral Daily With Breakfast Duarte Juarez PA-C      cholecalciferol  2,000 Units Oral Daily Yvan Rai, PRISCA      docusate sodium  100 mg Oral BID Yvan Chawlaa, PA-HELEN      enoxaparin  40 mg Subcutaneous Daily Yvan Cecelia, PA-HELEN      multivitamin-minerals  1 tablet Oral Daily Yvan Rai, PRISCA      ondansetron  4 mg Oral Q6H PRN Yvan Chawlaa, PA-HELEN      polyethylene glycol  17 g Oral Daily PRN Yvan Cecelia, PRISCA      senna  2 tablet Oral BID Yvan Chawlaa, PA-HELEN      sertraline  75 mg Oral Daily Yvan PRISCA Rai         Subjective/ HPI: Patient seen and examined  Patients overnight issues or events were reviewed with nursing or staff during rounds or morning huddle session  New or overnight issues include the following:     No new or overnight issues  Offers no complaints    ROS:   A 10 point ROS was performed; negative except as noted above         Imaging:     No orders to display       *Labs /Radiology studies reviewed  *Medications reviewed and reconciled as needed  *Please refer to order section for additional ordered labs studies  *Case discussed with primary attending during morning huddle case rounds    Physical Examination:  Vitals:   Vitals:    08/05/22 0618 08/05/22 1342 08/05/22 2045 08/06/22 0500   BP: 121/59 112/56 146/67 125/56   BP Location: Right arm Left arm Left arm Left arm   Pulse: (!) 52 65 (!) 49 55   Resp: 18 16 16 16   Temp: 98 2 °F (36 8 °C) 98 1 °F (36 7 °C) 98 2 °F (36 8 °C) 98 1 °F (36 7 °C)   TempSrc: Oral Oral Oral Oral   SpO2: 97% 97% 98% 97%   Weight:       Height:           General Appearance: no distress, conversive  HEENT: PERRLA, conjuctiva normal; oropharynx clear; mucous membranes moist   Neck:  Supple, normal ROM  Lungs: CTA, normal respiratory effort, no retractions, expiratory effort normal  CV: regular rate and rhythm; no rubs/murmurs/gallops, PMI normal   ABD: soft; ND/NT; +BS  EXT: no edema  Skin: normal turgor, normal texture, no rashes  Psych: affect normal, mood normal  Neuro: AAO      The above physical exam was reviewed and updated as determined by my evaluation of the patient on 8/6/2022  Invasive Devices  Report    None                    VTE Pharmacologic Prophylaxis: Enoxaparin  Code Status: Level 3 - DNAR and DNI  Current Length of Stay: 9 day(s)      Total time spent:  30 minutes with more than 50% spent counseling/coordinating care  Counseling includes discussion with patient re: progress  and discussion with patient of his/her current medical state/information  Coordination of patient's care was performed in conjunction with primary service  Time invested included review of patient's labs, vitals, and management of their comorbidities with continued monitoring  In addition, this patient was discussed with medical team including physician and advanced extenders  The care of the patient was extensively discussed and appropriate treatment plan was formulated unique for this patient  ** Please Note:  voice to text software may have been used in the creation of this document   Although proof errors in transcription or interpretation are a potential of such software**

## 2022-08-06 NOTE — PROGRESS NOTES
08/06/22 0900   Pain Assessment   Pain Assessment Tool 0-10   Pain Score No Pain   Restrictions/Precautions   Precautions Aphasia; Aspiration;Bed/chair alarms;Cognitive; Fall Risk;Supervision on toilet/commode;Visual deficit   Weight Bearing Restrictions No   ROM Restrictions No   Cognition   Overall Cognitive Status Impaired   Arousal/Participation Alert   Attention Attends with cues to redirect   Orientation Level Oriented to person   Memory Decreased short term memory   Following Commands Follows one step commands with increased time or repetition   Sit to Stand   Type of Assistance Needed Supervision   Physical Assistance Level No physical assistance   Sit to Stand CARE Score 4   Bed-Chair Transfer   Type of Assistance Needed Supervision   Physical Assistance Level No physical assistance   Chair/Bed-to-Chair Transfer CARE Score 4   Walk 10 Feet   Type of Assistance Needed Supervision;Verbal cues   Physical Assistance Level No physical assistance   Comment VC's for inc speed   Walk 10 Feet CARE Score 4   Walk 50 Feet with Two Turns   Type of Assistance Needed Supervision;Verbal cues   Physical Assistance Level No physical assistance   Comment VC's for inc speed   Walk 50 Feet with Two Turns CARE Score 4   Walk 150 Feet   Type of Assistance Needed Supervision;Verbal cues   Physical Assistance Level No physical assistance   Comment VC's for inc speed   Walk 150 Feet CARE Score 4   Ambulation   Does the patient walk? 2  Yes   Primary Mode of Locomotion Prior to Admission Walk   Distance Walked (feet) 350 ft  (x3 cycles (breaks b/w), 200 ft)   Assist Device   (none)   Gait Pattern Inconsistant Sorin;Decreased foot clearance; Slow Sorin; Wide ZAID;Step through; Improper weight shift   Limitations Noted In Balance; Coordination; Endurance;Speed   Provided Assistance with: Direction   Walk Assist Level Supervision;Close Supervision   Findings Patient with inconsistent sorin during forward propulsion, little improvement with VC's for increased gait sorin, performed no AD + 4# R ankle weight   Curb or Single Stair   Style negotiated Single stair   Type of Assistance Needed Supervision;Verbal cues   Physical Assistance Level No physical assistance   Comment CGA with 1 HR for  safety/sequencing concerns   1 Step (Curb) CARE Score 4   4 Steps   Type of Assistance Needed Supervision;Verbal cues   Physical Assistance Level No physical assistance   Comment CGA with 1 HR for  safety/sequencing concerns   4 Steps CARE Score 4   12 Steps   Type of Assistance Needed Supervision;Verbal cues   Physical Assistance Level No physical assistance   Comment CGA with 1 HR for  safety/sequencing concerns   12 Steps CARE Score 4   Stairs   Type Other  (Trainer step)   # of Steps 12  (FF (in total))   Weight Bearing Precautions Fall Risk   Assist Devices Single Rail   Picking Up Object   Type of Assistance Needed Supervision   Physical Assistance Level No physical assistance   Picking Up Object CARE Score 4   Therapeutic Interventions   Neuromuscular Re-Education Repeated STS + use of 2# MB: 10 reps x 2 sets (pt unable to perform STS initially w/o UE, but once utilizing MB in front NDT style improved anterior weight shift able to complete), Tandem walking in // bars x 6 cycles down/back haptic touch (poor insight often stepping off and required CGA to prevent near LOB), Performed ambulation with self ball bounce x 180 ft , perfomed walking with partner bounce pass x 180 ft (added in cog component with pt illustrating increased medial to lateral sway), pt able to complete scavenger hunt with cones and reach OBOS to ground and above head collecting cones and carrying them in bag  Also performed multiple bouts of ambulation with head nods and head turns (HN/HT) but often required re-direction and increased medial to lateral sway)   Assessment   Treatment Assessment Patient with good tolerance of session focused on dynamic balance and dual tasking   Patient was challenged with > 2 tasks and often required a standing break to re-focus  Patient does demonstrate increased sway with multiple tasks, but overall fair stability  Attempted to increase speed of walking as she was < 1 0 m/s and therefore a HIGH risk for falls  However, unsuccessful as she was barely able to increase speed  Patient would continue to benefit from practing dual tasks similar to requirments for home as she was most challenged by this  Problem List Impaired balance;Orthopedic restrictions;Decreased coordination;Decreased cognition; Impaired judgement;Decreased safety awareness   Plan   Treatment/Interventions Functional transfer training;LE strengthening/ROM; Elevations; Therapeutic exercise; Endurance training;Cognitive reorientation;Patient/family training;Bed mobility;Gait training;Equipment eval/education;OT   Progress Progressing toward goals   PT Therapy Minutes   PT Time In 0900   PT Time Out 1000   PT Total Time (minutes) 60   PT Mode of treatment - Individual (minutes) 60   PT Mode of treatment - Concurrent (minutes) 0   PT Mode of treatment - Group (minutes) 0   PT Mode of treatment - Co-treat (minutes) 0   PT Mode of Treatment - Total time(minutes) 60 minutes   PT Cumulative Minutes 655

## 2022-08-06 NOTE — PLAN OF CARE
Problem: Prexisting or High Potential for Compromised Skin Integrity  Goal: Skin integrity is maintained or improved  Description: INTERVENTIONS:  - Identify patients at risk for skin breakdown  - Assess and monitor skin integrity  - Assess and monitor nutrition and hydration status  - Monitor labs   - Assess for incontinence   - Turn and reposition patient  - Assist with mobility/ambulation  - Relieve pressure over bony prominences  - Avoid friction and shearing  - Provide appropriate hygiene as needed including keeping skin clean and dry  - Evaluate need for skin moisturizer/barrier cream  - Collaborate with interdisciplinary team   - Patient/family teaching  - Consider wound care consult   Outcome: Progressing     Problem: PAIN - ADULT  Goal: Verbalizes/displays adequate comfort level or baseline comfort level  Description: Interventions:  - Encourage patient to monitor pain and request assistance  - Assess pain using appropriate pain scale  - Administer analgesics based on type and severity of pain and evaluate response  - Implement non-pharmacological measures as appropriate and evaluate response  - Consider cultural and social influences on pain and pain management  - Notify physician/advanced practitioner if interventions unsuccessful or patient reports new pain  Outcome: Progressing     Problem: INFECTION - ADULT  Goal: Absence or prevention of progression during hospitalization  Description: INTERVENTIONS:  - Assess and monitor for signs and symptoms of infection  - Monitor lab/diagnostic results  - Monitor all insertion sites, i e  indwelling lines, tubes, and drains  - Monitor endotracheal if appropriate and nasal secretions for changes in amount and color  - [place] appropriate cooling/warming therapies per order  - Administer medications as ordered  - Instruct and encourage patient and family to use good hand hygiene technique  - Identify and instruct in appropriate isolation precautions for identified infection/condition  Outcome: Progressing  Goal: Absence of fever/infection during neutropenic period  Description: INTERVENTIONS:  - Monitor WBC    Outcome: Progressing     Problem: SAFETY ADULT  Goal: Patient will remain free of falls  Description: INTERVENTIONS:  - Educate patient/family on patient safety including physical limitations  - Instruct patient to call for assistance with activity   - Consult OT/PT to assist with strengthening/mobility   - Keep Call bell within reach  - Keep bed low and locked with side rails adjusted as appropriate  - Keep care items and personal belongings within reach  - Initiate and maintain comfort rounds  - Make Fall Risk Sign visible to staff  - Offer Toileting every  Hours, in advance of need  - Initiate/Maintain alarm  - Obtain necessary fall risk management equipment:   - Apply yellow socks and bracelet for high fall risk patients  - Consider moving patient to room near nurses station  Outcome: Progressing  Goal: Maintain or return to baseline ADL function  Description: INTERVENTIONS:  -  Assess patient's ability to carry out ADLs; assess patient's baseline for ADL function and identify physical deficits which impact ability to perform ADLs (bathing, care of mouth/teeth, toileting, grooming, dressing, etc )  - Assess/evaluate cause of self-care deficits   - Assess range of motion  - Assess patient's mobility; develop plan if impaired  - Assess patient's need for assistive devices and provide as appropriate  - Encourage maximum independence but intervene and supervise when necessary  - Involve family in performance of ADLs  - Assess for home care needs following discharge   - Consider OT consult to assist with ADL evaluation and planning for discharge  - Provide patient education as appropriate  Outcome: Progressing  Goal: Maintains/Returns to pre admission functional level  Description: INTERVENTIONS:  - Perform BMAT or MOVE assessment daily    - Set and communicate daily mobility goal to care team and patient/family/caregiver  - Collaborate with rehabilitation services on mobility goals if consulted  - Perform Range of Motion  times a day  - Reposition patient every hours  - Dangle patient  times a day  - Stand patient  times a day  - Ambulate patient  times a day  - Out of bed to chair  times a day   - Out of bed for meals  times a day  - Out of bed for toileting  - Record patient progress and toleration of activity level   Outcome: Progressing     Problem: DISCHARGE PLANNING  Goal: Discharge to home or other facility with appropriate resources  Description: INTERVENTIONS:  - Identify barriers to discharge w/patient and caregiver  - Arrange for needed discharge resources and transportation as appropriate  - Identify discharge learning needs (meds, wound care, etc )  - Arrange for interpretive services to assist at discharge as needed  - Refer to Case Management Department for coordinating discharge planning if the patient needs post-hospital services based on physician/advanced practitioner order or complex needs related to functional status, cognitive ability, or social support system  Outcome: Progressing     Problem: Neurological Deficit  Goal: Neurological status is stable or improving  Description: Interventions:  - Monitor and assess patient's level of consciousness, motor function, sensory function, and level of assistance needed for ADLs  - Monitor and report changes from baseline  Collaborate with interdisciplinary team to initiate plan and implement interventions as ordered  - Provide and maintain a safe environment  - Consider seizure precautions  - Consider fall precautions  - Consider aspiration precautions  - Consider bleeding precautions  Outcome: Progressing     Problem:  Activity Intolerance/Impaired Mobility  Goal: Mobility/activity is maintained at optimum level for patient  Description: Interventions:  - Assess and monitor patient barriers to mobility and need for assistive/adaptive devices  - Assess patient's emotional response to limitations  - Collaborate with interdisciplinary team and initiate plans and interventions as ordered  - Encourage independent activity per ability   - Maintain proper body alignment  - Perform active/passive rom as tolerated/ordered  - Plan activities to conserve energy   - Turn patient as appropriate  Outcome: Progressing     Problem: Communication Impairment  Goal: Ability to express needs and understand communication  Description: Assess patient's communication skills and ability to understand information  Patient will demonstrate use of effective communication techniques, alternative methods of communication and understanding even if not able to speak  - Encourage communication and provide alternate methods of communication as needed  - Collaborate with case management/ for discharge needs  - Include patient/family/caregiver in decisions related to communication  Outcome: Progressing     Problem: Potential for Aspiration  Goal: Non-ventilated patient's risk of aspiration is minimized  Description: Assess and monitor vital signs, respiratory status, and labs (WBC)  Monitor for signs of aspiration (tachypnea, cough, rales, wheezing, cyanosis, fever)  - Assess and monitor patient's ability to swallow  - Place patient up in chair to eat if possible  - HOB up at 90 degrees to eat if unable to get patient up into chair   - Supervise patient during oral intake  - Instruct patient/ family to take small bites  - Instruct patient/ family to take small single sips when taking liquids  - Follow patient-specific strategies generated by speech pathologist   Outcome: Progressing  Goal: Ventilated patient's risk of aspiration is minimized  Description: Assess and monitor vital signs, respiratory status, airway cuff pressure, and labs (WBC)    Monitor for signs of aspiration (tachypnea, cough, rales, wheezing, cyanosis, fever)  - Elevate head of bed 30 degrees if patient has tube feeding   - Monitor tube feeding  Outcome: Progressing     Problem: Nutrition  Goal: Nutrition/Hydration status is improving  Description: Monitor and assess patient's nutrition/hydration status for malnutrition (ex- brittle hair, bruises, dry skin, pale skin and conjunctiva, muscle wasting, smooth red tongue, and disorientation)  Collaborate with interdisciplinary team and initiate plan and interventions as ordered  Monitor patient's weight and dietary intake as ordered or per policy  Utilize nutrition screening tool and intervene per policy  Determine patient's food preferences and provide high-protein, high-caloric foods as appropriate  - Assist patient with eating   - Allow adequate time for meals   - Encourage patient to take dietary supplement as ordered  - Collaborate with clinical nutritionist   - Include patient/family/caregiver in decisions related to nutrition  Outcome: Progressing     Problem: Nutrition/Hydration-ADULT  Goal: Nutrient/Hydration intake appropriate for improving, restoring or maintaining nutritional needs  Description: Monitor and assess patient's nutrition/hydration status for malnutrition  Collaborate with interdisciplinary team and initiate plan and interventions as ordered  Monitor patient's weight and dietary intake as ordered or per policy  Utilize nutrition screening tool and intervene as necessary  Determine patient's food preferences and provide high-protein, high-caloric foods as appropriate       INTERVENTIONS:  - Monitor oral intake, urinary output, labs, and treatment plans  - Assess nutrition and hydration status and recommend course of action  - Evaluate amount of meals eaten  - Assist patient with eating if necessary   - Allow adequate time for meals  - Recommend/ encourage appropriate diets, oral nutritional supplements, and vitamin/mineral supplements  - Order, calculate, and assess calorie counts as needed  - Recommend, monitor, and adjust tube feedings and TPN/PPN based on assessed needs  - Assess need for intravenous fluids  - Provide specific nutrition/hydration education as appropriate  - Include patient/family/caregiver in decisions related to nutrition  Outcome: Progressing     Problem: Potential for Falls  Goal: Patient will remain free of falls  Description: INTERVENTIONS:  - Educate patient/family on patient safety including physical limitations  - Instruct patient to call for assistance with activity   - Consult OT/PT to assist with strengthening/mobility   - Keep Call bell within reach  - Keep bed low and locked with side rails adjusted as appropriate  - Keep care items and personal belongings within reach  - Initiate and maintain comfort rounds  - Make Fall Risk Sign visible to staff  - Offer Toileting every  Hours, in advance of need  - Initiate/Maintain alarm  - Obtain necessary fall risk management equipment:   - Apply yellow socks and bracelet for high fall risk patients  - Consider moving patient to room near nurses station  Outcome: Progressing

## 2022-08-06 NOTE — PROGRESS NOTES
08/06/22 1000   Pain Assessment   Pain Assessment Tool 0-10   Pain Score No Pain   Restrictions/Precautions   Precautions Aphasia; Aspiration;Bed/chair alarms;Cognitive; Fall Risk;Supervision on toilet/commode;Visual deficit   Weight Bearing Restrictions No   ROM Restrictions No   Putting On/Taking Off Footwear   Type of Assistance Needed Supervision   Physical Assistance Level No physical assistance   Comment seated to don/doff  Putting On/Taking Off Footwear CARE Score 4   Sit to Stand   Type of Assistance Needed Supervision   Physical Assistance Level No physical assistance   Sit to Stand CARE Score 4   Bed-Chair Transfer   Type of Assistance Needed Supervision   Physical Assistance Level No physical assistance   Comment CS   Chair/Bed-to-Chair Transfer CARE Score 4   Exercise Tools   Other Exercise Tool 1 RUE strengthening with use of 2# free weight moving through chest press, bicep curls, and shoulder flexion completing 2 x 15 to increase UB strength and endurance for increased independence with ADL tasks  Cognition   Overall Cognitive Status Impaired   Arousal/Participation Alert   Attention Attends with cues to redirect   Orientation Level Oriented to person   Memory Decreased short term memory   Following Commands Follows one step commands with increased time or repetition   Vision   Vision Comments Trailmaking: Pt engaged in trail making task and was first asked to locate numbers in ascending order from 1-8  Pt makes 2 errors and requires mod VC's to correct  Pt then progressed to Trail making with numbers in ascending order from 1-25  Pt able to complete from 1-20 with accuracy, when progressing to numbers 20-25 (which were located on R lower quadrant of page) pt with significant difficulty, unable to locate 23 which was in R lower corner despite multiple cues  Then trialed same task but with letters A-H instead of numbers   Pt able to progress from letters A -C but then requires max A to progress from D-H  Unable to progress task to alternating trail making as currently too complex for pt  Additional Activities   Additional Activities Comments Clock drawing: Pt engaged in clock drawing with improvement noted in numbers including 1-12  Pt does not place anchor numbers of 12, 3, 6, 9 first and instead goes in ascending order which causes spacing to be off, leaving too much room in L Upper quadrant  Pt asked to set time to "ten past eleven " Draws to equal length lines to 11 and 12 not differentiating between hours and minutes  Requires max cues to problem solve error  Pt then engaged in "telling time" cognitive work book activities [id number]  She demos improved ability to look at analog clock and convert to digital time noting 2/3 accurate with increased time and was able to correct error (which was caused from perseveration of last clock) when brought to her attention  As opposed to digital to analog where she completed 0/3 accurately without VC's  With VC's pt was able to correct errors and better distinguish hour from minute hand in length  Pt then engaged in answering questions by placing hands on the clock face  She was able to appropriately verbalize when she wakes up, eats breakfast, has lunch/dinner, and goes to bed  Improved ability by end of activity in placing hands on clock, with improved length differentiation noted with 3/4 accuracy  Activity Tolerance   Activity Tolerance Patient tolerated treatment well   Assessment   Treatment Assessment Pt participated in skilled OT services with focus on functional cognition, txfers, and strengthening  Pt continues to be limited by cognitive deficits, questionable vision deficits (unable to formally assess due to language deficits/comprehension), and aphasia  Pt will continue to benefit from skilled OT services with focus on above mentioned deficits to increase safety and independence with I/ADL tasks     Prognosis Good   Problem List Impaired balance;Decreased coordination;Decreased cognition; Impaired judgement;Decreased safety awareness;Decreased strength;Decreased endurance; Impaired hearing   Plan   Treatment/Interventions ADL retraining;Functional transfer training; Therapeutic exercise; Endurance training;Cognitive reorientation;Patient/family training;Equipment eval/education; Bed mobility; Compensatory technique education   Progress Progressing toward goals   Recommendation   OT Discharge Recommendation   (24/7 S/A)   OT Therapy Minutes   OT Time In 1000   OT Time Out 1100   OT Total Time (minutes) 60   OT Mode of treatment - Individual (minutes) 60   OT Mode of treatment - Concurrent (minutes) 0   OT Mode of treatment - Group (minutes) 0   OT Mode of treatment - Co-treat (minutes) 0   OT Mode of Treatment - Total time(minutes) 60 minutes   OT Cumulative Minutes 358   Therapy Time missed   Time missed?  No

## 2022-08-06 NOTE — PROGRESS NOTES
Occupational Therapy Treatment Note         08/06/22 1230   Pain Assessment   Pain Assessment Tool 0-10   Pain Score No Pain   Restrictions/Precautions   Precautions Bed/chair alarms;Cognitive; Fall Risk;Aphasia; Supervision on toilet/commode;Visual deficit   Lifestyle   Autonomy "that was hard" - safety awareness cards   Sit to Stand   Type of Assistance Needed Supervision   Physical Assistance Level No physical assistance   Sit to Stand CARE Score 4   Bed-Chair Transfer   Type of Assistance Needed Supervision   Physical Assistance Level No physical assistance   Comment close supervision to/from OT gym   Chair/Bed-to-Chair Transfer CARE Score 4   [address]   Type of Assistance Needed Supervision   Physical Assistance Level No physical assistance   Comment seated for rear hygiene, pt did check for thoroughness  in stance for clothing management  Pt required verbal cue to flush toilet, but today she was able to recall need to complete hand hygiene and she initiated using soap today with no cue - improvement from yesterday  Toileting Hygiene CARE Score 4   Toilet Transfer   Type of Assistance Needed Supervision   Physical Assistance Level No physical assistance   Comment no use of grab bar   Toilet Transfer CARE Score 4   Meal Prep   Meal Prep Level of Assistance Close supervision   Meal Preparation Engaged in meal preparation task of toasting bread and then making peanut butter and jelly sandwich  Pt required overall mod verbal cues and close supervision  Pt did demonstrate improvement in requesting a knife when it was not present to spread jelly  However pt required cue to toast bread first, identify that toaster was not yet plugged in (after she tried to place toast down 6x and stated "not working), cued to collect peanut butter instead of honey (honey was closest to pt)  Kitchen Mobility   Kitchen Activity Transport items; Retrieve items   Kitchen Mobility Comments supervision with no device   Cognition Overall Cognitive Status Impaired   Arousal/Participation Alert   Attention Attends with cues to redirect   Orientation Level Oriented to person;Oriented to situation   Memory Decreased short term memory   Following Commands Follows multistep commands with increased time or repetition   Comments Engaged in 2-3 step command following  Pt 100% accurate with simple 1 step commands  Requires repetition for 2-3 step commands  For example pt instructed to "get your mask, walk out the door, and turn right"  Pt then proceeded to walk out door and turn right, but when cued "what do you need to get?" pt did not recall need to get mask  Later in kitchen pt was able to follow 3 step command to "go to refrigerator, get orange juice, put it on the table" accurately  Pt will continue to benefit from focus on 2-3 step command following during session  As long as pt's safety is not at risk, she benefits from indirect cues, such as "what do you do next?" during ADL tasks  Engaged in safety scenario cards where pt was able to point out what was safe versus unsafe without any verbal cues 8/13x  During the 5/13 times that OT provided cues, only 1/13 of them was to identify the safety concern, the other times was to cue pt's attention to detail (usually to card on R side)  Pt did then also require cues 4/13x to place card in appropriate pile after she had appropriately identified the safe versus unsafe scenario  OT also began insight worksheet with pt, writing "I had a stroke" and "I have trouble with my - reading, thinking, getting thoughts out" (after discussion with pt and daughters regarding pt's largest deficits to begin identifying)  Engaged in repetitive training of pt reading worksheet and then OT removing and having pt recall 3 areas she's having trouble with  Without visual cue, pt able to recall 3/3 deficits 2/4x   Blue paper in pt's room, OT encouraged pt's family to continue to work on this in room and pt to review/read it on her own  Goal to increase pt's memory and insight into some of her deficits  Vision   Vision Comments pt does not follow multi-step command during visual assessment, however do anticipate deficit with R eye R visual, specifically lower quadrant  however pt was able to complete line cancellation with 100% accuracy with visual scanning to R  Continue to reinforce visual scanning to R side during functional tasks  Activity Tolerance   Activity Tolerance Patient tolerated treatment well   Assessment   Treatment Assessment Pt participated in skilled OT tx session focused on cognitive retraining and family training  See above for further details on functional performance  Pt's daughter Bernardo Lambert and Paige's wife Mela Paget present  Mela Paget does report that she works from home and eventually when pt is d/c home it will likely be to [employer] as Mela Paget will be working from home  They observed session and OT educated on pt's deficits with expressive aphasia, multi-step command following, and how to cue pt with direct versus indirect verbal cues  For example, if situation is unsafe and pt needs to sit down tell pt to "sit down", but if situation is safe, an indirect verbal cue could be "what do you need to do next?", this allows pt to process and think more on her own than always providing her with an answer  Pt's family is receptive to education and engages throughout session  OT informed that Mela Paget can attend family training scheduled for Monday/Tuesday of this week, or another time as well, as she will also be caregiver for the patient  Continue OT plan of care with focus on cognitive rehab with increasing insight into her deficits, sequencing/probelm solving during ADL and simple meal prep tasks, and family training  Prognosis Good   Problem List Decreased endurance; Impaired balance;Decreased mobility; Decreased cognition; Impaired judgement;Decreased safety awareness   Barriers to Discharge Decreased caregiver support   Plan   Treatment/Interventions ADL retraining;Functional transfer training; Therapeutic exercise; Endurance training;Cognitive reorientation;Patient/family training;Equipment eval/education; Bed mobility; Compensatory technique education   Progress Progressing toward goals   Recommendation   OT Discharge Recommendation   (24/7 supervision/assist)   OT Therapy Minutes   OT Time In 1230   OT Time Out 1355   OT Total Time (minutes) 85   OT Mode of treatment - Individual (minutes) 85   OT Mode of treatment - Concurrent (minutes) 0   OT Mode of treatment - Group (minutes) 0   OT Mode of treatment - Co-treat (minutes) 0   OT Mode of Treatment - Total time(minutes) 85 minutes   OT Cumulative Minutes 860   Therapy Time missed   Time missed?  No

## 2022-08-07 PROCEDURE — 99232 SBSQ HOSP IP/OBS MODERATE 35: CPT | Performed by: NURSE PRACTITIONER

## 2022-08-07 PROCEDURE — 97129 THER IVNTJ 1ST 15 MIN: CPT

## 2022-08-07 PROCEDURE — 97535 SELF CARE MNGMENT TRAINING: CPT

## 2022-08-07 PROCEDURE — 92507 TX SP LANG VOICE COMM INDIV: CPT

## 2022-08-07 PROCEDURE — 92526 ORAL FUNCTION THERAPY: CPT

## 2022-08-07 RX ADMIN — ASPIRIN 81 MG CHEWABLE TABLET 81 MG: 81 TABLET CHEWABLE at 08:21

## 2022-08-07 RX ADMIN — SENNOSIDES 17.2 MG: 8.6 TABLET, FILM COATED ORAL at 18:38

## 2022-08-07 RX ADMIN — MULTIPLE VITAMINS W/ MINERALS TAB 1 TABLET: TAB ORAL at 08:21

## 2022-08-07 RX ADMIN — Medication 2000 UNITS: at 08:21

## 2022-08-07 RX ADMIN — OXYCODONE HYDROCHLORIDE AND ACETAMINOPHEN 500 MG: 500 TABLET ORAL at 08:21

## 2022-08-07 RX ADMIN — ATORVASTATIN CALCIUM 40 MG: 40 TABLET, FILM COATED ORAL at 18:38

## 2022-08-07 RX ADMIN — ENOXAPARIN SODIUM 40 MG: 40 INJECTION SUBCUTANEOUS at 08:22

## 2022-08-07 RX ADMIN — SERTRALINE HYDROCHLORIDE 75 MG: 50 TABLET ORAL at 08:21

## 2022-08-07 RX ADMIN — Medication 1 TABLET: at 08:21

## 2022-08-07 NOTE — PLAN OF CARE
Problem: Prexisting or High Potential for Compromised Skin Integrity  Goal: Skin integrity is maintained or improved  Description: INTERVENTIONS:  - Identify patients at risk for skin breakdown  - Assess and monitor skin integrity  - Assess and monitor nutrition and hydration status  - Monitor labs   - Assess for incontinence   - Turn and reposition patient  - Assist with mobility/ambulation  - Relieve pressure over bony prominences  - Avoid friction and shearing  - Provide appropriate hygiene as needed including keeping skin clean and dry  - Evaluate need for skin moisturizer/barrier cream  - Collaborate with interdisciplinary team   - Patient/family teaching  - Consider wound care consult   Outcome: Progressing     Problem: PAIN - ADULT  Goal: Verbalizes/displays adequate comfort level or baseline comfort level  Description: Interventions:  - Encourage patient to monitor pain and request assistance  - Assess pain using appropriate pain scale  - Administer analgesics based on type and severity of pain and evaluate response  - Implement non-pharmacological measures as appropriate and evaluate response  - Consider cultural and social influences on pain and pain management  - Notify physician/advanced practitioner if interventions unsuccessful or patient reports new pain  Outcome: Progressing     Problem: INFECTION - ADULT  Goal: Absence or prevention of progression during hospitalization  Description: INTERVENTIONS:  - Assess and monitor for signs and symptoms of infection  - Monitor lab/diagnostic results  - Monitor all insertion sites, i e  indwelling lines, tubes, and drains  - Monitor endotracheal if appropriate and nasal secretions for changes in amount and color  - [place] appropriate cooling/warming therapies per order  - Administer medications as ordered  - Instruct and encourage patient and family to use good hand hygiene technique  - Identify and instruct in appropriate isolation precautions for identified infection/condition  Outcome: Progressing  Goal: Absence of fever/infection during neutropenic period  Description: INTERVENTIONS:  - Monitor WBC    Outcome: Progressing     Problem: SAFETY ADULT  Goal: Patient will remain free of falls  Description: INTERVENTIONS:  - Educate patient/family on patient safety including physical limitations  - Instruct patient to call for assistance with activity   - Consult OT/PT to assist with strengthening/mobility   - Keep Call bell within reach  - Keep bed low and locked with side rails adjusted as appropriate  - Keep care items and personal belongings within reach  - Initiate and maintain comfort rounds  - Make Fall Risk Sign visible to staff  - Offer Toileting every  Hours, in advance of need  - Initiate/Maintain alarm  - Obtain necessary fall risk management equipment:   - Apply yellow socks and bracelet for high fall risk patients  - Consider moving patient to room near nurses station  Outcome: Progressing  Goal: Maintain or return to baseline ADL function  Description: INTERVENTIONS:  -  Assess patient's ability to carry out ADLs; assess patient's baseline for ADL function and identify physical deficits which impact ability to perform ADLs (bathing, care of mouth/teeth, toileting, grooming, dressing, etc )  - Assess/evaluate cause of self-care deficits   - Assess range of motion  - Assess patient's mobility; develop plan if impaired  - Assess patient's need for assistive devices and provide as appropriate  - Encourage maximum independence but intervene and supervise when necessary  - Involve family in performance of ADLs  - Assess for home care needs following discharge   - Consider OT consult to assist with ADL evaluation and planning for discharge  - Provide patient education as appropriate  Outcome: Progressing  Goal: Maintains/Returns to pre admission functional level  Description: INTERVENTIONS:  - Perform BMAT or MOVE assessment daily    - Set and communicate daily mobility goal to care team and patient/family/caregiver  - Collaborate with rehabilitation services on mobility goals if consulted  - Perform Range of Motion  times a day  - Reposition patient every hours  - Dangle patient  times a day  - Stand patient times a day  - Ambulate patient  times a day  - Out of bed to chair  times a day   - Out of bed for meals  times a day  - Out of bed for toileting  - Record patient progress and toleration of activity level   Outcome: Progressing     Problem: DISCHARGE PLANNING  Goal: Discharge to home or other facility with appropriate resources  Description: INTERVENTIONS:  - Identify barriers to discharge w/patient and caregiver  - Arrange for needed discharge resources and transportation as appropriate  - Identify discharge learning needs (meds, wound care, etc )  - Arrange for interpretive services to assist at discharge as needed  - Refer to Case Management Department for coordinating discharge planning if the patient needs post-hospital services based on physician/advanced practitioner order or complex needs related to functional status, cognitive ability, or social support system  Outcome: Progressing     Problem: Neurological Deficit  Goal: Neurological status is stable or improving  Description: Interventions:  - Monitor and assess patient's level of consciousness, motor function, sensory function, and level of assistance needed for ADLs  - Monitor and report changes from baseline  Collaborate with interdisciplinary team to initiate plan and implement interventions as ordered  - Provide and maintain a safe environment  - Consider seizure precautions  - Consider fall precautions  - Consider aspiration precautions  - Consider bleeding precautions  Outcome: Progressing     Problem:  Activity Intolerance/Impaired Mobility  Goal: Mobility/activity is maintained at optimum level for patient  Description: Interventions:  - Assess and monitor patient barriers to mobility and need for assistive/adaptive devices  - Assess patient's emotional response to limitations  - Collaborate with interdisciplinary team and initiate plans and interventions as ordered  - Encourage independent activity per ability   - Maintain proper body alignment  - Perform active/passive rom as tolerated/ordered  - Plan activities to conserve energy   - Turn patient as appropriate  Outcome: Progressing     Problem: Communication Impairment  Goal: Ability to express needs and understand communication  Description: Assess patient's communication skills and ability to understand information  Patient will demonstrate use of effective communication techniques, alternative methods of communication and understanding even if not able to speak  - Encourage communication and provide alternate methods of communication as needed  - Collaborate with case management/ for discharge needs  - Include patient/family/caregiver in decisions related to communication  Outcome: Progressing     Problem: Potential for Aspiration  Goal: Non-ventilated patient's risk of aspiration is minimized  Description: Assess and monitor vital signs, respiratory status, and labs (WBC)  Monitor for signs of aspiration (tachypnea, cough, rales, wheezing, cyanosis, fever)  - Assess and monitor patient's ability to swallow  - Place patient up in chair to eat if possible  - HOB up at 90 degrees to eat if unable to get patient up into chair   - Supervise patient during oral intake  - Instruct patient/ family to take small bites  - Instruct patient/ family to take small single sips when taking liquids  - Follow patient-specific strategies generated by speech pathologist   Outcome: Progressing  Goal: Ventilated patient's risk of aspiration is minimized  Description: Assess and monitor vital signs, respiratory status, airway cuff pressure, and labs (WBC)    Monitor for signs of aspiration (tachypnea, cough, rales, wheezing, cyanosis, fever)  - Elevate head of bed 30 degrees if patient has tube feeding   - Monitor tube feeding  Outcome: Progressing     Problem: Nutrition  Goal: Nutrition/Hydration status is improving  Description: Monitor and assess patient's nutrition/hydration status for malnutrition (ex- brittle hair, bruises, dry skin, pale skin and conjunctiva, muscle wasting, smooth red tongue, and disorientation)  Collaborate with interdisciplinary team and initiate plan and interventions as ordered  Monitor patient's weight and dietary intake as ordered or per policy  Utilize nutrition screening tool and intervene per policy  Determine patient's food preferences and provide high-protein, high-caloric foods as appropriate  - Assist patient with eating   - Allow adequate time for meals   - Encourage patient to take dietary supplement as ordered  - Collaborate with clinical nutritionist   - Include patient/family/caregiver in decisions related to nutrition  Outcome: Progressing     Problem: Nutrition/Hydration-ADULT  Goal: Nutrient/Hydration intake appropriate for improving, restoring or maintaining nutritional needs  Description: Monitor and assess patient's nutrition/hydration status for malnutrition  Collaborate with interdisciplinary team and initiate plan and interventions as ordered  Monitor patient's weight and dietary intake as ordered or per policy  Utilize nutrition screening tool and intervene as necessary  Determine patient's food preferences and provide high-protein, high-caloric foods as appropriate       INTERVENTIONS:  - Monitor oral intake, urinary output, labs, and treatment plans  - Assess nutrition and hydration status and recommend course of action  - Evaluate amount of meals eaten  - Assist patient with eating if necessary   - Allow adequate time for meals  - Recommend/ encourage appropriate diets, oral nutritional supplements, and vitamin/mineral supplements  - Order, calculate, and assess calorie counts as needed  - Recommend, monitor, and adjust tube feedings and TPN/PPN based on assessed needs  - Assess need for intravenous fluids  - Provide specific nutrition/hydration education as appropriate  - Include patient/family/caregiver in decisions related to nutrition  Outcome: Progressing     Problem: Potential for Falls  Goal: Patient will remain free of falls  Description: INTERVENTIONS:  - Educate patient/family on patient safety including physical limitations  - Instruct patient to call for assistance with activity   - Consult OT/PT to assist with strengthening/mobility   - Keep Call bell within reach  - Keep bed low and locked with side rails adjusted as appropriate  - Keep care items and personal belongings within reach  - Initiate and maintain comfort rounds  - Make Fall Risk Sign visible to staff  - Offer Toileting every Hours, in advance of need  - Initiate/Maintain alarm  - Obtain necessary fall risk management equipment:   - Apply yellow socks and bracelet for high fall risk patients  - Consider moving patient to room near nurses station  Outcome: Progressing

## 2022-08-07 NOTE — PROGRESS NOTES
08/07/22 1400   Pain Assessment   Pain Assessment Tool 0-10   Pain Score No Pain   Restrictions/Precautions   Precautions Bed/chair alarms; Aphasia; Fall Risk;Cognitive;Supervision on toilet/commode;Hard of hearing   Weight Bearing Restrictions No   ROM Restrictions No   Putting On/Taking Off Footwear   Type of Assistance Needed Supervision   Physical Assistance Level No physical assistance   Comment seated via cross leg   Putting On/Taking Off Footwear CARE Score 4   Lying to Sitting on Side of Bed   Type of Assistance Needed Supervision   Physical Assistance Level No physical assistance   Lying to Sitting on Side of Bed CARE Score 4   Sit to Stand   Type of Assistance Needed Supervision   Physical Assistance Level No physical assistance   Comment CS   Sit to Stand CARE Score 4   Bed-Chair Transfer   Type of Assistance Needed Supervision   Physical Assistance Level No physical assistance   Comment no AD   Chair/Bed-to-Chair Transfer CARE Score 4   Cognition   Overall Cognitive Status Impaired   Arousal/Participation Alert   Attention Attends with cues to redirect   Orientation Level Oriented to person   Memory Decreased short term memory   Following Commands Follows multistep commands with increased time or repetition   Vision   Vision Comments Pt engaged in L/R scanning worksheet at tabletop  Pt able to independently and accurately complete 4/6 with increased time  Of note the last two pt with difficulty locating were on inferior portion of page as pt was initially noted to omit all together saying she was finished and required cues to fully scan to bottom of page  Pt then with difficulty locating proper match of differently oriented squares  With cueing is able to locate  Additional Activities   Additional Activities Comments Scattered on tabletop were frequently used daily items including plate, cup, knife, fork, toothbrush/paste, mouthwash, comb, brush, shampoo   Pt asked to locate and group items based on items for grooming hair, oral care, and items for eating  Pt requires increased time to locate items in R visual field and cues to fully scan  Pt then engaged in naming  Pt again noted with perseveration behaviors where she would consistently repeat previous word, requires increased time and min VC's overall  Activity Tolerance   Activity Tolerance Patient tolerated treatment well   Assessment   Treatment Assessment Pt participated in skilled OT services with focus on functional cognition, LB dressing, and R visual scanning  Pt with dec STM, noted by stating "we have to put my hearing aides in before we leave" after immediately having just placed them in  Pt continues to present with dec R inferior visual scanning  Pt will continue to benefit from skilled OT services with focus on functional cognition as it relates to I/ADL tasks, R attention/visual scanning, standing balance/tolerance, R body coordination/strength  Prognosis Good   Problem List Decreased endurance; Impaired balance;Decreased mobility; Decreased cognition; Impaired judgement;Decreased safety awareness   Plan   Treatment/Interventions ADL retraining;Functional transfer training; Therapeutic exercise; Endurance training;Cognitive reorientation;Patient/family training;Equipment eval/education; Bed mobility; Compensatory technique education   Progress Progressing toward goals   Recommendation   OT Discharge Recommendation   (24/7 S/A)   OT Therapy Minutes   OT Time In 1400   OT Time Out 1430   OT Total Time (minutes) 30   OT Mode of treatment - Individual (minutes) 30   OT Mode of treatment - Concurrent (minutes) 0   OT Mode of treatment - Group (minutes) 0   OT Mode of treatment - Co-treat (minutes) 0   OT Mode of Treatment - Total time(minutes) 30 minutes   OT Cumulative Minutes 890   Therapy Time missed   Time missed?  No

## 2022-08-07 NOTE — PROGRESS NOTES
Internal Medicine Progress Note  Patient: Edita Faria  Age/sex: 76 y o  female  Medical Record #: [id number]      ASSESSMENT/PLAN: (Interval History)  Edita Faria is seen and examined and management for following issues:    Lt MCA CVA  · Continue ASA 81mg daily/atorvastatin 40mg daily  · Outpt follow-up with Neurology  · Outpt follow-up with Cardiology     Junctional rhythm/PACs  · Previous EKGs were sinus  · Cards saw here and rec'd LOOP now vs Zio patch now and then LOOP as OP but family declined both 8/2/22 noting they want to revisit the topic in the OP setting  · AVOID ANY AVN BLOCKING AGENTS  · Repeat EKG 8/2 was NSR with 1st degree AVB but currently sounded by exam to be back in [facility] on exam today 8/7 with rates in 50's     Depression  · Continue sertraline         Discharge date: 8/12/22    The above assessment and plan was reviewed and updated as determined by my evaluation of the patient on 8/7/2022      Labs:   Results from last 7 days   Lab Units 08/01/22  0505   WBC Thousand/uL 8 18   HEMOGLOBIN g/dL 14 0   HEMATOCRIT % 43 3   PLATELETS Thousands/uL 241     Results from last 7 days   Lab Units 08/01/22  0505   SODIUM mmol/L 139   POTASSIUM mmol/L 4 2   CHLORIDE mmol/L 107   CO2 mmol/L 29   BUN mg/dL 17   CREATININE mg/dL 0 64   CALCIUM mg/dL 9 2                   Review of Scheduled Meds:  Current Facility-Administered Medications   Medication Dose Route Frequency Provider Last Rate    acetaminophen  650 mg Oral Q6H PRN Lina Chatham, PA-C      aluminum-magnesium hydroxide-simethicone  30 mL Oral Q6H PRN Lina Chatham, PA-C      ascorbic acid  500 mg Oral Daily Lina Chatham, PA-C      aspirin  81 mg Oral Daily Lina Ocean, PA-C      atorvastatin  40 mg Oral QPM Lina Ocean, PA-C      bisacodyl  10 mg Rectal Daily PRN Lina Ocean, PA-C      calcium carbonate-vitamin D  1 tablet Oral Daily With Breakfast Lina Ocean, PA-C      cholecalciferol  2,000 Units Oral Daily Sravani Quinonez, PRISCA      docusate sodium  100 mg Oral BID Sravani Quinonez, PA-C      enoxaparin  40 mg Subcutaneous Daily Sravani Quinonez, PRISCA      multivitamin-minerals  1 tablet Oral Daily Sravani Quinonez, PRISCA      ondansetron  4 mg Oral Q6H PRN Sravani Quinonez, PA-HELEN      polyethylene glycol  17 g Oral Daily PRN Sravani Quinonez, PRISCA      senna  2 tablet Oral BID Sravani Quinonez, PA-HELEN      sertraline  75 mg Oral Daily Sravani Quinonez, PA-HELEN         Subjective/ HPI: Patient seen and examined  Patients overnight issues or events were reviewed with nursing or staff during rounds or morning huddle session  New or overnight issues include the following:     No new or overnight issues  Offers no complaints    ROS:   A 10 point ROS was performed; negative except as noted above         Imaging:     No orders to display       *Labs /Radiology studies reviewed  *Medications reviewed and reconciled as needed  *Please refer to order section for additional ordered labs studies  *Case discussed with primary attending during morning huddle case rounds    Physical Examination:  Vitals:   Vitals:    08/06/22 0500 08/06/22 1554 08/06/22 2054 08/07/22 0607   BP: 125/56 131/64 121/55 122/60   BP Location: Left arm Left arm Right arm Right arm   Pulse: 55 58 57 57   Resp: 16 18 20 20   Temp: 98 1 °F (36 7 °C) 97 7 °F (36 5 °C) 97 7 °F (36 5 °C) 97 9 °F (36 6 °C)   TempSrc: Oral Oral Oral Oral   SpO2: 97% 98% 98% 98%   Weight:       Height:           General Appearance: no distress, conversive  HEENT: PERRLA, conjuctiva normal; oropharynx clear; mucous membranes moist   Neck:  Supple, normal ROM  Lungs: CTA, normal respiratory effort, no retractions, expiratory effort normal  CV: regular rate and rhythm; no rubs/murmurs/gallops, PMI normal   ABD: soft; ND/NT; +BS  EXT: no edema  Skin: normal turgor, normal texture, no rashes  Psych: affect normal, mood normal  Neuro: AAO      The above physical exam was reviewed and updated as determined by my evaluation of the patient on 8/7/2022  Invasive Devices  Report    None                    VTE Pharmacologic Prophylaxis: Enoxaparin  Code Status: Level 3 - DNAR and DNI  Current Length of Stay: 10 day(s)      Total time spent:  30 minutes with more than 50% spent counseling/coordinating care  Counseling includes discussion with patient re: progress  and discussion with patient of his/her current medical state/information  Coordination of patient's care was performed in conjunction with primary service  Time invested included review of patient's labs, vitals, and management of their comorbidities with continued monitoring  In addition, this patient was discussed with medical team including physician and advanced extenders  The care of the patient was extensively discussed and appropriate treatment plan was formulated unique for this patient  ** Please Note:  voice to text software may have been used in the creation of this document   Although proof errors in transcription or interpretation are a potential of such software**

## 2022-08-08 LAB
ANION GAP SERPL CALCULATED.3IONS-SCNC: 6 MMOL/L (ref 4–13)
BASOPHILS # BLD AUTO: 0.03 THOUSANDS/ΜL (ref 0–0.1)
BASOPHILS NFR BLD AUTO: 0 % (ref 0–1)
BUN SERPL-MCNC: 13 MG/DL (ref 5–25)
CALCIUM SERPL-MCNC: 9.1 MG/DL (ref 8.3–10.1)
CHLORIDE SERPL-SCNC: 106 MMOL/L (ref 96–108)
CO2 SERPL-SCNC: 29 MMOL/L (ref 21–32)
CREAT SERPL-MCNC: 0.63 MG/DL (ref 0.6–1.3)
EOSINOPHIL # BLD AUTO: 0.23 THOUSAND/ΜL (ref 0–0.61)
EOSINOPHIL NFR BLD AUTO: 3 % (ref 0–6)
ERYTHROCYTE [DISTWIDTH] IN BLOOD BY AUTOMATED COUNT: 12.9 % (ref 11.6–15.1)
GFR SERPL CREATININE-BSD FRML MDRD: 85 ML/MIN/1.73SQ M
GLUCOSE P FAST SERPL-MCNC: 82 MG/DL (ref 65–99)
GLUCOSE SERPL-MCNC: 82 MG/DL (ref 65–140)
HCT VFR BLD AUTO: 39.7 % (ref 34.8–46.1)
HGB BLD-MCNC: 12.8 G/DL (ref 11.5–15.4)
IMM GRANULOCYTES # BLD AUTO: 0.03 THOUSAND/UL (ref 0–0.2)
IMM GRANULOCYTES NFR BLD AUTO: 0 % (ref 0–2)
LYMPHOCYTES # BLD AUTO: 2.59 THOUSANDS/ΜL (ref 0.6–4.47)
LYMPHOCYTES NFR BLD AUTO: 34 % (ref 14–44)
MCH RBC QN AUTO: 29.6 PG (ref 26.8–34.3)
MCHC RBC AUTO-ENTMCNC: 32.2 G/DL (ref 31.4–37.4)
MCV RBC AUTO: 92 FL (ref 82–98)
MONOCYTES # BLD AUTO: 0.81 THOUSAND/ΜL (ref 0.17–1.22)
MONOCYTES NFR BLD AUTO: 11 % (ref 4–12)
NEUTROPHILS # BLD AUTO: 3.96 THOUSANDS/ΜL (ref 1.85–7.62)
NEUTS SEG NFR BLD AUTO: 52 % (ref 43–75)
NRBC BLD AUTO-RTO: 0 /100 WBCS
PLATELET # BLD AUTO: 206 THOUSANDS/UL (ref 149–390)
PMV BLD AUTO: 10.8 FL (ref 8.9–12.7)
POTASSIUM SERPL-SCNC: 4.3 MMOL/L (ref 3.5–5.3)
RBC # BLD AUTO: 4.33 MILLION/UL (ref 3.81–5.12)
SODIUM SERPL-SCNC: 141 MMOL/L (ref 135–147)
WBC # BLD AUTO: 7.65 THOUSAND/UL (ref 4.31–10.16)

## 2022-08-08 PROCEDURE — 92526 ORAL FUNCTION THERAPY: CPT

## 2022-08-08 PROCEDURE — 97530 THERAPEUTIC ACTIVITIES: CPT

## 2022-08-08 PROCEDURE — 97129 THER IVNTJ 1ST 15 MIN: CPT

## 2022-08-08 PROCEDURE — 97130 THER IVNTJ EA ADDL 15 MIN: CPT

## 2022-08-08 PROCEDURE — 99232 SBSQ HOSP IP/OBS MODERATE 35: CPT | Performed by: PHYSICAL MEDICINE & REHABILITATION

## 2022-08-08 PROCEDURE — 97535 SELF CARE MNGMENT TRAINING: CPT

## 2022-08-08 PROCEDURE — 85025 COMPLETE CBC W/AUTO DIFF WBC: CPT | Performed by: NURSE PRACTITIONER

## 2022-08-08 PROCEDURE — 92507 TX SP LANG VOICE COMM INDIV: CPT

## 2022-08-08 PROCEDURE — 97112 NEUROMUSCULAR REEDUCATION: CPT

## 2022-08-08 PROCEDURE — 99232 SBSQ HOSP IP/OBS MODERATE 35: CPT | Performed by: INTERNAL MEDICINE

## 2022-08-08 PROCEDURE — 97110 THERAPEUTIC EXERCISES: CPT

## 2022-08-08 PROCEDURE — 80048 BASIC METABOLIC PNL TOTAL CA: CPT | Performed by: NURSE PRACTITIONER

## 2022-08-08 RX ADMIN — ENOXAPARIN SODIUM 40 MG: 40 INJECTION SUBCUTANEOUS at 09:01

## 2022-08-08 RX ADMIN — SENNOSIDES 17.2 MG: 8.6 TABLET, FILM COATED ORAL at 09:02

## 2022-08-08 RX ADMIN — ASPIRIN 81 MG CHEWABLE TABLET 81 MG: 81 TABLET CHEWABLE at 09:01

## 2022-08-08 RX ADMIN — OXYCODONE HYDROCHLORIDE AND ACETAMINOPHEN 500 MG: 500 TABLET ORAL at 09:02

## 2022-08-08 RX ADMIN — Medication 1 TABLET: at 09:02

## 2022-08-08 RX ADMIN — SERTRALINE HYDROCHLORIDE 75 MG: 50 TABLET ORAL at 09:02

## 2022-08-08 RX ADMIN — SENNOSIDES 17.2 MG: 8.6 TABLET, FILM COATED ORAL at 17:41

## 2022-08-08 RX ADMIN — Medication 2000 UNITS: at 09:02

## 2022-08-08 RX ADMIN — ATORVASTATIN CALCIUM 40 MG: 40 TABLET, FILM COATED ORAL at 17:41

## 2022-08-08 RX ADMIN — MULTIPLE VITAMINS W/ MINERALS TAB 1 TABLET: TAB ORAL at 09:01

## 2022-08-08 NOTE — PROGRESS NOTES
Physical Medicine and Rehabilitation Progress Note  Latrell Kline 76 y o  female MRN: [id number]  Unit/Bed#: -01 Encounter: [id number]    HPI: Latrell Kline is a 76 y o  female with a PMH significant for h/o endometrial cancer, depression, h/o DVT, vitamin D deficency and OA, who presented to the [address] [place] [address] ED on 07/23 with a stroke alert  She was in contact with family the night prior when the next day family could not contact her and called EMS  Patient was noted to have altered mental status upon EMS arrival and was experiencing right facial droop  Neurology was consulted  [facility] ordered without evidence of acute intracranial hemorrhage  CTA with left distal m2/m3 occlusion  SLP assessed and also noted patient to have dysphagia  MRI brain with acute to early subacute ischemia involving the left MCA territory with a larger infarct and multiple smaller infarcts  No hemorrhagic transformation identified  With her history of endometrial cancer the MRI of the brain also ruled out mets  ECHO obtained with EF of 70%  Neurology recommending outpatient loop recorder placement  She will continue ASA and Lipitor  PM&R consulted and recommended patient for ARC  She participated with PT/OT/ST and was deemed appropriate for post-acute rehabilitation services after demonstrating the ability to tolerate three hours of therapy per day  She was accepted to [facility] and arrived on 7/28/22  Chief Complaint: No new issues  Interval History/Subjective:  No acute events over the weekend  Patient is feeling fine with no new headaches, numbness, tingling, weakness, CP, SOB, fevers, chills, N/V  Last BM was 8/6  Daughter Virgilio Israel present at bedside today with concerns for patients' ability to progress to more independence with IADLs   We reviewed different options, and that there is a chance that once she is a supervision level of function, that she will not be appropriate for JOSE/SNF (or that time there would be limited)  She expressed interest in neuro intensive program, and asked if she could instead send her mother to her friends who have worked in neuro rehab - I said she could, but it would be unlikely that they could get as much therapy as is available via the neuro intensive program approved  She expressed understanding  She did expressive some reticence about JOSE/SNF - due to the amount of therapies she would get, and also being in an unfamiliar location  She mentioned that her wife would be able to work from home for 2-3 weeks  ROS:  A 10 point review of systems was negative except for what is noted in the HPI  Today's Changes: 1  Continue family training  Discussed plan of care with daughterAlondra  Total visit time: 25 minutes, with more than 50% spent counseling/coordinating care  Counseling includes discussion with patient re: progress in therapies, functional issues observed by therapy staff, and discussion with patient regarding their current medical state and wellbeing  Coordination of patient's care was performed in conjunction with Internal Medicine service to monitor patient's labs, vitals, and management of their comorbidities  Assessment/Plan:    * Acute ischemic left MCA stroke [facility])  Assessment & Plan  · Presented to [facility] with right facial droop and aphasia  · Outside tPA window  · CT without evidence of acute intracranial hemorrhage  Left frontal temporal and low attenuation suggesting acute-subacute ischemia  · CTA H/N with distal Left M2/M3 MCA occlusion   · MRI brain- acute to early subacute ischemia identified involving the left MCA territory with one larger infarct and multiple additional smaller infarcts   No hemorrhagic transformation identified   · MRI brain negative for metastasis in setting of h/o endometrial cancer  · ECHO unremarkable   · Continue ASA/Statin therapy per neurology recs  · Dysphagia diet; SLP following  · Neurology also recommended Loop recorder as OP with OP FU in 4-6 weeks  · Daughter deferred inpatient placement of loop recorder; aware of need to follow up with Cardiology as OP  · Acute chomprehensive interdisciplinary inpatient rehabilitation to include intensive skilled therapies as outlines with oversight and management by a rehabilitation Physician Assistant overseen by rehabilitation physician as well as inpatient rehabilitation nursing, case management and weekly interdisciplinary team meeting    SA node dysfunction [facility])  Assessment & Plan  With junctional bradycardia new on exam 8/2 8/4 more regular  Monitor EKG  - Seen by Cards  - HR fairly stable compared to previous  - K > 4, Mag > 2  - Family would like to defer any monitoring until outpatient    - Risk/benefits/alternatives presented in detail  Dysphagia  Assessment & Plan  · Dysphagia level 3 diet with thin liquids  · SLP to follow    History of DVT (deep vein thrombosis)  Assessment & Plan  · Potential h/o xarelto use for several months; no longer taking  · Currently Lovenox SQ Daily and SCD       Mild episode of recurrent major depressive disorder (HCC)  Assessment & Plan  · Continue Zoloft 75 mg daily      Vitamin D deficiency  Assessment & Plan  · Continue Cholecalciferol       History of endometrial cancer  Assessment & Plan  · Stage IA, grade 3 s/p completion of adjuvant chemotherapy and radiation in April 2015  · Follows with GYN-ONC as OP        Health Maintenance  #Delirium/Sleep: At risk  First night with some impulsivity, but due to needing to go to the bathroom  Added timed voids with improvement  Continue to optimize pain, bowel, bladder, sleep-wake cycle management  #Pain: Tylenol PRN  #Bowel: Last BM 8/6  On Senna 2 tabs BID and docusate BID  Will try to de-escalate while here  #Bladder: Timed voids to help with continence PVRx all < 150cc  #Skin/Pressure Injury Prevention: Turn Q2hr in bed, with weight shifts [id number] in wheelchair    #DVT Prophylaxis: Lovenox, SCDs  #GI Prophylaxis: PO diet  #Code Status:  DNR/DNI  #FEN: Level 3/thins  #Dispo: Team Conference 8/3: ADD 8/12 to her daughter's home with supervision and PT/OT/SLP  Objective:    Functional Update:  PT: progressing to Sup with all mobility up to 400'  CGA for uneven surfaces  Sup for stairs  OT:  Progressing to Sup for ADLs  SLP: Level 3/thins advanced  Continuse with moderate language deficits in receptive/expressive language skills  Allergies per EMR    Physical Exam:  Temp:  [97 7 °F (36 5 °C)-98 8 °F (37 1 °C)] 98 8 °F (37 1 °C)  HR:  [58-63] 63  Resp:  [16-20] 20  BP: (117-162)/(57-70) 117/57  Oxygen Therapy  SpO2: 97 %    Gen: No acute distress, Well-nourished, well-appearing  HEENT: Moist mucus membranes, Normocephalic/Atraumatic  Cardiovascular: Regular rate, rhythm, S1/S2  Distal pulses palpable  Heme/Extr: No edema  Pulmonary: Non-labored breathing  Lungs CTAB  : No pantoja  GI: Soft, non-tender, non-distended  Integumentary: Skin is warm, dry  Neuro: AAOx3, Expressive > Receptive aphasia  Some apraxia? Psych: Normal mood and affect  Diagnostic Studies: Reviewed, no new imaging      Laboratory:  Reviewed   Results from last 7 days   Lab Units 08/08/22  0455   HEMOGLOBIN g/dL 12 8   HEMATOCRIT % 39 7   WBC Thousand/uL 7 65     Results from last 7 days   Lab Units 08/08/22  0455   BUN mg/dL 13   POTASSIUM mmol/L 4 3   CHLORIDE mmol/L 106   CREATININE mg/dL 0 63            Patient Active Problem List   Diagnosis    History of endometrial cancer    Endometrial cancer ([facility] [address] )    Inguinal lymphadenopathy    Multinodular thyroid    Obesity    Osteoarthritis of hip    Osteoarthritis of knee    Vitamin D deficiency    Osteopenia of lumbar spine    Mild episode of recurrent major depressive disorder (HCC)    Acute ischemic left MCA stroke (HCC)    History of DVT (deep vein thrombosis)    Dysphagia    SA node dysfunction (HCC)         Medications  Current Facility-Administered Medications   Medication Dose Route Frequency Provider Last Rate    acetaminophen  650 mg Oral Q6H PRN Lina Wadena, PA-C      aluminum-magnesium hydroxide-simethicone  30 mL Oral Q6H PRN Lina Wadena, PA-C      ascorbic acid  500 mg Oral Daily Lina Wadena, PA-C      aspirin  81 mg Oral Daily Lina Ocean, PA-C      atorvastatin  40 mg Oral QPM Lina Ocean, PA-C      bisacodyl  10 mg Rectal Daily PRN Lina Ocean, PA-C      calcium carbonate-vitamin D  1 tablet Oral Daily With Breakfast Lina Ocean, PA-C      cholecalciferol  2,000 Units Oral Daily [facility], PA-C      docusate sodium  100 mg Oral BID [facility], PA-C      enoxaparin  40 mg Subcutaneous Daily Lina Ocean, PA-C      multivitamin-minerals  1 tablet Oral Daily [facility], PA-C      ondansetron  4 mg Oral Q6H PRN Lina Wadena, PA-C      polyethylene glycol  17 g Oral Daily PRN [facility], PA-C      senna  2 tablet Oral BID [facility], PA-C      sertraline  75 mg Oral Daily [facility], PA-HELEN          ** Please Note: Fluency Direct voice to text software may have been used in the creation of this document   **

## 2022-08-08 NOTE — PROGRESS NOTES
08/08/22 1400   Pain Assessment   Pain Assessment Tool 0-10   Pain Score No Pain   Restrictions/Precautions   Precautions Aphasia;Bed/chair alarms;Cognitive; Fall Risk;Hard of hearing;Supervision on toilet/commode;Visual deficit   Weight Bearing Restrictions No   ROM Restrictions No   Lifestyle   Autonomy "I am going to nap "   Sit to Lying   Type of Assistance Needed Supervision   Physical Assistance Level No physical assistance   Sit to Lying CARE Score 4   Sit to Stand   Type of Assistance Needed Supervision   Physical Assistance Level No physical assistance   Sit to Stand CARE Score 4   Bed-Chair Transfer   Type of Assistance Needed Supervision   Physical Assistance Level No physical assistance   Comment no AD, ambulated<>bathroom, <>OT gym   Chair/Bed-to-Chair Transfer CARE Score 4   Toileting Hygiene   Type of Assistance Needed Supervision   Physical Assistance Level No physical assistance   Comment seated for arsenio hygiene, CS in stance for clothing management  Toileting Hygiene CARE Score 4   Toilet Transfer   Type of Assistance Needed Supervision   Physical Assistance Level No physical assistance   Comment no grab bars   Toilet Transfer CARE Score 4   Cognition   Overall Cognitive Status Impaired   Arousal/Participation Alert; Cooperative   Attention Attends with cues to redirect   Orientation Level Oriented to person;Oriented to place;Oriented to situation;Disoriented to time   Memory Decreased short term memory;Decreased recall of precautions   Following Commands Follows multistep commands with increased time or repetition   Comments Focused on numerical trail-making w/ 8 numbers, w/ repetition x2 pt able to complete without challenge w/ inc time provided  Upgraded to 25 number trail-making, completed w/ inc time w/ Min vc's as pt attempted to skip number order x2   Upgraded task to trail-making to include numbers and letter in ascending order x8, inc difficulty req Mod vc's and demo w/ pt able to complete  Attempted 25 number/letter trail-making, inc difficulty noted w/ pt requiring Max vc's  Although pt was unable to correct errors, pt able to ID errors was made which is overall improvement cognitively  Emotional support and encouragement provided as task was challenging, improved divided attention to task as there were distractions in OT gym, ie  music  Assessment   Treatment Assessment Seen for 30 minute OT session w/ focus on toileting and fxnl cognitive retraining: divided attention, problem-solving, direction-following paired w/ visual scanning  Overall improvement w/ attention to task and frustration tolerance compared to eval  Cont to incorporate fxnl speech into OT sessions as well, w/ pt able to express "I am going to nap " Cont OT POC to include fxnl cognitive retraining: following 2-3 step directions, divided attention, light/cold meal prep, light IADL management, R visual scanning, and ADL retraining  Pt was left resting in bed w/ all needs within reach and alarm activated  Prognosis Good   Problem List Impaired balance;Decreased cognition; Impaired judgement;Decreased safety awareness; Impaired vision   Plan   Treatment/Interventions ADL retraining;Functional transfer training; Therapeutic exercise; Endurance training;Patient/family training;Cognitive reorientation   Progress Progressing toward goals   Recommendation   OT Discharge Recommendation   (pending progress, 24 hour S)   OT Therapy Minutes   OT Time In 1400   OT Time Out 1430   OT Total Time (minutes) 30   OT Mode of treatment - Individual (minutes) 30   OT Mode of treatment - Concurrent (minutes) 0   OT Mode of treatment - Group (minutes) 0   OT Mode of treatment - Co-treat (minutes) 0   OT Mode of Treatment - Total time(minutes) 30 minutes   OT Cumulative Minutes 1010   Therapy Time missed   Time missed?  No

## 2022-08-08 NOTE — PROGRESS NOTES
08/08/22 1030   Pain Assessment   Pain Assessment Tool 0-10   Pain Score No Pain   Restrictions/Precautions   Precautions Aphasia; Aspiration;Bed/chair alarms;Cognitive; Fall Risk;Supervision on toilet/commode;Visual deficit   Weight Bearing Restrictions No   ROM Restrictions No   Cognition   Overall Cognitive Status Impaired   Subjective   Subjective Patient ready to particiapte in PT session   Roll Left and Right   Type of Assistance Needed Supervision   Physical Assistance Level No physical assistance   Roll Left and Right CARE Score 4   Sit to Lying   Type of Assistance Needed Supervision   Physical Assistance Level No physical assistance   Sit to Lying CARE Score 4   Lying to Sitting on Side of Bed   Type of Assistance Needed Supervision   Physical Assistance Level No physical assistance   Lying to Sitting on Side of Bed CARE Score 4   Sit to Stand   Type of Assistance Needed Supervision   Physical Assistance Level No physical assistance   Sit to Stand CARE Score 4   Bed-Chair Transfer   Type of Assistance Needed Supervision   Physical Assistance Level No physical assistance   Chair/Bed-to-Chair Transfer CARE Score 4   Transfer Bed/Chair/Wheelchair   Limitations Noted In Balance; Endurance;Vision;LE Strength;Problem Solving; Sequencing   Findings CS with no AD; CGA/CS for balance activities; verbqal cues needed for safe negotiation of all enviornements   Car Transfer   Type of Assistance Needed Supervision   Physical Assistance Level No physical assistance   Car Transfer CARE Score 4   Walk 10 Feet   Type of Assistance Needed Supervision;Verbal cues   Physical Assistance Level No physical assistance   Walk 10 Feet CARE Score 4   Walk 50 Feet with Two Turns   Type of Assistance Needed Supervision;Verbal cues   Physical Assistance Level No physical assistance   Walk 50 Feet with Two Turns CARE Score 4   Walk 150 Feet   Type of Assistance Needed Supervision;Verbal cues   Physical Assistance Level No physical assistance   Walk 150 Feet CARE Score 4   Walking 10 Feet on Uneven Surfaces   Type of Assistance Needed Incidental touching   Physical Assistance Level No physical assistance   Comment CGA/CS   Walking 10 Feet on Uneven Surfaces CARE Score 4   Ambulation   Does the patient walk? 2  Yes   Primary Mode of Locomotion Prior to Admission Walk   Distance Walked (feet) 150 ft  (x2 + 400)   Gait Pattern Decreased foot clearance;Decreased R stance; Improper weight shift   Limitations Noted In Balance; Endurance; Heel Strike;Speed;Strength;Swing   Provided Assistance with: Direction   Walk Assist Level Supervision;Close Supervision   Wheel 50 Feet with Two Turns   Reason if not Attempted Activity not applicable   Wheel 50 Feet with Two Turns CARE Score 9   Wheel 150 Feet   Reason if not Attempted Activity not applicable   Wheel 758 Feet CARE Score 9   Curb or Single Stair   Style negotiated Curb   Type of Assistance Needed Supervision   Physical Assistance Level No physical assistance   Comment CS on 6" curb step performed 2x   1 Step (Curb) CARE Score 4   4 Steps   Type of Assistance Needed Supervision   Physical Assistance Level No physical assistance   Comment FF with R HR   4 Steps CARE Score 4   12 Steps   Type of Assistance Needed Supervision   Physical Assistance Level No physical assistance   Comment FF with R HR   12 Steps CARE Score 4   Stairs   Type Stairs;Curb   # of Steps 12   Weight Bearing Precautions Fall Risk   Assist Devices Single Rail   Findings nonreciprocal pattern; daughter states she negotiated this way PTA   Picking Up Object   Type of Assistance Needed Supervision   Physical Assistance Level No physical assistance   Comment no reacher   Picking Up Object CARE Score 4   Therapeutic Interventions   Balance Obstacle course stepping over bolsters, around cones, picking up cones, cleaning items used at the mat  Walking with head turns carrying full mug of water in R hand then washing it into sink   Patient filling up water from jug to drink   Neuromuscular Re-Education Reviewed with patient what to do in an emergency  She was able to state call for EMS but stated "9-2-2"  She was unable to correct  When PT notifiying her that the number is "9-1-1" she stated again (similar to the other day "Oh is it?"  At the end of session she was able to state "9-1-1" is who she would call in an emergency  Modalities At the end of session, had patient try and find her way back to the room  She completely went around the full [place] of the hallway before pausing  She was unable to recall her room number on he own recall or even after being provided with it by the PT  Despite this being an unfamiliar environment, she has been here 11 days and taking the same route  Other Daughter provided with handout on Neuro Day Program and copy of example dates/times  At this time, she is unsure if this is the route they want to persure  Daughter meeting with CM at the end of session   Equipment Use   NuStep L2x 8 minutes   Assessment   Treatment Assessment Patient engaged in PT treatment session focused initially on family training followed by balance and strength training  Daughter, Don Cuadra, present for majority of PT session where we reviewed "What Comes Next" and "Preparing to [address] [facility]" from the stroke education in conjunction with functional training  Daughter able to observe patient's mobility for all appropriate care scores this date  Patient S level with no AD  Daughter did express concern with her 3 benton retrievers and requests PT focus on stepping around obstacles to further dec patient's risk for falls  We also reviewed further implementing 2-3 step commands into her functional activity recommended by OTR-L  Daughter meeting with CM at the end of session to further discuss discharge planning  Problem List Impaired balance;Decreased cognition; Impaired judgement;Decreased safety awareness; Impaired vision   Barriers to Discharge Inaccessible home environment;Decreased caregiver support   PT Therapy Minutes   PT Time In 1030   PT Time Out 1130   PT Total Time (minutes) 60   PT Mode of treatment - Individual (minutes) 60   PT Mode of treatment - Concurrent (minutes) 0   PT Mode of treatment - Group (minutes) 0   PT Mode of treatment - Co-treat (minutes) 0   PT Mode of Treatment - Total time(minutes) 60 minutes   PT Cumulative Minutes 715         Stroke Education Series    Pt participated in skilled Stroke Education Series in an individualized setting to address the topic of Preparing To Go Home  This education was provided in both verbal and written formats  Education within this session focused on providing safety strategies including environmental and lifestyle changes that if made will support a safe discharge to his/her home setting  One goal of this session is to focus on ways to improve the patient's independence while maintaining safety and decreasing fall risk  Following education, pt's response (family's response) to education is: verbalizes understanding and demonstrates understanding  To individualize this session, the following topics were reviewed: caregiver considerations, assistive devices, emergency preparedness, and family training  Stroke Education Series    Pt participated in skilled Stroke Education Series in an individualized setting to address the topic of What Comes Next post stroke in both verbal and written formats  Education within this session included information on recommendations and resources after leaving the [facility]  This education session links together what has been covered in previous stroke education classes to improve the patient's understanding of how managing risk factors for stroke, participating in therapy, working with family and friends in the rehab process, and reviewing their role in the rehab process will maximize outcomes and their functional gains   This education also incorporates what the patient wants to achieve and helps them set realistic goals  To individualize this education the following topics were covered: continued therapy (home versus outpatient) and caregiver support  Following education, pt's response (family response) to education is: verbalizes understanding and demonstrates understanding        Toney Nesbitt PT

## 2022-08-08 NOTE — PROGRESS NOTES
08/08/22 0700   Pain Assessment   Pain Assessment Tool 0-10   Pain Score No Pain   Restrictions/Precautions   Precautions Aphasia;Bed/chair alarms;Cognitive; Fall Risk;Hard of hearing;Supervision on toilet/commode;Visual deficit   Weight Bearing Restrictions No   ROM Restrictions No   Lifestyle   Autonomy "getting better" (related to her speech)   Oral Hygiene   Type of Assistance Needed Supervision   Physical Assistance Level No physical assistance   Comment CS in stance sink  Oral Hygiene CARE Score 4   Shower/Bathe Self   Type of Assistance Needed Incidental touching;Verbal cues; Set-up / clean-up   Physical Assistance Level No physical assistance   Comment Cues for task initiation, rinses body w/ cues to locate soap  Task perseveration while bathing BUE and while rinsing hair, cues for task progression  Bathed BUE and BLE while seated, CGA in stance w/ cues req for thoroughness to bathe arsenio and buttocks  Shower/Bathe Self CARE Score 4   Tub/Shower Transfer   Adaptive Equipment Grab Bars;Seat with out Back   Assessed Shower   Findings CGA without AD  Upper Body Dressing   Type of Assistance Needed Physical assistance   Physical Assistance Level 25% or less   Comment A to clasp bra, able to don shirt while seated  Daughter questioning need for front-clasp bra, OTR edu on preference  Upper Body Dressing CARE Score 3   Lower Body Dressing   Type of Assistance Needed Supervision   Physical Assistance Level No physical assistance   Comment threaded BLE while seated, CS in stance for clothing management     Lower Body Dressing CARE Score 4   Putting On/Taking Off Footwear   Type of Assistance Needed Supervision;Set-up / clean-up   Physical Assistance Level No physical assistance   Comment seated, crossed leg technique   Putting On/Taking Off Footwear CARE Score 4   Lying to Sitting on Side of Bed   Type of Assistance Needed Supervision   Physical Assistance Level No physical assistance   Lying to Sitting on Side of Bed CARE Score 4   Sit to Stand   Type of Assistance Needed Supervision   Physical Assistance Level No physical assistance   Comment CS   Sit to Stand CARE Score 4   Bed-Chair Transfer   Type of Assistance Needed Supervision   Physical Assistance Level No physical assistance   Comment no AD   Chair/Bed-to-Chair Transfer CARE Score 4   Toileting Hygiene   Type of Assistance Needed Supervision   Physical Assistance Level No physical assistance   Comment seated for arsenio hygiene, CS in stance for clothing management  Toileting Hygiene CARE Score 4   Toilet Transfer   Type of Assistance Needed Supervision   Physical Assistance Level No physical assistance   Comment no grab bars   Toilet Transfer CARE Score 4   Cognition   Overall Cognitive Status Impaired   Arousal/Participation Alert; Cooperative   Attention Attends with cues to redirect   Orientation Level Oriented to person;Oriented to place;Oriented to situation;Disoriented to time   Memory Decreased short term memory;Decreased recall of precautions   Following Commands Follows multistep commands with increased time or repetition   Comments Focused on insight worksheet, pt able to recall stroke dx, basic stroke definition, and 1 deficit (trouble getting things out) without use of memory aide  Able to recall 2/3 deficits w/ 1 reference of memory aide, 3/3 w/ inc time following repetition x3  When asked to recall date, pt perseverates on April  OTR questioning places to reference date, pt able to locate calendar and recall month and date, difficulty w/ NISH  Edu family on recommendation for calendar to cross out past dates to A w/ orientation  Assessment   Treatment Assessment Pt seen for skilled OT session focusing on self-care management and fxnl cognitive retraining   Details on ADL noted above, cont to req Min-Mod vc's for thorough bathing and for sequencing however improvement noted since eval  Pt's daughter present for FT during ADL, detail on same below  Daughter carlos FIELD understanding of pt's current LOF and OT d/c recommendations, however reports pt is unable to stay w/ her at this time  Several questions asked about d/c to sub-acute, highly encouraged her to speak w/ family w/ few choices w/ CM to follow up later today  Discussed recommendation for shower chair w/ back to inc safety and promote energy conservation; suggested bed/chair alarms and video monitor to A w/ 24 hour S/A  Cont OT POC: repetitive safety training, fxnl cognitive retraining, endurance work, R visual scanning, ADL retraining, and light/cold meal prep  FT scheduled for tomorrow morning  Pt was left resting in recliner w/ all needs within reach and alarm activated  OT Family training done with: pt's daughterHolly of family training FT w/ pt's daughter Zandra Trivedi) w/ focus on ADL routine  Edu on current deficits to include language, cognition specifically STM and dec cognitive flexibility, and R visual field cut  Edu provided on impact deficits have on pt's fxnl performance, w/ need for cues or prompts to inc pt's safety, progress task, challenge problem-solving, or at times w/ inc pt frustration  Edu on need to provide cues to maximize thoroughness and task progression, although improvement noted cont to task perseverate for example bathed arms several times during shower today req vc's to bathe next body part  Edu on need to monitor direct vs indirect cues based on scenario/safety for example indirect to locate soap, direct to sit to thread BLE into pants; daughter demo G understanding and able to provide cues during dressing  Additional cues may be req for proper item use, example of indirect cue req to locate deoderant today  Edu provided on how physical and cognitive fatigue can impact fxnl performance, included edu on overstimulation w/ need for 'brain breaks' as pt does not independently initiate rest breaks   Discussed DME needs to include shower chair w/ back, daughter reports both her home and Paige's home have a walk-in shower  Alexandr'claudio kimball on bed/chair alarms and video monitor to ease 24 hour S  Daughter reports ability for family to A w/ all IADL management to include transportation and med management, plan to practice cold/light meal prep only  Again discussed importance of selecting 3-4 sub-acute choices w/ rest of family w/ recommendation to cont to plan for 24 hour S  Jeremiah Hartman very supportive and demo understanding of pt's current LOF, will benefit from cont FT to inc confidence w/ caregiving to maximize pt's indep at time of d/c    Prognosis Good   Problem List Decreased endurance; Impaired balance;Decreased mobility; Decreased cognition; Impaired judgement;Decreased safety awareness   Plan   Treatment/Interventions ADL retraining;Functional transfer training; Therapeutic exercise; Endurance training;Cognitive reorientation;Patient/family training   Progress Progressing toward goals   Recommendation   OT Discharge Recommendation   (pending progress, recommending 24 hour S)   OT Therapy Minutes   OT Time In 0700   OT Time Out 0830   OT Total Time (minutes) 90   OT Mode of treatment - Individual (minutes) 90   OT Mode of treatment - Concurrent (minutes) 0   OT Mode of treatment - Group (minutes) 0   OT Mode of treatment - Co-treat (minutes) 0   OT Mode of Treatment - Total time(minutes) 90 minutes   OT Cumulative Minutes 980   Therapy Time missed   Time missed?  No

## 2022-08-08 NOTE — PROGRESS NOTES
Internal Medicine Progress Note  Patient: Virgil Du  Age/sex: 76 y o  female  Medical Record #: [id number]      ASSESSMENT/PLAN: (Interval History)  Virgil Du is seen and examined and management for following issues:    Lt MCA CVA  · Continue ASA 81mg daily/atorvastatin 40mg daily  · Outpt follow-up with Neurology  · Outpt follow-up with Cardiology     Junctional rhythm/PACs  · Previous EKGs were sinus  · Cards saw here and rec'd LOOP now vs Zio patch now and then LOOP as OP but family declined both 8/2/22 noting they want to revisit the topic in the OP setting  · AVOID ANY AVN BLOCKING AGENTS  · Repeat EKG 8/2 was NSR with 1st degree AVB      Depression  · Continue sertraline         Discharge date: 8/12/22    The above assessment and plan was reviewed and updated as determined by my evaluation of the patient on 8/8/2022      Labs:   Results from last 7 days   Lab Units 08/08/22  0455   WBC Thousand/uL 7 65   HEMOGLOBIN g/dL 12 8   HEMATOCRIT % 39 7   PLATELETS Thousands/uL 206     Results from last 7 days   Lab Units 08/08/22  0455   SODIUM mmol/L 141   POTASSIUM mmol/L 4 3   CHLORIDE mmol/L 106   CO2 mmol/L 29   BUN mg/dL 13   CREATININE mg/dL 0 63   CALCIUM mg/dL 9 1                   Review of Scheduled Meds:  Current Facility-Administered Medications   Medication Dose Route Frequency Provider Last Rate    acetaminophen  650 mg Oral Q6H PRN Frank Huffman PA-C      aluminum-magnesium hydroxide-simethicone  30 mL Oral Q6H PRN Frank Huffman PA-C      ascorbic acid  500 mg Oral Daily Frank Huffman PA-C      aspirin  81 mg Oral Daily Frank Huffman PA-C      atorvastatin  40 mg Oral QPM Frank Huffman PA-C      bisacodyl  10 mg Rectal Daily PRN Frank Huffman PA-C      calcium carbonate-vitamin D  1 tablet Oral Daily With Breakfast Frank Huffman PA-C      cholecalciferol  2,000 Units Oral Daily Frank Huffman PA-C      docusate sodium  100 mg Oral BID Roger Long PRISCA Lawson      enoxaparin  40 mg Subcutaneous Daily Lidya Taylor, PRISCA      multivitamin-minerals  1 tablet Oral Daily Lidyaseng Taylor, PRISCA      ondansetron  4 mg Oral Q6H PRN Lidya Taylor, PA-HELEN      polyethylene glycol  17 g Oral Daily PRN Lidya Taylor, PRISCA      senna  2 tablet Oral BID Lidyaseng Taylor, PA-HELEN      sertraline  75 mg Oral Daily Lidya Taylor, PRISCA         Subjective/ HPI: Patient seen and examined  Patients overnight issues or events were reviewed with nursing or staff during rounds or morning huddle session  New or overnight issues include the following:     No new or overnight issues  Offers no complaints    ROS:   A 10 point ROS was performed; negative except as noted above         Imaging:     No orders to display       *Labs /Radiology studies reviewed  *Medications reviewed and reconciled as needed  *Please refer to order section for additional ordered labs studies  *Case discussed with primary attending during morning huddle case rounds    Physical Examination:  Vitals:   Vitals:    08/07/22 0607 08/07/22 1314 08/07/22 2050 08/08/22 0500   BP: 122/60 116/58 162/70 135/65   BP Location: Right arm Right arm Right arm Left arm   Pulse: 57 61 58 58   Resp: 20 19 19 16   Temp: 97 9 °F (36 6 °C) 98 2 °F (36 8 °C) 97 7 °F (36 5 °C) 98 °F (36 7 °C)   TempSrc: Oral Oral Oral Oral   SpO2: 98% 99% 98% 97%   Weight:       Height:           General Appearance: no distress, conversive  HEENT: PERRLA, conjuctiva normal; oropharynx clear; mucous membranes moist   Neck:  Supple, normal ROM  Lungs: CTA, normal respiratory effort, no retractions, expiratory effort normal  CV: regular rate and rhythm; no rubs/murmurs/gallops, PMI normal   ABD: soft; ND/NT; +BS  EXT: no edema  Skin: normal turgor, normal texture, no rashes  Psych: affect normal, mood normal  Neuro: AAO      The above physical exam was reviewed and updated as determined by my evaluation of the patient on 8/8/2022  Invasive Devices  Report    None                    VTE Pharmacologic Prophylaxis: Enoxaparin  Code Status: Level 3 - DNAR and DNI  Current Length of Stay: 11 day(s)      Total time spent:  30 minutes with more than 50% spent counseling/coordinating care  Counseling includes discussion with patient re: progress  and discussion with patient of his/her current medical state/information  Coordination of patient's care was performed in conjunction with primary service  Time invested included review of patient's labs, vitals, and management of their comorbidities with continued monitoring  In addition, this patient was discussed with medical team including physician and advanced extenders  The care of the patient was extensively discussed and appropriate treatment plan was formulated unique for this patient  ** Please Note:  voice to text software may have been used in the creation of this document   Although proof errors in transcription or interpretation are a potential of such software**

## 2022-08-08 NOTE — PROGRESS NOTES
08/08/22 0830   Pain Assessment   Pain Assessment Tool 0-10   Pain Score No Pain   Restrictions/Precautions   Precautions Aphasia; Aspiration;Bed/chair alarms;Cognitive; Fall Risk;Supervision on toilet/commode;Visual deficit   Comprehension   Comprehension (FIM) 3 - Understands basic info/conversation 50-74% of time   Expression   Expression (FIM) 3 - Expresses basic info/needs 50-74% of time   Social Interaction   Social Interaction (FIM) 5 - Interacts appropriately with others 90% of time   Problem Solving   Problem solving (FIM) 2 - Needs direction more than ½ time to initiate, plan or complete simple tasks   Memory   Memory (FIM) 2 - Recognizes, recalls/performs 25-49%   Speech/Language/Cognition Assessmetn   Treatment Assessment After dysphagia tx session, pt engaged in language/cognitive tx sessions, which angela, Michelle Rodriguez and Ashlyn present to observe as well as engage in cues, etc throughout tasks as well  Began session by initiating a simple phrase completion task which was an association activity (ie: needle and ___, pots and ____)  Given this task, SLP did want pt to [place] the words associated w/ the answer, however, pt w/ decreased verbal comprehension given this and began to write in the words vs Creek the words which were located to the R side of the page MEDICINE [facility] word choices given)  Pt's ability to spontaneously write in correct target word to complete each phrase was 7/12 accurate, noting the errors elicited were either perseverated words from the last item or the first word which was provided given the [facility] words  Pt was unaware of these errors, requiring verbal cues from SLP to scan to the R of the page to look at all word options, and was able to choose the correct word increasing to 12/12 accuracy  Pt did need verbal/visual cure to Creek the already placed word located on the R side of the page 100% of the time, noting inability to carryover from task to task   SLP providing education at the end of this task about using cueing methods as demonstrated for improved carryover given verbal directions, but also in regard to visual attention/scanning  SLP providing dtrs recommendations for Neurophthalmology to further assess at time of discharge  Next task introduced was a category inclusion task providing pt w/ 6 concrete categories and a total of 30 words to be written into these categories  Pt's overall attention to the words which were placed already into categories was decreased as well as her ability to keep track of last word left off at, needing cues from SLP to cross out the word when it was placed  Pt's ability to independently determine the word to fit the category was 20/30 accurate, noting that pt would verbalize the correct word to fit the category but when writing in the word, pt was noted to be HIGHLY perseverative in writing either the word last stated OR the word which was already written in the category  Written expression was 15/30 accurate needing moderate verbal/visual cues to correct errors  Of note during this task, pt's dtrs were providing appropriate verbal cues in attempts to aid pt's awareness of errors  Last activity in which pt completed today was where pt was provided a definition and then to write the word being described w/ stated definition  Pt was 6/20 accurate in placing just the word for the answer  It was observed that as this task progressed, pt was perseverative in writing some of the definition into the answer (ie: dishes cabinet, an eating spoon) for 12 other items  However not all answers provided were semantically related  When SLP providing definitions verbally, pt was able to increased just one word name of items to 15/20 accurate  SLP providing education to dtrs about now as this session has progressed, fatigue will play a factor in pt's ability to provide more precise responses   Furthermore educating pt and family about rest breaks throughout the day or even when completed structured tasks as completed in today's session for 'brain breaks ' No further questions presented by dtrs at end of session  Will continue to plan for family training session again tomorrow (Tuesday 8/9)  Currently, pt will continue to benefit from ongoing skilled SLP services targeting functional language skills, including receptive and expressive language as well as cognitive linguistic skills in attempts to decrease caregiver burden at time of discharge  Eating   Type of Assistance Needed Set-up / clean-up;Supervision;Verbal cues   Physical Assistance Level No physical assistance   Eating CARE Score 4   Swallow Assessment   Swallow Treatment Assessment Daily Dysphagia Tx Note     Patient Name: Maddy ARIASW Date: 8/8/2022      Current Risks for Dysphagia & Aspiration: Dysarthria;General debilitation;New Neuro event;Brain injury;Cognitive deficit     Current Symptoms/Concerns: Cough;Clear throat;During meals; Difficulty chewing;Holding food in mouth;Pockets food;     Current diet:level 3 diet and thin liquids      Premorbid diet::regular diet and thin liquids      Positioning: upright in recliner    Items administered:Consistencies Administered: thin liquids, soft solids and hard solids  Materials administered included: Maori toast, coronado, cheesy eggs, juice by cup    Total amount of meal consumed:   100% of meal and 240cc of thin liquids by cup      Oral stage:minimal to [facility]  Lip closure: wfl  Anterior spillage: none  Mastication: improved in overall timing w/ solids  Bolus formation: functional  Bolus control: timely  Transfer: prompt  Oral residue: trace but pt independent in clearing  Pocketing: none         Pharyngeal stage:minimal to [facility]  Swallow promptness: timely given regular textures  Hyolaryngeal elevation: present across meal  Wet voice: none  Throat clear: none  Cough: none  Secondary swallows: none  Audible swallows: none       Esophageal stage:WFL        Summary:     Pt presenting with minimal to [facility] oropharyngeal dysphagia as noted by improvement in overall use of swallow strategies given regular diet trial items today w/ meal  Pt's overall pacing, bite size and use of liquid wash was noted to be highly functional to where verbal cue was provided by SLP only x1 throughout entire meal  Due to this pt's overall mastication given solids was observed to be full/functional but intermittently did have trace oral residual, but cleared given strategies  Bolus control/transfer of thin liquids by cup today was prompt  Bolus formation was functional and lip seal around fork, cup and finger foods also observed to be functional throughout meal today  Swallow initiation was prompter across textures today and hyolaryngeal excursion present across meal  No overt signs/sxs of aspiration noted given meal today  Of note, pt's dtrs, Luis M Ramos and Rufino Pedro were present for meal today, but due to improved ability to comply and follow swallow strategies more independently, no verbal cues were needed  Recommendations:  Diet: dysphagia level 3 diet and thin liquids  Meds: as pt tolerates  Strategies: upright posture, only feed when fully alert, slow rate of feeding, small bites/sips, effortful swallow, quiet environment (tv off, limit talking, door closed, etc ), alternating bites and sips and OOB for ALL meals     DISTANT supervision with meals  Results reviewed with:  patient, dtrs- Luis M Ramos and Ashlyn  Aspiration precautions posted  F/u ST tx: Pt will continue to benefit from ongoing skilled dysphagia tx sessions to establish safest least restrictive diet w/o increased oropharyngeal or aspiration sxs as well as monitor ability to carryover swallow strategies independently  Plan: Likely advancement to regular diet to occur in upcoming sessions due to improved tolerance w/o increased oropharyngeal or aspiration sxs      Swallow Assessment Prognosis   Prognosis Good   Prognosis Considerations Age; Co-morbidities; Family/community support;Medical diagnosis; Medical prognosis;Previous level of function;Severity of impairments;New learning ability;Ability to carry over   SLP Therapy Minutes   SLP Time In 0830   SLP Time Out 1000   SLP Total Time (minutes) 90   SLP Mode of treatment - Individual (minutes) 90   SLP Mode of treatment - Concurrent (minutes) 0   SLP Mode of treatment - Group (minutes) 0   SLP Mode of treatment - Co-treat (minutes) 0   SLP Mode of Treatment - Total time(minutes) 90 minutes   SLP Cumulative Minutes 630   Therapy Time missed   Time missed? No           Daily Dysphagia Tx Note     Patient Name: Maddy Molina    [id number] Date: 8/8/2022      Current Risks for Dysphagia & Aspiration: Dysarthria;General debilitation;New Neuro event;Brain injury;Cognitive deficit     Current Symptoms/Concerns: Cough;Clear throat;During meals; Difficulty chewing;Holding food in mouth;Pockets food;     Current diet:level 3 diet and thin liquids      Premorbid diet::regular diet and thin liquids      Positioning: upright in recliner    Items administered:Consistencies Administered: thin liquids, soft solids and hard solids  Materials administered included: Alban[place] toast, coronado, cheesy eggs, juice by cup    Total amount of meal consumed:   100% of meal and 240cc of thin liquids by cup      Oral stage:minimal to [facility]  Lip closure: wfl  Anterior spillage: none  Mastication: improved in overall timing w/ solids  Bolus formation: functional  Bolus control: timely  Transfer: prompt  Oral residue: trace but pt independent in clearing  Pocketing: none         Pharyngeal stage:minimal to [facility]  Swallow promptness: timely given regular textures  Hyolaryngeal elevation: present across meal  Wet voice: none  Throat clear: none  Cough: none  Secondary swallows: none  Audible swallows: none       Esophageal stage:WFL        Summary:     Pt presenting with minimal to [facility] oropharyngeal dysphagia as noted by improvement in overall use of swallow strategies given regular diet trial items today w/ meal  Pt's overall pacing, bite size and use of liquid wash was noted to be highly functional to where verbal cue was provided by SLP only x1 throughout entire meal  Due to this pt's overall mastication given solids was observed to be full/functional but intermittently did have trace oral residual, but cleared given strategies  Bolus control/transfer of thin liquids by cup today was prompt  Bolus formation was functional and lip seal around fork, cup and finger foods also observed to be functional throughout meal today  Swallow initiation was prompter across textures today and hyolaryngeal excursion present across meal  No overt signs/sxs of aspiration noted given meal today  Of note, pt's dtrs, Britta Jimenez and Fidencio Chandler were present for meal today, but due to improved ability to comply and follow swallow strategies more independently, no verbal cues were needed  Recommendations:  Diet: dysphagia level 3 diet and thin liquids  Meds: as pt tolerates  Strategies: upright posture, only feed when fully alert, slow rate of feeding, small bites/sips, effortful swallow, quiet environment (tv off, limit talking, door closed, etc ), alternating bites and sips and OOB for ALL meals     DISTANT supervision with meals  Results reviewed with:  patient, dtrs- Britta Jimenez and Ashlyn  Aspiration precautions posted  F/u ST tx: Pt will continue to benefit from ongoing skilled dysphagia tx sessions to establish safest least restrictive diet w/o increased oropharyngeal or aspiration sxs as well as monitor ability to carryover swallow strategies independently  Plan: Likely advancement to regular diet to occur in upcoming sessions due to improved tolerance w/o increased oropharyngeal or aspiration sxs

## 2022-08-08 NOTE — CASE MANAGEMENT
Met with the patient's daughter, Rachel Scherer, today to discuss her mother's progress and planned upcoming DC  She had questions regarding her mother's DC date including the ability to consider keeping her a little longer, even if it was just for a couple of days  Her daughter's concern was maximizing her mother's function to assure a safe transition home  She was able to verbalize the recommendations the team has made  She stated that she felt like there are still additional items to work on (primarily IADLs) but also was concerned with her returning home with the need for supervision  I did communicate how the team felt like she would not progress beyond a supervision level during the acute timefreame therefore were not able to justify continuing stay based on that  Additionally, I did state that I would review additional goals to maximize independence and reduce burden of care for the family with the care team to verify if Friday is an appropriate DC  She was informed of the Medicare appeal process as well  She was going to speak with her sister regarding their options and stated that we would talk tomorrow to confirm what the team's discussions were

## 2022-08-09 PROCEDURE — 97129 THER IVNTJ 1ST 15 MIN: CPT

## 2022-08-09 PROCEDURE — 97112 NEUROMUSCULAR REEDUCATION: CPT

## 2022-08-09 PROCEDURE — 92526 ORAL FUNCTION THERAPY: CPT

## 2022-08-09 PROCEDURE — 97535 SELF CARE MNGMENT TRAINING: CPT

## 2022-08-09 PROCEDURE — 97530 THERAPEUTIC ACTIVITIES: CPT

## 2022-08-09 PROCEDURE — 99232 SBSQ HOSP IP/OBS MODERATE 35: CPT | Performed by: INTERNAL MEDICINE

## 2022-08-09 PROCEDURE — 92507 TX SP LANG VOICE COMM INDIV: CPT

## 2022-08-09 PROCEDURE — 97130 THER IVNTJ EA ADDL 15 MIN: CPT

## 2022-08-09 PROCEDURE — 99232 SBSQ HOSP IP/OBS MODERATE 35: CPT | Performed by: PHYSICAL MEDICINE & REHABILITATION

## 2022-08-09 RX ADMIN — DOCUSATE SODIUM 100 MG: 100 CAPSULE, LIQUID FILLED ORAL at 18:05

## 2022-08-09 RX ADMIN — SENNOSIDES 17.2 MG: 8.6 TABLET, FILM COATED ORAL at 18:05

## 2022-08-09 RX ADMIN — SENNOSIDES 17.2 MG: 8.6 TABLET, FILM COATED ORAL at 10:07

## 2022-08-09 RX ADMIN — ASPIRIN 81 MG CHEWABLE TABLET 81 MG: 81 TABLET CHEWABLE at 10:07

## 2022-08-09 RX ADMIN — ENOXAPARIN SODIUM 40 MG: 40 INJECTION SUBCUTANEOUS at 10:08

## 2022-08-09 RX ADMIN — SERTRALINE HYDROCHLORIDE 75 MG: 50 TABLET ORAL at 10:08

## 2022-08-09 RX ADMIN — Medication 2000 UNITS: at 10:08

## 2022-08-09 RX ADMIN — ATORVASTATIN CALCIUM 40 MG: 40 TABLET, FILM COATED ORAL at 18:05

## 2022-08-09 RX ADMIN — Medication 1 TABLET: at 10:09

## 2022-08-09 RX ADMIN — OXYCODONE HYDROCHLORIDE AND ACETAMINOPHEN 500 MG: 500 TABLET ORAL at 10:08

## 2022-08-09 RX ADMIN — MULTIPLE VITAMINS W/ MINERALS TAB 1 TABLET: TAB ORAL at 10:07

## 2022-08-09 NOTE — DISCHARGE INSTRUCTIONS
DISCHARGE INSTRUCTIONS: Gilma Jasso [age]    Bring these instructions with you to your Outpatient Physician appointments so they can order and follow-up any additional lab work or imaging recommended at time of discharge  It  is you or your caregivers responsibility to obtain follow-up MEDICATION REFILLS  As indicated through your Primary Care Physician (PCP) and other outpatient specialty provider(s) after discharge  Please follow-up with your PCP as soon as possible after discharge to set-up follow-up management and when appropriate refills  You have been determined to have some cognitive impairments  - It is YOUR CAREGIVER'S RESPONSIBILITY to ensure appropriate follow-up which includes:  - APPOINTMENTS are scheduled and safe transportation is arranged  - LABS and IMAGING are completed  - MEDICATION MANAGEMENT at home is carried out appropriately     You remain a fall and injury risk which could be severe  - Your risk of fall has decreased however since admission to acute rehab  Caregiver training has been completed with our staff  - Appropriate supervision +/- assistance as instructed during your rehab course is recommended to decrease risk of fall and injury  - Continue skilled therapy as discussed after discharge to further decrease this risk    If you (or your health care proxy) have any questions or concerns regarding your acute rehabilitation stay including issues with medications, rehabilitation, and follow-up plan, please call:          [address] [place] [address] at [phone] or [phone]  Should you develop fevers, chills, new weakness, changes in sensation, difficulty speaking, facial weakness, confusion, shortness of breath, chest pain, or other concerning symptoms please call 911 and/or obtain transportation to nearest ER immediately      PHYSICIANS to see:  Please see your doctors listed in the follow up providers section of your discharge paperwork, and take the discharge paperwork with you to your appointments  LAB WORK recommended after discharge: Follow-up lab work at discretion of your outpatient physicians to be determined at time of your future appointments  IMAGING to follow-up:  Follow-up imaging at discretion of your outpatient physicians to be determined at time of your appointments  ADDITIONAL FINDINGS and ISSUES to follow-up:  Stroke: Please make sure to follow-up with Cardiology as soon as possible after discharge for evaluation for heart monitor to monitor for arrhythmias that can result in stroke  Follow-up with Neurology as instructed  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 admission to the [facility]  Cardiology consulted  Follow-up with Cardiology as an outpatient  Thyroid nodule: - Follow-up incidental thyroid nodule seen on recent imaging and obtain follow-up ultrasound of thyroid through primary care office    Check under the "DISCHARGE PROVIDER" section of these [address] for provider specific issues as well  Excessive delay or lack of appropriate follow-up could potentially increase your risk of complications which could be severe and even life-threatening  It is you or your caregiver's responsibility to ensure these tests are ordered by your outpatient providers and followed up with accordingly  WEIGHTBEARING/ACTIVITY PRECAUTIONS to follow:    24 hours/7 days per week supervision by caregiver required    Driving restrictions:  ou are recommended against driving until cleared by an outpatient physician and cleared through a formal driving evaluation  Driving at current time can increase your risk of injury and can increase risk of injury of others        **As outlined in [address], healthcare personnel are required to report every person over 13years of age diagnosed as having a condition that could impair their ability to drive, with the exception of medical condition expected to last less than 90 days (most commonly orthopedic fractures and post orthopedic surgery conditions without other co-morbidities)  Your medical condition hopefully will have adequately improved by that time but at this time it is not certain  Work restrictions: You should NOT return to work (if working) until cleared by an outpatient physician  You should not operate heavy machinery (if applicable) until cleared by an outpatient physician  Alcohol restrictions: You are recommended to not drink alcohol at this time unless cleared by an outpatient physician  Drinking alcohol in your current functional condition can increase your risk of injury which could be severe  Drinking alcohol given your current health problems can lead to increased medical complications which could be severe  Combining alcohol with your current medications can increase your risk of injury which could be severe  Smoking restrictions: You are recommended to not smoke nicotine  Smoking increases your risk of heart attack, stroke, emphysema/COPD, and lung cancer  MEDICATIONS:  Please see a full list of your medications outlined in the After Visit Summary that is attached to these Discharge Instructions  Please note changes may have been made to your medications please refer to your discharge paperwork for your current medications and take this list with you to all your doctors appointments for your doctors to review  Please do not resume a home medication unless the medication reconciliation sheet indicates to do so, please do not assume that a medication that you were given a prescription for is the same as a medication you have at home based on both medications having the same name as dosages and frequency may have changed        Unless specifically noted in your medication list provided to you in your discharge paper work do not resume prior vitamins, minerals, or supplements you may have been taking prior to your hospitalization unless instructed by an outpatient physician in the future  Discuss with your primary care at next visit if applicable  Blood thinners prescribed to optimize long and short term health and decrease risk of complications but with risks related to bleeding and other complications  Aspirin instructions  TAKE aspirin daily unless instructed otherwise by a doctor  This medication will need to be managed by your outpatient physician(s) after discharge  Follow-up with the appropriate provider as soon as possible to ensure appropriate use  This is a blood thinner  It is being recommended for use by you (or your family) to decrease the risk of clotting which can be severely disabling and even life-threatening  Even when provided as recommended it can cause severe disabling and even life-threatening bleeding  Too much or too little of this blood thinner further increases your risk of this medication causing serious and even life-threatening complications such as severe bleeding, clots, strokes, and death  With that said, at this time based on best available evidence and consensus agreement, your physicians recommend you take aspirin based on your overall risks and benefits in your specific medical situation  If you (or your family/caregiver) notice black stools, bloody stools, vomit blood, develop new weakness, slurred speech, confusion or have any other concerning symptoms call 911 or obtain transportation to nearest emergency room immediately  MEDICAL MANAGEMENT AT HOME specific to you:  Cardiac Loop recorder (Implanted heart rhythm monitor) RECOMMENDED:  You are recommended to have an implanted cardiac monitor (LOOP RECORDER) placed after discharge  Contact cardiology as outlined to set this up as soon as possible after discharge    This monitor will be looking for abnormal heart rhythms over time that would put you at higher risk of stroke which could change the type of blood thinner you take  Acetaminophen (Tylenol) Dosing Warning: You may have up to 3000 mg of acetaminophen (Tylenol) from all sources spread out over a 24 hours period  Do not have more than that, as this can increase your risk of liver injury which can be serious  Please note a summary of your hospital stay with relevant information for your doctors will try to be sent to them  Please confirm with your doctors at your follow up visits that they have received this summary and have them contact [address] [place] [address] if they have not received them along with any other medical records they may require       Elie Bacon Phone Number:  [phone]

## 2022-08-09 NOTE — PROGRESS NOTES
08/09/22 0830   Pain Assessment   Pain Assessment Tool 0-10   Pain Score No Pain   Restrictions/Precautions   Precautions Aphasia; Aspiration;Bed/chair alarms;Cognitive; Fall Risk;Impulsive;Supervision on toilet/commode;Visual deficit   Comprehension   Comprehension (FIM) 4 - Understands basic info/conversation 75-90% of time   Expression   Expression (FIM) 4 - Expresses basic info/needs 75-90% of time   Social Interaction   Social Interaction (FIM) 6 - Interacts appropriately with others BUT requires extra  time   Problem Solving   Problem solving (FIM) 3 - Solves basic problmes 50-74% of time  (max -total A for basic finances)   Memory   Memory (FIM) 3 - Recognizes, recalls/performs 50-74%   Speech/Language/Cognition Assessmetn   Treatment Assessment Rest of session focused on language skills  Pt's dtrs were present for session and actively engaged and providing cues to pt as needed throughout session  Initial task introduced was a reading comprehension and visual scanning task where pt was presented w/ 3 clue words to the L side of the page and then [facility] words which pt was to determine the word which fit the clue words  Pt's ability to determine the target words given clues was 11/15 accurate, needing either verbal cues for scanning all words to the R of the page OR SLP providing verbal repetition of clued words for improved comprehension, which increased pt's ability to determine target words on 2nd attempt to 15/15 accuracy  Next task introduced was a functional money management activity, where it was attempted for pt to add 2 different coin amounts for a total  This task was presented given written coin amounts (ie: 2 dimes, 4 nickels)  Pt did exhibit increased difficulty in overall recognition given coin amounts and then determining just even the total for each coin amount  Upon 2 attempts, pt was 0/2 accurate, despite increased cues from both SLP and dtr   Pt was perseverative on a coin amount presented and was not able to complete simple math when presented (ie: 5+5)  SLP using tangible change w/ pt instead  Pt was able to recognize and identify correct coins presented (pennies, nickels, dimes, quarters)  SLP verbally providing pt w/ a random coin amount to provide SLP w/ correct change  Ability for pt to determine correct change amounts was 4/6 accurate  When switching to just dollarss ($1, $5, $10, $20), pt noted to have increased difficulty in recognizing dollar amounts verbally presented by SLP  When attempting to correct amounts provided, pt was noted to add the dollars present, but unable to provide amount asked  SLP re-educating dtrs in regard to completing financial management at this time, which via demonstration did provide insight to this difficulty in pt's current skills  Switched back to a task not completed in a few sessions but engaged pt in a reading comprehension activity where pt was to complete as sentence provided FO3 words to choose from to complete  Pt was 19/20 accurate in this only needing cue on initial item to scan all the word choices which pt was able to carryover throughout tasks this round  Of note, the sentences which were being completed were more "automatic" in nature to which word choices may have been easier for pt to determine  Now increasing the challenge to the pt, SLP provided a phrase completion task where pt was to write in her own word which was to be associated w/ the phrase (ie: cat and ___, lunch and ____, etc)  Pt was 15/24 accurate when more simple phrases presented  When increasing the difficulty, pt was observed to write in the word which began the phrases noting decreased awareness given errors provided  Increased verbal cues require by SLP to demonstrate to dtrs that verbally presenting the carrier part of the phrase can increase a target response which pt did in 6 out of the 9 errors   Again, educating pt and family in regards to fatigue (again as this task was last presented) which can impact focus/comprehension, etc  At this time,  pt will continue to benefit from ongoing skilled SLP services targeting functional language skills, including receptive and expressive language as well as cognitive linguistic skills in attempts to decrease caregiver burden at time of discharge  Eating   Type of Assistance Needed Set-up / clean-up;Supervision;Verbal cues   Physical Assistance Level No physical assistance   Eating CARE Score 4   Swallow Assessment   Swallow Treatment Assessment Daily Dysphagia Tx Note     Patient Name: Latrell MCFARLAND Date: 8/9/2022      Current Risks for Dysphagia & Aspiration: Dysarthria;General debilitation;New Neuro event;Brain injury;Cognitive deficit     Current Symptoms/Concerns: Cough;Clear throat;During meals; Difficulty chewing;Holding food in mouth;Pockets food;     Current diet:level 3 diet and thin liquids      Premorbid diet::regular diet and thin liquids      Positioning: upright in recliner       Items administered:Consistencies Administered: thin liquids, soft solids, hard solids and mixed consistency  Materials administered included: sausage and cheese omelet, home fries, toast, fresh fruit cup and juice     Total amount of meal consumed:   100% of meal and 240cc of thin liquids by cup      Oral stage:minimal to [facility]  Lip closure: wfl  Anterior spillage: none   Mastication: fairly functional across textures observed at meal, noting some rapid intake rate needing minimal verbal cues for pacing  Bolus formation: functiona  Bolus control: timely  Transfer: prompt  Oral residue: trace  Pocketing: none         Pharyngeal stage: WFL  Swallow promptness: timely  Hyolaryngeal elevation: present throughout meal  Wet voice: none  Throat clear: none  Cough: none  Secondary swallows: none  Audible swallows: none       Esophageal stage:WFL--> no overt sxs were observed throughout or after meal        Summary:     Pt presenting with minimal to [facility] oropharyngeal swallow function as noted by ongoing improvement given mastication time of solid textures w/o increased or overt oral residual/pockeitng  However, pt's rate of intake was mildly faster than yesterday's session to where SLP did have to provide verbal cues x3 to slow overall rate of intake  Pt is noted to demonstrate ongoing use of liquid wash given meal which does aid overall breakdown of textures and minimizing overt oral residual  Lip seal and bolus retrieval from fork, tsp, cup and finger foods was functional w/o exhibiting anterior loss given textures today  Bolus control/transfer was timely w/ thin liquids  Overall swallow initiation remains to be fairly prompt across consistencies and hyolaryngeal excursion is observed to be present throughout meal  No overt signs/sxs of aspiration exhibited w/ meal today  Of note, pt's dtrs were present for session to which no verbal cues were presented throughout meal, but plan for family to initiate in cues as needed in upcoming sessions  D/w pt and pt's dtrs about advancement of diet at this time where all parties were agreeable w/ advancement  Recommendations:  Diet: regular diet and thin liquids  Meds: whole with liquid and whole with puree  Strategies: upright posture, only feed when fully alert, slow rate of feeding, small bites/sips, quiet environment (tv off, limit talking, door closed, etc ), alternating bites and sips and OOB for ALL meals    DISTANT supervision w/ meals  Results reviewed with:  patient, RN, MD and family - dtrs Karla Smith and Collins Ramirez  Aspiration precautions posted  F/u ST tx: Pt will continue to benefit from ongoing skilled dysphagia tx sessions to establish safest least restrictive diet w/o increased oropharyngeal or aspiration sxs as well as monitor ability to carryover swallow strategies independently      Plan: Monitor pt's diet upgrade to regular w/ thin liquids to where pt is able to exhibit no increased oropharyngeal or aspiration sxs  Swallow Assessment Prognosis   Prognosis Good   Prognosis Considerations Age; Co-morbidities; Family/community support;Medical diagnosis; Medical prognosis; New learning ability;Ability to carry over   SLP Therapy Minutes   SLP Time In 0830   SLP Time Out 1000   SLP Total Time (minutes) 90   SLP Mode of treatment - Individual (minutes) 90   SLP Mode of treatment - Concurrent (minutes) 0   SLP Mode of treatment - Group (minutes) 0   SLP Mode of treatment - Co-treat (minutes) 0   SLP Mode of Treatment - Total time(minutes) 90 minutes   SLP Cumulative Minutes 720   Therapy Time missed   Time missed?  No

## 2022-08-09 NOTE — PROGRESS NOTES
08/09/22 1030   Pain Assessment   Pain Assessment Tool 0-10   Pain Score No Pain   Restrictions/Precautions   Precautions Aphasia; Aspiration;Bed/chair alarms;Cognitive; Fall Risk;Supervision on toilet/commode;Visual deficit   Cognition   Overall Cognitive Status Impaired   Subjective   Subjective Patient ready to participate in PT session   Sit to Stand   Type of Assistance Needed Supervision   Physical Assistance Level No physical assistance   Sit to Stand CARE Score 4   Bed-Chair Transfer   Type of Assistance Needed Supervision   Physical Assistance Level No physical assistance   Chair/Bed-to-Chair Transfer CARE Score 4   Transfer Bed/Chair/Wheelchair   Limitations Noted In Balance; Endurance;LE Strength   Adaptive Equipment None   Walk 10 Feet   Type of Assistance Needed Supervision;Verbal cues   Physical Assistance Level No physical assistance   Walk 10 Feet CARE Score 4   Walk 50 Feet with Two Turns   Type of Assistance Needed Supervision;Verbal cues   Physical Assistance Level No physical assistance   Walk 50 Feet with Two Turns CARE Score 4   Walk 150 Feet   Type of Assistance Needed Supervision;Verbal cues   Physical Assistance Level No physical assistance   Walk 150 Feet CARE Score 4   Ambulation   Does the patient walk? 2  Yes   Primary Mode of Locomotion Prior to Admission Walk   Distance Walked (feet) 300 ft  (+ 200')   Gait Pattern Inconsistant Jesi;Decreased foot clearance; Improper weight shift  (R leg circumduction)   Limitations Noted In Balance; Endurance;Swing   Provided Assistance with: Direction   Wheel 50 Feet with Two Turns   Reason if not Attempted Activity not applicable   Wheel 50 Feet with Two Turns CARE Score 9   Wheel 150 Feet   Reason if not Attempted Activity not applicable   Wheel 018 Feet CARE Score 9   Toilet Transfer   Type of Assistance Needed Supervision;Verbal cues   Physical Assistance Level No physical assistance   Toilet Transfer CARE Score 4   Therapeutic Interventions Balance Narrow ZAID EO 1 min + EC 1 min; Narrow ZAID on green foam EO 1 min + EC 1 min   Neuromuscular Re-Education 150' carrying tray of water wihtout spilling   Assessment   Treatment Assessment Patient engaged in PT treatment session focused on family training  Present for FT was initially Virgilio Lindy and Rene Beard Cordell however Ashlyn stepping out not long after initation  While both were present, education reviewed regarding how to dec risk for falls: "1  Patient should wear supportive footwear such as sneakers or gripped socks at all times  No bare foot or regular socks  2  Use night lights throughout home for when the environment is dark to decrease risk of tripping on obstacles/items on the floor  3  Arrange the environment to reduce obstacles  Open concept is best   Rearrange any cords, etc that may be a tripping hazard  Remove throw rugs  4  Utilize bed/chair alarms at night to alert family or caregiver when patient attempts to get up without someone present  5  If you have pets, ensure they will not be a tripping hazard, jump up onto patient, or hang near feet " With this review, information was provided on bed/chair alarms from [employer] with instruction to read reviews, etc  We also reviewed other recommendations that could help dec risk for falls such as utilizing BSC over night, sleeping in gripped socks, etc  Daughter, Virgilio Israel able to draw out floor plan of her home  PT to focus in upcoming sessions walking around objects as she has 3 benton retrievers that can pose an increased risk for falls  Virgilio Lindy to come in again Thursday and Friday and participate in the hands on/cueing portion of the training as she previously has observed therapy  Rene Beard [phone] stating that she will be in to visit but was unaware of the time  PT also to make a HEP for strength and balance to be completed at home  Plan to discharge Friday with OPPT services and 24 hour S/A   Virgilio Israel confirming discharge to occur Friday; ALEX, acting CM, to follow up via phone call  Patient continues to benefit from skilled PT intervention to maximize her balance and safety  24 hour S will remain the recommendation for cognitive and linguistic barriers  Problem List Impaired balance; Impaired judgement;Decreased cognition;Decreased safety awareness;Decreased strength   PT Barriers   Functional Limitation Stair negotiation; Walking   Plan   Treatment/Interventions Functional transfer training;LE strengthening/ROM; Elevations; Therapeutic exercise; Endurance training;Equipment eval/education;Patient/family training;Cognitive reorientation;Gait training;Bed mobility   Progress Progressing toward goals   PT Therapy Minutes   PT Time In 1030   PT Time Out 1130   PT Total Time (minutes) 60   PT Mode of treatment - Individual (minutes) 60   PT Mode of treatment - Concurrent (minutes) 0   PT Mode of treatment - Group (minutes) 0   PT Mode of treatment - Co-treat (minutes) 0   PT Mode of Treatment - Total time(minutes) 60 minutes   PT Cumulative Minutes 775

## 2022-08-09 NOTE — PROGRESS NOTES
Physical Medicine and Rehabilitation Progress Note  Virigl Du 76 y o  female MRN: [id number]  Unit/Bed#: -01 Encounter: [id number]    HPI: Virgil Du is a 76 y o  female with a PMH significant for h/o endometrial cancer, depression, h/o DVT, vitamin D deficency and OA, who presented to the [address] [place] [address] ED on 07/23 with a stroke alert  She was in contact with family the night prior when the next day family could not contact her and called EMS  Patient was noted to have altered mental status upon EMS arrival and was experiencing right facial droop  Neurology was consulted  [facility] ordered without evidence of acute intracranial hemorrhage  CTA with left distal m2/m3 occlusion  SLP assessed and also noted patient to have dysphagia  MRI brain with acute to early subacute ischemia involving the left MCA territory with a larger infarct and multiple smaller infarcts  No hemorrhagic transformation identified  With her history of endometrial cancer the MRI of the brain also ruled out mets  ECHO obtained with EF of 70%  Neurology recommending outpatient loop recorder placement  She will continue ASA and Lipitor  PM&R consulted and recommended patient for ARC  She participated with PT/OT/ST and was deemed appropriate for post-acute rehabilitation services after demonstrating the ability to tolerate three hours of therapy per day  She was accepted to [facility] and arrived on 7/28/22  Chief Complaint: No new issues  Interval History/Subjective:  No acute events overnight  Last BM was 8/9  Doing well  Slept fine  No new numbness/tingling/weakness  Denies any CP, SOB, fevers, chills, N/V, abdominal pain  Family is here for family training  ROS:  A 10 point review of systems was negative except for what is noted in the HPI  Today's Changes:  1  Continue current plan of care  Family would like scripts sent to [facility] , or Jassi Nicole      Total visit time: 22 minutes, with more than 50% spent counseling/coordinating care  Counseling includes discussion with patient re: progress in therapies, functional issues observed by therapy staff, and discussion with patient regarding their current medical state and wellbeing  Coordination of patient's care was performed in conjunction with Internal Medicine service to monitor patient's labs, vitals, and management of their comorbidities  Assessment/Plan:    * Acute ischemic left MCA stroke [facility])  Assessment & Plan  · Presented to [facility] ED with right facial droop and aphasia  · Outside tPA window  · CT without evidence of acute intracranial hemorrhage  Left frontal temporal and low attenuation suggesting acute-subacute ischemia  · CTA H/N with distal Left M2/M3 MCA occlusion   · MRI brain- acute to early subacute ischemia identified involving the left MCA territory with one larger infarct and multiple additional smaller infarcts  No hemorrhagic transformation identified   · MRI brain negative for metastasis in setting of h/o endometrial cancer  · ECHO unremarkable   · Continue ASA/Statin therapy per neurology recs  · Dysphagia diet; SLP following  · Neurology also recommended Loop recorder as OP with OP FU in 4-6 weeks  · Daughter deferred inpatient placement of loop recorder; aware of need to follow up with Cardiology as OP  · Acute chomprehensive interdisciplinary inpatient rehabilitation to include intensive skilled therapies as outlines with oversight and management by a rehabilitation Physician Assistant overseen by rehabilitation physician as well as inpatient rehabilitation nursing, case management and weekly interdisciplinary team meeting    SA node dysfunction [facility])  Assessment & Plan  With junctional bradycardia new on exam 8/2  8/4 more regular  Monitor EKG  - Seen by Cards  - HR fairly stable compared to previous  - K > 4, Mag > 2  - Family would like to defer any monitoring until outpatient     - Risk/benefits/alternatives presented in detail  Dysphagia  Assessment & Plan  · Dysphagia level 3 diet with thin liquids  · SLP to follow    History of DVT (deep vein thrombosis)  Assessment & Plan  · Potential h/o xarelto use for several months; no longer taking  · Currently Lovenox SQ Daily and SCD       Mild episode of recurrent major depressive disorder (HCC)  Assessment & Plan  · Continue Zoloft 75 mg daily      Vitamin D deficiency  Assessment & Plan  · Continue Cholecalciferol       History of endometrial cancer  Assessment & Plan  · Stage IA, grade 3 s/p completion of adjuvant chemotherapy and radiation in April 2015  · Follows with GYN-ONC as OP        Health Maintenance  #Delirium/Sleep: At risk  First night with some impulsivity, but due to needing to go to the bathroom  Added timed voids with improvement  Continue to optimize pain, bowel, bladder, sleep-wake cycle management  #Pain: Tylenol PRN  #Bowel: Last BM 8/9  On Senna 2 tabs BID and docusate BID  Will try to de-escalate while here  #Bladder: Timed voids to help with continence PVRx all < 150cc  #Skin/Pressure Injury Prevention: Turn Q2hr in bed, with weight shifts [id number] in wheelchair  #DVT Prophylaxis: Lovenox, SCDs  #GI Prophylaxis: PO diet  #Code Status:  DNR/DNI  #FEN: Level 3/thins  #Dispo: Team Conference 8/3: ADD 8/12 to her daughter's home with supervision and PT/OT/SLP  Objective:    Functional Update:  PT: progressing to Sup with all mobility up to 400'  CGA for uneven surfaces  Sup for stairs  OT:  Progressing to Sup for ADLs  SLP: Level 3/thins advanced  Continuse with moderate language deficits in receptive/expressive language skills  Allergies per EMR    Physical Exam:  Temp:  [97 6 °F (36 4 °C)-98 8 °F (37 1 °C)] 97 6 °F (36 4 °C)  HR:  [55-63] 59  Resp:  [16-20] 16  BP: (117-145)/(57-70) 124/60  Oxygen Therapy  SpO2: 97 %    Gen: No acute distress, Well-nourished, well-appearing    HEENT: Moist mucus membranes, Normocephalic/Atraumatic  Cardiovascular: Regular rate, rhythm, S1/S2  Distal pulses palpable  Heme/Extr: L > RLE edema  Varicose veins on the L    Pulmonary: Non-labored breathing  Lungs CTAB  : No pantoja  GI: Soft, non-tender, non-distended  BS+  Integumentary: Skin is warm, dry  Neuro: AAOx3,Expressive and receptive deficits  Psych: Normal mood and affect  Diagnostic Studies: Reviewed, no new imaging      Laboratory:  Reviewed   Results from last 7 days   Lab Units 08/08/22  0455   HEMOGLOBIN g/dL 12 8   HEMATOCRIT % 39 7   WBC Thousand/uL 7 65     Results from last 7 days   Lab Units 08/08/22  0455   BUN mg/dL 13   POTASSIUM mmol/L 4 3   CHLORIDE mmol/L 106   CREATININE mg/dL 0 63            Patient Active Problem List   Diagnosis    History of endometrial cancer    Endometrial cancer ([facility] [address] )    Inguinal lymphadenopathy    Multinodular thyroid    Obesity    Osteoarthritis of hip    Osteoarthritis of knee    Vitamin D deficiency    Osteopenia of lumbar spine    Mild episode of recurrent major depressive disorder (HCC)    Acute ischemic left MCA stroke (HCC)    History of DVT (deep vein thrombosis)    Dysphagia    SA node dysfunction (HCC)         Medications  Current Facility-Administered Medications   Medication Dose Route Frequency Provider Last Rate    acetaminophen  650 mg Oral Q6H PRN Gala Crum PA-C      aluminum-magnesium hydroxide-simethicone  30 mL Oral Q6H PRN Gala Crum PA-C      ascorbic acid  500 mg Oral Daily Gala Crum PA-C      aspirin  81 mg Oral Daily Gala Crum PA-C      atorvastatin  40 mg Oral QPM Gala Crum PA-C      bisacodyl  10 mg Rectal Daily PRN Gala Crum PA-C      calcium carbonate-vitamin D  1 tablet Oral Daily With Breakfast Gala Crum PA-C      cholecalciferol  2,000 Units Oral Daily Gala Crum PA-C      docusate sodium  100 mg Oral BID Gala Crum PA-C      enoxaparin  40 mg Subcutaneous Daily Ml Donahue, PA-HELEN      multivitamin-minerals  1 tablet Oral Daily Ml Godfrey, PA-HELEN      ondansetron  4 mg Oral Q6H PRN Ml Donahue, PA-HELEN      polyethylene glycol  17 g Oral Daily PRN Ml Donahue, PRISCA      senna  2 tablet Oral BID Ml Donahue, PA-HELEN      sertraline  75 mg Oral Daily Ml Donahue PA-C          ** Please Note: Fluency Direct voice to text software may have been used in the creation of this document   **

## 2022-08-09 NOTE — PLAN OF CARE
Reviewed    Problem: Prexisting or High Potential for Compromised Skin Integrity  Goal: Skin integrity is maintained or improved  Description: INTERVENTIONS:  - Identify patients at risk for skin breakdown  - Assess and monitor skin integrity  - Assess and monitor nutrition and hydration status  - Monitor labs   - Assess for incontinence   - Turn and reposition patient  - Assist with mobility/ambulation  - Relieve pressure over bony prominences  - Avoid friction and shearing  - Provide appropriate hygiene as needed including keeping skin clean and dry  - Evaluate need for skin moisturizer/barrier cream  - Collaborate with interdisciplinary team   - Patient/family teaching  - Consider wound care consult   Outcome: Progressing     Problem: PAIN - ADULT  Goal: Verbalizes/displays adequate comfort level or baseline comfort level  Description: Interventions:  - Encourage patient to monitor pain and request assistance  - Assess pain using appropriate pain scale  - Administer analgesics based on type and severity of pain and evaluate response  - Implement non-pharmacological measures as appropriate and evaluate response  - Consider cultural and social influences on pain and pain management  - Notify physician/advanced practitioner if interventions unsuccessful or patient reports new pain  Outcome: Progressing     Problem: INFECTION - ADULT  Goal: Absence or prevention of progression during hospitalization  Description: INTERVENTIONS:  - Assess and monitor for signs and symptoms of infection  - Monitor lab/diagnostic results  - Monitor all insertion sites, i e  indwelling lines, tubes, and drains  - Monitor endotracheal if appropriate and nasal secretions for changes in amount and color  - [place] appropriate cooling/warming therapies per order  - Administer medications as ordered  - Instruct and encourage patient and family to use good hand hygiene technique  - Identify and instruct in appropriate isolation precautions for identified infection/condition  Outcome: Progressing  Goal: Absence of fever/infection during neutropenic period  Description: INTERVENTIONS:  - Monitor WBC    Outcome: Progressing     Problem: SAFETY ADULT  Goal: Patient will remain free of falls  Description: INTERVENTIONS:  - Educate patient/family on patient safety including physical limitations  - Instruct patient to call for assistance with activity   - Consult OT/PT to assist with strengthening/mobility   - Keep Call bell within reach  - Keep bed low and locked with side rails adjusted as appropriate  - Keep care items and personal belongings within reach  - Initiate and maintain comfort rounds  - Make Fall Risk Sign visible to staff  - Offer Toileting every 4 Hours, in advance of need  - Initiate/Maintain bed and chair alarm  - Apply yellow socks and bracelet for high fall risk patients  - Consider moving patient to room near nurses station  Outcome: Progressing  Goal: Maintain or return to baseline ADL function  Description: INTERVENTIONS:  -  Assess patient's ability to carry out ADLs; assess patient's baseline for ADL function and identify physical deficits which impact ability to perform ADLs (bathing, care of mouth/teeth, toileting, grooming, dressing, etc )  - Assess/evaluate cause of self-care deficits   - Assess range of motion  - Assess patient's mobility; develop plan if impaired  - Assess patient's need for assistive devices and provide as appropriate  - Encourage maximum independence but intervene and supervise when necessary  - Involve family in performance of ADLs  - Assess for home care needs following discharge   - Consider OT consult to assist with ADL evaluation and planning for discharge  - Provide patient education as appropriate  Outcome: Progressing  Goal: Maintains/Returns to pre admission functional level  Description: INTERVENTIONS:  - Set and communicate daily mobility goal to care team and patient/family/caregiver     - Collaborate with rehabilitation services on mobility goals if consulted  - Out of bed for toileting  - Record patient progress and toleration of activity level   Outcome: Progressing     Problem: DISCHARGE PLANNING  Goal: Discharge to home or other facility with appropriate resources  Description: INTERVENTIONS:  - Identify barriers to discharge w/patient and caregiver  - Arrange for needed discharge resources and transportation as appropriate  - Identify discharge learning needs (meds, wound care, etc )  - Arrange for interpretive services to assist at discharge as needed  - Refer to Case Management Department for coordinating discharge planning if the patient needs post-hospital services based on physician/advanced practitioner order or complex needs related to functional status, cognitive ability, or social support system  Outcome: Progressing     Problem: Neurological Deficit  Goal: Neurological status is stable or improving  Description: Interventions:  - Monitor and assess patient's level of consciousness, motor function, sensory function, and level of assistance needed for ADLs  - Monitor and report changes from baseline  Collaborate with interdisciplinary team to initiate plan and implement interventions as ordered  - Provide and maintain a safe environment  - Consider seizure precautions  - Consider fall precautions  - Consider aspiration precautions  - Consider bleeding precautions  Outcome: Progressing     Problem: Activity Intolerance/Impaired Mobility  Goal: Mobility/activity is maintained at optimum level for patient  Description: Interventions:  - Assess and monitor patient  barriers to mobility and need for assistive/adaptive devices  - Assess patient's emotional response to limitations  - Collaborate with interdisciplinary team and initiate plans and interventions as ordered  - Encourage independent activity per ability   - Maintain proper body alignment    - Perform active/passive rom as tolerated/ordered  - Plan activities to conserve energy   - Turn patient as appropriate  Outcome: Progressing     Problem: Communication Impairment  Goal: Ability to express needs and understand communication  Description: Assess patient's communication skills and ability to understand information  Patient will demonstrate use of effective communication techniques, alternative methods of communication and understanding even if not able to speak  - Encourage communication and provide alternate methods of communication as needed  - Collaborate with case management/ for discharge needs  - Include patient/family/caregiver in decisions related to communication  Outcome: Progressing     Problem: Potential for Aspiration  Goal: Non-ventilated patient's risk of aspiration is minimized  Description: Assess and monitor vital signs, respiratory status, and labs (WBC)  Monitor for signs of aspiration (tachypnea, cough, rales, wheezing, cyanosis, fever)  - Assess and monitor patient's ability to swallow  - Place patient up in chair to eat if possible  - HOB up at 90 degrees to eat if unable to get patient up into chair   - Supervise patient during oral intake  - Instruct patient/ family to take small bites  - Instruct patient/ family to take small single sips when taking liquids  - Follow patient-specific strategies generated by speech pathologist   Outcome: Progressing  Goal: Ventilated patient's risk of aspiration is minimized  Description: Assess and monitor vital signs, respiratory status, airway cuff pressure, and labs (WBC)  Monitor for signs of aspiration (tachypnea, cough, rales, wheezing, cyanosis, fever)  - Elevate head of bed 30 degrees if patient has tube feeding   - Monitor tube feeding    Outcome: Progressing     Problem: Nutrition  Goal: Nutrition/Hydration status is improving  Description: Monitor and assess patient's nutrition/hydration status for malnutrition (ex- brittle hair, bruises, dry skin, pale skin and conjunctiva, muscle wasting, smooth red tongue, and disorientation)  Collaborate with interdisciplinary team and initiate plan and interventions as ordered  Monitor patient's weight and dietary intake as ordered or per policy  Utilize nutrition screening tool and intervene per policy  Determine patient's food preferences and provide high-protein, high-caloric foods as appropriate  - Assist patient with eating   - Allow adequate time for meals   - Encourage patient to take dietary supplement as ordered  - Collaborate with clinical nutritionist   - Include patient/family/caregiver in decisions related to nutrition  Outcome: Progressing     Problem: Nutrition/Hydration-ADULT  Goal: Nutrient/Hydration intake appropriate for improving, restoring or maintaining nutritional needs  Description: Monitor and assess patient's nutrition/hydration status for malnutrition  Collaborate with interdisciplinary team and initiate plan and interventions as ordered  Monitor patient's weight and dietary intake as ordered or per policy  Utilize nutrition screening tool and intervene as necessary  Determine patient's food preferences and provide high-protein, high-caloric foods as appropriate       INTERVENTIONS:  - Monitor oral intake, urinary output, labs, and treatment plans  - Assess nutrition and hydration status and recommend course of action  - Evaluate amount of meals eaten  - Assist patient with eating if necessary   - Allow adequate time for meals  - Recommend/ encourage appropriate diets, oral nutritional supplements, and vitamin/mineral supplements  - Order, calculate, and assess calorie counts as needed  - Recommend, monitor, and adjust tube feedings and TPN/PPN based on assessed needs  - Assess need for intravenous fluids  - Provide specific nutrition/hydration education as appropriate  - Include patient/family/caregiver in decisions related to nutrition  Outcome: Progressing Problem: Potential for Falls  Goal: Patient will remain free of falls  Description: INTERVENTIONS:  - Educate patient/family on patient safety including physical limitations  - Instruct patient to call for assistance with activity   - Consult OT/PT to assist with strengthening/mobility   - Keep Call bell within reach  - Keep bed low and locked with side rails adjusted as appropriate  - Keep care items and personal belongings within reach  - Initiate and maintain comfort rounds  - Make Fall Risk Sign visible to staff  - Offer Toileting every 4 Hours, in advance of need  - Initiate/Maintain bed and chair alarm  - Apply yellow socks and bracelet for high fall risk patients  - Consider moving patient to room near nurses station  Outcome: Progressing     Problem: MOBILITY - ADULT  Goal: Maintain or return to baseline ADL function  Description: INTERVENTIONS:  -  Assess patient's ability to carry out ADLs; assess patient's baseline for ADL function and identify physical deficits which impact ability to perform ADLs (bathing, care of mouth/teeth, toileting, grooming, dressing, etc )  - Assess/evaluate cause of self-care deficits   - Assess range of motion  - Assess patient's mobility; develop plan if impaired  - Assess patient's need for assistive devices and provide as appropriate  - Encourage maximum independence but intervene and supervise when necessary  - Involve family in performance of ADLs  - Assess for home care needs following discharge   - Consider OT consult to assist with ADL evaluation and planning for discharge  - Provide patient education as appropriate  Outcome: Progressing  Goal: Maintains/Returns to pre admission functional level  Description: INTERVENTIONS:  - Set and communicate daily mobility goal to care team and patient/family/caregiver     - Collaborate with rehabilitation services on mobility goals if consulted  - Out of bed for toileting  - Record patient progress and toleration of activity level   Outcome: Progressing

## 2022-08-09 NOTE — PROGRESS NOTES
08/09/22 1330   Pain Assessment   Pain Assessment Tool 0-10   Pain Score No Pain   Restrictions/Precautions   Precautions Aphasia; Aspiration;Bed/chair alarms;Cognitive; Fall Risk;Hard of hearing;Supervision on toilet/commode;Visual deficit   Weight Bearing Restrictions No   ROM Restrictions No   Lifestyle   Autonomy "I want to eat it "   Lying to Sitting on Side of Bed   Type of Assistance Needed Supervision   Physical Assistance Level No physical assistance   Comment DS   Lying to Sitting on Side of Bed CARE Score 4   Sit to Stand   Type of Assistance Needed Supervision   Physical Assistance Level No physical assistance   Comment CS   Sit to Stand CARE Score 4   Bed-Chair Transfer   Type of Assistance Needed Supervision   Physical Assistance Level No physical assistance   Comment CS, no AD   Chair/Bed-to-Chair Transfer CARE Score 4   Meal Prep   Meal Prep Level of Assistance Distant supervision;Minimal verbal cues  (seated)   Meal Preparation Pt participated in light, cold meal prep: sandwich making including item retrieval/transportation and clean-up w/ focus on carryover of kitchen safety training, kitchen mobility, fxnl cognitive retraining, and IADL retraining  While seated at table, pt provided w/ bread on plate and several items scattered on table to include peanut butter and jelly  Add'lly provided w/ fork, spoon, knife  Tasked pt w/ making PB&J and cutting into quarters, pt able to select appropriate items to include knife  Min vc's to evenly spread PB as pt placed on center of bread only  Pt cut sandwich in half, req vc's to cut into quarters  At this time, recommending CS w/ cues PRN for cold, light meal prep  Due to cognitive impairment, do not recommend oven/stove meal prep and will req A at d/c  Plan to trial microwave meal prep next session     Cognition   Overall Cognitive Status Impaired   Assessment   Treatment Assessment Pt seen for skilled OT session focusing on short distance fxnl mobility and light, cold meal prep  Overall improvement w/ sandwich making during course of OT w/ benefit noted from repetitive task, however will cont to req CS w/ cues PRN for cold meal prep needing TA for hot meal prep 2* cognitive deficits  Plan to trial light microwave meal prep next session  FT w/ pt's daughter tomorrow afternoon  Cont OT POC: ongoing FT, light IADL retraining, fxnl cognitive retraining, repetitive safety training, and ADL retraining  Pt was left resting in recliner w/ all needs within reach and alarm activated  Prognosis Good   Problem List Impaired balance; Impaired judgement;Decreased cognition;Decreased safety awareness;Decreased strength   Plan   Treatment/Interventions ADL retraining;Functional transfer training; Therapeutic exercise; Endurance training;Cognitive reorientation;Patient/family training   Progress Progressing toward goals   Recommendation   OT Discharge Recommendation Home with outpatient rehabilitation  (24 hour S, OP neuro OT)   OT Therapy Minutes   OT Time In 1330   OT Time Out 1400   OT Total Time (minutes) 30   OT Mode of treatment - Individual (minutes) 30   OT Mode of treatment - Concurrent (minutes) 0   OT Mode of treatment - Group (minutes) 0   OT Mode of treatment - Co-treat (minutes) 0   OT Mode of Treatment - Total time(minutes) 30 minutes   OT Cumulative Minutes 1130   Therapy Time missed   Time missed?  No

## 2022-08-09 NOTE — PROGRESS NOTES
Internal Medicine Progress Note  Patient: Bridgett Abdullahi  Age/sex: 76 y o  female  Medical Record #: [id number]      ASSESSMENT/PLAN: (Interval History)  Bridgett Abdullahi is seen and examined and management for following issues:    Lt MCA CVA  · Continue ASA 81mg daily/atorvastatin 40mg daily  · Outpt follow-up with Neurology  · Outpt follow-up with Cardiology     Junctional rhythm/PACs  · Previous EKGs were sinus  · Cards saw here and rec'd LOOP now vs Zio patch now and then LOOP as OP but family declined both 8/2/22 noting they want to revisit the topic in the OP setting  · AVOID ANY AVN BLOCKING AGENTS  · Repeat EKG 8/2 was NSR with 1st degree AVB      Depression  · Continue sertraline         Discharge date: 8/12/22    The above assessment and plan was reviewed and updated as determined by my evaluation of the patient on 8/9/2022      Labs:   Results from last 7 days   Lab Units 08/08/22  0455   WBC Thousand/uL 7 65   HEMOGLOBIN g/dL 12 8   HEMATOCRIT % 39 7   PLATELETS Thousands/uL 206     Results from last 7 days   Lab Units 08/08/22  0455   SODIUM mmol/L 141   POTASSIUM mmol/L 4 3   CHLORIDE mmol/L 106   CO2 mmol/L 29   BUN mg/dL 13   CREATININE mg/dL 0 63   CALCIUM mg/dL 9 1                   Review of Scheduled Meds:  Current Facility-Administered Medications   Medication Dose Route Frequency Provider Last Rate    acetaminophen  650 mg Oral Q6H PRN Dara Pollock PA-C      aluminum-magnesium hydroxide-simethicone  30 mL Oral Q6H PRN Dara Pollock PA-C      ascorbic acid  500 mg Oral Daily Dara Pollock PA-C      aspirin  81 mg Oral Daily Dara Pollock PA-C      atorvastatin  40 mg Oral QPM Dara Pollock PA-C      bisacodyl  10 mg Rectal Daily PRN Dara Pollock PA-C      calcium carbonate-vitamin D  1 tablet Oral Daily With Breakfast Dara Pollock PA-C      cholecalciferol  2,000 Units Oral Daily Dara Pollock PA-C      docusate sodium  100 mg Oral BID Juan David Henderson PRISCA Lawson      enoxaparin  40 mg Subcutaneous Daily Yvan Rai, PRISCA      multivitamin-minerals  1 tablet Oral Daily Yvanrichmond Chawlaa, PRISCA      ondansetron  4 mg Oral Q6H PRN Yvanrichmond Chawlaa, PRISCA      polyethylene glycol  17 g Oral Daily PRN Yvanrichmond Chawlaa, PRISCA      senna  2 tablet Oral BID Yvanrichmond Chawlaa, PRISCA      sertraline  75 mg Oral Daily Yvan Chawlaa, PRISCA         Subjective/ HPI: Patient seen and examined  Patients overnight issues or events were reviewed with nursing or staff during rounds or morning huddle session  New or overnight issues include the following:     No new or overnight issues  Offers no complaints    ROS:   A 10 point ROS was performed; negative except as noted above         Imaging:     No orders to display       *Labs /Radiology studies reviewed  *Medications reviewed and reconciled as needed  *Please refer to order section for additional ordered labs studies  *Case discussed with primary attending during morning huddle case rounds      Physical Examination:  Vitals:   Vitals:    08/08/22 0500 08/08/22 1339 08/08/22 2112 08/09/22 0515   BP: 135/65 117/57 145/70 124/60   BP Location: Left arm Left arm Right arm Right arm   Pulse: 58 63 55 59   Resp: 16 20 17 16   Temp: 98 °F (36 7 °C) 98 8 °F (37 1 °C) 98 1 °F (36 7 °C) 97 6 °F (36 4 °C)   TempSrc: Oral Oral Oral Oral   SpO2: 97% 97% 98% 97%   Weight:       Height:           General Appearance: no distress, conversive  HEENT: PERRLA, conjuctiva normal; oropharynx clear; mucous membranes moist   Neck:  Supple, normal ROM  Lungs: CTA, normal respiratory effort, no retractions, expiratory effort normal  CV: irregular rate and rhythm; no rubs/murmurs/gallops, PMI normal   ABD: soft; ND/NT; +BS  EXT: no edema  Skin: normal turgor, normal texture, no rashes  Psych: affect normal, mood normal  Neuro: AAO     The above physical exam was reviewed and updated as determined by my evaluation of the patient on 8/9/2022  Invasive Devices  Report    None                    VTE Pharmacologic Prophylaxis: Enoxaparin  Code Status: Level 3 - DNAR and DNI  Current Length of Stay: 12 day(s)      Total time spent:  30 minutes with more than 50% spent counseling/coordinating care  Counseling includes discussion with patient re: progress  and discussion with patient of his/her current medical state/information  Coordination of patient's care was performed in conjunction with primary service  Time invested included review of patient's labs, vitals, and management of their comorbidities with continued monitoring  In addition, this patient was discussed with medical team including physician and advanced extenders  The care of the patient was extensively discussed and appropriate treatment plan was formulated unique for this patient  ** Please Note:  voice to text software may have been used in the creation of this document   Although proof errors in transcription or interpretation are a potential of such software**

## 2022-08-09 NOTE — PROGRESS NOTES
08/09/22 0700   Pain Assessment   Pain Assessment Tool 0-10   Pain Score No Pain   Restrictions/Precautions   Precautions Aphasia;Bed/chair alarms;Cognitive; Fall Risk;Supervision on toilet/commode;Visual deficit   Weight Bearing Restrictions No   ROM Restrictions No   Lifestyle   Autonomy "I'll sit here "   Oral Hygiene   Type of Assistance Needed Supervision   Physical Assistance Level No physical assistance   Comment CS in stance at sink, no cues req w/ inc time for sequencing  Oral Hygiene CARE Score 4   Shower/Bathe Self   Type of Assistance Needed Incidental touching   Physical Assistance Level No physical assistance   Comment bathed BUE and BLE while seated, CGA while in stance to bathe arsenio and buttocks  Req Mod vc's and gestures for thorough buttocks hygiene, cues to switch out dirty wash cloth  Shower/Bathe Self CARE Score 4   Tub/Shower Transfer   Adaptive Equipment Seat with out Back   Assessed Shower   Findings CGA without AD   Upper Body Dressing   Type of Assistance Needed Physical assistance   Physical Assistance Level 25% or less   Comment A to clasp bra, donned shirt while seated  Upper Body Dressing CARE Score 3   Lower Body Dressing   Type of Assistance Needed Supervision   Physical Assistance Level No physical assistance   Comment threaded BLE while seated, CS in stance for clothing management  Lower Body Dressing CARE Score 4   Putting On/Taking Off Footwear   Type of Assistance Needed Supervision;Set-up / clean-up   Physical Assistance Level No physical assistance   Comment seated to don/doff socks and shoes     Putting On/Taking Off Footwear CARE Score 4   Picking Up Object   Type of Assistance Needed Supervision   Physical Assistance Level No physical assistance   Comment from ground level   Picking Up Object CARE Score 4   Lying to Sitting on Side of Bed   Type of Assistance Needed Supervision   Physical Assistance Level No physical assistance   Lying to Sitting on Side of Bed CARE Score 4   Sit to Stand   Type of Assistance Needed Supervision   Physical Assistance Level No physical assistance   Comment CS   Sit to Stand CARE Score 4   Bed-Chair Transfer   Type of Assistance Needed Supervision   Physical Assistance Level No physical assistance   Comment no AD   Chair/Bed-to-Chair Transfer CARE Score 4   Toileting Hygiene   Type of Assistance Needed Supervision   Physical Assistance Level No physical assistance   Comment in stance for bowel hygiene, cues for thoroughness  CS in stance for clothing management  Toileting Hygiene CARE Score 4   Toilet Transfer   Type of Assistance Needed Supervision   Physical Assistance Level No physical assistance   Comment no grab bars  Toilet Transfer CARE Score 4   Light Housekeeping   Light Housekeeping Level   (no AD)   Light Housekeeping Level of Assistance Close supervision   Light Housekeeping CS while in stance x12 minutes for laundry folding, no LOB noted  asked pt to sort pants, towels, and shirts req 1 cue during act to carryover direcitons  Add'lly focused on bed making, demo improved task initiation and progression  Req 1 cues to place sheet underneath blanket  Completed at CS and inc time provided  Encouraged daughter to engage pt in tasks within safety means at time of d/c, brainstromed watering plants, watching the dogs in the yard, and feeding fish  Cognition   Overall Cognitive Status Impaired   Arousal/Participation Alert; Cooperative   Attention Difficulty dividing attention   Orientation Level Oriented to person;Oriented to place;Oriented to situation;Disoriented to time   Memory Decreased short term memory;Decreased recall of precautions   Following Commands Follows multistep commands with increased time or repetition   Assessment   Treatment Assessment Pt seen for skilled OT session focusing on self-care management, visual scanning, and light IADL management to include folding laundry and bed making   Details on ADL noted above, daughter provide S and vc's for dressing  Pt cont to req vc's/prompts for sequencing and to inc safety w/ all fxnl activity  Although improvement noted compared to eval, cont to demo difficulty following multi-step directions and w/ divided attention to task  Cont OT POC: fxnl cognitive retraining, R visual scanning, light IADL management, and repetitive safety training  Pt was left resting in recliner w/ all needs within reach and alarm activated  FT scheduled tomorrow afternoon w/ pt's daughter, Boris Paul  On track for d/c home Friday w/ 24 hour S, recommending OP neuro OT  OT Family training done with: pt's daughter, Lucio Riceaver of family training Pt's daughter present at 34 at end of shower, provided S and cues needed to UB/LB dressing  Attempted to A pt w/ thread BLE into pants w/ pt accepting of A, however OTR encouraged set-up and S, A only if needed as pt demo ability to thread BLE at S level while seated w/ inc time  Daughter declined handout for shower chair, info provided to ease independent purchase w/ daughter receptitive to same  Reviewed appropriate activities for pt to engage in at home, daughter repetitive  Daughter observed remaining of session to include visual scanning act and IADL management  Prognosis Good   Problem List Impaired balance;Decreased cognition; Impaired judgement;Decreased safety awareness; Impaired vision   Plan   Treatment/Interventions ADL retraining;Functional transfer training; Therapeutic exercise; Endurance training;Cognitive reorientation;Patient/family training   Progress Progressing toward goals   Recommendation   OT Discharge Recommendation Home with outpatient rehabilitation  (OP neuro OT, 24 hour S)   OT Therapy Minutes   OT Time In 0700   OT Time Out 0830   OT Total Time (minutes) 90   OT Mode of treatment - Individual (minutes) 90   OT Mode of treatment - Concurrent (minutes) 0   OT Mode of treatment - Group (minutes) 0   OT Mode of treatment - Co-treat (minutes) 0   OT Mode of Treatment - Total time(minutes) 90 minutes   OT Cumulative Minutes 1100   Therapy Time missed   Time missed?  No

## 2022-08-09 NOTE — PHYSICAL THERAPY NOTE
Pt's alarm was going on and she had transferred herself back to bed from the recliner by the time I arrived  She said she wanted to lie down  Pt assisted back to bed with call bell in reach, and she said she wanted to lie down before her next therapy session  Bed alarm activated

## 2022-08-10 PROCEDURE — 97530 THERAPEUTIC ACTIVITIES: CPT

## 2022-08-10 PROCEDURE — 97130 THER IVNTJ EA ADDL 15 MIN: CPT

## 2022-08-10 PROCEDURE — 99233 SBSQ HOSP IP/OBS HIGH 50: CPT | Performed by: PHYSICAL MEDICINE & REHABILITATION

## 2022-08-10 PROCEDURE — 99232 SBSQ HOSP IP/OBS MODERATE 35: CPT | Performed by: INTERNAL MEDICINE

## 2022-08-10 PROCEDURE — 97129 THER IVNTJ 1ST 15 MIN: CPT

## 2022-08-10 PROCEDURE — 92507 TX SP LANG VOICE COMM INDIV: CPT

## 2022-08-10 PROCEDURE — 92526 ORAL FUNCTION THERAPY: CPT

## 2022-08-10 PROCEDURE — 97535 SELF CARE MNGMENT TRAINING: CPT

## 2022-08-10 PROCEDURE — 97110 THERAPEUTIC EXERCISES: CPT

## 2022-08-10 RX ORDER — ATORVASTATIN CALCIUM 40 MG/1
40 TABLET, FILM COATED ORAL EVERY EVENING
Qty: 30 TABLET | Refills: 0 | Status: SHIPPED | OUTPATIENT
Start: 2022-08-10 | End: 2022-10-10

## 2022-08-10 RX ORDER — SENNOSIDES 8.6 MG
17.2 TABLET ORAL
Qty: 30 TABLET | Refills: 0 | Status: SHIPPED | OUTPATIENT
Start: 2022-08-10 | End: 2022-10-10 | Stop reason: ALTCHOICE

## 2022-08-10 RX ORDER — MULTIVIT-MIN/IRON/FOLIC ACID/K 18-600-40
500 CAPSULE ORAL DAILY
Qty: 30 CAPSULE | Refills: 0 | Status: SHIPPED | OUTPATIENT
Start: 2022-08-10 | End: 2022-10-10 | Stop reason: ALTCHOICE

## 2022-08-10 RX ORDER — ACETAMINOPHEN 325 MG/1
650 TABLET ORAL EVERY 6 HOURS PRN
Refills: 0
Start: 2022-08-10

## 2022-08-10 RX ORDER — DOCUSATE SODIUM 100 MG/1
100 CAPSULE, LIQUID FILLED ORAL 2 TIMES DAILY
Qty: 60 CAPSULE | Refills: 0 | Status: SHIPPED | OUTPATIENT
Start: 2022-08-10 | End: 2022-10-10 | Stop reason: ALTCHOICE

## 2022-08-10 RX ORDER — ACETAMINOPHEN 160 MG
2000 TABLET,DISINTEGRATING ORAL DAILY
Qty: 30 CAPSULE | Refills: 0 | Status: SHIPPED | OUTPATIENT
Start: 2022-08-10

## 2022-08-10 RX ORDER — ASPIRIN 81 MG/1
81 TABLET, CHEWABLE ORAL DAILY
Qty: 30 TABLET | Refills: 0 | Status: SHIPPED | OUTPATIENT
Start: 2022-08-10 | End: 2022-10-10

## 2022-08-10 RX ADMIN — ASPIRIN 81 MG CHEWABLE TABLET 81 MG: 81 TABLET CHEWABLE at 07:54

## 2022-08-10 RX ADMIN — SENNOSIDES 17.2 MG: 8.6 TABLET, FILM COATED ORAL at 07:55

## 2022-08-10 RX ADMIN — SERTRALINE HYDROCHLORIDE 75 MG: 50 TABLET ORAL at 07:53

## 2022-08-10 RX ADMIN — ATORVASTATIN CALCIUM 40 MG: 40 TABLET, FILM COATED ORAL at 17:47

## 2022-08-10 RX ADMIN — ENOXAPARIN SODIUM 40 MG: 40 INJECTION SUBCUTANEOUS at 07:54

## 2022-08-10 RX ADMIN — MULTIPLE VITAMINS W/ MINERALS TAB 1 TABLET: TAB ORAL at 07:56

## 2022-08-10 RX ADMIN — Medication 2000 UNITS: at 07:56

## 2022-08-10 RX ADMIN — DOCUSATE SODIUM 100 MG: 100 CAPSULE, LIQUID FILLED ORAL at 07:54

## 2022-08-10 RX ADMIN — SENNOSIDES 17.2 MG: 8.6 TABLET, FILM COATED ORAL at 17:46

## 2022-08-10 RX ADMIN — Medication 1 TABLET: at 07:56

## 2022-08-10 RX ADMIN — OXYCODONE HYDROCHLORIDE AND ACETAMINOPHEN 500 MG: 500 TABLET ORAL at 07:55

## 2022-08-10 RX ADMIN — DOCUSATE SODIUM 100 MG: 100 CAPSULE, LIQUID FILLED ORAL at 17:47

## 2022-08-10 NOTE — PLAN OF CARE
Problem: Prexisting or High Potential for Compromised Skin Integrity  Goal: Skin integrity is maintained or improved  Description: INTERVENTIONS:  - Identify patients at risk for skin breakdown  - Assess and monitor skin integrity  - Assess and monitor nutrition and hydration status  - Monitor labs   - Assess for incontinence   - Turn and reposition patient  - Assist with mobility/ambulation  - Relieve pressure over bony prominences  - Avoid friction and shearing  - Provide appropriate hygiene as needed including keeping skin clean and dry  - Evaluate need for skin moisturizer/barrier cream  - Collaborate with interdisciplinary team   - Patient/family teaching  - Consider wound care consult   Outcome: Progressing     Problem: PAIN - ADULT  Goal: Verbalizes/displays adequate comfort level or baseline comfort level  Description: Interventions:  - Encourage patient to monitor pain and request assistance  - Assess pain using appropriate pain scale  - Administer analgesics based on type and severity of pain and evaluate response  - Implement non-pharmacological measures as appropriate and evaluate response  - Consider cultural and social influences on pain and pain management  - Notify physician/advanced practitioner if interventions unsuccessful or patient reports new pain  Outcome: Progressing     Problem: INFECTION - ADULT  Goal: Absence or prevention of progression during hospitalization  Description: INTERVENTIONS:  - Assess and monitor for signs and symptoms of infection  - Monitor lab/diagnostic results  - Monitor all insertion sites, i e  indwelling lines, tubes, and drains  - Monitor endotracheal if appropriate and nasal secretions for changes in amount and color  - [place] appropriate cooling/warming therapies per order  - Administer medications as ordered  - Instruct and encourage patient and family to use good hand hygiene technique  - Identify and instruct in appropriate isolation precautions for identified infection/condition  Outcome: Progressing  Goal: Absence of fever/infection during neutropenic period  Description: INTERVENTIONS:  - Monitor WBC    Outcome: Progressing     Problem: SAFETY ADULT  Goal: Patient will remain free of falls  Description: INTERVENTIONS:  - Educate patient/family on patient safety including physical limitations  - Instruct patient to call for assistance with activity   - Consult OT/PT to assist with strengthening/mobility   - Keep Call bell within reach  - Keep bed low and locked with side rails adjusted as appropriate  - Keep care items and personal belongings within reach  - Initiate and maintain comfort rounds  - Make Fall Risk Sign visible to staff  - Offer Toileting every 2 Hours, in advance of need  - Initiate/Maintain bed and chair alarm  - Obtain necessary fall risk management equipment:alarms  - Apply yellow socks and bracelet for high fall risk patients  - Consider moving patient to room near nurses station  Outcome: Progressing  Goal: Maintain or return to baseline ADL function  Description: INTERVENTIONS:  -  Assess patient's ability to carry out ADLs; assess patient's baseline for ADL function and identify physical deficits which impact ability to perform ADLs (bathing, care of mouth/teeth, toileting, grooming, dressing, etc )  - Assess/evaluate cause of self-care deficits   - Assess range of motion  - Assess patient's mobility; develop plan if impaired  - Assess patient's need for assistive devices and provide as appropriate  - Encourage maximum independence but intervene and supervise when necessary  - Involve family in performance of ADLs  - Assess for home care needs following discharge   - Consider OT consult to assist with ADL evaluation and planning for discharge  - Provide patient education as appropriate  Outcome: Progressing  Goal: Maintains/Returns to pre admission functional level  Description: INTERVENTIONS:  - Perform BMAT or MOVE assessment daily  - Set and communicate daily mobility goal to care team and patient/family/caregiver  - Collaborate with rehabilitation services on mobility goals if consulted    - Reposition patient every 2 hours  - Stand patient 3 times a day  - Ambulate patient 3 times a day  - Out of bed to chair 3 times a day   - Out of bed for meals 3 times a day  - Out of bed for toileting  - Record patient progress and toleration of activity level   Outcome: Progressing     Problem: DISCHARGE PLANNING  Goal: Discharge to home or other facility with appropriate resources  Description: INTERVENTIONS:  - Identify barriers to discharge w/patient and caregiver  - Arrange for needed discharge resources and transportation as appropriate  - Identify discharge learning needs (meds, wound care, etc )  - Arrange for interpretive services to assist at discharge as needed  - Refer to Case Management Department for coordinating discharge planning if the patient needs post-hospital services based on physician/advanced practitioner order or complex needs related to functional status, cognitive ability, or social support system  Outcome: Progressing     Problem: Neurological Deficit  Goal: Neurological status is stable or improving  Description: Interventions:  - Monitor and assess patient's level of consciousness, motor function, sensory function, and level of assistance needed for ADLs  - Monitor and report changes from baseline  Collaborate with interdisciplinary team to initiate plan and implement interventions as ordered  - Provide and maintain a safe environment  - Consider seizure precautions  - Consider fall precautions  - Consider aspiration precautions  - Consider bleeding precautions  Outcome: Progressing     Problem:  Activity Intolerance/Impaired Mobility  Goal: Mobility/activity is maintained at optimum level for patient  Description: Interventions:  - Assess and monitor patient  barriers to mobility and need for assistive/adaptive devices  - Assess patient's emotional response to limitations  - Collaborate with interdisciplinary team and initiate plans and interventions as ordered  - Encourage independent activity per ability   - Maintain proper body alignment  - Perform active/passive rom as tolerated/ordered  - Plan activities to conserve energy   - Turn patient as appropriate  Outcome: Progressing     Problem: Communication Impairment  Goal: Ability to express needs and understand communication  Description: Assess patient's communication skills and ability to understand information  Patient will demonstrate use of effective communication techniques, alternative methods of communication and understanding even if not able to speak  - Encourage communication and provide alternate methods of communication as needed  - Collaborate with case management/ for discharge needs  - Include patient/family/caregiver in decisions related to communication  Outcome: Progressing     Problem: Potential for Aspiration  Goal: Non-ventilated patient's risk of aspiration is minimized  Description: Assess and monitor vital signs, respiratory status, and labs (WBC)  Monitor for signs of aspiration (tachypnea, cough, rales, wheezing, cyanosis, fever)  - Assess and monitor patient's ability to swallow  - Place patient up in chair to eat if possible  - HOB up at 90 degrees to eat if unable to get patient up into chair   - Supervise patient during oral intake  - Instruct patient/ family to take small bites  - Instruct patient/ family to take small single sips when taking liquids  - Follow patient-specific strategies generated by speech pathologist   Outcome: Progressing  Goal: Ventilated patient's risk of aspiration is minimized  Description: Assess and monitor vital signs, respiratory status, airway cuff pressure, and labs (WBC)    Monitor for signs of aspiration (tachypnea, cough, rales, wheezing, cyanosis, fever)  - Elevate head of bed 30 degrees if patient has tube feeding   - Monitor tube feeding  Outcome: Progressing     Problem: Nutrition  Goal: Nutrition/Hydration status is improving  Description: Monitor and assess patient's nutrition/hydration status for malnutrition (ex- brittle hair, bruises, dry skin, pale skin and conjunctiva, muscle wasting, smooth red tongue, and disorientation)  Collaborate with interdisciplinary team and initiate plan and interventions as ordered  Monitor patient's weight and dietary intake as ordered or per policy  Utilize nutrition screening tool and intervene per policy  Determine patient's food preferences and provide high-protein, high-caloric foods as appropriate  - Assist patient with eating   - Allow adequate time for meals   - Encourage patient to take dietary supplement as ordered  - Collaborate with clinical nutritionist   - Include patient/family/caregiver in decisions related to nutrition  Outcome: Progressing     Problem: Nutrition/Hydration-ADULT  Goal: Nutrient/Hydration intake appropriate for improving, restoring or maintaining nutritional needs  Description: Monitor and assess patient's nutrition/hydration status for malnutrition  Collaborate with interdisciplinary team and initiate plan and interventions as ordered  Monitor patient's weight and dietary intake as ordered or per policy  Utilize nutrition screening tool and intervene as necessary  Determine patient's food preferences and provide high-protein, high-caloric foods as appropriate       INTERVENTIONS:  - Monitor oral intake, urinary output, labs, and treatment plans  - Assess nutrition and hydration status and recommend course of action  - Evaluate amount of meals eaten  - Assist patient with eating if necessary   - Allow adequate time for meals  - Recommend/ encourage appropriate diets, oral nutritional supplements, and vitamin/mineral supplements  - Order, calculate, and assess calorie counts as needed  - Recommend, monitor, and adjust tube feedings and TPN/PPN based on assessed needs  - Assess need for intravenous fluids  - Provide specific nutrition/hydration education as appropriate  - Include patient/family/caregiver in decisions related to nutrition  Outcome: Progressing     Problem: Potential for Falls  Goal: Patient will remain free of falls  Description: INTERVENTIONS:  - Educate patient/family on patient safety including physical limitations  - Instruct patient to call for assistance with activity   - Consult OT/PT to assist with strengthening/mobility   - Keep Call bell within reach  - Keep bed low and locked with side rails adjusted as appropriate  - Keep care items and personal belongings within reach  - Initiate and maintain comfort rounds  - Make Fall Risk Sign visible to staff  - Offer Toileting every 2 Hours, in advance of need  - Initiate/Maintain bed and chair alarm  - Obtain necessary fall risk management equipmentalarms   - Apply yellow socks and bracelet for high fall risk patients  - Consider moving patient to room near nurses station  Outcome: Progressing     Problem: MOBILITY - ADULT  Goal: Maintain or return to baseline ADL function  Description: INTERVENTIONS:  -  Assess patient's ability to carry out ADLs; assess patient's baseline for ADL function and identify physical deficits which impact ability to perform ADLs (bathing, care of mouth/teeth, toileting, grooming, dressing, etc )  - Assess/evaluate cause of self-care deficits   - Assess range of motion  - Assess patient's mobility; develop plan if impaired  - Assess patient's need for assistive devices and provide as appropriate  - Encourage maximum independence but intervene and supervise when necessary  - Involve family in performance of ADLs  - Assess for home care needs following discharge   - Consider OT consult to assist with ADL evaluation and planning for discharge  - Provide patient education as appropriate  Outcome: Progressing  Goal: Maintains/Returns to pre admission functional level  Description: INTERVENTIONS:  - Perform BMAT or MOVE assessment daily    - Set and communicate daily mobility goal to care team and patient/family/caregiver  - Collaborate with rehabilitation services on mobility goals if consulted    - Reposition patient every 2 hours    - Dangle patient 3 times a day  - Stand patient 3 times a day  - Ambulate patient 3 times a day  - Out of bed to chair 3 times a day   - Out of bed for meals 3 times a day  - Out of bed for toileting  - Record patient progress and toleration of activity level   Outcome: Progressing

## 2022-08-10 NOTE — PROGRESS NOTES
08/10/22 1130   Pain Assessment   Pain Assessment Tool 0-10   Pain Score No Pain   Restrictions/Precautions   Precautions Aphasia; Aspiration;Bed/chair alarms;Cognitive; Fall Risk;Impulsive;Supervision on toilet/commode;Visual deficit   Comprehension   Comprehension (FIM) 3 - Understands basic info/conversation 50-74% of time   Expression   Expression (FIM) 3 - Expresses basic info/needs 50-74% of time   Social Interaction   Social Interaction (FIM) 6 - Interacts appropriately with others BUT requires extra  time   Problem Solving   Problem solving (FIM) 3 - Solves basic problmes 50-74% of time   Memory   Memory (FIM) 3 - Recognizes, recalls/performs 50-74%   Speech/Language/Cognition Assessmetn   Treatment Assessment Engaged pt in completing some review items which have been targeted early in pt's stay on the acute rehab center  Pt's dtr, Piter Morales was present for session where ongoing education and training completed w/ dtr  SLP providing pt w/ a picture and then 3 written words to identify the word which matches  Pt did require increased probing clues and repetition given the directions  Pt's ability to match the word given the picture was 7/8 accurate, given the increased cues needed initially to complete the task  Another activity targeted was a description on the left side of the page to be matched to the corresponding picture to the R side of the page  SLP allowing increased time for pt to initiate this activity where some time had passed  Verbal/visual cue by SLP was used for pt to read the first description provided, to where pt able to independently ID the matching picture  SLP did need to provided visual cues for each listed description provided, but pt was able to independently draw a line to the matching picture   This task was completed again where pt was able to independently determine the first description to match the picture, but verbal cues required for all remaining items, to where pt was able to match description to correct picture  After these 2 activities completed, SLP providing dtr w/ education in regards to likely needing increased repetition and demonstration of activities presented for improved comprehension of the task being given to pt to complete  Dtr verbalizing understanding given this education  The next task introduced, SLP was explaining to dtr is a dual task where it is marked as being a 'written direction' activity, BUT increased education provided that when engaging in this task, pt needs to determine the word which the direction is to be completed  The directions were 1-step (ie: underline the food) where there was a FO4 words below for pt to choose  It was observed that pt was perseverative on circling the target words throughout this activity  Due to this, SLP first focusing on identifying the target word which pt was to complete the direction  Pt was 6/8 accurate, increasing to 8/8 provided minimal verbal cues for scanning to the R side of the page (as both words were at the end on the R side)  Once identifying the target words, SLP did go back and attempt to have pt re-read the direction and follow thru in completing the direction were pt was 2/8 accurate, increasing to 5/8 given moderate cues  SLP further educating dtr about completing this task in a similar manner if it is observed that she is not understanding the full directions, as this activity was provided in pt's HEP  Dtr acknowledging this education  As just mentioned, SLP providing pt's dtr w/ HEP program until initiation of OP ST services  Dtr did state to SLP how she did arrange OT and PT at another facility which current SLP is aware there is no Speech services, SLP highly stressing to dtr about needing an OP program which contains Neuro SLP   Currently pt will continue to benefit from ongoing skilled SLP services targeting functional language skills, including receptive and expressive language as well as cognitive linguistic skills in attempts to decrease caregiver burden at time of discharge  Eating   Type of Assistance Needed Set-up / clean-up;Supervision;Verbal cues   Physical Assistance Level No physical assistance   Eating CARE Score 4   Swallow Assessment   Swallow Treatment Assessment Daily Dysphagia Tx Note     Patient Name: Felix Caldera    NDIVF'Y Date: 8/10/2022        Current Risks for Dysphagia & Aspiration: Dysarthria;General debilitation;New Neuro event;Brain injury;Cognitive deficit     Current Symptoms/Concerns: Cough;Clear throat;During meals; Difficulty chewing;Holding food in mouth;Pockets food;     Current diet:Regular and thin liquids      Premorbid diet::regular diet and thin liquids      Positioning: upright in recliner    Items administered:Consistencies Administered: thin liquids, hard solids and mixed consistency  Materials administered included: grilled chicken breast, baked potato, corn, chicken noodle soup, carrot cake and orange juice by cup  Total amount of meal consumed:   100% of meal and 120cc thin liquids      Oral stage:minimal to [facility]  Lip closure: wfl  Anterior spillage: none   Mastication: overall was noted to be functional given meal, despite some increased rate of intake  Bolus formation: functional  Bolus control: prompt across consistencies  Transfer: timely across consistences  Oral residue: minimal but given increased time able to clear   Pocketing: none         Pharyngeal stage:minimal to [facility]  Swallow promptness: overall observed to be timely across consistencies  Hyolaryngeal elevation: present upon palapation  Wet voice: none  Throat clear: none  Cough: x1 w/ initial bite of soup, but no further or increased cough exhibited  Secondary swallows: none  Audible swallows: none       Esophageal stage:WFL --> none observed w/ meal today          Summary:     Pt presenting with minimal to [facility] oropharyngeal dysphagia as observed by continued functional lip seal around fork, tsp, cup, finger foods along w/ exhibiting adequate bolus retrieval given consistencies w/o anterior loss  Mastication continues to exhibit functional and effective mastication across soft and solid textures  Pt did have intermittent episodes given increased rate of intake but did not increased overall residual throughout meal  Overall bolus control/transfer was prompt across consistencies, but exhibited decreased control given ambient liquid of soup on initial tsp  Swallow initiation was noted to be prompt across consistencies and hyolaryngeal excursion remains to be present across meal  Pt did have cough x1 on initial tsp of soup due to liquids, but throughout remainder of meal no increased or overt aspiration sxs observed  Pt's dtr Pamela Rouse present for meal in which minimal verbal cues were required, dtr providing cues accordingly  Recommendations:  Diet: regular diet and thin liquids  Meds: whole with liquid and whole with puree  Strategies: upright posture, only feed when fully alert, slow rate of feeding, small bites/sips, quiet environment (tv off, limit talking, door closed, etc ), alternating bites and sips and OOB for ALL meals     DISTANT supervision w/ meals  Results reviewed with:  patient, RN, MD and family - dtr David Button  Aspiration precautions posted  F/u ST tx: Pt will continue to benefit from ongoing skilled dysphagia tx sessions to establish safest least restrictive diet w/o increased oropharyngeal or aspiration sxs as well as monitor ability to carryover swallow strategies independently  Plan: Brief f/u x1-2 sessions to continue to monitor pt's diet upgrade to regular w/ thin liquids to where pt is able to exhibit no increased oropharyngeal or aspiration sxs  Swallow Assessment Prognosis   Prognosis Good   Prognosis Considerations Age; Co-morbidities; Family/community support;Medical diagnosis; Medical prognosis; New learning ability;Ability to carry over   SLP Therapy Minutes SLP Time In 1130   SLP Time Out 1230   SLP Total Time (minutes) 60   SLP Mode of treatment - Individual (minutes) 60   SLP Mode of treatment - Concurrent (minutes) 0   SLP Mode of treatment - Group (minutes) 0   SLP Mode of treatment - Co-treat (minutes) 0   SLP Mode of Treatment - Total time(minutes) 60 minutes   SLP Cumulative Minutes 780   Therapy Time missed   Time missed?  No

## 2022-08-10 NOTE — PROGRESS NOTES
08/10/22 0830   Pain Assessment   Pain Assessment Tool 0-10   Pain Score No Pain   Restrictions/Precautions   Precautions Aspiration; Aphasia;Bed/chair alarms;Cognitive; Fall Risk;Supervision on toilet/commode;Visual deficit   Weight Bearing Restrictions No   ROM Restrictions No   Cognition   Overall Cognitive Status Impaired   Subjective   Subjective Patient ready to participate in PT session   Sit to Stand   Type of Assistance Needed Supervision   Physical Assistance Level No physical assistance   Sit to Stand CARE Score 4   Bed-Chair Transfer   Type of Assistance Needed Supervision   Physical Assistance Level No physical assistance   Chair/Bed-to-Chair Transfer CARE Score 4   Transfer Bed/Chair/Wheelchair   Limitations Noted In Balance; Endurance   Findings Noted with [place] and LLD  R>LLE   Walk 10 Feet   Type of Assistance Needed Supervision   Physical Assistance Level No physical assistance   Walk 10 Feet CARE Score 4   Walk 50 Feet with Two Turns   Type of Assistance Needed Supervision   Physical Assistance Level No physical assistance   Walk 50 Feet with Two Turns CARE Score 4   Walk 150 Feet   Type of Assistance Needed Supervision   Physical Assistance Level No physical assistance   Walk 150 Feet CARE Score 4   Walking 10 Feet on Uneven Surfaces   Type of Assistance Needed Supervision   Physical Assistance Level No physical assistance   Comment CS outside on sidewalks, curbs, curb cuts and ramps with no AD  We crossed 3 streets during our outdoor negotiation and, patient appropriately looked both ways 2/3 times  She needed cueing to look right on the third trial  She appropaitely found her way outside and back to the PT gym locating the elevators but on the return trip, she waited in front of the elevator for extended period of time before pressing the up button  She needed prompting to complete   When negotiating obstacles outside, she needed verbal cues to attend to the R side at times and was found walking very closely to the end of the sidewalk on R despite having a lot of space on her L  She did nearly step off sidewalk 1 time   Walking 10 Feet on Uneven Surfaces CARE Score 4   Ambulation   Does the patient walk? 2  Yes   Primary Mode of Locomotion Prior to Admission Walk   Distance Walked (feet) 300 ft  (x4)   Gait Pattern Inconsistant Jesi  (slight  leg circumducion)   Limitations Noted In Balance; Sequencing   Provided Assistance with: Direction   Walk Assist Level Supervision   Findings Patient, for the first time during stay, was able to correctly find her room without prompting   Wheel 50 Feet with Two Turns   Reason if not Attempted Activity not applicable   Wheel 50 Feet with Two Turns CARE Score 9   Wheel 150 Feet   Reason if not Attempted Activity not applicable   Wheel 053 Feet CARE Score 9   Curb or Single Stair   Style negotiated Curb   Type of Assistance Needed Supervision   Physical Assistance Level No physical assistance   Comment outside curb step   1 Step (Curb) CARE Score 4   4 Steps   Type of Assistance Needed Supervision   Physical Assistance Level No physical assistance   4 Steps CARE Score 4   12 Steps   Type of Assistance Needed Supervision   Physical Assistance Level No physical assistance   12 Steps CARE Score 4   Stairs   Type Stairs;Curb   # of Steps 12  (+ 1 curb step)   Weight Bearing Precautions Fall Risk   Assist Devices Single Rail  (railing for stairs, no AD on curb)   Therapeutic Interventions   Strengthening 10x each leg each exercise for review of HEP  Seated: LAQ, hip flexion, hip ADD with ball, hip ABD against black TB  Standing: hip flexion, heel raises, hip ABD, knee flexion  Single leg stance 30 seconds each leg   Side stepping 7'x4 each direction   Flexibility BLE heel cord and HS gentle stretch   Other Purpose of Rehab from Stroke Ed: see below   Equipment Use   NuStep L2x15 minutes   Other Comments   Comments (S)  8/11: please complete supine therex (handout provided to her this session with black TB but, please reference); TUG regular, cognitive, and manual, 5x STS and Gait Speed   Assessment   Treatment Assessment Patient working towards goals for discharge which is scheduled for 8/12/22 with OPPT services and 24 hour S/A from daughters  Patient to live at daughterPaige's,  home who was previously in and has completed PT training  HEP initiated this date and will need to be completed 8/11/22 prior to discharge  Additionally, please complete outcome measures (see above) 8/11/22 in preparation for d/c and OPPT services  Patient will need 24 hour S/A at home due to safety concerns 2* cognitive and linguistic deficits  Problem List Decreased cognition; Impaired judgement;Decreased safety awareness; Impaired balance   Recommendation   PT Discharge Recommendation Home with outpatient rehabilitation   PT Therapy Minutes   PT Time In 0830   PT Time Out 1000   PT Total Time (minutes) 90   PT Mode of treatment - Individual (minutes) 90   PT Mode of treatment - Concurrent (minutes) 0   PT Mode of treatment - Group (minutes) 0   PT Mode of treatment - Co-treat (minutes) 0   PT Mode of Treatment - Total time(minutes) 90 minutes   PT Cumulative Minutes 865       Stroke Education Series    Pt participated in skilled Stroke Education Series in an individual setting to address the topic of Purpose of Rehab post stroke in both verbal and written formats  Education within this session included a review of the individual roles of the rehab team, functions of the acute rehab center, and neuro rehabilitation treatment strategies  The goal of this education session was to provide the patient with an understanding of the overall importance of the therapy process   This section reviews neuroplasticity principles as well that includes how patiens can incorporate specificity, intensity, repetition and salience into their home exercise program  This allows the patient to connect neuro rehabilitation treatment strategies to his or her individual therapy process  The patients are engaged in conversation to incorporate activities they like to do into therapy sessions  Following education, the patient's response to education is: needs reinforcment   Additional topics to individualize this section of stroke education include: adapting to stroke , cardiovascular components of rehab, involvement of therapy reducing risk of another stroke, and how family can help in the rehab process           Rachel Yoon, PT

## 2022-08-10 NOTE — TEAM CONFERENCE
Acute RehabilitationTeam Conference Note  Date: 8/10/2022   Time: 11:15 AM       Patient Name:  Harlan Sagastume       Medical Record Number: [id number]   YOB: 1945  Sex: Female          Room/Bed:  [facility]2/[facility] 972-01  Payor Info:  Payor: MEDICARE / Plan: MEDICARE A AND B / Product Type: Medicare A & B Fee for Service /      Admitting Diagnosis: Stroke ([facility] 75 ) [I63 9]   Admit Date/Time:  7/28/2022  2:28 PM  Admission Comments: No comment available     Primary Diagnosis:  Acute ischemic left MCA stroke (HCC)  Principal Problem: Acute ischemic left MCA stroke [facility])    Patient Active Problem List    Diagnosis Date Noted    SA node dysfunction ([facility] ) 08/01/2022    Dysphagia 07/28/2022    History of DVT (deep vein thrombosis) 07/25/2022    Acute ischemic left MCA stroke ([place] [id number] ) 07/24/2022    Mild episode of recurrent major depressive disorder ([place] [address] ) 10/07/2021    Osteopenia of lumbar spine 09/17/2019    History of endometrial cancer 03/14/2018    Multinodular thyroid 01/09/2017    Inguinal lymphadenopathy 04/17/2015    Endometrial cancer ([place] [address] ) 11/17/2014    Obesity 05/29/2013    Osteoarthritis of hip 05/29/2013    Osteoarthritis of knee 05/29/2013    Vitamin D deficiency 05/29/2013       Physical Therapy:    Weight Bearing Status: Full Weight Bearing  Transfers: Supervision  Bed Mobility: Supervision  Amulation Distance (ft): 300 feet  Ambulation: Supervision  Wheelchair Mobility: Supervision  Number of Stairs: 12  Assistive Device for Stairs: Bilateral Hand Rails  Stair Assistance: Incidental Touching  Ramp: Supervision  Discharge Recommendations: Home with:  [address] with[de-identified] 24 Hour Supervision, Outpatient Physical Therapy    Patient making progress with skilled PT intervention thusfar  Family training initiated 8/5 and remains ongoing  Training being completed with patient's daughters, Rick Lowe and Minh Osorio    Plan to discharge Friday with OPPT services and 24 hour S/A   Minh Osorio confirming discharge to occur Friday; ALEX, acting CM, to follow up via phone call  Patient continues to benefit from skilled PT intervention to maximize her balance and safety  24 hour S will remain the recommendation for cognitive and linguistic barriers  Prior to d/c, PT to focus walking around objects as she has 3 benton retrievers that can pose an increased risk for falls  Orysia [place] to come in again Thursday and Friday and participate in the hands on/cueing portion of the training as she previously has observed therapy  Mirna Vega stating that she will be in to visit but was unaware of the time  PT also to make a HEP for strength and balance to be completed at home      Occupational Therapy:  Eating: Independent  Grooming: Supervision  Bathing: Supervision  Bathing: Supervision  Upper Body Dressing: Supervision  Lower Body Dressing: Supervision  Toileting: Supervision  Tub/Shower Transfer: Incidental Touching  Toilet Transfer: Supervision  Cognition: Exceptions to WNL  Cognition: Decreased Memory, Decreased Executive Functions, Decreased Attention, Decreased Comprehension, Decreased Safety  Orientation: Person, Place, Situation  Discharge Recommendations: Home with:  [address] [facility] [address] with[de-identified] 24 Hour Supervision, Family Support, First Floor Setup, Outpatient Occupational Therapy       Pt us functioning at overall CS for ADLs w/ Min-Mod vc's and CS for fxnl mobility without AD  Limited by impaired insight into deficits, impaired insight, receptive>expressive aphasia, dec STM, impaired divided attention to task, impaired cognitive flexibility, cognitive fatigue, impaired problem-solving, impaired balance, impaired safety awareness, dec endurance, and ADL dysfunction  Plan to d/c to daughterPaige's home and will have 24 hour S  Ongoing FT occurring w/ both daughters  On track for d/c home Friday w/ 24 hour S and A w/ IADLs  Recommending OP neuro OT w/ focus on fxnl cognitive retraining        Speech Therapy:  Mode of Communication: Verbal  Speech/Language:  (expressive and receptive aphasia)  Cognition: Exceptions to WNL  Cognition: Decreased Memory, Decreased Executive Functions, Decreased Attention, Decreased Comprehension, Decreased Safety, Impulsive  Orientation: Person, Situation  Swallowing: Within Defined Limits  Swallowing: Oral Dysphagia, Pharyngeal Dysphagia, Aspiration Risk  Diet Recommendations: Regular Diet, Thin  Discharge Recommendations: Home with:  [address] with[de-identified] 24 Hour Supervision, 24 Hour Assisteance, Family Support, Outpatient Speech Therapy  Pt currently being followed for dysphagia and language/cognitive tx sessions  In regards to dysphagia, current diet is dysphagia level 2 w/  thin liquids  Upon completion of initial dysphagia bedside assessment, pt presented with s/s suggestive of mild-moderate oral and suspected mild pharyngeal dysphagia  Concerns included: Mastication was observed to be moderately prolonged given eggs, due to piecemeal bites taken as well as faster intake from bolus to bolus which lead to mild-moderate oral residual over time  SLP did have to provide pt moderate verbal cues to slow intake rate as well as use liquid wash to decrease oral residual which did occur over time  Lip seal around fork, tsp, straw and finger foods was noted to be [facility] along w/ appropriate bolus retrieval across items  No anterior loss was observed  Bolus formation and transfer were functional given yogurt and thin liquids today with no significant oral residue noted given these textures  No overt s/s reduced oral control observed w/ thin liquids by straw today  Swallowing initiation appeared prompt given puree/thin textures and observing mild delay w/ soft solids (banana, eggs) due to increased oral mastication as well as piecemeal bites taken  Laryngeal rise was palpated and judged to be within functional limits across textures observed   No coughing, throat clearing, change in vocal quality or respiratory status noted during assessment today  While some improvement has been observed in f/u meals given trials of level 3 food items, concerns more lay in regards to pt's overall rate of intake as well as larger boluses taken which increased oral residual  Pt's decreased comprehension skills impact consistent use of swallow strategies needing moderate verbal/visual cues to alternate solids/liquids, slowing rate  Education provided to pt's dtrs as to why diet advancement has not occurred at this time, and will plan for family training to ensure they are able to provide appropriate cues for meals  Continued diet recommendations remain to be dysphagia level 2 diet w/ thin liquids, but hopes for advancement can occur in upcoming sessions  In regards to pt's language and cognitive skills, pt engaged in completing both formal and informal language assessments to date, where pt currently demonstrates MODERATE language deficits both in receptive and expressive language skills  Per the Bedside WAB, pt's scores were comparable to Broca's type aphasia  It was noted throughout assessment that pt exhibited by difficulty in following basic verbal commands as well as when increased in difficulty given mutli-step commands, difficulty given moderately complex yes/no ?'s as well as noted difficulty in comprehension given more complex questions  Additionally, pt does also exhibit moderate expressive language deficits, noting perseveration in verbal responses, neologisms, paraphasia errors, jargon at times, noting inconsistency in recognition given verbalized errors when eliciting spontaneous speech  While cognitive skills were not assessed, pt did also demonstrate decreased LT and ST memory recall, decreased executive function skills (problem solving, reasoning, judgement, sequencing) and decreased insight/safety to overall functioning at this time   Additionally suspect some visual perceptual deficits but due to comprehension given more complex directions, likely unable to full assess at this time  Due to noted deficits, recommendations at time of discharge will be for 24/7 supervision at this time  Currently pt will benefit from SLP services at this time to maximize overall functional independence of both receptive and expressive language skills as well as targeting functional cognitive linguistic skills while in the acute rehab center  Update from week 8/9/2022: Pt continues to be followed for ongoing dysphagia and language/cognitive tx sessions  In regards to current swallow function, pt has improved in oropharyngeal skills to where pt has now been advanced to regular diet w/ thin liquids  Improvement had been noted more so in pt's ability to carryover swallow strategies which vastly improved oral stage and minimizes overall aspiration risk which initially presented in dysphagia tx sessions  Pt will intermittently exhibit mildly longer mastication given solids, BUT is due to faster rate of intake  Minimal verbal cues provided to slow overall rate of intake during meals now  Pt has demonstrated improvement in bite sizes as well as alternating solids/liquids to aid in breakdown of textures where pt does not exhibit increase oral residual/pocketing  Additionally, due to improvement in oral stage, swallow initiation has been observed to be prompter and NO over aspiration sxs noted in past dysphagia tx sessions  Pt's dtrs have been present for multiple dysphagia tx sessions, provided education in verbal cues to present to pt as needed given meals and will plan to initiate carryover for dtrs to cue in upcoming sessions  Plan is to continue diet advancement of regular diet/thin liquids at this time  As for pt's language and cognitive linguistic skills, pt continues to demonstrate MODERATE deficits in both receptive and expressive language and mod-max deficits in executive function and memory skills   However, pt is slowly demonstrating improvement given reading comprehension abilities but continues to be limited due to decreased R side scanning at times  Additionally, in completing more complex tasks w/ increased amount of words does present to be a challenge for pt due to decreased attention or recall given last left off, requiring verbal/visual cues  As for expressive language skills, pt has been improving as well given spontaneous verbalizations, BUT is noted to exhibit perseveration at times, more present when fatigued  This is also noted in written expression as well  Trialed functional money management in recent sessions to where pt was max to total A in completing  Education provided to family in regards that higher level tasks such as fiances, medications will need to be assisted fully at time of discharge  Of note, increased and repeated family training has occurred w/ pt's dtrs Ismael Ireland and Ivan Sykes, where ongoing education/training as well as educational handouts have been provided and ongoing questions answered given sessions  Plan to provide HEP for family at time of discharge  Due to ongoing deficits as reported above, recommendations still remain to be 24/7 supervision  Pt will continue benefit from SLP services at this time to maximize overall functional independence of both receptive and expressive language skills as well as targeting functional cognitive linguistic skills w/ transitioning to OP ST services upon time of planned discharge, which is for 8/12/2022  Nursing Notes:  Appetite: Good  Diet Type: Regular/House                      Diet Patient/Family Education Complete:  Yes                         Type of Wound Patient/Family Education: No  Bladder: Continent (stress inc)     Bladder Patient/Family Education: Yes  Bowel: Continent     Bowel Patient/Family Education: Yes  Pain Location/Orientation: Location: Neck  Pain Score: 0                       Hospital Pain Intervention(s): Declines  Pain Patient/Family Education: Yes  Medication Management/Safety  Injectable: Lovenox (will not need for d/c)  Safe Administration: Yes (will not need lovenox for d/c)  Medication Patient/Family Education Complete: Yes (ongoing)    Lt MCA CVA - Continue ASA 81mg daily/atorvastatin 40mg daily,Outpt follow-up with Neurology, Outpt follow-up with Cardiology  Junctional rhythm/PACs - Previous EKGs were sinus, Cards following now, rec LOOP now vs Zio patch now and then LOOP as OP, Family aware and will give decision today, AVOID ANY AVN BLOCKING AGENTS, For repeat EKG today per Cards  Depression - Continue sertraline  Pt is contient of both bowel and bladder  Pt requires alarms for safety  8/9- Family and pt decline zio patch or loop at present  Will f/u as outpt  Diet advanced to regular- will attempt to advance for giving meds as well  Cont to be impulsive and requires alarms for safety  Has stress inc, pt changed peripads independently  This week we will monitor vital signs and lab values  We will educate on the importance of turning and offloading in order to prevent skin breakdown and preform routine skin checks  We will preform safe transfers and keep the pt free from falls  We will encourage indepedence with ADLs  We will monitor for constipation and medicate per bowel protocol  Case Management:     Discharge Planning  Living Arrangements: Lives Alone  Support Systems: Family members  Assistance Needed: TBD  Type of Current Residence: Private residence  Current Home Care Services: No  Pt continues to participate well and is expected to return home later this week  Family education/training has occurred and they are aware of recommendations for assist  It is recommended pt continue with outpt pt ot and speech services  Following to arrange recommended services  Is the patient actively participating in therapies?  yes  List any modifications to the treatment plan:     Barriers Interventions   All functional barriers resolved Is the patient making expected progress toward goals? yes  List any update or changes to goals:     Medical Goals: Patient will be medically stable for discharge to [facility] upon completion of rehab program and Patient will be able to manage medical conditions and comorbid conditions with medications and follow up upon completion of rehab program    Weekly Team Goals:   Rehab Team Goals  ADL Team Goal: Patient will require supervision with ADLs with least restrictive device upon completion of rehab program  Transfer Team Goal: Patient will require supervision with transfers with least restrictive device upon completion of rehab program  Locomotion Team Goal: Patient will require supervision with locomotion with least restrictive device upon completion of rehab program  Cognitive Team Goal: Patient will require supervision for basic and complex tasks upon completion of rehab program    Discussion: pt has made good progress and family training has been ongoing  Pt will dc to dtrs home at supervision level with need for cueing for task completion and safety  Recommendations are for outpt pt ot and speech services  Anticipated Discharge Date:  8/12/2022  [facility] Team Members Present: The following team members are supervising care for this patient and were present during this Weekly Team Conference      Physician: Dr Sherryle Mule, MD  : Annette Momin MSW  Registered Nurse: Gutierrez Shelby RN, BSN, CRRN  Physical Therapist: Valery Otero DPT  Occupational Therapist: Bridgette Buckley MS, OTR/L  Speech Therapist: Jeni Tadeo, [place], [address] [place] [address]

## 2022-08-10 NOTE — PROGRESS NOTES
Occupational Therapy Treatment Note         08/10/22 0184   Pain Assessment   Pain Assessment Tool 0-10   Pain Score No Pain   Restrictions/Precautions   Precautions Aspiration;Bed/chair alarms;Cognitive; Fall Risk;Supervision on toilet/commode;Visual deficit; Aphasia   Lifestyle   Autonomy "I am tired" - at end of session   Bed-Chair Transfer   Type of Assistance Needed Supervision   Physical Assistance Level No physical assistance   Comment no device   Chair/Bed-to-Chair Transfer CARE Score 4   Meal Prep   Meal Prep Level of Assistance Minimum assistance;Maximum verbal cues   Meal Preparation Engaged in microwave safety education  Began with verbally providing pt with an item, ie "a glass bowl with chicken", but language deficits may have influenced this  Therefore began to show pt items, pulling out aluminum foil and showing to pt, pt reporting that yes this can go into microwave  OT educated pt that it cannot 3x, then at end of session pt was able to recall "no" when asked if aluminum foil can go in microwave  OT placed metal utensil in microwave, and pt able to report "no", without cues  Overall, pt does have deficits in microwave safety and identifying which items can and cannot be microwaved  OT asked pt to set time for 2 minutes, pt hits 4 instead of 2, and then requires cues to hit start  When OT instructed pt to hit stop, pt unable to locate stop button (above start button) and required assistance to stop microwave  Repeated this 4 times, asking pt to set time to different times (1 minute, 2, minutes, 3 minutes) and then appropriately hit start and stop  All times, pt required OT to point out the "stop" button, even after pt had been able to read it  Pt was unable to reset microwave without assistance  Overall, pt requires assist with microwave meal prep     Commmunity Re-entry   Community Re-entry Level of Assistance Moderate verbal cues   Community Re-entry from 4th floor, pt required assistance to find signs in order to locate elevators  Pt instructed to "take the elevator up to the 9th floor"  pt was able to do then press up button and press "9" once on elevator  However, pt unable to locate her room from elevator  She was unable to state her correct room number and required assist to locate a sign to read  Once pt was assisted in finding the sign, she was then able to point to direction of her room and locate her room  When pt focused on following instructions, she requires verbal cues to identify obstacles on her far R side  Cognition   Overall Cognitive Status Impaired   Arousal/Participation Alert   Attention Difficulty attending to directions   Orientation Level Oriented to person;Oriented to place;Oriented to situation   Memory Decreased short term memory;Decreased recall of recent events   Following Commands Follows multistep commands with increased time or repetition   Comments pt presents with impaired comprehension, as well as impaired problem solving and attention to detail, these deficits observed in above microwave meal prep activity  Activity Tolerance   Activity Tolerance Patient tolerated treatment well   Assessment   Treatment Assessment Pt participated in skilled OT tx session focused on cognitive retraining  See above for further details on functional performance  Pt requires assist for microwave meal prep and community reintegration tasks  Pt has demonstrated progress, but will be recommended for 24/7 supervision upon d/c and continued OT at neuro program for cognitive retraining, as well as further visual assessment as pt does present with R peripheral visual deficits  Plan for d/c ADL tomorrow  Family training has been occurring with pt's daughters  Prognosis Good   Problem List Decreased strength;Decreased endurance; Impaired balance;Decreased mobility;Orthopedic restrictions;Pain   Plan   Treatment/Interventions ADL retraining;Functional transfer training; Endurance training;Cognitive reorientation;Patient/family training;Equipment eval/education; Bed mobility; Compensatory technique education   Progress Progressing toward goals   Recommendation   OT Discharge Recommendation Home with outpatient rehabilitation   OT Therapy Minutes   OT Time In 1345   OT Time Out 1400   OT Total Time (minutes) 15   OT Mode of treatment - Individual (minutes) 15   OT Mode of treatment - Concurrent (minutes) 0   OT Mode of treatment - Group (minutes) 0   OT Mode of treatment - Co-treat (minutes) 0   OT Mode of Treatment - Total time(minutes) 15 minutes   OT Cumulative Minutes [id number]   Therapy Time missed   Time missed?  No

## 2022-08-10 NOTE — PROGRESS NOTES
08/10/22 1230   Pain Assessment   Pain Assessment Tool 0-10   Pain Score No Pain   Restrictions/Precautions   Precautions Aspiration; Aphasia;Bed/chair alarms;Cognitive; Fall Risk;Supervision on toilet/commode;Visual deficit   Weight Bearing Restrictions No   ROM Restrictions No   Lifestyle   Autonomy "I know that's not it "   Putting On/Taking Off Footwear   Type of Assistance Needed Supervision   Physical Assistance Level No physical assistance   Comment seated EOB  Putting On/Taking Off Footwear CARE Score 4   Lying to Sitting on Side of Bed   Type of Assistance Needed Supervision   Physical Assistance Level No physical assistance   Lying to Sitting on Side of Bed CARE Score 4   Sit to Stand   Type of Assistance Needed Supervision   Physical Assistance Level No physical assistance   Sit to Stand CARE Score 4   Bed-Chair Transfer   Type of Assistance Needed Supervision   Physical Assistance Level No physical assistance   Comment no AD   Chair/Bed-to-Chair Transfer CARE Score 4   Cognition   Overall Cognitive Status Impaired   Arousal/Participation Alert; Cooperative   Attention Difficulty dividing attention   Orientation Level Oriented to person;Oriented to place;Oriented to situation;Disoriented to time  (able to recall month and year w/ calendar, cues for NISH and date)   Memory Decreased short term memory;Decreased recall of precautions   Following Commands Follows multistep commands with increased time or repetition   Comments With inc time provided able to follow 3-step command w/ inc time to locate elevators, go to 4th floor, turn left out of elevator  Time spent focused on basic money management  Pt able to ID all bills (1, 5, 10, 20, 50, 100) w/ inc time  Inc difficulty noted w/ ID coin names req Min prompts, able to recall coin amount  Tasked pt w/ making specific amounts of money using bills only, completed w/ inc time w/ 100% accuracy  Upgraded task to include bills w/ simple change (ie  $14 25, 8 50)  Req Min prompts to inc attention to coin amount as pt counted out bills only, w/ prompts completed 100%  w/ inc time provided and ability to correct initial errors  Upgraded again for more difficult amounts (ie  17 67, 121 53) and inc difficulty noted req Mod vc's/prompts w/ ~50% accuracy, errors noted w/ bills and coins despite inc time provided, however limited by component of cognitive fatigue  Add'lly engaged in ballon toss w/ scattered letters, asking pt to ID first an animal, then food that starts w/ letter near R thumb on balloon  Req Min-Mod prompts to ID word and pt noted w/ inc frustration tolerance and error awareness to include perseveration  Assessment   Treatment Assessment Pt seen for skilled OT session focusing on fxnl cognitive retraining, refer to details above  Pt cont to req Min-Mod vc's/prompts for cognition and receptive and expressive language, especially w/ cognitive fatigue  However, improvements noted w/ implementing memory aides (w/ occ cues), frustration tolerance, basic problem-solving, following multi-step directions, task progression, and attention to task  Pt has demo improvement w/ repetitive training, which will be beneficial to continue w/ transition to daughter's home at d/c  Recommending shower chair and bed/chair alarms  Cont Ot POC: fxnl cognitive retraining, light cold meal prep, and repetitive safety training  Plan for d/c ADL tomorrow  Ongoing FT was completed, demo G understanding of OT recommendations and pt's current LOF  From OT standpoint, on track for d/c home w/ family to provide 24 hour S and A for IADL management, recommending OP neuro OT  Prognosis Good   Problem List Decreased cognition; Impaired judgement;Decreased safety awareness; Impaired balance   Plan   Treatment/Interventions ADL retraining;Functional transfer training; Therapeutic exercise;Cognitive reorientation; Endurance training   Progress Progressing toward goals   Recommendation   OT Discharge Recommendation Home with outpatient rehabilitation  (24 hour S, OP neuro OT)   OT Therapy Minutes   OT Time In 1230   OT Time Out 1345   OT Total Time (minutes) 75   OT Mode of treatment - Individual (minutes) 75   OT Mode of treatment - Concurrent (minutes) 0   OT Mode of treatment - Group (minutes) 0   OT Mode of treatment - Co-treat (minutes) 0   OT Mode of Treatment - Total time(minutes) 75 minutes   OT Cumulative Minutes 1205   Therapy Time missed   Time missed?  No

## 2022-08-10 NOTE — PROGRESS NOTES
Internal Medicine Progress Note  Patient: Negro Garza  Age/sex: 76 y o  female  Medical Record #: [id number]      ASSESSMENT/PLAN: (Interval History)  Negro Garza is seen and examined and management for following issues:    Lt MCA CVA  · Continue ASA 81mg daily/atorvastatin 40mg daily  · Outpt follow-up with Neurology  · Outpt follow-up with Cardiology     Junctional rhythm/PACs  · Previous EKGs were sinus  · Cards saw here and rec'd LOOP now vs Zio patch now and then LOOP as OP but family declined both 8/2/22 noting they want to revisit the topic in the OP setting  · AVOID ANY AVN BLOCKING AGENTS  · Repeat EKG 8/2 was NSR with 1st degree AVB      Depression  · Continue sertraline         Discharge date: 8/12/22    The above assessment and plan was reviewed and updated as determined by my evaluation of the patient on 8/10/2022      Labs:   Results from last 7 days   Lab Units 08/08/22  0455   WBC Thousand/uL 7 65   HEMOGLOBIN g/dL 12 8   HEMATOCRIT % 39 7   PLATELETS Thousands/uL 206     Results from last 7 days   Lab Units 08/08/22  0455   SODIUM mmol/L 141   POTASSIUM mmol/L 4 3   CHLORIDE mmol/L 106   CO2 mmol/L 29   BUN mg/dL 13   CREATININE mg/dL 0 63   CALCIUM mg/dL 9 1                   Review of Scheduled Meds:  Current Facility-Administered Medications   Medication Dose Route Frequency Provider Last Rate    acetaminophen  650 mg Oral Q6H PRN Hilario Primus, PA-C      aluminum-magnesium hydroxide-simethicone  30 mL Oral Q6H PRN Hilario Primus, PA-C      ascorbic acid  500 mg Oral Daily Hilario Primus, PA-C      aspirin  81 mg Oral Daily Hilario Primus, PA-C      atorvastatin  40 mg Oral QPM Hilario Primus, PA-C      bisacodyl  10 mg Rectal Daily PRN Hilario Primus, PA-C      calcium carbonate-vitamin D  1 tablet Oral Daily With Breakfast Hilario Primus, PA-C      cholecalciferol  2,000 Units Oral Daily Hilario Primus, PA-C      docusate sodium  100 mg Oral BID Preble Stable PRISCA Lawsno      enoxaparin  40 mg Subcutaneous Daily Krissy Silva PA-C      multivitamin-minerals  1 tablet Oral Daily Krissy Silva PA-C      ondansetron  4 mg Oral Q6H PRN Krissy Silva, PRISCA      polyethylene glycol  17 g Oral Daily PRN Krissy Silva, PRISCA      senna  2 tablet Oral BID Krissy Silva, PRISCA      sertraline  75 mg Oral Daily Krissy Silva PA-C         Subjective/ HPI: Patient seen and examined  Patients overnight issues or events were reviewed with nursing or staff during rounds or morning huddle session  New or overnight issues include the following:     No new or overnight issues  Offers no complaints    ROS:   A 10 point ROS was performed; negative except as noted above         Imaging:     No orders to display       *Labs /Radiology studies reviewed  *Medications reviewed and reconciled as needed  *Please refer to order section for additional ordered labs studies  *Case discussed with primary attending during morning huddle case rounds    Physical Examination:  Vitals:   Vitals:    08/09/22 0515 08/09/22 1334 08/09/22 2058 08/10/22 0500   BP: 124/60 127/70 120/66 126/60   BP Location: Right arm Left arm Right arm Right arm   Pulse: 59 73 (!) 54 58   Resp: 16 18 20 18   Temp: 97 6 °F (36 4 °C) 98 7 °F (37 1 °C) 97 9 °F (36 6 °C) 98 2 °F (36 8 °C)   TempSrc: Oral Oral Oral Oral   SpO2: 97% 96% 94% 96%   Weight:    87 2 kg (192 lb 3 9 oz)   Height:           General Appearance: no distress, conversive  HEENT: PERRLA, conjuctiva normal; oropharynx clear; mucous membranes moist   Neck:  Supple, normal ROM  Lungs: CTA, normal respiratory effort, no retractions, expiratory effort normal  CV: irregular rate and rhythm; no rubs/murmurs/gallops, PMI normal   ABD: soft; ND/NT; +BS  EXT: no edema  Skin: normal turgor, normal texture, no rashes  Psych: affect normal, mood normal  Neuro: AA      The above physical exam was reviewed and updated as determined by my evaluation of the patient on 8/10/2022  Invasive Devices  Report    None                    VTE Pharmacologic Prophylaxis: Enoxaparin  Code Status: Level 3 - DNAR and DNI  Current Length of Stay: 13 day(s)      Total time spent:  30 minutes with more than 50% spent counseling/coordinating care  Counseling includes discussion with patient re: progress  and discussion with patient of his/her current medical state/information  Coordination of patient's care was performed in conjunction with primary service  Time invested included review of patient's labs, vitals, and management of their comorbidities with continued monitoring  In addition, this patient was discussed with medical team including physician and advanced extenders  The care of the patient was extensively discussed and appropriate treatment plan was formulated unique for this patient  ** Please Note:  voice to text software may have been used in the creation of this document   Although proof errors in transcription or interpretation are a potential of such software**

## 2022-08-10 NOTE — PROGRESS NOTES
Physical Medicine and Rehabilitation Progress Note  Aleks Flor 76 y o  female MRN: [id number]  Unit/Bed#: -01 Encounter: [id number]    HPI: Aleks Flor is a 76 y o  female with a PMH significant for h/o endometrial cancer, depression, h/o DVT, vitamin D deficency and OA, who presented to the [address] [place] [address] ED on 07/23 with a stroke alert  She was in contact with family the night prior when the next day family could not contact her and called EMS  Patient was noted to have altered mental status upon EMS arrival and was experiencing right facial droop  Neurology was consulted  [facility] ordered without evidence of acute intracranial hemorrhage  CTA with left distal m2/m3 occlusion  SLP assessed and also noted patient to have dysphagia  MRI brain with acute to early subacute ischemia involving the left MCA territory with a larger infarct and multiple smaller infarcts  No hemorrhagic transformation identified  With her history of endometrial cancer the MRI of the brain also ruled out mets  ECHO obtained with EF of 70%  Neurology recommending outpatient loop recorder placement  She will continue ASA and Lipitor  PM&R consulted and recommended patient for ARC  She participated with PT/OT/ST and was deemed appropriate for post-acute rehabilitation services after demonstrating the ability to tolerate three hours of therapy per day  She was accepted to [facility] and arrived on 7/28/22  Chief Complaint: No new issues  Interval History/Subjective:  No acute events overnight  Last BM 8/9  Sleeping fine  No headaches, pain, CP, SOB, fevers, chills, N/V, abdominal pain  ROS:  A 10 point review of systems was negative except for what is noted in the HPI  Today's Changes:  1  Will prep scripts in anticipation of discharge on Friday  2  Team Conference today  Total time spent:  35 minutes, with more than 50% spent counseling/coordinating care   Counseling includes discussion with patient re: progress in therapies, functional issues observed by therapy staff, and discussion with patient his/her current medical state/wellbeing  Coordination of patient's care was performed in conjunction with Internal Medicine service to monitor patient's labs, vitals, and management of their comorbidities  In addition, this patient was discussed by the interdisciplinary team in weekly case conference today  The care of the patient was extensively discussed with all care providers and an appropriate rehabilitation plan was formulated unique for this patient  Barriers were identified preventing progression of therapy and appropriate interventions were discussed with each discipline  Please see the team note for input from all disciplines regarding barriers, intervention, and discharge planning  Assessment/Plan:    * Acute ischemic left MCA stroke [facility])  Assessment & Plan  · Presented to [facility] ED with right facial droop and aphasia  · Outside tPA window  · CT without evidence of acute intracranial hemorrhage  Left frontal temporal and low attenuation suggesting acute-subacute ischemia  · CTA H/N with distal Left M2/M3 MCA occlusion   · MRI brain- acute to early subacute ischemia identified involving the left MCA territory with one larger infarct and multiple additional smaller infarcts   No hemorrhagic transformation identified   · MRI brain negative for metastasis in setting of h/o endometrial cancer  · ECHO unremarkable   · Continue ASA/Statin therapy per neurology recs  · Dysphagia diet; SLP following  · Neurology also recommended Loop recorder as OP with OP FU in 4-6 weeks  · Daughter deferred inpatient placement of loop recorder; aware of need to follow up with Cardiology as OP  · Acute chomprehensive interdisciplinary inpatient rehabilitation to include intensive skilled therapies as outlines with oversight and management by a rehabilitation Physician Assistant overseen by rehabilitation physician as well as inpatient rehabilitation nursing, case management and weekly interdisciplinary team meeting    SA node dysfunction [facility])  Assessment & Plan  With junctional bradycardia new on exam 8/2 8/4 more regular  Monitor EKG  - Seen by Cards  - HR fairly stable compared to previous  - K > 4, Mag > 2  - Family would like to defer any monitoring until outpatient    - Risk/benefits/alternatives presented in detail  Dysphagia  Assessment & Plan  · Dysphagia level 3 diet with thin liquids  · SLP to follow    History of DVT (deep vein thrombosis)  Assessment & Plan  · Potential h/o xarelto use for several months; no longer taking  · Currently Lovenox SQ Daily and SCD       Mild episode of recurrent major depressive disorder (HCC)  Assessment & Plan  · Continue Zoloft 75 mg daily      Vitamin D deficiency  Assessment & Plan  · Continue Cholecalciferol       History of endometrial cancer  Assessment & Plan  · Stage IA, grade 3 s/p completion of adjuvant chemotherapy and radiation in April 2015  · Follows with GYN-ONC as OP        Health Maintenance  #Delirium/Sleep: At risk  First night with some impulsivity, but due to needing to go to the bathroom  Added timed voids with improvement  Continue to optimize pain, bowel, bladder, sleep-wake cycle management  #Pain: Tylenol PRN  #Bowel: Last BM 8/9  On Senna 2 tabs BID and docusate BID  Will try to de-escalate while here  #Bladder: Timed voids to help with continence PVRx all < 150cc  #Skin/Pressure Injury Prevention: Turn Q2hr in bed, with weight shifts [id number] in wheelchair  #DVT Prophylaxis: Lovenox, SCDs  #GI Prophylaxis: PO diet  #Code Status:  DNR/DNI  #FEN: Level 3/thins  #Dispo: Team Conference 8/10: ADD 8/12 to her daughter's home with supervision and PT/OT/SLP  Objective:    Functional Update:  PT: Sup transfers, bed mobility, ambulation 300', wheelchair mobility   CGA stairs   OT:  Ind eating, Sup grooming, bathing, UB/LB dressing, toileting, CGA tub/shower transfer, Sup toilet transfer  SLP: Level 3/thins advanced  Moderate deficits in receptive/expressive language and mod-max deficits in executive function and memory skills with gradual improvement  Allergies per EMR    Physical Exam:  Temp:  [97 9 °F (36 6 °C)-98 7 °F (37 1 °C)] 98 2 °F (36 8 °C)  HR:  [54-73] 58  Resp:  [18-20] 18  BP: (120-127)/(60-70) 126/60  Oxygen Therapy  SpO2: 96 %    Gen: No acute distress, Well-nourished, well-appearing  HEENT: Moist mucus membranes, Normocephalic/Atraumatic  Cardiovascular: Regular rate, rhythm, S1/S2  Distal pulses palpable  Heme/Extr: No edema  Pulmonary: Non-labored breathing  : No pantoja  GI: Soft, non-tender, non-distended  Integumentary: Skin is warm, dry  Neuro: AAOx3, Expressive and receptive deficits  Seems to have had some improvement cognitively  Psych: Normal mood and affect  Diagnostic Studies: Reviewed, no new imaging      Laboratory:  Reviewed   Results from last 7 days   Lab Units 08/08/22  0455   HEMOGLOBIN g/dL 12 8   HEMATOCRIT % 39 7   WBC Thousand/uL 7 65     Results from last 7 days   Lab Units 08/08/22  0455   BUN mg/dL 13   POTASSIUM mmol/L 4 3   CHLORIDE mmol/L 106   CREATININE mg/dL 0 63            Patient Active Problem List   Diagnosis    History of endometrial cancer    Endometrial cancer ([facility] [address] )    Inguinal lymphadenopathy    Multinodular thyroid    Obesity    Osteoarthritis of hip    Osteoarthritis of knee    Vitamin D deficiency    Osteopenia of lumbar spine    Mild episode of recurrent major depressive disorder (HCC)    Acute ischemic left MCA stroke (HCC)    History of DVT (deep vein thrombosis)    Dysphagia    SA node dysfunction (HCC)         Medications  Current Facility-Administered Medications   Medication Dose Route Frequency Provider Last Rate    acetaminophen  650 mg Oral Q6H PRN Lina King PA-C      aluminum-magnesium hydroxide-simethicone  30 mL Oral Q6H PRN Jodie Dwyer PRISCA Lawson      ascorbic acid  500 mg Oral Daily Janet Meyer, PRISCA      aspirin  81 mg Oral Daily Janet Meyer, PRISCA      atorvastatin  40 mg Oral QPM Janet Meyer, PRISCA      bisacodyl  10 mg Rectal Daily PRN Janet Meyer, PA-HELEN      calcium carbonate-vitamin D  1 tablet Oral Daily With Breakfast Janet Meyer, PRISCA      cholecalciferol  2,000 Units Oral Daily Janet Meyer, PRISCA      docusate sodium  100 mg Oral BID Janet Meyer, PRISCA      enoxaparin  40 mg Subcutaneous Daily Janet Meyer, PRISCA      multivitamin-minerals  1 tablet Oral Daily Janet Meyer, PRISCA      ondansetron  4 mg Oral Q6H PRN Janet Meyer, PRISCA      polyethylene glycol  17 g Oral Daily PRN Janet Meyer, PRISCA      senna  2 tablet Oral BID [facility], PRISCA      sertraline  75 mg Oral Daily Janet Meyer, PRISCA          ** Please Note: Fluency Direct voice to text software may have been used in the creation of this document   **

## 2022-08-11 PROCEDURE — 92526 ORAL FUNCTION THERAPY: CPT

## 2022-08-11 PROCEDURE — 97530 THERAPEUTIC ACTIVITIES: CPT

## 2022-08-11 PROCEDURE — 99232 SBSQ HOSP IP/OBS MODERATE 35: CPT | Performed by: INTERNAL MEDICINE

## 2022-08-11 PROCEDURE — 97110 THERAPEUTIC EXERCISES: CPT

## 2022-08-11 PROCEDURE — 97535 SELF CARE MNGMENT TRAINING: CPT

## 2022-08-11 PROCEDURE — 97112 NEUROMUSCULAR REEDUCATION: CPT

## 2022-08-11 PROCEDURE — 99233 SBSQ HOSP IP/OBS HIGH 50: CPT

## 2022-08-11 PROCEDURE — 92507 TX SP LANG VOICE COMM INDIV: CPT

## 2022-08-11 RX ADMIN — ATORVASTATIN CALCIUM 40 MG: 40 TABLET, FILM COATED ORAL at 18:19

## 2022-08-11 RX ADMIN — Medication 2000 UNITS: at 08:17

## 2022-08-11 RX ADMIN — DOCUSATE SODIUM 100 MG: 100 CAPSULE, LIQUID FILLED ORAL at 08:17

## 2022-08-11 RX ADMIN — DOCUSATE SODIUM 100 MG: 100 CAPSULE, LIQUID FILLED ORAL at 18:19

## 2022-08-11 RX ADMIN — ENOXAPARIN SODIUM 40 MG: 40 INJECTION SUBCUTANEOUS at 08:17

## 2022-08-11 RX ADMIN — ASPIRIN 81 MG CHEWABLE TABLET 81 MG: 81 TABLET CHEWABLE at 08:17

## 2022-08-11 RX ADMIN — SENNOSIDES 17.2 MG: 8.6 TABLET, FILM COATED ORAL at 08:17

## 2022-08-11 RX ADMIN — SENNOSIDES 17.2 MG: 8.6 TABLET, FILM COATED ORAL at 18:18

## 2022-08-11 RX ADMIN — SERTRALINE HYDROCHLORIDE 75 MG: 50 TABLET ORAL at 08:17

## 2022-08-11 RX ADMIN — OXYCODONE HYDROCHLORIDE AND ACETAMINOPHEN 500 MG: 500 TABLET ORAL at 08:17

## 2022-08-11 RX ADMIN — Medication 1 TABLET: at 07:17

## 2022-08-11 RX ADMIN — MULTIPLE VITAMINS W/ MINERALS TAB 1 TABLET: TAB ORAL at 08:17

## 2022-08-11 NOTE — PLAN OF CARE
Problem: Prexisting or High Potential for Compromised Skin Integrity  Goal: Skin integrity is maintained or improved  Description: INTERVENTIONS:  - Identify patients at risk for skin breakdown  - Assess and monitor skin integrity  - Assess and monitor nutrition and hydration status  - Monitor labs   - Assess for incontinence   - Turn and reposition patient  - Assist with mobility/ambulation  - Relieve pressure over bony prominences  - Avoid friction and shearing  - Provide appropriate hygiene as needed including keeping skin clean and dry  - Evaluate need for skin moisturizer/barrier cream  - Collaborate with interdisciplinary team   - Patient/family teaching  - Consider wound care consult   Outcome: Progressing     Problem: PAIN - ADULT  Goal: Verbalizes/displays adequate comfort level or baseline comfort level  Description: Interventions:  - Encourage patient to monitor pain and request assistance  - Assess pain using appropriate pain scale  - Administer analgesics based on type and severity of pain and evaluate response  - Implement non-pharmacological measures as appropriate and evaluate response  - Consider cultural and social influences on pain and pain management  - Notify physician/advanced practitioner if interventions unsuccessful or patient reports new pain  Outcome: Progressing     Problem: INFECTION - ADULT  Goal: Absence or prevention of progression during hospitalization  Description: INTERVENTIONS:  - Assess and monitor for signs and symptoms of infection  - Monitor lab/diagnostic results  - Monitor all insertion sites, i e  indwelling lines, tubes, and drains  - Monitor endotracheal if appropriate and nasal secretions for changes in amount and color  - [place] appropriate cooling/warming therapies per order  - Administer medications as ordered  - Instruct and encourage patient and family to use good hand hygiene technique  - Identify and instruct in appropriate isolation precautions for identified infection/condition  Outcome: Progressing  Goal: Absence of fever/infection during neutropenic period  Description: INTERVENTIONS:  - Monitor WBC    Outcome: Progressing     Problem: SAFETY ADULT  Goal: Patient will remain free of falls  Description: INTERVENTIONS:  - Educate patient/family on patient safety including physical limitations  - Instruct patient to call for assistance with activity   - Consult OT/PT to assist with strengthening/mobility   - Keep Call bell within reach  - Keep bed low and locked with side rails adjusted as appropriate  - Keep care items and personal belongings within reach  - Initiate and maintain comfort rounds  - Make Fall Risk Sign visible to staff  - Apply yellow socks and bracelet for high fall risk patients  - Consider moving patient to room near nurses station  Outcome: Progressing  Goal: Maintain or return to baseline ADL function  Description: INTERVENTIONS:  -  Assess patient's ability to carry out ADLs; assess patient's baseline for ADL function and identify physical deficits which impact ability to perform ADLs (bathing, care of mouth/teeth, toileting, grooming, dressing, etc )  - Assess/evaluate cause of self-care deficits   - Assess range of motion  - Assess patient's mobility; develop plan if impaired  - Assess patient's need for assistive devices and provide as appropriate  - Encourage maximum independence but intervene and supervise when necessary  - Involve family in performance of ADLs  - Assess for home care needs following discharge   - Consider OT consult to assist with ADL evaluation and planning for discharge  - Provide patient education as appropriate  Outcome: Progressing  Goal: Maintains/Returns to pre admission functional level  Description: INTERVENTIONS:  - Perform BMAT or MOVE assessment daily    - Set and communicate daily mobility goal to care team and patient/family/caregiver     - Collaborate with rehabilitation services on mobility goals if consulted  - Out of bed for toileting  - Record patient progress and toleration of activity level   Outcome: Progressing     Problem: DISCHARGE PLANNING  Goal: Discharge to home or other facility with appropriate resources  Description: INTERVENTIONS:  - Identify barriers to discharge w/patient and caregiver  - Arrange for needed discharge resources and transportation as appropriate  - Identify discharge learning needs (meds, wound care, etc )  - Arrange for interpretive services to assist at discharge as needed  - Refer to Case Management Department for coordinating discharge planning if the patient needs post-hospital services based on physician/advanced practitioner order or complex needs related to functional status, cognitive ability, or social support system  Outcome: Progressing     Problem: Neurological Deficit  Goal: Neurological status is stable or improving  Description: Interventions:  - Monitor and assess patient's level of consciousness, motor function, sensory function, and level of assistance needed for ADLs  - Monitor and report changes from baseline  Collaborate with interdisciplinary team to initiate plan and implement interventions as ordered  - Provide and maintain a safe environment  - Consider seizure precautions  - Consider fall precautions  - Consider aspiration precautions  - Consider bleeding precautions  Outcome: Progressing     Problem: Activity Intolerance/Impaired Mobility  Goal: Mobility/activity is maintained at optimum level for patient  Description: Interventions:  - Assess and monitor patient  barriers to mobility and need for assistive/adaptive devices  - Assess patient's emotional response to limitations  - Collaborate with interdisciplinary team and initiate plans and interventions as ordered  - Encourage independent activity per ability   - Maintain proper body alignment  - Perform active/passive rom as tolerated/ordered  - Plan activities to conserve energy    - Turn patient as appropriate  Outcome: Progressing     Problem: Communication Impairment  Goal: Ability to express needs and understand communication  Description: Assess patient's communication skills and ability to understand information  Patient will demonstrate use of effective communication techniques, alternative methods of communication and understanding even if not able to speak  - Encourage communication and provide alternate methods of communication as needed  - Collaborate with case management/ for discharge needs  - Include patient/family/caregiver in decisions related to communication  Outcome: Progressing     Problem: Potential for Aspiration  Goal: Non-ventilated patient's risk of aspiration is minimized  Description: Assess and monitor vital signs, respiratory status, and labs (WBC)  Monitor for signs of aspiration (tachypnea, cough, rales, wheezing, cyanosis, fever)  - Assess and monitor patient's ability to swallow  - Place patient up in chair to eat if possible  - HOB up at 90 degrees to eat if unable to get patient up into chair   - Supervise patient during oral intake  - Instruct patient/ family to take small bites  - Instruct patient/ family to take small single sips when taking liquids  - Follow patient-specific strategies generated by speech pathologist   Outcome: Progressing  Goal: Ventilated patient's risk of aspiration is minimized  Description: Assess and monitor vital signs, respiratory status, airway cuff pressure, and labs (WBC)  Monitor for signs of aspiration (tachypnea, cough, rales, wheezing, cyanosis, fever)  - Elevate head of bed 30 degrees if patient has tube feeding   - Monitor tube feeding    Outcome: Progressing     Problem: Nutrition  Goal: Nutrition/Hydration status is improving  Description: Monitor and assess patient's nutrition/hydration status for malnutrition (ex- brittle hair, bruises, dry skin, pale skin and conjunctiva, muscle wasting, smooth red tongue, and disorientation)  Collaborate with interdisciplinary team and initiate plan and interventions as ordered  Monitor patient's weight and dietary intake as ordered or per policy  Utilize nutrition screening tool and intervene per policy  Determine patient's food preferences and provide high-protein, high-caloric foods as appropriate  - Assist patient with eating   - Allow adequate time for meals   - Encourage patient to take dietary supplement as ordered  - Collaborate with clinical nutritionist   - Include patient/family/caregiver in decisions related to nutrition  Outcome: Progressing     Problem: Nutrition/Hydration-ADULT  Goal: Nutrient/Hydration intake appropriate for improving, restoring or maintaining nutritional needs  Description: Monitor and assess patient's nutrition/hydration status for malnutrition  Collaborate with interdisciplinary team and initiate plan and interventions as ordered  Monitor patient's weight and dietary intake as ordered or per policy  Utilize nutrition screening tool and intervene as necessary  Determine patient's food preferences and provide high-protein, high-caloric foods as appropriate       INTERVENTIONS:  - Monitor oral intake, urinary output, labs, and treatment plans  - Assess nutrition and hydration status and recommend course of action  - Evaluate amount of meals eaten  - Assist patient with eating if necessary   - Allow adequate time for meals  - Recommend/ encourage appropriate diets, oral nutritional supplements, and vitamin/mineral supplements  - Order, calculate, and assess calorie counts as needed  - Recommend, monitor, and adjust tube feedings and TPN/PPN based on assessed needs  - Assess need for intravenous fluids  - Provide specific nutrition/hydration education as appropriate  - Include patient/family/caregiver in decisions related to nutrition  Outcome: Progressing     Problem: Potential for Falls  Goal: Patient will remain free of falls  Description: INTERVENTIONS:  - Educate patient/family on patient safety including physical limitations  - Instruct patient to call for assistance with activity   - Consult OT/PT to assist with strengthening/mobility   - Keep Call bell within reach  - Keep bed low and locked with side rails adjusted as appropriate  - Keep care items and personal belongings within reach  - Initiate and maintain comfort rounds  - Make Fall Risk Sign visible to staff  - Apply yellow socks and bracelet for high fall risk patients  - Consider moving patient to room near nurses station  Outcome: Progressing     Problem: MOBILITY - ADULT  Goal: Maintain or return to baseline ADL function  Description: INTERVENTIONS:  -  Assess patient's ability to carry out ADLs; assess patient's baseline for ADL function and identify physical deficits which impact ability to perform ADLs (bathing, care of mouth/teeth, toileting, grooming, dressing, etc )  - Assess/evaluate cause of self-care deficits   - Assess range of motion  - Assess patient's mobility; develop plan if impaired  - Assess patient's need for assistive devices and provide as appropriate  - Encourage maximum independence but intervene and supervise when necessary  - Involve family in performance of ADLs  - Assess for home care needs following discharge   - Consider OT consult to assist with ADL evaluation and planning for discharge  - Provide patient education as appropriate  Outcome: Progressing  Goal: Maintains/Returns to pre admission functional level  Description: INTERVENTIONS:  - Perform BMAT or MOVE assessment daily    - Set and communicate daily mobility goal to care team and patient/family/caregiver     - Collaborate with rehabilitation services on mobility goals if consulted  - Out of bed for toileting  - Record patient progress and toleration of activity level   Outcome: Progressing

## 2022-08-11 NOTE — PLAN OF CARE
Problem: Prexisting or High Potential for Compromised Skin Integrity  Goal: Skin integrity is maintained or improved  Description: INTERVENTIONS:  - Identify patients at risk for skin breakdown  - Assess and monitor skin integrity  - Assess and monitor nutrition and hydration status  - Monitor labs   - Assess for incontinence   - Turn and reposition patient  - Assist with mobility/ambulation  - Relieve pressure over bony prominences  - Avoid friction and shearing  - Provide appropriate hygiene as needed including keeping skin clean and dry  - Evaluate need for skin moisturizer/barrier cream  - Collaborate with interdisciplinary team   - Patient/family teaching  - Consider wound care consult   Outcome: Progressing     Problem: PAIN - ADULT  Goal: Verbalizes/displays adequate comfort level or baseline comfort level  Description: Interventions:  - Encourage patient to monitor pain and request assistance  - Assess pain using appropriate pain scale  - Administer analgesics based on type and severity of pain and evaluate response  - Implement non-pharmacological measures as appropriate and evaluate response  - Consider cultural and social influences on pain and pain management  - Notify physician/advanced practitioner if interventions unsuccessful or patient reports new pain  Outcome: Progressing     Problem: INFECTION - ADULT  Goal: Absence or prevention of progression during hospitalization  Description: INTERVENTIONS:  - Assess and monitor for signs and symptoms of infection  - Monitor lab/diagnostic results  - Monitor all insertion sites, i e  indwelling lines, tubes, and drains  - Monitor endotracheal if appropriate and nasal secretions for changes in amount and color  - [place] appropriate cooling/warming therapies per order  - Administer medications as ordered  - Instruct and encourage patient and family to use good hand hygiene technique  - Identify and instruct in appropriate isolation precautions for identified infection/condition  Outcome: Progressing  Goal: Absence of fever/infection during neutropenic period  Description: INTERVENTIONS:  - Monitor WBC    Outcome: Progressing     Problem: SAFETY ADULT  Goal: Patient will remain free of falls  Description: INTERVENTIONS:  - Educate patient/family on patient safety including physical limitations  - Instruct patient to call for assistance with activity   - Consult OT/PT to assist with strengthening/mobility   - Keep Call bell within reach  - Keep bed low and locked with side rails adjusted as appropriate  - Keep care items and personal belongings within reach  - Initiate and maintain comfort rounds  - Make Fall Risk Sign visible to staff  - Offer Toileting every 2 Hours, in advance of need  - Initiate/Maintain bed and chair  alarm  - Obtain necessary fall risk management equipment: alarms  - Apply yellow socks and bracelet for high fall risk patients  - Consider moving patient to room near nurses station  Outcome: Progressing  Goal: Maintain or return to baseline ADL function  Description: INTERVENTIONS:  -  Assess patient's ability to carry out ADLs; assess patient's baseline for ADL function and identify physical deficits which impact ability to perform ADLs (bathing, care of mouth/teeth, toileting, grooming, dressing, etc )  - Assess/evaluate cause of self-care deficits   - Assess range of motion  - Assess patient's mobility; develop plan if impaired  - Assess patient's need for assistive devices and provide as appropriate  - Encourage maximum independence but intervene and supervise when necessary  - Involve family in performance of ADLs  - Assess for home care needs following discharge   - Consider OT consult to assist with ADL evaluation and planning for discharge  - Provide patient education as appropriate  Outcome: Progressing  Goal: Maintains/Returns to pre admission functional level  Description: INTERVENTIONS:  - Perform BMAT or MOVE assessment daily  - Set and communicate daily mobility goal to care team and patient/family/caregiver  - Collaborate with rehabilitation services on mobility goals if consulted    - Reposition patient every 2 hours  - Stand patient 3 times a day  - Ambulate patient 3 times a day  - Out of bed to chair 3 times a day   - Out of bed for meals 3 times a day  - Out of bed for toileting  - Record patient progress and toleration of activity level   Outcome: Progressing     Problem: DISCHARGE PLANNING  Goal: Discharge to home or other facility with appropriate resources  Description: INTERVENTIONS:  - Identify barriers to discharge w/patient and caregiver  - Arrange for needed discharge resources and transportation as appropriate  - Identify discharge learning needs (meds, wound care, etc )  - Arrange for interpretive services to assist at discharge as needed  - Refer to Case Management Department for coordinating discharge planning if the patient needs post-hospital services based on physician/advanced practitioner order or complex needs related to functional status, cognitive ability, or social support system  Outcome: Progressing     Problem: Neurological Deficit  Goal: Neurological status is stable or improving  Description: Interventions:  - Monitor and assess patient's level of consciousness, motor function, sensory function, and level of assistance needed for ADLs  - Monitor and report changes from baseline  Collaborate with interdisciplinary team to initiate plan and implement interventions as ordered  - Provide and maintain a safe environment  - Consider seizure precautions  - Consider fall precautions  - Consider aspiration precautions  - Consider bleeding precautions  Outcome: Progressing     Problem:  Activity Intolerance/Impaired Mobility  Goal: Mobility/activity is maintained at optimum level for patient  Description: Interventions:  - Assess and monitor patient  barriers to mobility and need for assistive/adaptive devices  - Assess patient's emotional response to limitations  - Collaborate with interdisciplinary team and initiate plans and interventions as ordered  - Encourage independent activity per ability   - Maintain proper body alignment  - Perform active/passive rom as tolerated/ordered  - Plan activities to conserve energy   - Turn patient as appropriate  Outcome: Progressing     Problem: Communication Impairment  Goal: Ability to express needs and understand communication  Description: Assess patient's communication skills and ability to understand information  Patient will demonstrate use of effective communication techniques, alternative methods of communication and understanding even if not able to speak  - Encourage communication and provide alternate methods of communication as needed  - Collaborate with case management/ for discharge needs  - Include patient/family/caregiver in decisions related to communication  Outcome: Progressing     Problem: Potential for Aspiration  Goal: Non-ventilated patient's risk of aspiration is minimized  Description: Assess and monitor vital signs, respiratory status, and labs (WBC)  Monitor for signs of aspiration (tachypnea, cough, rales, wheezing, cyanosis, fever)  - Assess and monitor patient's ability to swallow  - Place patient up in chair to eat if possible  - HOB up at 90 degrees to eat if unable to get patient up into chair   - Supervise patient during oral intake  - Instruct patient/ family to take small bites  - Instruct patient/ family to take small single sips when taking liquids  - Follow patient-specific strategies generated by speech pathologist   Outcome: Progressing  Goal: Ventilated patient's risk of aspiration is minimized  Description: Assess and monitor vital signs, respiratory status, airway cuff pressure, and labs (WBC)    Monitor for signs of aspiration (tachypnea, cough, rales, wheezing, cyanosis, fever)  - Elevate head of bed 30 degrees if patient has tube feeding   - Monitor tube feeding  Outcome: Progressing     Problem: Nutrition  Goal: Nutrition/Hydration status is improving  Description: Monitor and assess patient's nutrition/hydration status for malnutrition (ex- brittle hair, bruises, dry skin, pale skin and conjunctiva, muscle wasting, smooth red tongue, and disorientation)  Collaborate with interdisciplinary team and initiate plan and interventions as ordered  Monitor patient's weight and dietary intake as ordered or per policy  Utilize nutrition screening tool and intervene per policy  Determine patient's food preferences and provide high-protein, high-caloric foods as appropriate  - Assist patient with eating   - Allow adequate time for meals   - Encourage patient to take dietary supplement as ordered  - Collaborate with clinical nutritionist   - Include patient/family/caregiver in decisions related to nutrition  Outcome: Progressing     Problem: Nutrition/Hydration-ADULT  Goal: Nutrient/Hydration intake appropriate for improving, restoring or maintaining nutritional needs  Description: Monitor and assess patient's nutrition/hydration status for malnutrition  Collaborate with interdisciplinary team and initiate plan and interventions as ordered  Monitor patient's weight and dietary intake as ordered or per policy  Utilize nutrition screening tool and intervene as necessary  Determine patient's food preferences and provide high-protein, high-caloric foods as appropriate       INTERVENTIONS:  - Monitor oral intake, urinary output, labs, and treatment plans  - Assess nutrition and hydration status and recommend course of action  - Evaluate amount of meals eaten  - Assist patient with eating if necessary   - Allow adequate time for meals  - Recommend/ encourage appropriate diets, oral nutritional supplements, and vitamin/mineral supplements  - Order, calculate, and assess calorie counts as needed  - Recommend, monitor, and adjust tube feedings and TPN/PPN based on assessed needs  - Assess need for intravenous fluids  - Provide specific nutrition/hydration education as appropriate  - Include patient/family/caregiver in decisions related to nutrition  Outcome: Progressing     Problem: Potential for Falls  Goal: Patient will remain free of falls  Description: INTERVENTIONS:  - Educate patient/family on patient safety including physical limitations  - Instruct patient to call for assistance with activity   - Consult OT/PT to assist with strengthening/mobility   - Keep Call bell within reach  - Keep bed low and locked with side rails adjusted as appropriate  - Keep care items and personal belongings within reach  - Initiate and maintain comfort rounds  - Make Fall Risk Sign visible to staff  - Offer Toileting every 2 Hours, in advance of need  - Initiate/Maintain alarms  alarm  - Obtain necessary fall risk management equipment: alarms    - Apply yellow socks and bracelet for high fall risk patients  - Consider moving patient to room near nurses station  Outcome: Progressing     Problem: MOBILITY - ADULT  Goal: Maintain or return to baseline ADL function  Description: INTERVENTIONS:  -  Assess patient's ability to carry out ADLs; assess patient's baseline for ADL function and identify physical deficits which impact ability to perform ADLs (bathing, care of mouth/teeth, toileting, grooming, dressing, etc )  - Assess/evaluate cause of self-care deficits   - Assess range of motion  - Assess patient's mobility; develop plan if impaired  - Assess patient's need for assistive devices and provide as appropriate  - Encourage maximum independence but intervene and supervise when necessary  - Involve family in performance of ADLs  - Assess for home care needs following discharge   - Consider OT consult to assist with ADL evaluation and planning for discharge  - Provide patient education as appropriate  Outcome: Progressing  Goal: Maintains/Returns to pre admission functional level  Description: INTERVENTIONS:  - Perform BMAT or MOVE assessment daily    - Set and communicate daily mobility goal to care team and patient/family/caregiver  - Collaborate with rehabilitation services on mobility goals if consulted      - Reposition patient every 2 hours        - Ambulate patient 3 times a day  - Out of bed to chair 3 times a day   - Out of bed for meals 3 times a day  - Out of bed for toileting  - Record patient progress and toleration of activity level   Outcome: Progressing

## 2022-08-11 NOTE — PROGRESS NOTES
08/11/22 1630   Pain Assessment   Pain Assessment Tool 0-10   Pain Score No Pain   Restrictions/Precautions   Precautions Aphasia; Aspiration;Bed/chair alarms;Cognitive; Fall Risk;Supervision on toilet/commode;Visual deficit   Weight Bearing Restrictions No   ROM Restrictions No   Eating   Type of Assistance Needed Supervision   Physical Assistance Level No physical assistance   Eating CARE Score 4   Swallow Assessment   Swallow Treatment Assessment Daily Dysphagia Tx Note      Patient Name: Vasu Irby     DVEXU'S Date: 8/11/2022           Current Risks for Dysphagia & Aspiration: Dysarthria;General debilitation;New Neuro event;Brain injury;Cognitive deficit     Current Symptoms/Concerns: difficulty chewing, extended time for meals     Current diet:Regular and thin liquids      Premorbid diet::regular diet and thin liquids      Positioning: upright in recliner     Items administered:Consistencies Administered: thin liquids, hard solids   Materials administered included: roast beef, mashed potatoes, cauliflower, lemon meringue pie, and thin liquids by straw     Total amount of meal consumed:   100% of meal and 120cc thin liquids        Oral stage:minimal to [facility]  Lip closure: adequate to utensil and straw sip  Anterior spillage: none   Mastication: slow but functional for items this meal  Bolus formation: functional  Bolus control: prompt across consistencies  Transfer: timely across consistences  Oral residue: minimal but given increased time able to clear   Pocketing: none     Pharyngeal stage: [facility]  Swallow promptness: adequate for food and liquids  Hyolaryngeal elevation: present  Wet voice: none  Throat clear: none  Cough: none  Secondary swallows: none  Audible swallows: none     Esophageal stage:WFL   No s/s noted  Summary:     Pt presenting with minimal to [facility] oropharyngeal dysphagia as observed by continued functional lip seal around utensil and straw with good control and no anterior loss  Mastication continues to be functional if not mildly prolonged given harder textures (larger pieces of cauliflower)  Overall bolus control/transfer was prompt across consistencies  Swallow initiation was noted to be prompt across consistencies and hyolaryngeal excursion present  No s/s aspiration with food or liquids across the meal this session  Family (2 daughters) present for meal and able to provide cuing for smaller bites  Recommendations:  Diet: regular diet and thin liquids  Meds: whole with liquid and whole with puree- as tolerated  Strategies: upright posture, only feed when fully alert, slow rate of feeding, small bites/sips, quiet environment (tv off, limit talking, door closed, etc ), alternating bites and sips and OOB for ALL meals     DISTANT supervision w/ meals  Results reviewed with:  patient, and family  Aspiration precautions posted  F/u ST tx: pt appears to be tolerating regular diet and thin liquids- education completed with family in regards to aspiration precautions for at home  Discharge planned for tomorrow 8/12/2022 home with family who can provide 24hr supervision/assistance  No further dysphagia tx services warranted at discharge  Swallow Assessment Prognosis   Prognosis Good   Prognosis Considerations Age; Co-morbidities; Medical diagnosis; Medical prognosis;Previous level of function;Severity of impairments;New learning ability;Ability to carry over; Cooperation   SLP Therapy Minutes   SLP Time In 36   SLP Time Out 1700   SLP Total Time (minutes) 30   SLP Mode of treatment - Individual (minutes) 30   SLP Mode of treatment - Concurrent (minutes) 0   SLP Mode of treatment - Group (minutes) 0   SLP Mode of treatment - Co-treat (minutes) 0   SLP Mode of Treatment - Total time(minutes) 30 minutes   SLP Cumulative Minutes 893   Therapy Time missed   Time missed?  No

## 2022-08-11 NOTE — PROGRESS NOTES
Internal Medicine Progress Note  Patient: Vasu Irby  Age/sex: 76 y o  female  Medical Record #: [id number]      ASSESSMENT/PLAN: (Interval History)  Vasu Irby is seen and examined and management for following issues:    Lt MCA CVA  · Continue ASA 81mg daily/atorvastatin 40mg daily  · Outpt follow-up with Neurology  · Outpt follow-up with Cardiology     Junctional rhythm/PACs  · Previous EKGs were sinus  · Cards saw here and rec'd LOOP now vs Zio patch now and then LOOP as OP but family declined both 8/2/22 noting they want to revisit the topic in the OP setting  · AVOID ANY AVN BLOCKING AGENTS  · Repeat EKG 8/2 was NSR with 1st degree AVB      Depression  · Continue sertraline         Discharge date: 8/12/22    The above assessment and plan was reviewed and updated as determined by my evaluation of the patient on 8/11/2022      Labs:   Results from last 7 days   Lab Units 08/08/22  0455   WBC Thousand/uL 7 65   HEMOGLOBIN g/dL 12 8   HEMATOCRIT % 39 7   PLATELETS Thousands/uL 206     Results from last 7 days   Lab Units 08/08/22  0455   SODIUM mmol/L 141   POTASSIUM mmol/L 4 3   CHLORIDE mmol/L 106   CO2 mmol/L 29   BUN mg/dL 13   CREATININE mg/dL 0 63   CALCIUM mg/dL 9 1                   Review of Scheduled Meds:  Current Facility-Administered Medications   Medication Dose Route Frequency Provider Last Rate    acetaminophen  650 mg Oral Q6H PRN Gala Crum PA-C      aluminum-magnesium hydroxide-simethicone  30 mL Oral Q6H PRN Gala Crum PA-C      ascorbic acid  500 mg Oral Daily Gala Crum PA-C      aspirin  81 mg Oral Daily Gala Crum PA-C      atorvastatin  40 mg Oral QPM Gala Crum PA-C      bisacodyl  10 mg Rectal Daily PRN Gala Crum PA-C      calcium carbonate-vitamin D  1 tablet Oral Daily With Breakfast Gala Crum PA-C      cholecalciferol  2,000 Units Oral Daily Gala Crum PA-C      docusate sodium  100 mg Oral BID Karley La PRISCA Lawson      enoxaparin  40 mg Subcutaneous Daily Dana Mendez PA-C      multivitamin-minerals  1 tablet Oral Daily Dana Mendez PA-C      ondansetron  4 mg Oral Q6H PRN Dana Mendez PA-C      polyethylene glycol  17 g Oral Daily PRN Dana Mendez PA-C      senna  2 tablet Oral BID Dana Mendez PA-C      sertraline  75 mg Oral Daily Dana Mendez PA-C         Subjective/ HPI: Patient seen and examined  Patients overnight issues or events were reviewed with nursing or staff during rounds or morning huddle session  New or overnight issues include the following:     No new or overnight issues  Offers no complaints    ROS:   A 10 point ROS was performed; negative except as noted above         Imaging:     No orders to display       *Labs /Radiology studies reviewed  *Medications reviewed and reconciled as needed  *Please refer to order section for additional ordered labs studies  *Case discussed with primary attending during morning huddle case rounds    Physical Examination:  Vitals:   Vitals:    08/10/22 0500 08/10/22 1333 08/10/22 2038 08/11/22 0528   BP: 126/60 124/62 149/65 130/61   BP Location: Right arm Right arm Right arm Right arm   Pulse: 58 62 58 (!) 52   Resp: 18 18 18 18   Temp: 98 2 °F (36 8 °C) 98 3 °F (36 8 °C) 97 6 °F (36 4 °C) 98 3 °F (36 8 °C)   TempSrc: Oral Oral Oral Oral   SpO2: 96% 96% 95% 96%   Weight: 87 2 kg (192 lb 3 9 oz)      Height:           General Appearance: no distress, conversive  HEENT: PERRLA, conjuctiva normal; oropharynx clear; mucous membranes moist   Neck:  Supple, normal ROM  Lungs: CTA, normal respiratory effort, no retractions, expiratory effort normal  CV: irregular rate and rhythm; no rubs/murmurs/gallops, PMI normal   ABD: soft; ND/NT; +BS  EXT: no edema  Skin: normal turgor, normal texture, no rashes  Psych: affect normal, mood normal  Neuro: AAO      The above physical exam was reviewed and updated as determined by my evaluation of the patient on 8/11/2022  Invasive Devices  Report    None                    VTE Pharmacologic Prophylaxis: Enoxaparin  Code Status: Level 3 - DNAR and DNI  Current Length of Stay: 14 day(s)      Total time spent:  30 minutes with more than 50% spent counseling/coordinating care  Counseling includes discussion with patient re: progress  and discussion with patient of his/her current medical state/information  Coordination of patient's care was performed in conjunction with primary service  Time invested included review of patient's labs, vitals, and management of their comorbidities with continued monitoring  In addition, this patient was discussed with medical team including physician and advanced extenders  The care of the patient was extensively discussed and appropriate treatment plan was formulated unique for this patient  ** Please Note:  voice to text software may have been used in the creation of this document   Although proof errors in transcription or interpretation are a potential of such software**

## 2022-08-11 NOTE — PROGRESS NOTES
08/11/22 1130   Pain Assessment   Pain Score No Pain   Restrictions/Precautions   Precautions Aspiration;Bed/chair alarms; Fall Risk;Cognitive; Aphasia; Visual deficit;Supervision on toilet/commode   Cognition   Arousal/Participation Alert   Attention Attends with cues to redirect   Memory Decreased short term memory   Following Commands Follows multistep commands with increased time or repetition   Subjective   Subjective pt  has no new complaints is ready to participate in therapy   Roll Left and Right   Type of Assistance Needed Independent   Roll Left and Right CARE Score 6   Sit to Lying   Type of Assistance Needed Set-up / clean-up   Sit to Lying CARE Score 5   Lying to Sitting on Side of Bed   Type of Assistance Needed Set-up / clean-up   Lying to Sitting on Side of Bed CARE Score 5   Sit to Stand   Type of Assistance Needed Supervision   Comment no AD   Sit to Stand CARE Score 4   Bed-Chair Transfer   Type of Assistance Needed Supervision   Comment no AD   Chair/Bed-to-Chair Transfer CARE Score 4   Transfer Bed/Chair/Wheelchair   Adaptive Equipment None   Car Transfer   Type of Assistance Needed Supervision;Verbal cues   Car Transfer CARE Score 4   Walk 10 Feet   Type of Assistance Needed Supervision   Comment no AD   Walk 10 Feet CARE Score 4   Walk 50 Feet with Two Turns   Type of Assistance Needed Supervision   Comment no AD   Walk 50 Feet with Two Turns CARE Score 4   Walk 150 Feet   Type of Assistance Needed Supervision   Comment no AD   Walk 150 Feet CARE Score 4   Walking 10 Feet on Uneven Surfaces   Comment see yesterdays notes, pt  was S level   Ambulation   Does the patient walk? 2  Yes   Primary Mode of Locomotion Prior to Admission Walk   Distance Walked (feet) 200 ft  (not limited)   Assist Device Other  (no AD)   Gait Pattern   (mild trendelenberg)   Limitations Noted In Balance; Coordination   Provided Assistance with: Direction   Walk Assist Level Supervision   Wheel 50 Feet with Two Turns Reason if not Attempted Activity not applicable   Wheel 50 Feet with Two Turns CARE Score 9   Wheel 150 Feet   Reason if not Attempted Activity not applicable   Wheel 694 Feet CARE Score 9   Wheelchair mobility   Does the patient use a wheelchair? 0  No   Curb or Single Stair   Style negotiated Curb   Type of Assistance Needed Supervision; Incidental touching   1 Step (Curb) CARE Score 4   4 Steps   Comment see last PT note   12 Steps   Comment see last PT note from yesterday   Stairs   Type Curb   # of Steps 1   Assist Devices None   Picking Up Object   Type of Assistance Needed Supervision   Comment no reacher picking up marker   Picking Up Object CARE Score 4   Therapeutic Interventions   Strengthening Instructed with HEP in supine with return demo of 10 reps each only due to time constratints including heel slides, SLR, supine marching, bridging and sidelying clamshell   Other (S)  functional measures: TUG 14 54 secs, TUG with cup 16 54 secs, TU*G cog 18 20 secs (pt  unable to completely verbalize cog task, started with counting from 100 down (unbale) and then tried counting by 5's also unable to sequence as well  5 X STS19 51 secs, Gait speed   77 m/ secs   Equipment Use   [email or web address] level 2 x 10 mins   Assessment   Treatment Assessment pt  engaged in 60 mins PT session and was able to tolerate above activities  Functional measures were assessed and inidicated that pt  still is a risk for falls  Gait speed inidicated that pt  is limited community ambulator  Pt  cont to be limited by dec cognition and difficulty with communication (aphasia) recommending 24/7 S /A at d/c  Pt  had no other complaints regarding d/c from Pt perspective  Pt  in bed at end of session as per requested and wants to have a nap  Pt  anticipated to return home tomorrow with family support   Problem List Impaired balance;Decreased mobility; Decreased coordination;Decreased cognition; Impaired vision  (aphasia)   Barriers to Discharge None Recommendation   PT Discharge Recommendation Home with outpatient rehabilitation  (24/7 S)   Equipment Recommended   (none)   PT Therapy Minutes   PT Time In 1130   PT Time Out 1230   PT Total Time (minutes) 60   PT Mode of treatment - Individual (minutes) 60   PT Mode of treatment - Concurrent (minutes) 0   PT Mode of treatment - Group (minutes) 0   PT Mode of treatment - Co-treat (minutes) 0   PT Mode of Treatment - Total time(minutes) 60 minutes   PT Cumulative Minutes 925   Therapy Time missed   Time missed?  No

## 2022-08-11 NOTE — PROGRESS NOTES
22 1300   Pain Assessment   Pain Assessment Tool 0-10   Pain Score No Pain   Restrictions/Precautions   Precautions Aphasia; Aspiration;Bed/chair alarms;Cognitive; Fall Risk;Supervision on toilet/commode;Visual deficit   Weight Bearing Restrictions No   ROM Restrictions No   Comprehension   Comprehension (FIM) 3 - Understands basic info/conversation 50-74% of time   Expression   Expression (FIM) 3 - Expresses basic info/needs 50-74% of time   Social Interaction   Social Interaction (FIM) 6 - Interacts appropriately with others BUT requires extra  time   Problem Solving   Problem solving (FIM) 3 - Solves basic problmes 50-74% of time   Memory   Memory (FIM) 3 - Recalls 2 of 2 steps   Speech/Language/Cognition Assessmetn   Treatment Assessment Pt engaged in skilled SLP services targeting expressive/receptive speech skills  Pt was resting in bed and transferred to recliner for skilled tasks  Pt was able to recall she was going home tomorrow  In regards to place and situation, able to recall both fluently  But for date pt was only able to recall the month appropriately needing further verbal cues for day and year  In regards to biographical information, pt was able to state her name but needed verbal cues as to address as she stated an address that wasn't hers and immediately said "no that's not right" and was able to self correct  In regards to  pt perseverating on "" and needed cues for correct   Pt required extended processing but was able to recall her 3 daughters names  In regards to structured tasks, pt completed matching words to pictures with 2 step components- pt was able to complete with 19/20acc however noted pt needed some verbal cues for directions with task as she was skipping over some items and not attending to all portions  Pt next completed following written directions with 2 step, 4 components   Pt was able to successfully ID the items in task with 14/16acc but when it came to executing the direction ([place], cross out, underline) pt was able to complete with 6/16acc needed mod to max verbal cues to attend to the directive where pt was perseverating on circling all the items  Last pt complete expression completions with common phrases  Pt was able to complete the common phrases with 15/20acc, however was unable to explain what the phrases meant without max prompting  At end of session, pt assisted back into bed, alarms on, all items in reach, TV turned on  Currently pt will continue to benefit from ongoing skilled SLP services targeting functional language skills, including receptive and expressive language as well as cognitive linguistic skills in attempts to decrease caregiver burden at time of discharge  SLP Therapy Minutes   SLP Time In 1300   SLP Time Out 1400   SLP Total Time (minutes) 60   SLP Mode of treatment - Individual (minutes) 60   SLP Mode of treatment - Concurrent (minutes) 0   SLP Mode of treatment - Group (minutes) 0   SLP Mode of treatment - Co-treat (minutes) 0   SLP Mode of Treatment - Total time(minutes) 60 minutes   SLP Cumulative Minutes 840   Therapy Time missed   Time missed?  No

## 2022-08-11 NOTE — PROGRESS NOTES
08/11/22 0900   Pain Assessment   Pain Assessment Tool 0-10   Pain Score No Pain   Restrictions/Precautions   Precautions Aspiration;Bed/chair alarms;Cognitive; Fall Risk;Supervision on toilet/commode;Visual deficit; Aphasia   Weight Bearing Restrictions No   ROM Restrictions No   Eating   Type of Assistance Needed Supervision   Physical Assistance Level No physical assistance   Eating CARE Score 4   Oral Hygiene   Type of Assistance Needed Supervision   Physical Assistance Level No physical assistance   Oral Hygiene CARE Score 4   Shower/Bathe Self   Type of Assistance Needed Supervision;Verbal cues; Adaptive equipment   Physical Assistance Level No physical assistance   Shower/Bathe Self CARE Score 4   Bathing   Assessed Bath Style Shower   Able to Gather/Transport Yes   Able to Adjust Water Temperature Yes   Able to Wash/Rinse/Dry (body part) Left Arm;Right Arm;L Upper Leg;R Upper Leg;L Lower Leg/Foot;R Lower Leg/Foot;Chest;Abdomen;Perineal Area; Buttocks   Limitations Noted in Balance; Endurance;Problem Solving;Strength; Sequencing   Positioning Seated;Standing   Adaptive Equipment Hand Held Shower; Shower Seat;Shower Bars   Tub/Shower Transfer   Adaptive Equipment Seat with out [employer] with cues for safety  Upper Body Dressing   Type of Assistance Needed Physical assistance   Physical Assistance Level 25% or less   Comment Ongoing A for bra clasping  Able to don/doff pullover shirt     Upper Body Dressing CARE Score 3   Lower Body Dressing   Type of Assistance Needed Supervision;Verbal cues   Physical Assistance Level No physical assistance   Lower Body Dressing CARE Score 4   Putting On/Taking Off Footwear   Type of Assistance Needed Supervision   Physical Assistance Level No physical assistance   Putting On/Taking Off Footwear CARE Score 4   Sit to Stand   Type of Assistance Needed Supervision   Physical Assistance Level No physical assistance   Comment no AD   Sit to Stand CARE Score 4   Bed-Chair Transfer   Type of Assistance Needed Supervision   Physical Assistance Level No physical assistance   Comment no AD   Chair/Bed-to-Chair Transfer CARE Score 4   Toileting Hygiene   Type of Assistance Needed Supervision;Verbal cues   Physical Assistance Level No physical assistance   Comment Pt with P thoroughness of rear hygiene, requires max cues and gestures for thorough completion  Toileting Hygiene CARE Score 4   Toilet Transfer   Type of Assistance Needed Supervision   Physical Assistance Level No physical assistance   Toilet Transfer CARE Score 4   Coordination   Fine Motor 9 hole peg test administered on 8/1 R: 31, L: 33  Re administered this session with R: 30, L:32 demonstrating slight improvement in coordination  Cognition   Overall Cognitive Status Impaired   Arousal/Participation Alert   Attention Difficulty attending to directions   Orientation Level Oriented to person;Oriented to place;Oriented to situation   Memory Decreased short term memory;Decreased recall of recent events   Following Commands Follows multistep commands with increased time or repetition   Activity Tolerance   Activity Tolerance Patient tolerated treatment well   Assessment   Treatment Assessment Pt participated in skilled OT services with focus on ADL retraining, functional txfers, and RUE coordination  Pt continues to be limited by dec functional cognition, dec problem solving, dec safety, dec comprehension, and aphasia  Pt continues to require max cues for thoroughness and sequencing of all ADL tasks  No DME needs  Plan for pt to d/c home with 24/7 S/A to be provided by daughters  Prognosis Good   Problem List Decreased strength;Decreased endurance; Impaired balance;Decreased mobility;Orthopedic restrictions;Pain   Recommendation   OT Discharge Recommendation Home with outpatient rehabilitation   OT Therapy Minutes   OT Time In 0900   OT Time Out 1030   OT Total Time (minutes) 90   OT Mode of treatment - Individual (minutes) 90   OT Mode of treatment - Concurrent (minutes) 0   OT Mode of treatment - Group (minutes) 0   OT Mode of treatment - Co-treat (minutes) 0   OT Mode of Treatment - Total time(minutes) 90 minutes   OT Cumulative Minutes 1310   Therapy Time missed   Time missed?  No

## 2022-08-12 VITALS
DIASTOLIC BLOOD PRESSURE: 63 MMHG | WEIGHT: 192.24 LBS | OXYGEN SATURATION: 96 % | SYSTOLIC BLOOD PRESSURE: 133 MMHG | HEART RATE: 58 BPM | RESPIRATION RATE: 18 BRPM | TEMPERATURE: 97.6 F | HEIGHT: 67 IN | BODY MASS INDEX: 30.17 KG/M2

## 2022-08-12 PROBLEM — E04.1 THYROID NODULE: Status: ACTIVE | Noted: 2022-08-12

## 2022-08-12 PROCEDURE — 99239 HOSP IP/OBS DSCHRG MGMT >30: CPT

## 2022-08-12 PROCEDURE — 99232 SBSQ HOSP IP/OBS MODERATE 35: CPT | Performed by: NURSE PRACTITIONER

## 2022-08-12 RX ADMIN — MULTIPLE VITAMINS W/ MINERALS TAB 1 TABLET: TAB ORAL at 08:04

## 2022-08-12 RX ADMIN — Medication 1 TABLET: at 08:04

## 2022-08-12 RX ADMIN — ENOXAPARIN SODIUM 40 MG: 40 INJECTION SUBCUTANEOUS at 08:04

## 2022-08-12 RX ADMIN — Medication 2000 UNITS: at 08:04

## 2022-08-12 RX ADMIN — OXYCODONE HYDROCHLORIDE AND ACETAMINOPHEN 500 MG: 500 TABLET ORAL at 08:04

## 2022-08-12 RX ADMIN — ASPIRIN 81 MG CHEWABLE TABLET 81 MG: 81 TABLET CHEWABLE at 08:04

## 2022-08-12 RX ADMIN — SERTRALINE HYDROCHLORIDE 75 MG: 50 TABLET ORAL at 08:04

## 2022-08-12 NOTE — PROGRESS NOTES
Internal Medicine Progress Note  Patient: Adolph Falcon  Age/sex: 76 y o  female  Medical Record #: [id number]      ASSESSMENT/PLAN: (Interval History)  Adolph Falcon is seen and examined and management for following issues:    Lt MCA CVA  · Continue ASA 81mg daily/atorvastatin 40mg daily  · Outpt follow-up with Neurology  · Outpt follow-up with Cardiology     Junctional rhythm/PACs  · Previous EKGs were sinus  · Cards saw here and rec'd LOOP now vs Zio patch now and then LOOP as OP but family declined both 8/2/22 noting they want to revisit the topic in the OP setting  · AVOID ANY AVN BLOCKING AGENTS  · Repeat EKG 8/2 was NSR with 1st degree AVB      Depression  · Continue sertraline         Discharge date: 8/12/22    The above assessment and plan was reviewed and updated as determined by my evaluation of the patient on 8/12/2022      Labs:   Results from last 7 days   Lab Units 08/08/22  0455   WBC Thousand/uL 7 65   HEMOGLOBIN g/dL 12 8   HEMATOCRIT % 39 7   PLATELETS Thousands/uL 206     Results from last 7 days   Lab Units 08/08/22  0455   SODIUM mmol/L 141   POTASSIUM mmol/L 4 3   CHLORIDE mmol/L 106   CO2 mmol/L 29   BUN mg/dL 13   CREATININE mg/dL 0 63   CALCIUM mg/dL 9 1                   Review of Scheduled Meds:  Current Facility-Administered Medications   Medication Dose Route Frequency Provider Last Rate    acetaminophen  650 mg Oral Q6H PRN Lorriane Bear, PA-C      aluminum-magnesium hydroxide-simethicone  30 mL Oral Q6H PRN Lorriane Bear, PA-C      ascorbic acid  500 mg Oral Daily Lorriane Bear, PA-C      aspirin  81 mg Oral Daily Lorriane Bear, PA-C      atorvastatin  40 mg Oral QPM Lorriane Bear, PA-C      bisacodyl  10 mg Rectal Daily PRN Lorriane Bear, PA-C      calcium carbonate-vitamin D  1 tablet Oral Daily With Breakfast Lorriane Bear, PA-C      cholecalciferol  2,000 Units Oral Daily Lorriane Bear, PA-C      docusate sodium  100 mg Oral BID Obed Valdes Lulu, PRISCA      enoxaparin  40 mg Subcutaneous Daily Marden Pilot, PA-C      multivitamin-minerals  1 tablet Oral Daily Marden Pilot, PA-C      ondansetron  4 mg Oral Q6H PRN Marden Pilot, PA-C      polyethylene glycol  17 g Oral Daily PRN Marden Pilot, PA-C      senna  2 tablet Oral BID Marden Pilot, PA-C      sertraline  75 mg Oral Daily Marden Pilot, PA-C         Subjective/ HPI: Patient seen and examined  Patients overnight issues or events were reviewed with nursing or staff during rounds or morning huddle session  New or overnight issues include the following:     No new or overnight issues  Offers no complaints    ROS:   A 10 point ROS was performed; negative except as noted above         Imaging:     No orders to display       *Labs /Radiology studies reviewed  *Medications reviewed and reconciled as needed  *Please refer to order section for additional ordered labs studies  *Case discussed with primary attending during morning huddle case rounds    Physical Examination:  Vitals:   Vitals:    08/11/22 0528 08/11/22 1351 08/11/22 2135 08/12/22 0500   BP: 130/61 143/63 143/58 133/63   BP Location: Right arm Right arm Right arm Right arm   Pulse: (!) 52 56 55 58   Resp: 18 16 18 18   Temp: 98 3 °F (36 8 °C) 97 5 °F (36 4 °C) 97 7 °F (36 5 °C) 97 6 °F (36 4 °C)   TempSrc: Oral Oral Oral Oral   SpO2: 96% 98% 99% 96%   Weight:       Height:           General Appearance: no distress, conversive  HEENT: PERRLA, conjuctiva normal; oropharynx clear; mucous membranes moist   Neck:  Supple, normal ROM  Lungs: CTA, normal respiratory effort, no retractions, expiratory effort normal  CV: regular rate and rhythm; no rubs/murmurs/gallops, PMI normal   ABD: soft; ND/NT; +BS  EXT: no edema  Skin: normal turgor, normal texture, no rashes  Psych: affect normal, mood normal  Neuro: AAO      The above physical exam was reviewed and updated as determined by my evaluation of the patient on 8/12/2022  Invasive Devices  Report    None                    VTE Pharmacologic Prophylaxis: Enoxaparin  Code Status: Level 3 - DNAR and DNI  Current Length of Stay: 15 day(s)      Total time spent:  30 minutes with more than 50% spent counseling/coordinating care  Counseling includes discussion with patient re: progress  and discussion with patient of his/her current medical state/information  Coordination of patient's care was performed in conjunction with primary service  Time invested included review of patient's labs, vitals, and management of their comorbidities with continued monitoring  In addition, this patient was discussed with medical team including physician and advanced extenders  The care of the patient was extensively discussed and appropriate treatment plan was formulated unique for this patient  ** Please Note:  voice to text software may have been used in the creation of this document   Although proof errors in transcription or interpretation are a potential of such software**

## 2022-08-12 NOTE — DISCHARGE SUMMARY
Discharge Summary - PMR   Adolph Falcon 76 y o  female MRN: [id number]  Unit/Bed#: -01 Encounter: [id number]    Admission Date: 7/28/2022     Discharge Date: 8/12/2022    Rehabilitation/Etiologic Diagnosis:   Stroke:  01 2  Right Body Involvement (Left Brain)  Etiologic: Left MCA Distribution Infarctions    Discharge Diagnoses:      Patient Active Problem List   Diagnosis    History of endometrial cancer    Vitamin D deficiency    Mild episode of recurrent major depressive disorder ([facility] )    Acute ischemic left MCA stroke ([facility] )    History of DVT (deep vein thrombosis)    Dysphagia    SA node dysfunction ([facility] )    Thyroid nodule       Acute Rehabilitation Center Course:      Patient participated in a comprehensive interdisciplinary inpatient rehabilitation program which included involvment of MD, therapies (PT, OT), RN, CM/SW, dietary  She made adequate functional gains and was able to be advanced to a largely supervision level of assist and is considered adequately safe for discharge home with family  Medical issues with comanagement from our internal medicine team as outlined below  Please see below for patient's hospital course and day to day management of medical needs with significant findings, complications (if applicable), treatment, and services provided in problem list format  Functional status on admission to ARC:  Mod assist bathing   Min assist to hygiene, LBD   Transfers min-mod assist   Ambulation/Mobility min assist     Functional status on discharge from ARC:  Supervision most ADLs  Transfers supervision   Ambulation/Mobility supervision        * Acute ischemic left MCA stroke [facility])  Assessment & Plan  · [facility] with right facial droop and aphasia  · Outside tPA window  · CT without evidence of acute intracranial hemorrhage   Left frontal temporal and low attenuation suggesting acute-subacute ischemia  · CTA H/N with distal Left M2/M3 MCA occlusion   · MRI brain- acute to early subacute ischemia identified involving the left MCA territory with one larger infarct and multiple additional smaller infarcts  No hemorrhagic transformation identified   · MRI brain negative for metastasis in setting of h/o endometrial cancer  · ECHO unremarkable   · Continue ASA/Statin therapy per neurology recs  · Dysphagia diet; SLP following  · Neurology also recommended Loop recorder as OP with OP FU in 4-6 weeks  · Daughter deferred inpatient placement of loop recorder; aware of need to follow up with Cardiology as OP  · Completing acute chomprehensive interdisciplinary inpatient rehabilitation to include intensive skilled therapies as outlines with oversight and management by a rehabilitation Physician Assistant overseen by rehabilitation physician as well as inpatient rehabilitation nursing, case management and weekly interdisciplinary team meeting  · OP PT recommended   · OP follow-up with neuro, cards     Thyroid nodule  Assessment & Plan  Incidental finding on CTA H/N:  "SOFT TISSUES OF THE NECK: 2 2 cm right thyroid nodule  Incidental discovery of one or more thyroid nodule(s) measuring more than 1 5 cm and without suspicious features is noted in this patient who is above 28years old; according to guidelines published   in the February 2015 white paper on incidental thyroid nodules in the Journal of the [employer] Vero Dorman), further characterization with thyroid ultrasound is recommended  -   - Incidental thyroid nodule(s) for which nonemergent thyroid ultrasound is recommended - instructions in d/c and will review with family   - Follow-up with PCP to order ultrasound     SA node dysfunction ([address] )  Assessment & Plan  With junctional bradycardia new on exam 8/2; 8/4 more regular  - Avoiding AV kiera agents during course   - Seen by Cards in ARC - cleared for d/c  - Family would like to defer any monitoring until outpatient ([facility])    - Risk/benefits/alternatives presented in detail  - OP cards follow-up     Dysphagia  Assessment & Plan  · Improved now on regular, thins    History of DVT (deep vein thrombosis)  Assessment & Plan  · Completed course of Xarelto in past  · Lovenox SQ Daily and SCD during ARC course       Mild episode of recurrent major depressive disorder (HCC)  Assessment & Plan  · Mood acceptable   · Continue Zoloft 75 mg daily      Vitamin D deficiency  Assessment & Plan  · Continue Cholecalciferol       History of endometrial cancer  Assessment & Plan  · Stage IA, grade 3 s/p completion of adjuvant chemotherapy and radiation in April 2015  · Follows with GYN-ONC as OP          Follow-up providers and other issues to be followed up after discharge through PCP and other specialists   PCP   Julia Estrada Neurology   Cardiology   Gyn-Onc     - See AVS (After visit summary) with discharge instructions, discharge medications, and other details  Imaging (See above as well):   No orders to display       Pertinent Recent Labs (See Course above, EPIC EMR, and if needed request additional records):   Latest Reference Range & Units 08/08/22 04:55   Sodium 135 - 147 mmol/L 141   Potassium 3 5 - 5 3 mmol/L 4 3   Chloride 96 - 108 mmol/L 106   CO2 21 - 32 mmol/L 29   Anion Gap 4 - 13 mmol/L 6   BUN 5 - 25 mg/dL 13   Creatinine 0 60 - 1 30 mg/dL 0 63   Glucose, Random 65 - 140 mg/dL 82   GLUCOSE FASTING 65 - 99 mg/dL 82   Calcium 8 3 - 10 1 mg/dL 9 1   eGFR ml/min/1 73sq m 85   WBC 4 31 - 10 16 Thousand/uL 7 65   Red Blood Cell Count 3 81 - 5 12 Million/uL 4 33   Hemoglobin 11 5 - 15 4 g/dL 12 8   HCT 34 8 - 46 1 % 39 7   MCV 82 - 98 fL 92   MCH 26 8 - 34 3 pg 29 6   MCHC 31 4 - 37 4 g/dL 32 2   RDW 11 6 - 15 1 % 12 9   Platelet Count 984 - 390 Thousands/uL 206       Procedures Performed During ARC Admission: None    Discharge Physical Examination:  Temp:  [97 5 °F (36 4 °C)-97 7 °F (36 5 °C)] 97 6 °F (36 4 °C)  HR:  [55-58] 58  Resp:  [16-18] 18  BP: (133-143)/(58-63) 133/63  SpO2:  [96 %-99 %] 96 %  GEN:  Sitting in NAD   HEENT/NECK: MMM  CARDIAC: Regular rate rhythm, no murmers, no rubs, no gallops  LUNGS:  clear to auscultation, no wheezes, rales, or rhonchi  ABDOMEN: Soft, non-tender, non-distended, normal active bowel sounds  EXTREMITIES/SKIN:  no calf edema, no calf tenderness to palpation  NEURO:   MENTAL STATUS:  Appropriate wakefulness and interaction , CN II-XII: grossly intact  and Strength/MMT:  Full throughout; FTN intact  PSYCH:  Affect:  Euthymic     HPI:   Per admitting provider  "Jarrett Mosquera is a 76 y o  female with a PMH significant for h/o endometrial cancer, depression, h/o DVT, vitamin D deficency and OA, who presented to the [facility] ED on 07/23 with a stroke alert  She was in contact with family the night prior when the next day family could not contact her and called EMS  Patient was noted to have altered mental status upon EMS arrival and was experiencing right facial droop  Neurology was consulted  [facility] ordered without evidence of acute intracranial hemorrhage  CTA with left distal m2/m3 occlusion  SLP assessed and also noted patient to have dysphagia  MRI brain with acute to early subacute ischemia involving the left MCA territory with a larger infarct and multiple smaller infarcts  No hemorrhagic transformation identified  With her history of endometrial cancer the MRI of the brain also ruled out mets  ECHO obtained with EF of 70%  Neurology recommending outpatient loop recorder placement  She will continue ASA and Lipitor  PM&R consulted and recommended patient for ARC  She participated with PT/OT/ST and was deemed appropriate for post-acute rehabilitation services after demonstrating the ability to tolerate three hours of therapy per day   She was accepted to [facility] and arrived on 7/28/22  "    Condition at Discharge: good     Discharge instructions/Information to patient and family:   See after visit summary for information provided to patient and family  Provisions for Follow-Up Care:  See after visit summary for information related to follow-up care and any pertinent home health orders  Disposition: Home with family     Planned Readmission:  No    Discharge Statement   Total time spent examining patient, counseling patient (or patient/family) on condition, medication, rehabilitation/medical plan, and coordinating care on day of discharge: at least 45 minutes  Greater than 50% of the total time was spent examining patient, answering all questions, directly discussing plan of care and post-discharge instructions with patient (or patient and family) some of which specifically related to recent CVA  Additional time spent on coordinating care and other discharge activities  Discharge Medications:  See after visit summary for reconciled discharge medications provided to patient and family

## 2022-08-12 NOTE — CASE MANAGEMENT
Team dc summary - pt made good progress and returned home w/family and continued recommendations for outpt pt ot and speech  dtr natasha present for training sessions and dc instructions  BODØ was arranging pts outpt therapy services at a non [facility] provider  No dme needs identified for dc

## 2022-08-12 NOTE — ASSESSMENT & PLAN NOTE
Incidental finding on CTA H/N:  "SOFT TISSUES OF THE NECK: 2 2 cm right thyroid nodule  Incidental discovery of one or more thyroid nodule(s) measuring more than 1 5 cm and without suspicious features is noted in this patient who is above 28years old; according to guidelines published   in the February 2015 white paper on incidental thyroid nodules in the Journal of the [employer] Halle Bryson), further characterization with thyroid ultrasound is recommended  -   - Incidental thyroid nodule(s) for which nonemergent thyroid ultrasound is recommended - instructions in d/c and will review with family   - Follow-up with PCP to order ultrasound

## 2022-08-12 NOTE — PROGRESS NOTES
Physical Medicine and Rehabilitation Progress Note  Virgil Du 76 y o  female MRN: [id number]  Unit/Bed#: -01 Encounter: [id number]    HPI: "Virgil Du is a 76 y o  female with a PMH significant for h/o endometrial cancer, depression, h/o DVT, vitamin D deficency and OA, who presented to the [address] [place] [address] ED on 07/23 with a stroke alert  She was in contact with family the night prior when the next day family could not contact her and called EMS  Patient was noted to have altered mental status upon EMS arrival and was experiencing right facial droop  Neurology was consulted  [facility] ordered without evidence of acute intracranial hemorrhage  CTA with left distal m2/m3 occlusion  SLP assessed and also noted patient to have dysphagia  MRI brain with acute to early subacute ischemia involving the left MCA territory with a larger infarct and multiple smaller infarcts  No hemorrhagic transformation identified  With her history of endometrial cancer the MRI of the brain also ruled out mets  ECHO obtained with EF of 70%  Neurology recommending outpatient loop recorder placement  She will continue ASA and Lipitor  PM&R consulted and recommended patient for ARC  She participated with PT/OT/ST and was deemed appropriate for post-acute rehabilitation services after demonstrating the ability to tolerate three hours of therapy per day  She was accepted to [address] [place] [address] and arrived on 7/28/22 " Per prior provider     Chief Complaint:  CVA follow-up     Interval History/Subjective:  Patient reports feeling ready for discharge  She denies worsening strength, sensation, SOB, dizziness, or other new complaints  ROS:  A 10 point ROS was performed; negative except as noted above  35 minutes or greater spent face-to-face with patient during this encounter and with coordination of care    Extended discussion today held with patient (+/- caregivers) in preparation for discharge regarding regarding current condition, medical/rehabilitation management, medications prescribed at discharge, functional limitations, safety, and follow-up/disposition  Assessment/Plan:    * Acute ischemic left MCA stroke [facility])  Assessment & Plan  · Presented to [facility] ED with right facial droop and aphasia  · Outside tPA window  · CT without evidence of acute intracranial hemorrhage  Left frontal temporal and low attenuation suggesting acute-subacute ischemia  · CTA H/N with distal Left M2/M3 MCA occlusion   · MRI brain- acute to early subacute ischemia identified involving the left MCA territory with one larger infarct and multiple additional smaller infarcts  No hemorrhagic transformation identified   · MRI brain negative for metastasis in setting of h/o endometrial cancer  · ECHO unremarkable   · Continue ASA/Statin therapy per neurology recs  · Dysphagia diet; SLP following  · Neurology also recommended Loop recorder as OP with OP FU in 4-6 weeks  · Daughter deferred inpatient placement of loop recorder; aware of need to follow up with Cardiology as OP  · Completing acute chomprehensive interdisciplinary inpatient rehabilitation to include intensive skilled therapies as outlines with oversight and management by a rehabilitation Physician Assistant overseen by rehabilitation physician as well as inpatient rehabilitation nursing, case management and weekly interdisciplinary team meeting  · OP PT recommended   · OP follow-up with neuro, cards     Thyroid nodule  Assessment & Plan  Incidental finding on CTA H/N:  "SOFT TISSUES OF THE NECK: 2 2 cm right thyroid nodule    Incidental discovery of one or more thyroid nodule(s) measuring more than 1 5 cm and without suspicious features is noted in this patient who is above 28years old; according to guidelines published   in the February 2015 white paper on incidental thyroid nodules in the Journal of the [employer] Ronette Bence), further characterization with thyroid ultrasound is recommended  -   - Incidental thyroid nodule(s) for which nonemergent thyroid ultrasound is recommended - instructions in d/c and will review with family   - Follow-up with PCP to order ultrasound     SA node dysfunction ([address] )  Assessment & Plan  With junctional bradycardia new on exam 8/2; 8/4 more regular  - Avoiding AV kiera agents during course   - Seen by Cards in ARC - cleared for d/c  - Family would like to defer any monitoring until outpatient ([facility])    - Risk/benefits/alternatives presented in detail  - OP cards follow-up     Dysphagia  Assessment & Plan  · Improved now on regular, thins    History of DVT (deep vein thrombosis)  Assessment & Plan  · Completed course of Xarelto in past  · Lovenox SQ Daily and SCD during ARC course       Mild episode of recurrent major depressive disorder (HCC)  Assessment & Plan  · Mood acceptable   · Continue Zoloft 75 mg daily      Vitamin D deficiency  Assessment & Plan  · Continue Cholecalciferol       History of endometrial cancer  Assessment & Plan  · Stage IA, grade 3 s/p completion of adjuvant chemotherapy and radiation in April 2015  · Follows with GYN-ONC as OP        Health Maintenance  #Delirium/Sleep: Sleeping adequately  #Pain: Denies; Tylenol PRN  #Bowel: Stooling adequately   #Bladder: Continent   #Skin/Pressure Injury Prevention: Turn Q2hr in bed, with weight shifts [id number] in wheelchair  #DVT Prophylaxis: Lovenox, SCDs during ARC course   #Code Status:  DNR/DNI  #Dispo: [place] with supervision and OP PT/OT/SLP  Objective:    Functional Update:  PT: Sup transfers, bed mobility, ambulation 300', wheelchair mobility  CGA stairs   OT:  Ind eating, Sup grooming, bathing, UB/LB dressing, toileting, CGA tub/shower transfer, Sup toilet transfer  SLP: Level 3/thins advanced  Moderate deficits in receptive/expressive language and mod-max deficits in executive function and memory skills with gradual improvement       Allergies per [facility]  08/11/22 0528 98 3 °F (36 8 °C) 52 Abnormal  18 130/61 96 % None (Room air) Lying     Vitals above reviewed on date of encounter    Physical Exam:  GEN:  Sitting in NAD   HEENT/NECK: Normocephalic, atraumatic, moist mucous membranes   CARDIAC: Regular rate rhythm, no murmers, no rubs, no gallops  LUNGS:  clear to auscultation, no wheezes, rales, or rhonchi  ABDOMEN: Soft, non-tender, non-distended, normal active bowel sounds  EXTREMITIES/SKIN:  no calf edema, no calf tenderness to palpation  NEURO:   MENTAL STATUS:  Appropriate wakefulness and interaction , CN II-XII: grossly intact  and Strength/MMT:  Near full throughout  [facility]:  Affect:  Euthymic     Diagnostic Studies: Reviewed, no new imaging      Laboratory:  Reviewed   Results from last 7 days   Lab Units 08/08/22  0455   HEMOGLOBIN g/dL 12 8   HEMATOCRIT % 39 7   WBC Thousand/uL 7 65     Results from last 7 days   Lab Units 08/08/22  0455   BUN mg/dL 13   POTASSIUM mmol/L 4 3   CHLORIDE mmol/L 106   CREATININE mg/dL 0 63            Patient Active Problem List   Diagnosis    History of endometrial cancer    [facility]    Inguinal lymphadenopathy    Multinodular thyroid    Obesity    Osteoarthritis of hip    Osteoarthritis of knee    Vitamin D deficiency    Osteopenia of lumbar spine    Mild episode of recurrent major depressive disorder (HCC)    Acute ischemic left MCA stroke (HCC)    History of DVT (deep vein thrombosis)    Dysphagia    SA node dysfunction (HCC)    Thyroid nodule         Medications  Current Facility-Administered Medications   Medication Dose Route Frequency Provider Last Rate    acetaminophen  650 mg Oral Q6H PRN Pecola Nathanael, PA-C      aluminum-magnesium hydroxide-simethicone  30 mL Oral Q6H PRN Pecola Nathanael, PA-C      ascorbic acid  500 mg Oral Daily Pecola Nathanael, PA-C      aspirin  81 mg Oral Daily Pecola Nathanael, PA-C      atorvastatin  40 mg Oral QPM Pecola Nathanael, PA-C      bisacodyl  10 mg Rectal Daily PRN Frank Huffman PA-C      calcium carbonate-vitamin D  1 tablet Oral Daily With Breakfast Frank Huffman PA-C      cholecalciferol  2,000 Units Oral Daily Frank Huffman PA-C      docusate sodium  100 mg Oral BID Frank Huffman PA-C      enoxaparin  40 mg Subcutaneous Daily Frank Huffman PA-C      multivitamin-minerals  1 tablet Oral Daily Frank Huffman PA-C      ondansetron  4 mg Oral Q6H PRN Frank Huffman PA-C      polyethylene glycol  17 g Oral Daily PRN Frank Huffman PA-C      senna  2 tablet Oral BID Frank Huffman PA-C      sertraline  75 mg Oral Daily Frank Huffman PA-C          ** Please Note: [employer] voice to text software may have been used in the creation of this document  **    I personally performed the required components and examined the patient myself in person on 8/11/22

## 2022-08-12 NOTE — NURSING NOTE
Patient discharging home today at S level for transfers  Patient is continent of B&B, using tylenol prn for pain control  Medications sent to patients community pharmacy for  upon discharge  Discharge instructions reviewed with patients daughter  All questions/concerns answered at this time

## 2022-08-13 NOTE — SPEECH THERAPY NOTE
SLP Discharge Summary    Pt was discharged home w/ family support/supervision on 8/12/2022  At time of discharge, pt was able to make slower and steady gain towards goals to where pt was nota able to achieve comprehension and expression goals, which remained to be mod A by time of discharge, but was camila to achieve social interaction, executive function and memory goals  Throughout pt's stay on the [facility], pt was followed for dysphagia tx sessions and language/cognitive tx sessions  In regards to dysphagia, current diet is dysphagia level 2 w/  thin liquids  Upon completion of initial dysphagia bedside assessment, pt presented with s/s suggestive of mild-moderate oral and suspected mild pharyngeal dysphagia  Concerns included: Mastication was observed to be moderately prolonged given eggs, due to piecemeal bites taken as well as faster intake from bolus to bolus which lead to mild-moderate oral residual over time  SLP did have to provide pt moderate verbal cues to slow intake rate as well as use liquid wash to decrease oral residual which did occur over time  Lip seal around fork, tsp, straw and finger foods was noted to be [facility] along w/ appropriate bolus retrieval across items  No anterior loss was observed  Bolus formation and transfer were functional given yogurt and thin liquids today with no significant oral residue noted given these textures  No overt s/s reduced oral control observed w/ thin liquids by straw today  Swallowing initiation appeared prompt given puree/thin textures and observing mild delay w/ soft solids (banana, eggs) due to increased oral mastication as well as piecemeal bites taken  Laryngeal rise was palpated and judged to be within functional limits across textures observed  No coughing, throat clearing, change in vocal quality or respiratory status noted during assessment today   While some improvement has been observed in f/u meals given trials of level 3 food items, concerns more lay in regards to pt's overall rate of intake as well as larger boluses taken which increased oral residual  Pt's decreased comprehension skills impact consistent use of swallow strategies needing moderate verbal/visual cues to alternate solids/liquids, slowing rate  Education provided to pt's dtrs as to why diet advancement has not occurred at this time, and will plan for family training to ensure they are able to provide appropriate cues for meals  Continued diet recommendations remain to be dysphagia level 2 diet w/ thin liquids, but hopes for advancement can occur in upcoming sessions  However, as continued dysphagia tx sessions progressed, pt was safely advanced to dysphagia level 3 with thin liquids and then eventually to regular diet w/ thin liquids  Improvement had been noted more so in pt's ability to carryover swallow strategies which vastly improved oral stage and minimizes overall aspiration risk which initially presented in dysphagia tx sessions  Pt will intermittently exhibit mildly longer mastication given solids, BUT is due to faster rate of intake  Minimal verbal cues provided to slow overall rate of intake during meals now  Pt has demonstrated improvement in bite sizes as well as alternating solids/liquids to aid in breakdown of textures where pt does not exhibit increase oral residual/pocketing  Additionally, due to improvement in oral stage, swallow initiation has been observed to be prompter and NO over aspiration sxs noted in past dysphagia tx sessions  Pt's dtrs have been present for multiple dysphagia tx sessions, provided education in verbal cues to present to pt as needed given meals and will plan to initiate carryover for dtrs to cue in upcoming sessions  Plan is to continue diet advancement of regular diet/thin liquids at this time           In regards to pt's language and cognitive skills, pt engaged in completing both formal and informal language assessments to date, where pt currently demonstrates MODERATE language deficits both in receptive and expressive language skills  Per the Bedside WAB, pt's scores were comparable to Broca's type aphasia  It was noted throughout assessment that pt exhibited by difficulty in following basic verbal commands as well as when increased in difficulty given mutli-step commands, difficulty given moderately complex yes/no ?'s as well as noted difficulty in comprehension given more complex questions  Additionally, pt does also exhibit moderate expressive language deficits, noting perseveration in verbal responses, neologisms, paraphasia errors, jargon at times, noting inconsistency in recognition given verbalized errors when eliciting spontaneous speech  While cognitive skills were not assessed, pt did also demonstrate decreased LT and ST memory recall, decreased executive function skills (problem solving, reasoning, judgement, sequencing) and decreased insight/safety to overall functioning at this time  Additionally suspect some visual perceptual deficits but due to comprehension given more complex directions, likely unable to full assess at this time  Due to noted deficits, recommendations at time of discharge will be for 24/7 supervision at this time  Pt did continue to demonstrate MODERATE deficits in both receptive and expressive language and mod-max deficits in executive function and memory skills  However, pt was slowly demonstrating improvement given reading comprehension abilities but continues to be limited due to decreased R side scanning at times  Additionally, in completing more complex tasks w/ increased amount of words does present to be a challenge for pt due to decreased attention or recall given last left off, requiring verbal/visual cues  As for expressive language skills, pt had been improving as well given spontaneous verbalizations, BUT was noted to exhibit perseveration at times, more present when fatigued   This was also noted in written expression as well  Trialed functional money management in recent sessions to where pt was max to total A in completing  Education provided to family in regards that higher level tasks such as fiances, medications will need to be assisted fully at time of discharge  Of note, increased and repeated family training had occurred w/ pt's dtrs Jw Ferguson and Alexander Weinstein, where ongoing education/training as well as educational handouts have been provided and ongoing questions answered given sessions  Plan to provide HEP for family at time of discharge  Due to ongoing deficits as reported above, recommendations still remain to be 24/7 supervision  Pt will continue benefit from OP SLP services at this time to maximize overall functional independence of both receptive and expressive language skills as well as targeting functional cognitive linguistic skills

## 2022-08-15 ENCOUNTER — PATIENT OUTREACH (OUTPATIENT)
Dept: CASE MANAGEMENT | Facility: OTHER | Age: 77
End: 2022-08-15

## 2022-08-15 ENCOUNTER — TRANSITIONAL CARE MANAGEMENT (OUTPATIENT)
Dept: FAMILY MEDICINE CLINIC | Facility: CLINIC | Age: 77
End: 2022-08-15

## 2022-08-15 ENCOUNTER — TELEPHONE (OUTPATIENT)
Dept: FAMILY MEDICINE CLINIC | Facility: CLINIC | Age: 77
End: 2022-08-15

## 2022-08-15 DIAGNOSIS — I63.512 ACUTE ISCHEMIC LEFT MCA STROKE (HCC): Primary | ICD-10-CM

## 2022-08-15 DIAGNOSIS — R13.10 DYSPHAGIA, UNSPECIFIED TYPE: ICD-10-CM

## 2022-08-15 NOTE — TELEPHONE ENCOUNTER
Patient's daughter called very upset because her Mom was discharged from hospital & they did not get to discuss any home care with a   Her mom is very limited & unable to care for herself  Home care was not discussed  Daughter requested an order for palliative care for patient  There is a referral in for [facility] ALTRADHA GOMEZ

## 2022-08-16 NOTE — PROGRESS NOTES
08/16/22 1101   Hello, [Guardians Name / Giovanna Miles, this is [Caller Fazal Townsend from [facility], and our clinical care team wanted to check on you / your child after your recent visit to the hospital  It will only take 3-5 minutes  Is this a good time? Discharge Call Type/ Specific Diagnosis: General Call   Call Complete   Attempted Number of Calls 2   Discharge phone call complete? Left Message  (Messages left 8/15 &/16)   Hi, This is ________ from [facility]  This is just a courtesy call, and there is no need to call us back  Have a great day     (Called by [facility])

## 2022-08-17 ENCOUNTER — TELEPHONE (OUTPATIENT)
Dept: PALLIATIVE MEDICINE | Facility: CLINIC | Age: 77
End: 2022-08-17

## 2022-08-17 ENCOUNTER — PATIENT OUTREACH (OUTPATIENT)
Dept: FAMILY MEDICINE CLINIC | Facility: CLINIC | Age: 77
End: 2022-08-17

## 2022-08-17 NOTE — TELEPHONE ENCOUNTER
Palliative Care is a specialty that helps take care of pts with complex medical conditions, that are often life limiting  [facility] provides supportive care along with other specialists and Primary Care providers  We can offer help with:  -Creating a living will/ACP  -Establishing a POA  -Discussing Goals of Care    Palliative Care is not the same as Hospice  Follow up with [facility] can be short term or long term as we offer symptom management related to advanced disease or side effects of tx  Common dx/sx: Active CA or symptoms from tx   -Pain   -N/V   -Diarrhea/Constipation   -Neuropathy   -Anxiety/Depression   -Sleeplessness   -Loss of Appetite    Severe Heart disease: Congestive heart failure, Coronary artery disease (dx must be reviewed by office nurse)   -Severe SOB   -Chest pain   -02 Dependence    Severe lung disease: COPD, pulmonary fibrosis, pulmonary hypertension   -Severe SOB   -Active limitations   -02 Dependence    Severe Kidney Disease: Stage 4 or 5, patient on dialysis or considering dialysis   -GOC -Advanced Directives   -5 wishes   -Living Will   -Health Care Proxy    Severe Liver disease   -Abdominal swelling   -SOB    Neurological disease: Dementia, Multiple Sclerosis (dx must be reviewed by nurse), Parkinson's Disease, ALS, or Marianna Gehrig's Disease   -Tremors   -anxiety   -sleeplessness   -appetite    We will not address Chronic Pain ONLY Active Cancer Pain

## 2022-08-17 NOTE — PHYSICAL THERAPY NOTE
PHYSICAL THERAPY DISCHARGE SUMMARY    Patient made good progress during her stay at the acute rehab center where she presented with an Impairment of mobility, safety, Activities of Daily Living (ADLs), and cognitive/communication skills due to Stroke: Left MCA Distribution Infarctions  She presented on evaluation with impairments and limitations including impaired balance, decreased endurance, impaired coordination, gait deviations, decreased functional mobility tolerance, decreased safety awareness, impaired judgement, fall risk and decreased cognition  She responded fairly to therapeutic activity and exercise, neuro re-ed, and transfer, gait and stair training  She was able to achieve a S level goal for all activities but was unable to progress past S 2* impaired cognitive and linguistic abilities  Family training performed and was able to provide this level of care: 24/7 S  She did not require DME at time of discharge and was recommended to follow up with OPPT services  Patient also provided with HEP for continued strengthneing at home  Discahrge Outcome Measures:  TUG 14 54 secs, TUG with cup 16 54 secs, TU*G cog 18 20 secs; 5 X STS19 51 secs, Gait speed    77 m/ secs    Amandeep Valles, PT

## 2022-08-17 NOTE — PROGRESS NOTES
Left message on voicemail for patient with my name, number and request for return call as covering

## 2022-08-19 ENCOUNTER — TELEPHONE (OUTPATIENT)
Dept: NEUROLOGY | Facility: CLINIC | Age: 77
End: 2022-08-19

## 2022-08-19 NOTE — TELEPHONE ENCOUNTER
Post CVA Discharge Follow Up  Hospitalization: 7/24/22-7/28/22, 7/28/22-8/12/22 [facility]    Called patient's daughter, Bernardo Lambert, whom is listed on the [address] [place] [address] Communication Consent form  She did not answer  Left a voice message requesting for a call back  Provided my office's phone number

## 2022-08-19 NOTE — TELEPHONE ENCOUNTER
Post CVA Discharge Follow Up  Hospitalization: 7/24/22-7/28/22, 7/28/22-8/12/22 [facility]    Called patient with no answer  Received a dial tone

## 2022-08-22 ENCOUNTER — PATIENT OUTREACH (OUTPATIENT)
Dept: FAMILY MEDICINE CLINIC | Facility: CLINIC | Age: 77
End: 2022-08-22

## 2022-08-24 ENCOUNTER — PATIENT OUTREACH (OUTPATIENT)
Dept: FAMILY MEDICINE CLINIC | Facility: CLINIC | Age: 77
End: 2022-08-24

## 2022-08-24 DIAGNOSIS — Z78.9 NEED FOR FOLLOW-UP BY SOCIAL WORKER: Primary | ICD-10-CM

## 2022-08-24 NOTE — PROGRESS NOTES
Spoke with Justina's daughter Kristi Orellana with whom she resides  Michelle Rodriguez has Yosvany Green doing outpatient PT OT and ST  They just heard back from [facility]  program that Yosvany Green is not a candidate for their program but maybe [facility] thru [facility]  I explained about home palliative program and per Yosvany Green they are looking for more aide type help in the home  [facility] call daughter Lucrecia Byrd about this  I also mentioned [facility] rehab for home therapies if Yosvany Green gets tired from going out  Kristi Orellana did express she was not happy that no one before Yosvany Green was discharged or after talked to them about a plan for at home  She felt like her mother was pushed out the door

## 2022-08-25 ENCOUNTER — TELEPHONE (OUTPATIENT)
Dept: NEUROSURGERY | Facility: CLINIC | Age: 77
End: 2022-08-25

## 2022-08-25 NOTE — TELEPHONE ENCOUNTER
Call received from patients daughter returning a call to Briseyda Jerry regarding stroke follow-up   Briseyda Jerry is currently OOO will route to neurology team to assist

## 2022-08-26 ENCOUNTER — PATIENT OUTREACH (OUTPATIENT)
Dept: FAMILY MEDICINE CLINIC | Facility: CLINIC | Age: 77
End: 2022-08-26

## 2022-08-26 NOTE — PROGRESS NOTES
OPCM SW received referral to assist patient with in home care needs  Chart review completed  Phone call placed to daughter Sylwia Sawant reports that patient resides with patient's daughter Claudia Wheeler who lives a block from Sylwia Sawant  Patient spends the day with Sylwia Sawant while Claudia Wheeler works  Sylwia Sawant also watches her grandchildren  Patient's  of 61y passed away last August   Patient was fully independent, managing her ADLs and iADLs prior to this recent stroke  [place] reports that patient is able to walk independently, does have issues with balance and coordination  Family has patient washing dishes, bathing, dressing  Patient prepares light meals for self  Patient needs significant re-direction  Daughter reports that patient has the "mental capacity of her 4yo grandson"  Per PT notes from recent hospital stay, patient has decreased safety awareness, impaired coordination, decreased functional mobility, and impaired judgement which requires 24/7 supervision  Daughter has been looking for memory care apps to assist patient wit memory  [facility] and [facility]ST at [facility]  Daughter reports that she has contacted the [address] [place] [address] Aging Office and they advised her of both the Waiver and Options programs  Aging to reach out to daughter in one weeks time  SW reviewed these programs with daughter  Daughter Sylwia Sawant does not know the financial status of patient  Sylwia Sawant reports that daughter Claudia Wheeler was recently added to patient's accounts and will need to review for monthly income and assets to determine if patient is eligible for waiver services  Daughter reports that she has contacted the PA SUNIL who reported they would also return a call in 1 weeks time  Patient does not have a living will, healthcare or financial POA        Observation made from phone call with daughter Sylwia Sawant who was picking patient up from sister's home for the day finds daughter assisting patient out of the car and re-directing patient on where they were going (to the front door of the home)  Deena Lopez will have sister Niurka Doe contact this SW with financial information to determine appropriate programs and referrals to be made

## 2022-08-29 ENCOUNTER — TELEPHONE (OUTPATIENT)
Dept: NEUROLOGY | Facility: CLINIC | Age: 77
End: 2022-08-29

## 2022-08-29 NOTE — TELEPHONE ENCOUNTER
Post CVA Discharge Follow Up  Hospitalization: 7/24/22-7/28/22, 7/28/22-8/12/22 [facility]    Received a message on 8/25/22 reporting BODØ returned my phone call  I was out of the office at that time  Called patient's daughter, DERICK, whom is listed on the [address] [place] [address] Communication Consent form  She did not answer  Left a voice message requesting for a call back  Provided my office's phone number

## 2022-08-29 NOTE — TELEPHONE ENCOUNTER
PRISCA Melendez  Patient can followup with neurovascular AP, resident or attending neurologist            Will schedule accordingly and add patient to the wait list  Will send patient a letter w/appointment details/directions

## 2022-08-29 NOTE — TELEPHONE ENCOUNTER
SLB/ACUTE ISCHEMIC LEFT MCA STROKE          NOTES: Virgil Du will need follow up in in 6 weeks with neurovascular team        SENT STAFF MESSAGE TO GIRISH DIALLO FOR CLARIFICATION:      Gabriel Perez,     Can you please clarify? I found these hfu instructions: Virgil Du will need follow up in in 6 weeks with neurovascular team  I was going to schedule w/Dr Bustillo Draft but first available is 1/5  Thank you kindly!        Mirta Burgos            AWAITING HER RESPONSE

## 2022-08-31 NOTE — TELEPHONE ENCOUNTER
Post CVA Discharge Follow Up  Hospitalization: 7/24/22-7/28/22, 7/28/22-8/12/22 [facility]    Called patient's daughter, Jackson Wick, with no answer  Left a voice message requesting for a call back  Provided the office's phone number  Will mail unable to reach letter

## 2022-09-08 ENCOUNTER — PATIENT OUTREACH (OUTPATIENT)
Dept: FAMILY MEDICINE CLINIC | Facility: CLINIC | Age: 77
End: 2022-09-08

## 2022-09-08 NOTE — PROGRESS NOTES
OPCM SW placed phone call to Carmen Chester for update on IEB  Sister DERICK is handling everything for patient  Carmen Chester is taking patient to therapy appointments  Patient had an eye appointment yesterday, has 2 cataracts, determined eye problems not due to stroke but due to cataracts  Phone call placed to BODTAM, call dropped, will attempt another outreach at a later time

## 2022-09-14 ENCOUNTER — TELEPHONE (OUTPATIENT)
Dept: NEUROLOGY | Facility: CLINIC | Age: 77
End: 2022-09-14

## 2022-09-14 NOTE — TELEPHONE ENCOUNTER
Post CVA Discharge Follow Up  Hospitalization: 7/24/22-7/28/22, 7/28/22-8/12/22 [facility]     Called patient's daughter, Kaitlynn Figueroa, with no answer  Left a vm requesting for a call back  As of now, there is an opening for tomorrow with JF Mix at [facility]  If Kaitlynn Figueroa calls back, then we can offer that appointment  Requested for a call back  Provided the office's phone number

## 2022-09-19 NOTE — TELEPHONE ENCOUNTER
Post CVA Discharge Follow Up  Hospitalization: 7/24/22-7/28/22, 7/28/22-8/12/22 [facility]      Called patient's daughter, Danay Berman reports the patient is doing well from a physical standpoint, and how she has reached a plateau in her recovery from a cognitive standpoint  Since discharge, she denies experiencing any new or worsening stroke-like symptoms  Patient denies the presence of any residual stroke symptoms following hospitalization and claims she has returned to baseline functioning  Patient reports she continues to have the following symptoms: issues with memory and word finding difficulty  She is ambulating independently as well as preforming her own ADLs  Danay Berman manages her medications, appointments, and affairs  Reviewed appointments - patient successfully followed up with PCP  Reminded her of the patient's stroke hospital follow up appointment on 9/26/22  She requested to cancel appointment since she wishes to research the provider more  She will call back to reschedule once she is ready  Reports that patient is participating with PT, OT, and ST  I reviewed medications with her  Reports having no difficulties obtaining medications  Reports she is taking as prescribed with no medication side effects or signs of bleeding  Patient is taking aspirin 81 mg once daily  She reports the patient stopped taking the atorvastatin since they are seeking a 2 nd opinion with a cardiologist at [facility] coming up soon  Expressed the importance of the atorvastatin for stroke prevention  During this call, we reviewed stroke type, symptoms, personal risk factors and management, medications, and resources  As for risk factors, she reports the patient's blood pressure has been under 130/80 and it is checked with doctor office visits and with therapy  Patient is a non smoker  She reports the patient is following a low salt / low cholesterol diet  I addressed all her questions   At the conclusion of the conversation, she denies having any further questions or concerns

## 2022-09-29 ENCOUNTER — PATIENT OUTREACH (OUTPATIENT)
Dept: FAMILY MEDICINE CLINIC | Facility: CLINIC | Age: 77
End: 2022-09-29

## 2022-09-29 NOTE — PROGRESS NOTES
OPCM SW placed follow up call to dtr, Claudia Wheeler, received voicemail  Message left with contact information and hours of availability  Awaiting call back from dtr to check status IEB and follow up services

## 2022-09-30 ENCOUNTER — PATIENT OUTREACH (OUTPATIENT)
Dept: FAMILY MEDICINE CLINIC | Facility: CLINIC | Age: 77
End: 2022-09-30

## 2022-09-30 NOTE — PROGRESS NOTES
OPCM SW received phone call from patient who reports that she is currently working with IEB for waiver services  Dtr is in the process of obtaining the required financial documents requested  Dtr reports that patient is doing well  Continues with PT/OT/ST  Patient would like to return to her home when it is safe for her to do so with additional supports

## 2022-10-03 ENCOUNTER — RA CDI HCC (OUTPATIENT)
Dept: OTHER | Facility: HOSPITAL | Age: 77
End: 2022-10-03

## 2022-10-03 NOTE — PROGRESS NOTES
[address]  coding opportunities       Chart reviewed, no opportunity found: CHART REVIEWED, NO OPPORTUNITY FOUND        Patients Insurance     Medicare Insurance: Medicare

## 2022-10-04 ENCOUNTER — TELEPHONE (OUTPATIENT)
Dept: NEUROLOGY | Facility: CLINIC | Age: 77
End: 2022-10-04

## 2022-10-07 ENCOUNTER — TELEPHONE (OUTPATIENT)
Dept: NEUROLOGY | Facility: CLINIC | Age: 77
End: 2022-10-07

## 2022-10-09 NOTE — PROGRESS NOTES
Name: Preeti Lopez      : 1945      MRN: [id number]  Encounter Provider: Collette Soles, MD  Encounter Date: 10/10/2022   Encounter department: [address] [place] [address] St     1  Acute ischemic left MCA stroke (HCC)  -     rosuvastatin (CRESTOR) 10 MG tablet; Take 1 tablet (10 mg total) by mouth daily    2  PAF (paroxysmal atrial fibrillation) (HCC)  -     rivaroxaban (Xarelto) 20 mg tablet; Take 1 tablet (20 mg total) by mouth daily with breakfast    3  SA node dysfunction ([facility] )    4  Primary osteoarthritis of both knees    5  Endometrial cancer ([facility] )    6  Screening for tuberculosis  -     Quantiferon TB Gold Plus; Future    7  Medicare annual wellness visit, subsequent    Continue with current medications  OT/PT/speech therapy  I discussed Fitness to Drive assessment by OT prior to resuming driving  Patient offered flu vaccine-she declined  Follow up with Cardiology  OV 1 year  She is not  Interested in colon CA screening        Subjective     Follow-up visit  Here with one of her daughters  S/p admission 2022 for L MCA CVA  Patient presented with right facial droop and aphasia  She was outside the window for tPA  CT scan head no acute intracranial hemorrhage  Left frontal temporal and low attenuation suggesting acute to subacute ischemia  MRI brain acute to early subacute ischemia identified involving the left MCA territory with 1 larger infarct and multiple smaller infarcts  No hemorrhagic transformation  Distal left M2/M3 MCA occlusion  Incidental right thyroid nodule  Echocardiogram normal left ventricular systolic function  EF 70%  Wall motion normal   Diastolic function normal   Right ventricular cavity size mildly dilated  Normal right ventricular systolic function  Mildly dilated left atrium  No significant valvular disease aortic root normal size  Ascending aorta normal size  No pericardial effusion   She had 1 episode of AF w/ ventricular response in the hospital  She was by Cardiology last month and changed from ASA 81 mg daily to Xarelto 20 mg/day  CHAD2-VASc score=4  She was switched from Atorvastatin 40 mg daily to Crestor 10 mg daily  She is currently receiving out patient OT/PT and speech therapy  She is living with her daughter  Not driving at this time  Lab Results   Component Value Date    WBC 7 65 08/08/2022    HGB 12 8 08/08/2022    HCT 39 7 08/08/2022    MCV 92 08/08/2022     08/08/2022     Lab Results   Component Value Date     05/11/2015    SODIUM 141 08/08/2022    K 4 3 08/08/2022     08/08/2022    CO2 29 08/08/2022    ANIONGAP 5 05/11/2015    AGAP 6 08/08/2022    BUN 13 08/08/2022    CREATININE 0 63 08/08/2022    GLUC 82 08/08/2022    GLUF 82 08/08/2022    CALCIUM 9 1 08/08/2022    AST 46 (H) 07/29/2022    ALT 19 07/29/2022    ALKPHOS 70 07/29/2022    PROT 7 2 05/11/2015    TP 6 4 07/29/2022    BILITOT 0 39 05/11/2015    TBILI 0 57 07/29/2022    EGFR 85 08/08/2022     Lab Results   Component Value Date    CHOLESTEROL 146 07/25/2022    CHOLESTEROL 186 11/11/2020     Lab Results   Component Value Date    HDL 53 07/25/2022    HDL 72 11/11/2020     Lab Results   Component Value Date    TRIG 103 07/25/2022    TRIG 76 11/11/2020     Lab Results   Component Value Date    LDLCALC 72 07/25/2022     Lab Results   Component Value Date    HGBA1C 5 4 07/25/2022     Lab Results   Component Value Date    IMW2DRDBACBY 1 500 07/25/2022           Review of Systems   Constitutional: Negative for appetite change, chills, fatigue, fever and unexpected weight change  See HPI    HENT: Positive for hearing loss (hearing aid left ear )  Negative for congestion, ear pain, rhinorrhea, sore throat and trouble swallowing  See HPI  Eyes: Negative for visual disturbance  Respiratory: Negative for cough, shortness of breath and wheezing  Cardiovascular: Negative for chest pain, palpitations and leg swelling  See HPI   Past history of DVT left leg patient completed 9 months of Xarelto  08/2016 prothrombin mutation gene, factor 5 Leiden and homocystine levels normal      Gastrointestinal: Negative for abdominal pain, blood in stool, constipation, diarrhea, nausea and vomiting          09/2014 colonoscopy 1 polyp  Severe diverticulosis   Endocrine: Negative for polyuria  Thyroid nodule 01/2019  Stable right lower pole nodule with previous non-malignant biopsy results  03/2018 DEXA scan T-score -1 5 lumbar spine consistent with low bone mineral density  Compared to prior examination has been a 5% decrease in the BMD of lumbar spine at 12% decrease in the total BMD of left hip  10/2021 vitamin-D 24 2-on vitamin D supplement    Genitourinary: Negative for difficulty urinating  History of endometrial CA stage I grade 3 status post total hysterectomy 12/2014 she completed chemotherapy and radiation therapy  Left inguinal lymph node biopsy 04/2015 no cancer  02/2018 CT scan chest,abdomen/pelvis- no evidence of metastatic disease  Stable 24 mm right thyroid nodule  Distal colonic diverticulosis  Stable small bilateral inguinal lymph nodes  A dominant left inguinal lymph node is unchanged  Musculoskeletal: Negative for arthralgias and myalgias  OA knees x-rays knees 06/2013 moderately severe osteoarthritis right knee  Moderate osteoarthritis left knee  She completed a course of physical therapy with improvement  Asymptomatic at present  On no pain medications  Skin: Negative for rash  Neurological: Negative for dizziness and headaches  Hematological: Negative for adenopathy  Does not bruise/bleed easily          Lab Results       Component                Value               Date                       WBC                      7 65                08/08/2022                 HGB                      12 8                08/08/2022                 HCT                      39 7 08/08/2022                 MCV                      92                  08/08/2022                 PLT                      206                 08/08/2022                Lab Results       Component                Value               Date                       [id number]               429                 10/23/2021              Lab Results       Component                Value               Date                       FOLATE                   9 3                 10/23/2021             Psychiatric/Behavioral: Negative for dysphoric mood, sleep disturbance and suicidal ideas  No longer on Sertraline  Periods of forgetfulness  She opted not to pursue neuropsychological testing  Past Medical History:   Diagnosis Date   • Deep vein thrombosis ([facility] ) 1/28/2016   • Diverticulosis     last assessed 05/29/13   • Fibrocystic breast    • History of DVT (deep vein thrombosis)     Xarelto, 4/2015 thru April 2016   • Impaired fasting glucose     last assessed 06/04/14   • Inguinal lymphadenopathy    • Multiple benign polyps of large intestine    • Obesity    • Uterine cancer ([facility] )      Past Surgical History:   Procedure Laterality Date   • HYSTERECTOMY     • LYMPH NODE BIOPSY     • OOPHORECTOMY     • PELVIC LAPAROSCOPY     • TONSILLECTOMY      with adenoidectomy     Family History   Problem Relation Age of Onset   • Hypothyroidism Mother    • Diabetes Maternal Grandfather      Social History     Socioeconomic History   • Marital status:       Spouse name: None   • Number of children: None   • Years of education: None   • Highest education level: None   Occupational History   • None   Tobacco Use   • Smoking status: Never Smoker   • Smokeless tobacco: Never Used   Vaping Use   • Vaping Use: Never used   Substance and Sexual Activity   • Alcohol use: Not Currently   • Drug use: No   • Sexual activity: None   Other Topics Concern   • None   Social History Narrative    Denied: Home environment Domestic violence Social Determinants of Health     Financial Resource Strain: Low Risk    • Difficulty of Paying Living Expenses: Not hard at all   Food Insecurity: No Food Insecurity   • Worried About Running Out of Food in the Last Year: Never true   • Ran Out of Food in the Last Year: Never true   Transportation Needs: No Transportation Needs   • Lack of Transportation (Medical): No   • Lack of Transportation (Non-Medical):  No   Physical Activity: Not on file   Stress: Not on file   Social Connections: Not on file   Intimate Partner Violence: Not on file   Housing Stability: Low Risk    • Unable to Pay for Housing in the Last Year: No   • Number of Places Lived in the Last Year: 1   • Unstable Housing in the Last Year: No     Current Outpatient Medications on File Prior to Visit   Medication Sig   • acetaminophen (TYLENOL) 325 mg tablet Take 2 tablets (650 mg total) by mouth every 6 (six) hours as needed for mild pain   • Cholecalciferol (Vitamin D3) 50 MCG (2000 UT) capsule Take 1 capsule (2,000 Units total) by mouth daily   • Multiple Vitamins-Minerals (CENTRUM SILVER 50+WOMEN PO) Take 1 tablet by mouth daily   • Vitamin E 268 MG (400 UNIT) capsule Take by mouth daily   • [DISCONTINUED] aspirin 81 mg chewable tablet Chew 1 tablet (81 mg total) daily   • [DISCONTINUED] atorvastatin (LIPITOR) 40 mg tablet Take 1 tablet (40 mg total) by mouth every evening   • [DISCONTINUED] sertraline (Zoloft) 50 mg tablet Take 1 5 tablets (75 mg total) by mouth daily   • [DISCONTINUED] Ascorbic Acid (Vitamin C) 500 MG CAPS Take 1 capsule (500 mg total) by mouth daily   • [DISCONTINUED] Calcium Carb-Cholecalciferol (Calcium 500/D) 500-400 MG-UNIT CHEW Chew 1 tablet daily   • [DISCONTINUED] docusate sodium (COLACE) 100 mg capsule Take 1 capsule (100 mg total) by mouth 2 (two) times a day   • [DISCONTINUED] senna (SENOKOT) 8 6 mg Take 2 tablets (17 2 mg total) by mouth daily at bedtime     No Known Allergies  Immunization History   Administered Date(s) Administered   • COVID-19 MODERNA VACC 0 5 ML IM 01/08/2021, 08/24/2021, 05/20/2022   • Influenza Split High Dose Preservative Free IM 11/04/2016   • Influenza, high dose seasonal 0 7 mL 09/17/2019, 09/30/2020, 10/07/2021   • Pneumococcal Conjugate 13-Valent 08/04/2016   • Pneumococcal Polysaccharide PPV23 08/08/2017       Objective     /64   Pulse 66   Temp 97 8 °F (36 6 °C)   Resp 16   Ht 5' 7" (1 702 m)   Wt 85 7 kg (189 lb)   SpO2 99%   BMI 29 60 kg/m²     BP Readings from Last 3 Encounters:   10/10/22 122/64   08/12/22 133/63   07/28/22 122/73     Wt Readings from Last 3 Encounters:   10/10/22 85 7 kg (189 lb)   08/10/22 87 2 kg (192 lb 3 9 oz)   07/25/22 84 8 kg (187 lb)           Physical Exam  Vitals and nursing note reviewed  Constitutional:       General: She is not in acute distress  Appearance: She is well-developed  HENT:      Right Ear: Tympanic membrane and ear canal normal       Left Ear: Tympanic membrane and ear canal normal    Eyes:      General: No scleral icterus  Extraocular Movements: Extraocular movements intact  Conjunctiva/sclera: Conjunctivae normal       Pupils: Pupils are equal, round, and reactive to light  Neck:      Thyroid: No thyroid mass or thyromegaly  Vascular: No carotid bruit or JVD  Trachea: No tracheal deviation  Cardiovascular:      Rate and Rhythm: Normal rate and regular rhythm  Heart sounds: Normal heart sounds  No murmur heard  No gallop  Pulmonary:      Effort: Pulmonary effort is normal  No respiratory distress  Breath sounds: Normal breath sounds  No wheezing or rales  Chest:   Breasts:      Right: No supraclavicular adenopathy  Left: No supraclavicular adenopathy  Abdominal:      General: Bowel sounds are normal  There is no distension or abdominal bruit  Palpations: Abdomen is soft  There is no hepatomegaly, splenomegaly or mass  Tenderness: There is no abdominal tenderness  There is no guarding or rebound  Musculoskeletal:      Right lower leg: No edema  Left lower leg: No edema  Lymphadenopathy:      Cervical: No cervical adenopathy  Upper Body:      Right upper body: No supraclavicular adenopathy  Left upper body: No supraclavicular adenopathy  Skin:     Findings: No rash  Nails: There is no clubbing  Neurological:      General: No focal deficit present  Mental Status: She is alert and oriented to person, place, and time  Cranial Nerves: No cranial nerve deficit, dysarthria or facial asymmetry  Motor: No weakness or pronator drift        Gait: Gait normal       Deep Tendon Reflexes: Reflexes normal    Psychiatric:         Mood and Affect: Mood normal          Behavior: Behavior normal        Evelyne Chatterjee MD

## 2022-10-10 ENCOUNTER — OFFICE VISIT (OUTPATIENT)
Dept: FAMILY MEDICINE CLINIC | Facility: CLINIC | Age: 77
End: 2022-10-10
Payer: MEDICARE

## 2022-10-10 VITALS
RESPIRATION RATE: 16 BRPM | TEMPERATURE: 97.8 F | HEART RATE: 66 BPM | HEIGHT: 67 IN | BODY MASS INDEX: 29.66 KG/M2 | DIASTOLIC BLOOD PRESSURE: 64 MMHG | WEIGHT: 189 LBS | OXYGEN SATURATION: 99 % | SYSTOLIC BLOOD PRESSURE: 122 MMHG

## 2022-10-10 DIAGNOSIS — Z00.00 MEDICARE ANNUAL WELLNESS VISIT, SUBSEQUENT: ICD-10-CM

## 2022-10-10 DIAGNOSIS — M17.0 PRIMARY OSTEOARTHRITIS OF BOTH KNEES: ICD-10-CM

## 2022-10-10 DIAGNOSIS — I49.5 SA NODE DYSFUNCTION (HCC): ICD-10-CM

## 2022-10-10 DIAGNOSIS — C54.1 ENDOMETRIAL CANCER (HCC): ICD-10-CM

## 2022-10-10 DIAGNOSIS — I48.0 PAF (PAROXYSMAL ATRIAL FIBRILLATION) (HCC): ICD-10-CM

## 2022-10-10 DIAGNOSIS — Z11.1 SCREENING FOR TUBERCULOSIS: ICD-10-CM

## 2022-10-10 DIAGNOSIS — I63.512 ACUTE ISCHEMIC LEFT MCA STROKE (HCC): Primary | ICD-10-CM

## 2022-10-10 PROBLEM — Z86.73 HISTORY OF CVA (CEREBROVASCULAR ACCIDENT) WITHOUT RESIDUAL DEFICITS: Status: ACTIVE | Noted: 2022-09-23

## 2022-10-10 PROBLEM — I87.2 VENOUS INSUFFICIENCY OF BOTH LOWER EXTREMITIES: Status: ACTIVE | Noted: 2022-09-23

## 2022-10-10 PROCEDURE — 99214 OFFICE O/P EST MOD 30 MIN: CPT | Performed by: FAMILY MEDICINE

## 2022-10-10 RX ORDER — ROSUVASTATIN CALCIUM 10 MG/1
10 TABLET, COATED ORAL DAILY
Qty: 30 TABLET | Refills: 5
Start: 2022-10-10

## 2022-10-10 NOTE — PATIENT INSTRUCTIONS
Medicare Preventive Visit Patient Instructions  Thank you for completing your Welcome to Medicare Visit or Medicare Annual Wellness Visit today  Your next wellness visit will be due in one year (10/11/2023)  The screening/preventive services that you may require over the next 5-10 years are detailed below  Some tests may not apply to you based off risk factors and/or age  Screening tests ordered at today's visit but not completed yet may show as past due  Also, please note that scanned in results may not display below  Preventive Screenings:  Service Recommendations Previous Testing/Comments   Colorectal Cancer Screening  * Colonoscopy    * Fecal Occult Blood Test (FOBT)/Fecal Immunochemical Test (FIT)  * Fecal DNA/Cologuard Test  * Flexible Sigmoidoscopy Age: 39-70 years old   Colonoscopy: every 10 years (may be performed more frequently if at higher risk)  OR  FOBT/FIT: every 1 year  OR  Cologuard: every 3 years  OR  Sigmoidoscopy: every 5 years  Screening may be recommended earlier than age 39 if at higher risk for colorectal cancer  Also, an individualized decision between you and your healthcare provider will decide whether screening between the ages of 74-80 would be appropriate  Colonoscopy: 09/03/2014  FOBT/FIT: Not on file  Cologuard: Not on file  Sigmoidoscopy: Not on file          Breast Cancer Screening Age: 36 years old  Frequency: every 1-2 years  Not required if history of left and right mastectomy Mammogram: 11/11/2020    Screening Current   Cervical Cancer Screening Between the ages of 21-29, pap smear recommended once every 3 years  Between the ages of 33-67, can perform pap smear with HPV co-testing every 5 years     Recommendations may differ for women with a history of total hysterectomy, cervical cancer, or abnormal pap smears in past  Pap Smear: Not on file    Screening Not Indicated   Hepatitis C Screening Once for adults born between 1945 and 1965  More frequently in patients at high risk for Hepatitis C Hep C Antibody: Not on file    Screening Current   Diabetes Screening 1-2 times per year if you're at risk for diabetes or have pre-diabetes Fasting glucose: 82 mg/dL (8/8/2022)  A1C: 5 4 % (7/25/2022)  Screening Current   Cholesterol Screening Once every 5 years if you don't have a lipid disorder  May order more often based on risk factors  Lipid panel: 07/25/2022    Screening Current     Other Preventive Screenings Covered by Medicare:  1  Abdominal Aortic Aneurysm (AAA) Screening: covered once if your at risk  You're considered to be at risk if you have a family history of AAA  2  Lung Cancer Screening: covers low dose CT scan once per year if you meet all of the following conditions: (1) Age 50-69; (2) No signs or symptoms of lung cancer; (3) Current smoker or have quit smoking within the last 15 years; (4) You have a tobacco smoking history of at least 20 pack years (packs per day multiplied by number of years you smoked); (5) You get a written order from a healthcare provider  3  Glaucoma Screening: covered annually if you're considered high risk: (1) You have diabetes OR (2) Family history of glaucoma OR (3)  aged 48 and older OR (3)  American aged 72 and older  3  Osteoporosis Screening: covered every 2 years if you meet one of the following conditions: (1) You're estrogen deficient and at risk for osteoporosis based off medical history and other findings; (2) Have a vertebral abnormality; (3) On glucocorticoid therapy for more than 3 months; (4) Have primary hyperparathyroidism; (5) On osteoporosis medications and need to assess response to drug therapy  · Last bone density test (DXA Scan): 03/17/2018  5  HIV Screening: covered annually if you're between the age of 12-76  Also covered annually if you are younger than 13 and older than 72 with risk factors for HIV infection   For pregnant patients, it is covered up to 3 times per pregnancy  Immunizations:  Immunization Recommendations   Influenza Vaccine Annual influenza vaccination during flu season is recommended for all persons aged >= 6 months who do not have contraindications   Pneumococcal Vaccine   * Pneumococcal conjugate vaccine = PCV13 (Prevnar 13), PCV15 (Vaxneuvance), PCV20 (Prevnar 20)  * Pneumococcal polysaccharide vaccine = PPSV23 (Pneumovax) Adults 25-60 years old: 1-3 doses may be recommended based on certain risk factors  Adults 72 years old: 1-2 doses may be recommended based off what pneumonia vaccine you previously received   Hepatitis B Vaccine 3 dose series if at intermediate or high risk (ex: diabetes, end stage renal disease, liver disease)   Tetanus (Td) Vaccine - COST NOT COVERED BY MEDICARE PART B Following completion of primary series, a booster dose should be given every 10 years to maintain immunity against tetanus  Td may also be given as tetanus wound prophylaxis  Tdap Vaccine - COST NOT COVERED BY MEDICARE PART B Recommended at least once for all adults  For pregnant patients, recommended with each pregnancy  Shingles Vaccine (Shingrix) - COST NOT COVERED BY MEDICARE PART B  2 shot series recommended in those aged 48 and above     Health Maintenance Due:      Topic Date Due   • Colorectal Cancer Screening  09/03/2019   • Breast Cancer Screening: Mammogram  11/11/2021   • Hepatitis C Screening  11/07/2023 (Originally 1945)     Immunizations Due:      Topic Date Due   • Influenza Vaccine (1) 09/01/2022   • COVID-19 Vaccine (4 - Booster for [place] series) 09/20/2022     Advance Directives   What are advance directives? Advance directives are legal documents that state your wishes and plans for medical care  These plans are made ahead of time in case you lose your ability to make decisions for yourself  Advance directives can apply to any medical decision, such as the treatments you want, and if you want to donate organs     What are the types of advance directives? There are many types of advance directives, and each state has rules about how to use them  You may choose a combination of any of the following:  · Living will: This is a written record of the treatment you want  You can also choose which treatments you do not want, which to limit, and which to stop at a certain time  This includes surgery, medicine, IV fluid, and tube feedings  · Durable power of  for healthcare [facility]): This is a written record that states who you want to make healthcare choices for you when you are unable to make them for yourself  This person, called a proxy, is usually a family member or a friend  You may choose more than 1 proxy  · Do not resuscitate (DNR) order:  A DNR order is used in case your heart stops beating or you stop breathing  It is a request not to have certain forms of treatment, such as CPR  A DNR order may be included in other types of advance directives  · Medical directive: This covers the care that you want if you are in a coma, near death, or unable to make decisions for yourself  You can list the treatments you want for each condition  Treatment may include pain medicine, surgery, blood transfusions, dialysis, IV or tube feedings, and a ventilator (breathing machine)  · Values history: This document has questions about your views, beliefs, and how you feel and think about life  This information can help others choose the care that you would choose  Why are advance directives important? An advance directive helps you control your care  Although spoken wishes may be used, it is better to have your wishes written down  Spoken wishes can be misunderstood, or not followed  Treatments may be given even if you do not want them  An advance directive may make it easier for your family to make difficult choices about your care  Urinary Incontinence   Urinary incontinence (UI)  is when you lose control of your bladder   UI develops because your bladder cannot store or empty urine properly  The 3 most common types of UI are stress incontinence, urge incontinence, or both  Medicines:   · May be given to help strengthen your bladder control  Report any side effects of medication to your healthcare provider  Do pelvic muscle exercises often:  Your pelvic muscles help you stop urinating  Squeeze these muscles tight for 5 seconds, then relax for 5 seconds  Gradually work up to squeezing for 10 seconds  Do 3 sets of 15 repetitions a day, or as directed  This will help strengthen your pelvic muscles and improve bladder control  Train your bladder:  Go to the bathroom at set times, such as every 2 hours, even if you do not feel the urge to go  You can also try to hold your urine when you feel the urge to go  For example, hold your urine for 5 minutes when you feel the urge to go  As that becomes easier, hold your urine for 10 minutes  Self-care:   · Keep a UI record  Write down how often you leak urine and how much you leak  Make a note of what you were doing when you leaked urine  · Drink liquids as directed  You may need to limit the amount of liquid you drink to help control your urine leakage  Do not drink any liquid right before you go to bed  Limit or do not have drinks that contain caffeine or alcohol  · Prevent constipation  Eat a variety of high-fiber foods  Good examples are high-fiber cereals, beans, vegetables, and whole-grain breads  Walking is the best way to trigger your intestines to have a bowel movement  · Exercise regularly and maintain a healthy weight  Weight loss and exercise will decrease pressure on your bladder and help you control your leakage  · Use a catheter as directed  to help empty your bladder  A catheter is a tiny, plastic tube that is put into your bladder to drain your urine  · Go to behavior therapy as directed  Behavior therapy may be used to help you learn to control your urge to urinate      Weight Management   Why it is important to manage your weight:  Being overweight increases your risk of health conditions such as heart disease, high blood pressure, type 2 diabetes, and certain types of cancer  It can also increase your risk for osteoarthritis, sleep apnea, and other respiratory problems  Aim for a slow, steady weight loss  Even a small amount of weight loss can lower your risk of health problems  How to lose weight safely:  A safe and healthy way to lose weight is to eat fewer calories and get regular exercise  You can lose up about 1 pound a week by decreasing the number of calories you eat by 500 calories each day  Healthy meal plan for weight management:  A healthy meal plan includes a variety of foods, contains fewer calories, and helps you stay healthy  A healthy meal plan includes the following:  · Eat whole-grain foods more often  A healthy meal plan should contain fiber  Fiber is the part of grains, fruits, and vegetables that is not broken down by your body  Whole-grain foods are healthy and provide extra fiber in your diet  Some examples of whole-grain foods are whole-wheat breads and pastas, oatmeal, brown rice, and bulgur  · Eat a variety of vegetables every day  Include dark, leafy greens such as spinach, kale, chato greens, and mustard greens  Eat yellow and orange vegetables such as carrots, sweet potatoes, and winter squash  · Eat a variety of fruits every day  Choose fresh or canned fruit (canned in its own juice or light syrup) instead of juice  Fruit juice has very little or no fiber  · Eat low-fat dairy foods  Drink fat-free (skim) milk or 1% milk  Eat fat-free yogurt and low-fat cottage cheese  Try low-fat cheeses such as mozzarella and other reduced-fat cheeses  · Choose meat and other protein foods that are low in fat  Choose beans or other legumes such as split peas or lentils   Choose fish, skinless poultry (chicken or turkey), or lean cuts of red meat (beef or pork)  Before you cook meat or poultry, cut off any visible fat  · Use less fat and oil  Try baking foods instead of frying them  Add less fat, such as margarine, sour cream, regular salad dressing and mayonnaise to foods  Eat fewer high-fat foods  Some examples of high-fat foods include french fries, doughnuts, ice cream, and cakes  · Eat fewer sweets  Limit foods and drinks that are high in sugar  This includes candy, cookies, regular soda, and sweetened drinks  Exercise:  Exercise at least 30 minutes per day on most days of the week  Some examples of exercise include walking, biking, dancing, and swimming  You can also fit in more physical activity by taking the stairs instead of the elevator or parking farther away from stores  Ask your healthcare provider about the best exercise plan for you  © Copyright [employer] 2018 Information is for End User's use only and may not be sold, redistributed or otherwise used for commercial purposes   All illustrations and images included in CareNotes® are the copyrighted property of A D A M , Inc  or [address] [place] [address]

## 2022-10-10 NOTE — PROGRESS NOTES
Assessment and Plan:     Problem List Items Addressed This Visit        Cardiovascular and Mediastinum    Acute ischemic left MCA stroke ([facility] ) - Primary    Relevant Medications    rosuvastatin (CRESTOR) 10 MG tablet    SA node dysfunction (HCC)       Musculoskeletal and Integument    Osteoarthritis of knee       Genitourinary    Endometrial cancer ([facility] )      Other Visit Diagnoses     PAF (paroxysmal atrial fibrillation) (HCC)        Relevant Medications    rivaroxaban (Xarelto) 20 mg tablet    Screening for tuberculosis        Relevant Orders    Quantiferon TB Gold Plus    Medicare annual wellness visit, subsequent              Urinary Incontinence Plan of Care: counseling topics discussed: limiting fluid intake 3-4 hours before bed  PHQ-2/9 Depression Screening    Little interest or pleasure in doing things: 2 - more than half the days  Feeling down, depressed, or hopeless: 0 - not at all  Trouble falling or staying asleep, or sleeping too much: 0 - not at all  Feeling tired or having little energy: 0 - not at all  Poor appetite or overeatin - not at all  Feeling bad about yourself - or that you are a failure or have let yourself or your family down: 0 - not at all  Trouble concentrating on things, such as reading the newspaper or watching television: 0 - not at all  Moving or speaking so slowly that other people could have noticed  Or the opposite - being so fidgety or restless that you have been moving around a lot more than usual: 0 - not at all  Thoughts that you would be better off dead, or of hurting yourself in some way: 0 - not at all  PHQ-9 Score: 2   PHQ-9 Interpretation: No or Minimal depression              Preventive health issues were discussed with patient, and age appropriate screening tests were ordered as noted in patient's After Visit Summary    Personalized health advice and appropriate referrals for health education or preventive services given if needed, as noted in patient's After Visit Summary       History of Present Illness:     Patient presents for a Medicare Wellness Visit    HPI   Patient Care Team:  Laureano Solorio MD as PCP - Socorro Frank MD (Obstetrics and Gynecology)  PRISCA Joseph MD (Surgical Oncology)  Kingsley Rodriguez MD (Radiation Oncology)  Staci Santana RN as RN Care Manager  Dc Tavarez LCSW as  Care Manager (Care Coordination)     Review of Systems:     Review of Systems     Problem List:     Patient Active Problem List   Diagnosis   • History of endometrial cancer   • Endometrial cancer [facility])   • Multinodular thyroid   • Obesity   • Osteoarthritis of hip   • Osteoarthritis of knee   • Vitamin D deficiency   • Osteopenia of lumbar spine   • Mild episode of recurrent major depressive disorder ([facility] [address] )   • Acute ischemic left MCA stroke ([facility] [address] )   • History of DVT (deep vein thrombosis)   • Dysphagia   • SA node dysfunction (HCC)   • Thyroid nodule   • History of CVA (cerebrovascular accident) without residual deficits   • Paroxysmal atrial fibrillation ([facility] )   • Venous insufficiency of both lower extremities      Past Medical and Surgical History:     Past Medical History:   Diagnosis Date   • Deep vein thrombosis ([facility] [address] ) 1/28/2016   • Diverticulosis     last assessed 05/29/13   • Fibrocystic breast    • History of DVT (deep vein thrombosis)     Xarelto, 4/2015 thru April 2016   • Impaired fasting glucose     last assessed 06/04/14   • Inguinal lymphadenopathy    • Multiple benign polyps of large intestine    • Obesity    • Uterine cancer [facility])      Past Surgical History:   Procedure Laterality Date   • HYSTERECTOMY     • LYMPH NODE BIOPSY     • OOPHORECTOMY     • PELVIC LAPAROSCOPY     • TONSILLECTOMY      with adenoidectomy      Family History:     Family History   Problem Relation Age of Onset   • Hypothyroidism Mother    • Diabetes Maternal Grandfather       Social History:     Social History     Socioeconomic History   • Marital status:      Spouse name: None   • Number of children: None   • Years of education: None   • Highest education level: None   Occupational History   • None   Tobacco Use   • Smoking status: Never Smoker   • Smokeless tobacco: Never Used   Vaping Use   • Vaping Use: Never used   Substance and Sexual Activity   • Alcohol use: Not Currently   • Drug use: No   • Sexual activity: None   Other Topics Concern   • None   Social History Narrative    Denied: Home environment Domestic violence     Social Determinants of Health     Financial Resource Strain: Not on file   Food Insecurity: No Food Insecurity   • Worried About Running Out of Food in the Last Year: Never true   • Ran Out of Food in the Last Year: Never true   Transportation Needs: No Transportation Needs   • Lack of Transportation (Medical): No   • Lack of Transportation (Non-Medical):  No   Physical Activity: Not on file   Stress: Not on file   Social Connections: Not on file   Intimate Partner Violence: Not on file   Housing Stability: Low Risk    • Unable to Pay for Housing in the Last Year: No   • Number of Places Lived in the Last Year: 1   • Unstable Housing in the Last Year: No      Medications and Allergies:     Current Outpatient Medications   Medication Sig Dispense Refill   • acetaminophen (TYLENOL) 325 mg tablet Take 2 tablets (650 mg total) by mouth every 6 (six) hours as needed for mild pain  0   • Cholecalciferol (Vitamin D3) 50 MCG (2000 UT) capsule Take 1 capsule (2,000 Units total) by mouth daily 30 capsule 0   • Multiple Vitamins-Minerals (CENTRUM SILVER 50+WOMEN PO) Take 1 tablet by mouth daily     • rivaroxaban (Xarelto) 20 mg tablet Take 1 tablet (20 mg total) by mouth daily with breakfast 30 tablet 5   • rosuvastatin (CRESTOR) 10 MG tablet Take 1 tablet (10 mg total) by mouth daily 30 tablet 5   • Vitamin E 268 MG (400 UNIT) capsule Take by mouth daily       No current facility-administered medications for this visit  No Known Allergies   Immunizations:     Immunization History   Administered Date(s) Administered   • COVID-19 MODERNA VACC 0 5 ML IM 01/08/2021, 08/24/2021, 05/20/2022   • Influenza Split High Dose Preservative Free IM 11/04/2016   • Influenza, high dose seasonal 0 7 mL 09/17/2019, 09/30/2020, 10/07/2021   • Pneumococcal Conjugate 13-Valent 08/04/2016   • Pneumococcal Polysaccharide PPV23 08/08/2017      Health Maintenance:         Topic Date Due   • Colorectal Cancer Screening  09/03/2019   • Breast Cancer Screening: Mammogram  11/11/2021   • Hepatitis C Screening  11/07/2023 (Originally 1945)         Topic Date Due   • Influenza Vaccine (1) 09/01/2022   • COVID-19 Vaccine (4 - Booster for [place] series) 09/20/2022      Medicare Screening Tests and Risk Assessments:     Thiago Alejandre is here for her Subsequent Wellness visit  Last Medicare Wellness visit information reviewed, patient interviewed and updates made to the record today  Health Risk Assessment:   Patient rates overall health as good  Patient feels that their physical health rating is same  Patient is satisfied with their life  Eyesight was rated as slightly worse  Hearing was rated as slightly worse  Patient feels that their emotional and mental health rating is slightly worse  Patients states they are never, rarely angry  Patient states they are sometimes unusually tired/fatigued  Pain experienced in the last 7 days has been some  Patient's pain rating has been 8/10  Patient states that she has experienced weight loss or gain in last 6 months  Depression Screening:   PHQ-9 Score: 2      Fall Risk Screening: In the past year, patient has experienced: no history of falling in past year      Urinary Incontinence Screening:   Patient has leaked urine accidently in the last six months  Home Safety:  Patient has trouble with stairs inside or outside of their home   Patient has working smoke alarms and has working carbon monoxide detector  Home safety hazards include: none  Nutrition:   Current diet is Regular  Medications:   Patient is currently taking over-the-counter supplements  OTC medications include: see medication list  Patient is able to manage medications  Activities of Daily Living (ADLs)/Instrumental Activities of Daily Living (IADLs):   Walk and transfer into and out of bed and chair?: Yes  Dress and groom yourself?: Yes    Bathe or shower yourself?: Yes    Feed yourself? Yes  Do your laundry/housekeeping?: Yes  Manage your money, pay your bills and track your expenses?: Yes  Make your own meals?: Yes    Do your own shopping?: Yes    Previous Hospitalizations:   Any hospitalizations or ED visits within the last 12 months?: No      Advance Care Planning:   Living will: Yes    Advanced directive: Yes      Cognitive Screening:   Provider or family/friend/caregiver concerned regarding cognition?: Yes    PREVENTIVE SCREENINGS      Cardiovascular Screening:    General: Screening Current      Diabetes Screening:     General: Screening Current      Colorectal Cancer Screening:     General: Risks and Benefits Discussed and Patient Declines      Breast Cancer Screening:     General: Risks and Benefits Discussed and Patient Declines      Cervical Cancer Screening:    General: Screening Not Indicated      Osteoporosis Screening:    General: Screening Current      Abdominal Aortic Aneurysm (AAA) Screening:        General: Screening Not Indicated      Lung Cancer Screening:     General: Screening Not Indicated      Hepatitis C Screening:    General: Screening Current    Screening, Brief Intervention, and Referral to Treatment (SBIRT)    Screening  Typical number of drinks in a day: 0  Typical number of drinks in a week: 0  Interpretation: Low risk drinking behavior  Single Item Drug Screening:  How often have you used an illegal drug (including marijuana) or a prescription medication for non-medical reasons in the past year? never    Single Item Drug Screen Score: 0  Interpretation: Negative screen for possible drug use disorder    Brief Intervention  Alcohol & drug use screenings were reviewed  No concerns regarding substance use disorder identified  Other Counseling Topics:   Regular weightbearing exercise and calcium and vitamin D intake       No exam data present     Physical Exam:     /64   Pulse 66   Temp 97 8 °F (36 6 °C)   Resp 16   Ht 5' 7" (1 702 m)   Wt 85 7 kg (189 lb)   SpO2 99%   BMI 29 60 kg/m²     Physical Exam     Enedina Ibarra MD

## 2022-10-18 LAB
GAMMA INTERFERON BACKGROUND BLD IA-ACNC: 0.01 IU/ML
M TB IFN-G CD4+ T-CELLS BLD-ACNC: 0.02 IU/ML
M TB IFN-G CD4+ T-CELLS BLD-ACNC: 0.03 IU/ML
MITOGEN IGNF BLD-ACNC: 2.4 IU/ML
QUANTIFERON INCUBATION COMMENT: NORMAL
QUANTIFERON-TB GOLD PLUS: NEGATIVE
SERVICE CMNT-IMP: NORMAL

## 2022-10-24 ENCOUNTER — TELEPHONE (OUTPATIENT)
Dept: FAMILY MEDICINE CLINIC | Facility: CLINIC | Age: 77
End: 2022-10-24

## 2022-10-24 NOTE — TELEPHONE ENCOUNTER
Informed patient's daughter that forms were filled out by Dr Margot Lynch and ready for   Per patient's daughter , forms mailed to patient's daughter

## 2022-10-26 ENCOUNTER — PATIENT OUTREACH (OUTPATIENT)
Dept: FAMILY MEDICINE CLINIC | Facility: CLINIC | Age: 77
End: 2022-10-26

## 2022-10-27 ENCOUNTER — TELEPHONE (OUTPATIENT)
Dept: NEUROLOGY | Facility: CLINIC | Age: 77
End: 2022-10-27

## 2022-10-28 ENCOUNTER — TELEPHONE (OUTPATIENT)
Dept: FAMILY MEDICINE CLINIC | Facility: CLINIC | Age: 77
End: 2022-10-28

## 2022-10-28 NOTE — TELEPHONE ENCOUNTER
Pt's daughter called stating pt would like to restart her Sertraline again  Asking for it to be sent to the [facility] on [facility]

## 2022-10-28 NOTE — TELEPHONE ENCOUNTER
See note    PATRICIA I did call patient daughter back and let her know you were out of town until Monday  They were ok with waiting

## 2022-10-30 DIAGNOSIS — F33.0 MILD EPISODE OF RECURRENT MAJOR DEPRESSIVE DISORDER (HCC): Primary | ICD-10-CM

## 2022-10-31 NOTE — TELEPHONE ENCOUNTER
Left detailed message on patient daughter voicemail and to contact the office with any questions or concerns

## 2022-11-02 ENCOUNTER — PATIENT OUTREACH (OUTPATIENT)
Dept: FAMILY MEDICINE CLINIC | Facility: CLINIC | Age: 77
End: 2022-11-02

## 2022-11-02 NOTE — PROGRESS NOTES
OPCM SW placed phone call to patient's dtr Mary Hopes  Dtr is not available and message left with reason for phone call and this SW contact information  Awaiting return call from dtr

## 2022-11-03 ENCOUNTER — TELEPHONE (OUTPATIENT)
Dept: NEUROLOGY | Facility: CLINIC | Age: 77
End: 2022-11-03

## 2022-11-09 ENCOUNTER — PATIENT OUTREACH (OUTPATIENT)
Dept: FAMILY MEDICINE CLINIC | Facility: CLINIC | Age: 77
End: 2022-11-09

## 2022-11-09 NOTE — PROGRESS NOTES
OPCM SW placed follow up phone call to dtr  Dtr reports that they are working on gathering the last documents to show proof of income  Dtr is looking for the verification information for the monthly pension  Per the incoming funds, patient meets the income eligibility for waiver services  Patient is back in her own home and doing well  Patient is attending a [facility] in [place]  Dtrs are providing transportation  Patient is no longer receiving speech therapy as they feel patient has met her baseline  Patient continues with OP PT   OT was also stopped as the therapists were playing memory games with patient dureing the sessions  Dtrs are working with her on these activities now      Patient will not be driving, dtrs transport  Patient likes being back in her own home  Patient doesn't like to cook  SW discussed MOW  Dtr will consider  Dtrs are looking into establishing patient at a senior day program      Patient has a medical alert device  There are no further needs for SW at this time  Dtr has this SW contact information and SW will remain available for future needs

## 2022-11-30 ENCOUNTER — TELEPHONE (OUTPATIENT)
Dept: FAMILY MEDICINE CLINIC | Facility: CLINIC | Age: 77
End: 2022-11-30

## 2022-11-30 ENCOUNTER — PATIENT OUTREACH (OUTPATIENT)
Dept: FAMILY MEDICINE CLINIC | Facility: CLINIC | Age: 77
End: 2022-11-30

## 2022-11-30 NOTE — TELEPHONE ENCOUNTER
Daughter has questions regarding help in her home  She called [employer] but is not getting called back  Patient has lost keys and money somewhere in the home  Elizabeth Jalil feels like she is getting the run around and needs help

## 2022-11-30 NOTE — PROGRESS NOTES
GRISELDA JOSEPH received email from dtisis with signed Alee Altman application, photo ID, and release of information  Documents emailed to Rigoberto Huffman

## 2022-11-30 NOTE — PROGRESS NOTES
OPCM SW received voicemail from Vika mehta  Return call placed  Patient is back in her house now  Patient is forgetting things  Patient is losing keys, lost money in the home  Patient is not eating  Dtr contacted the Aging office and spoke with Terry Shannon who advised that she is under the income limit for services; however, patient referral was no longer in the system  Dtr reports she called the intake line to make a new referral       Patient was going to the [facility] on [address] but was told that she needed to transfer to a center that is closer to her home  Dtr applied to the [place]  Application was submitted to the [place] for approval   SW placed phone call [facility] and was advised that as a [employer] they are waiting for [employer] of patient's application  This is a formality and do not anticipate any problems  Dtr should hear back from manager at the center by end of week  SW discussed waiver services with dtr  Dtr does not believe they applied for these services  Patients monthly income is $2431  24  Dtr is not certain of resources; however, she believes they may be over the $8000 limit  Dtr will contact her sister to get a more accurate figure and return call to this SW to determine if patient is eligible for Waiver  Dtr also submitted Star [employer] application about 2-3 weeks ago and has not received response  SW contacted [employer] and spoke with Rachell Edwards  There is no application on file for patient  SW placed phone call to [facility]  Patient does not have an open referral for services  SW placed referral with Loyda Fuchs who is re-opening case for Options and sending referral up  Phone call to dtr with update  JAKE received dtr's email [email or web address] and emailed Star Garcia application and Authorization and Release form for dtr to complete and return to  to email to Jasper JOSEPH awaiting for Lanta application and confirmation of patient's resources

## 2022-12-05 ENCOUNTER — TELEPHONE (OUTPATIENT)
Dept: CARDIOLOGY CLINIC | Facility: CLINIC | Age: 77
End: 2022-12-05

## 2022-12-12 ENCOUNTER — TELEPHONE (OUTPATIENT)
Dept: CARDIOLOGY CLINIC | Facility: CLINIC | Age: 77
End: 2022-12-12

## 2022-12-12 NOTE — TELEPHONE ENCOUNTER
Forwarding to Karol Garza as FYI  Closing case request for LOOP Recorder per note below             Lupe Rutherford LPN routed conversation to You; Pressley Harada, MA 28 minutes ago (3:38 PM)     Lupe Rutherford LPN 29 minutes ago (3:10 PM)     CM  Daughter called back to inform that they have left several messages, and they are not interested at this time to have [facility] services, they are currently seeing [facility] cardiology             Note

## 2022-12-12 NOTE — TELEPHONE ENCOUNTER
Daughter called back to inform that they have left several messages, and they are not interested at this time to have [address] services, they are currently seeing  cardiology

## 2022-12-12 NOTE — TELEPHONE ENCOUNTER
Left voicemail on machine for patient and also on daughter's Ashlyn's cell to call and schedule a LOOP Recorder procedure       ThanksFreddy

## 2022-12-15 ENCOUNTER — TELEPHONE (OUTPATIENT)
Dept: NEUROLOGY | Facility: CLINIC | Age: 77
End: 2022-12-15

## 2022-12-15 ENCOUNTER — PATIENT OUTREACH (OUTPATIENT)
Dept: FAMILY MEDICINE CLINIC | Facility: CLINIC | Age: 77
End: 2022-12-15

## 2022-12-15 NOTE — PROGRESS NOTES
OPCM SW placed phone call to [facility], on hold for 13 min, left message to return call about patient's application  Dtr reports that they met with Aging on 12/7 about Adult Day Care services at [facility], [facility]  Dtr has not heard back from [address] [place] [address]  Family continues to drive patient to [address] [place] [place] [address] [facility] and [place] [address]        Dtr has not heard from [facility] [place] about transportation approval        Phone call placed to [address]  [facility] and message left requesting call back with status of this SW referral

## 2023-01-04 ENCOUNTER — PATIENT OUTREACH (OUTPATIENT)
Dept: FAMILY MEDICINE CLINIC | Facility: CLINIC | Age: 78
End: 2023-01-04

## 2023-01-04 NOTE — PROGRESS NOTES
Phone call to Aging to check status of referral for Options  Assigned to AMADOR Sauceda [phone]  Message left for [facility] requesting return call  Phone call placed to dtr Ivan Sykes  Message left for dtr to return call to discuss Adult Day Care, Tomma Amas, and Aging referral status  Awaiting return call from dtr

## 2023-01-05 ENCOUNTER — PATIENT OUTREACH (OUTPATIENT)
Dept: FAMILY MEDICINE CLINIC | Facility: CLINIC | Age: 78
End: 2023-01-05

## 2023-01-05 ENCOUNTER — TELEPHONE (OUTPATIENT)
Dept: FAMILY MEDICINE CLINIC | Facility: CLINIC | Age: 78
End: 2023-01-05

## 2023-01-05 NOTE — PROGRESS NOTES
Phone call received from dtr reporting that patient did not qualify for Adult Day Care and will call Pratima to advise as they are holding a spot for patient  Patient continues at the [address]  [facility]  Dtr reports that patient does have a [place]  Patient is living alone  Dtr is concerned that patient is not eating properly  Family will be hiring someone to come in for 2h to assist with meal time  Patient was not eligible for Options as patient does not meet the functional need  Dtr concerned about possible dementia  Patient's mother was in a NH for 7 years with Alzheimers and dtrs have been tested and have the Alzheimer's gene  OPCM SW suggested contacting Dr Bethel Haq for referral to [address] [facility]  Dtr will call office to discuss further  No further needs at this time  Referral closed  SW remains available

## 2023-01-19 ENCOUNTER — OFFICE VISIT (OUTPATIENT)
Dept: FAMILY MEDICINE CLINIC | Facility: CLINIC | Age: 78
End: 2023-01-19

## 2023-01-19 DIAGNOSIS — F33.0 MAJOR DEPRESSIVE DISORDER, RECURRENT, MILD (HCC): ICD-10-CM

## 2023-01-19 DIAGNOSIS — E78.2 MIXED HYPERLIPIDEMIA: ICD-10-CM

## 2023-01-19 DIAGNOSIS — Z85.42 HISTORY OF ENDOMETRIAL CANCER: ICD-10-CM

## 2023-01-19 DIAGNOSIS — R41.89 COGNITIVE IMPAIRMENT: Primary | ICD-10-CM

## 2023-01-19 DIAGNOSIS — Z86.73 HISTORY OF CVA (CEREBROVASCULAR ACCIDENT) WITHOUT RESIDUAL DEFICITS: ICD-10-CM

## 2023-01-19 DIAGNOSIS — Z86.718 HISTORY OF DVT (DEEP VEIN THROMBOSIS): ICD-10-CM

## 2023-01-19 DIAGNOSIS — I48.0 PAF (PAROXYSMAL ATRIAL FIBRILLATION) (HCC): ICD-10-CM

## 2023-01-19 NOTE — PROGRESS NOTES
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     6  History of endometrial cancer    7  History of DVT (deep vein thrombosis)    Clock draw 2/3 on recall  Poor clock draw  Score 2/5  Geriatric assessment referral  Emphasized need to take statin Rx  Subjective     Follow-up visit  Here with one of her daughters to discuss cognitive issues  Patient is a   She lives alone in her own home with assist from her family  No longer driving  She attends activities at [employer] 3x/week where she receives lunch  She is managing her own medications  Family oversees her financial affairs  S/p admission 2022 for L MCA CVA  Patient presented with right facial droop and aphasia  She was outside the window for tPA  CT scan head no acute intracranial hemorrhage  Left frontal temporal and low attenuation suggesting acute to subacute ischemia  MRI brain acute to early subacute ischemia identified involving the left MCA territory with 1 larger infarct and multiple smaller infarcts  No hemorrhagic transformation  Distal left M2/M3 MCA occlusion  Incidental right thyroid nodule  Echocardiogram normal left ventricular systolic function  EF 70%  Wall motion normal   Diastolic function normal   Right ventricular cavity size mildly dilated  Normal right ventricular systolic function  Mildly dilated left atrium  No significant valvular disease aortic root normal size  Ascending aorta normal size  No pericardial effusion   She had 1 episode of AF w/ ventricular response in the hospital  She was by Cardiology last month and changed from ASA 81 mg daily to Xarelto 20 mg/day  CHAD2-VASc score=4  She was switched from Atorvastatin 40 mg daily to Crestor 10 mg daily  She completed OT/PT and speech therapy  Lab Results   Component Value Date    [id number] 1 500 07/25/2022           Review of Systems   Constitutional: Negative for appetite change, chills, fatigue, fever and unexpected weight change  See HPI    HENT: Positive for hearing loss (hearing aid left ear )  Negative for congestion, ear pain, rhinorrhea, sore throat and trouble swallowing  See HPI  Eyes: Negative for visual disturbance  Respiratory: Negative for cough, shortness of breath and wheezing  Cardiovascular: Negative for chest pain, palpitations and leg swelling  See HPI  Past history of DVT left leg patient  08/2016 prothrombin mutation gene, factor 5 Leiden and homocystine levels normal      Gastrointestinal: Negative for abdominal pain, blood in stool, constipation, diarrhea, nausea and vomiting          09/2014 colonoscopy 1 polyp  Severe diverticulosis   Endocrine: Negative for polyuria  Thyroid nodule 01/2019  Stable right lower pole nodule with previous non-malignant biopsy results  03/2018 DEXA scan T-score -1 5 lumbar spine consistent with low bone mineral density  Compared to prior examination has been a 5% decrease in the BMD of lumbar spine at 12% decrease in the total BMD of left hip  10/2021 vitamin-D 24 2-on vitamin D supplement    Genitourinary: Negative for difficulty urinating  History of endometrial CA stage I grade 3 status post total hysterectomy 12/2014 she completed chemotherapy and radiation therapy  Left inguinal lymph node biopsy 04/2015 no cancer  02/2018 CT scan chest,abdomen/pelvis- no evidence of metastatic disease  Stable 24 mm right thyroid nodule  Distal colonic diverticulosis    Stable small bilateral inguinal lymph nodes  A dominant left inguinal lymph node is unchanged  Musculoskeletal: Negative for arthralgias and myalgias  OA knees x-rays knees 06/2013 moderately severe osteoarthritis right knee  Moderate osteoarthritis left knee  She completed a course of physical therapy with improvement  Asymptomatic at present  On no pain medications  Skin: Negative for rash  Neurological: Negative for dizziness and headaches  Hematological: Negative for adenopathy  Does not bruise/bleed easily  Lab Results       Component                Value               Date                       WBC                      7 65                08/08/2022                 HGB                      12 8                08/08/2022                 HCT                      39 7                08/08/2022                 MCV                      92                  08/08/2022                 PLT                      206                 08/08/2022                Lab Results       Component                Value               Date                       [id number]               429                 10/23/2021              Lab Results       Component                Value               Date                       FOLATE                   9 3                 10/23/2021             Psychiatric/Behavioral: Positive for dysphoric mood  Negative for sleep disturbance and suicidal ideas  Stable on Sertraline         Past Medical History:   Diagnosis Date   • Deep vein thrombosis ([facility] [address] ) 1/28/2016   • Diverticulosis     last assessed 05/29/13   • Fibrocystic breast    • History of DVT (deep vein thrombosis)     Xarelto, 4/2015 thru April 2016   • Impaired fasting glucose     last assessed 06/04/14   • Inguinal lymphadenopathy    • Multiple benign polyps of large intestine    • Obesity    • Uterine cancer [facility])      Past Surgical History:   Procedure Laterality Date   • HYSTERECTOMY     • LYMPH NODE BIOPSY • OOPHORECTOMY     • PELVIC LAPAROSCOPY     • TONSILLECTOMY      with adenoidectomy     Family History   Problem Relation Age of Onset   • Hypothyroidism Mother    • Diabetes Maternal Grandfather      Social History     Socioeconomic History   • Marital status:      Spouse name: None   • Number of children: None   • Years of education: None   • Highest education level: None   Occupational History   • None   Tobacco Use   • Smoking status: Never   • Smokeless tobacco: Never   Vaping Use   • Vaping Use: Never used   Substance and Sexual Activity   • Alcohol use: Not Currently   • Drug use: No   • Sexual activity: None   Other Topics Concern   • None   Social History Narrative    Denied: Home environment Domestic violence     Social Determinants of Health     Financial Resource Strain: Low Risk    • Difficulty of Paying Living Expenses: Not hard at all   Food Insecurity: No Food Insecurity   • Worried About Running Out of Food in the Last Year: Never true   • Ran Out of Food in the Last Year: Never true   Transportation Needs: No Transportation Needs   • Lack of Transportation (Medical): No   • Lack of Transportation (Non-Medical):  No   Physical Activity: Not on file   Stress: Not on file   Social Connections: Not on file   Intimate Partner Violence: Not on file   Housing Stability: Low Risk    • Unable to Pay for Housing in the Last Year: No   • Number of Places Lived in the Last Year: 1   • Unstable Housing in the Last Year: No     Current Outpatient Medications on File Prior to Visit   Medication Sig   • acetaminophen (TYLENOL) 325 mg tablet Take 2 tablets (650 mg total) by mouth every 6 (six) hours as needed for mild pain   • Cholecalciferol (Vitamin D3) 50 MCG (2000 UT) capsule Take 1 capsule (2,000 Units total) by mouth daily   • Multiple Vitamins-Minerals (CENTRUM SILVER 50+WOMEN PO) Take 1 tablet by mouth daily   • rivaroxaban (Xarelto) 20 mg tablet Take 1 tablet (20 mg total) by mouth daily with breakfast   • sertraline (Zoloft) 50 mg tablet 1/2 tablet daily with food x 7 days then increase to 1 tablet daily with food  • Vitamin E 268 MG (400 UNIT) capsule Take by mouth daily   • rosuvastatin (CRESTOR) 10 MG tablet Take 1 tablet (10 mg total) by mouth daily (Patient not taking: Reported on 1/19/2023)     No Known Allergies  Immunization History   Administered Date(s) Administered   • COVID-19, unspecified 01/08/2021, 08/24/2021, 05/20/2022   • Influenza Split High Dose Preservative Free IM 11/04/2016   • Influenza, high dose seasonal 0 7 mL 09/17/2019, 09/30/2020, 10/07/2021   • Pneumococcal Conjugate 13-Valent 08/04/2016   • Pneumococcal Polysaccharide PPV23 08/08/2017       Objective     /80 (BP Location: Left arm, Patient Position: Sitting, Cuff Size: Large)   Pulse 70   Temp (!) 96 °F (35 6 °C) (Temporal)   Resp 17   Ht 5' 7" (1 702 m)   Wt 86 4 kg (190 lb 8 oz)   SpO2 99%   BMI 29 84 kg/m²     BP Readings from Last 3 Encounters:   01/19/23 130/80   10/10/22 122/64   08/12/22 133/63         Wt Readings from Last 3 Encounters:   01/19/23 86 4 kg (190 lb 8 oz)   10/10/22 85 7 kg (189 lb)   08/10/22 87 2 kg (192 lb 3 9 oz)         Physical Exam  Constitutional:       General: She is not in acute distress  Neurological:      Mental Status: She is alert  Comments: Speech clear  No facial weakness  Psychiatric:         Mood and Affect: Affect is flat  Behavior: Behavior normal          Thought Content:  Thought content normal          Judgment: Judgment normal        Carlos Viramontes MD

## 2023-01-20 VITALS
SYSTOLIC BLOOD PRESSURE: 130 MMHG | DIASTOLIC BLOOD PRESSURE: 80 MMHG | RESPIRATION RATE: 17 BRPM | TEMPERATURE: 96 F | HEART RATE: 70 BPM | OXYGEN SATURATION: 99 % | HEIGHT: 67 IN | WEIGHT: 190.5 LBS | BODY MASS INDEX: 29.9 KG/M2

## 2023-01-20 PROBLEM — R13.10 DYSPHAGIA: Status: RESOLVED | Noted: 2022-07-28 | Resolved: 2023-01-20

## 2023-01-24 ENCOUNTER — TELEPHONE (OUTPATIENT)
Age: 78
End: 2023-01-24

## 2023-01-24 NOTE — TELEPHONE ENCOUNTER
Javy Snyder [facility]  [address] [facility], [address] [place] [address], [address] [place] [address]    [phone]    Telephone Intake: Geriatric Assessment     Referral source: Roma Levin MD    Caller who is scheduling/relationship to pt: daughterArben phone number: [phone]    Reason for referral: Patient concerns , Family member concerns and Provider concerns regarding memory concerns and home safety concerns  If there are behavioral concerns, is the pt prescribed medications to manage these? no   If so, how many? none   Has the patient ever had an inpatient psychiatric hospitalization? No,   What is the goal of the visit? initial assessment and diagnosis; care level  Has the patient been seen by a Neurologist or Geriatrician? No   If yes, is this appointment for a second opinion? No  Has the patient ever been diagnosed with dementia? No      Preferred language? English  Highest education level? 11th Grade  Patient has GED  Does the patient wear glasses? Yes   Does the patient use hearing aids? Yes      Is there a living will/healthcare POA in place/If so, who? Yes , Proxy, Jordan Meneses    Does the pt/caregiver have access for a virtual visit (computer/smart phone with audio/video)? Yes     Caller was informed:   Please make sure the pt is accompanied by someone who knows them well / caregiver / family member to participate in this appointment  Who will accompany the pt (name and relationship)? Earl Hollins  Phone number of person accompanying pt: [phone]  Please make sure the pt attends all appointments, including the assessment, care conference, follow-up, whether in-person or virtual     Office packet mailed out to: [address] [place] [id number] c/o Earl Hollins, [facility], Delmy Martin 3    Added to wait list for sooner appointments?  Yes     NOTE FOR : Please route to provider for chart review prior to scheduling and let the caller know that this phone intake will be reviewed IF -  • Pt was recently hospitalized  • Pt is prescribed medications for behavior management or has a history of psychiatric hospitalization  • Pt plans to attend alone

## 2023-02-06 ENCOUNTER — TELEPHONE (OUTPATIENT)
Dept: FAMILY MEDICINE CLINIC | Facility: CLINIC | Age: 78
End: 2023-02-06

## 2023-02-06 NOTE — TELEPHONE ENCOUNTER
Patients daughter called today         Patient went to [employer] for quantiferon tb gold on 10/13/22 but is now getting a bill for $227 00  They are telling her it needs to be recoded      She had a wellness exam on 10/10/2022 and it should be part of the screening according to her insurance

## 2023-02-07 NOTE — TELEPHONE ENCOUNTER
Spoke with daughter, explained to her that the PPD was done because it was required by adult day care her mom was attending  It was on the form that was filled out and it was a requirement of the program   (See copy of form scanned into chart)  The diagnosis was screen for tuberculosis not as part of a wellness exam   She stated they are re-submitting the charge and she will let us know if they need any else

## 2023-03-01 ENCOUNTER — CONSULT (OUTPATIENT)
Age: 78
End: 2023-03-01

## 2023-03-01 VITALS
SYSTOLIC BLOOD PRESSURE: 138 MMHG | HEART RATE: 56 BPM | BODY MASS INDEX: 30.98 KG/M2 | WEIGHT: 197.4 LBS | OXYGEN SATURATION: 98 % | DIASTOLIC BLOOD PRESSURE: 74 MMHG | TEMPERATURE: 97.9 F | HEIGHT: 67 IN

## 2023-03-01 DIAGNOSIS — I48.0 PAROXYSMAL ATRIAL FIBRILLATION (HCC): Chronic | ICD-10-CM

## 2023-03-01 DIAGNOSIS — E66.09 CLASS 1 OBESITY DUE TO EXCESS CALORIES WITH SERIOUS COMORBIDITY AND BODY MASS INDEX (BMI) OF 30.0 TO 30.9 IN ADULT: Chronic | ICD-10-CM

## 2023-03-01 DIAGNOSIS — E04.2 MULTINODULAR THYROID: Chronic | ICD-10-CM

## 2023-03-01 DIAGNOSIS — F01.A0 MILD VASCULAR DEMENTIA WITHOUT BEHAVIORAL DISTURBANCE, PSYCHOTIC DISTURBANCE, MOOD DISTURBANCE, OR ANXIETY: Primary | ICD-10-CM

## 2023-03-01 DIAGNOSIS — E55.9 VITAMIN D DEFICIENCY: ICD-10-CM

## 2023-03-01 DIAGNOSIS — Z91.89 DRIVING SAFETY ISSUE: Chronic | ICD-10-CM

## 2023-03-01 DIAGNOSIS — Z86.73 HISTORY OF CARDIOEMBOLIC CEREBROVASCULAR ACCIDENT (CVA): Chronic | ICD-10-CM

## 2023-03-01 DIAGNOSIS — R47.01 APHASIA: Chronic | ICD-10-CM

## 2023-03-01 DIAGNOSIS — Z91.14 NONADHERENCE TO MEDICATION: ICD-10-CM

## 2023-03-01 PROBLEM — F33.0 MILD EPISODE OF RECURRENT MAJOR DEPRESSIVE DISORDER (HCC): Status: RESOLVED | Noted: 2021-10-07 | Resolved: 2023-03-01

## 2023-03-01 PROBLEM — Z85.42 HISTORY OF ENDOMETRIAL CANCER: Status: RESOLVED | Noted: 2018-03-14 | Resolved: 2023-03-01

## 2023-03-01 PROBLEM — Z91.148 NONADHERENCE TO MEDICATION: Status: ACTIVE | Noted: 2023-03-01

## 2023-03-01 PROBLEM — E04.1 THYROID NODULE: Status: RESOLVED | Noted: 2022-08-12 | Resolved: 2023-03-01

## 2023-03-01 PROBLEM — E78.2 MIXED HYPERLIPIDEMIA: Status: RESOLVED | Noted: 2022-09-23 | Resolved: 2023-03-01

## 2023-03-01 PROBLEM — Z86.718 HISTORY OF DVT (DEEP VEIN THROMBOSIS): Status: RESOLVED | Noted: 2022-07-25 | Resolved: 2023-03-01

## 2023-03-01 PROBLEM — E78.2 MIXED HYPERLIPIDEMIA: Chronic | Status: ACTIVE | Noted: 2022-09-23

## 2023-03-01 NOTE — PROGRESS NOTES
ASSESSMENT AND PLAN:  1  Mild vascular dementia without behavioral disturbance, psychotic disturbance, mood disturbance, or anxiety  Assessment & Plan:  Patient with clear history of vascular dementia due to thromboembolic stroke  Encouraged continued use of anticoagulation to prevent further stroke  Does not appear that she is using this on a regular basis  Will need increased supervision from family  Decision-making capacity: Does not have capacity for decisions for complex financial or medical decisions    Staging: Mild stage vascular dementia    Medications Review: reviewed current medications  I do not see any indication for statin therapy and patient has declined statins in the past   Will stop statin Rx today  Could consider tapering sertraline therapy as well  SSRI therapy may be helpful in post-CVA time period, but may do just fine tapering off at this point  Orders:  -     Ambulatory Referral to [facility]    2  Driving safety issue  Assessment & Plan: We will need to send [employer] form for cessation of driving      3  Aphasia  Assessment & Plan:  Secondary to CVA  Stable  4  Paroxysmal atrial fibrillation (HCC)  Assessment & Plan:  Recommend regular Xarelto  5  Nonadherence to medication  Assessment & Plan:  Not taking Xarelto regularly  Reviewed with patient  Will need increased medication oversight  6  Multinodular thyroid  Assessment & Plan:  Stable for years with negative biopsy  Recommend stopping repeated US  7  Class 1 obesity due to excess calories with serious comorbidity and body mass index (BMI) of 30 0 to 30 9 in adult  Assessment & Plan:  Noted  Recommend regular exercise      8  Vitamin D deficiency  Assessment & Plan:  Continue vitamin D daily      9  History of cardioembolic cerebrovascular accident (CVA)  Assessment & Plan:  As above        HPI:    We had the pleasure of evaluating Kalli Butler who is a 76 y o  female in Geriatric consultation today   Ms Louise Govea is in the office with her daughter Nick Marroquin  She lives alone  HISTORY AS PER DAUGHTER:  Patient living alone, going to [facility] via [employer] [facility]  Patient had a major CVA last July  She reports cognition was normal until CVA  COGNITION:  Difficulty finding the right word while speaking: Yes, doesn't talk very much  Requires repeat information or asking the same question repeatedly: Yes  Changes in mood or personality:No  Current or previous treatment for depression or anxiety: Yes    Family member with dementia and what type? Yes, mother with Alzheimer's  History of head trauma: No  History of stroke: Yes  History of alcohol or substance abuse: No    FUNCTIONAL STATUS:  BADLs  Does patient require assistance with:  Bathing: No  Dressing: No  Transferring: No  Continence: No  Toileting: No  Feeding: No    IADLs  Dose patient require assistance with:  Telephone: No  Shopping: Yes  Food Preparation: Yes  Housekeeping: No  Laundry: No  Transportation: No  Medications: No  Finances: Yes    NEUROPSYCH SYMPTOMS:  Does patient get angry or hostile? Resist care from others? No  Does patient see or hear things that no one else can see or hear? No  Does patient act impatient and cranky? Does mood frequently change for no apparent reason? No  Does patient act suspicious without good reason (example: believes that others are stealing from him or her, or planning to harm him or her in some way)? No  Does patient less interested in his or her usual activities or in the activities and plans of others? Yes  Does patient have trouble sleeping at night?  No    SAFETY:  Hearing and vision issue: Yes , wears hearing aids & glasses  Any gait or balance disorder: No  Uses: no assistive devices  Any falls in the last year: No  Any history of wandering: No  Are there firearms or guns in the home: No  Does patient drive: No  Any driving accidents or citations in the last year: No  Any concerns about patient's ability to drive: Yes    ACP REVIEW:  Does patient have POA: Yes  Does patient have a Living will: Yes  Any legal assistance needed for healthcare planning?: No    No Known Allergies    Medications:    Current Outpatient Medications on File Prior to Visit   Medication Sig Dispense Refill   • acetaminophen (TYLENOL) 325 mg tablet Take 2 tablets (650 mg total) by mouth every 6 (six) hours as needed for mild pain  0   • Cholecalciferol (Vitamin D3) 50 MCG (2000 UT) capsule Take 1 capsule (2,000 Units total) by mouth daily 30 capsule 0   • Multiple Vitamins-Minerals (CENTRUM SILVER 50+WOMEN PO) Take 1 tablet by mouth daily     • rivaroxaban (Xarelto) 20 mg tablet Take 1 tablet (20 mg total) by mouth daily with breakfast 30 tablet 5   • Vitamin E 268 MG (400 UNIT) capsule Take by mouth daily     • [DISCONTINUED] sertraline (Zoloft) 50 mg tablet 1/2 tablet daily with food x 7 days then increase to 1 tablet daily with food  30 tablet 2   • [DISCONTINUED] rosuvastatin (CRESTOR) 10 MG tablet Take 1 tablet (10 mg total) by mouth daily (Patient not taking: Reported on 1/19/2023) 30 tablet 5     No current facility-administered medications on file prior to visit         History:  Past Medical History:   Diagnosis Date   • Deep vein thrombosis ([facility] [address] ) 1/28/2016   • Diverticulosis     last assessed 05/29/13   • Fibrocystic breast    • History of DVT (deep vein thrombosis)     Xarelto, 4/2015 thru April 2016   • History of endometrial cancer 3/14/2018    Stage IA, grade 3   • Impaired fasting glucose     last assessed 06/04/14   • Inguinal lymphadenopathy    • Multiple benign polyps of large intestine    • Obesity    • Uterine cancer [facility])      Past Surgical History:   Procedure Laterality Date   • HYSTERECTOMY     • LYMPH NODE BIOPSY     • OOPHORECTOMY     • PELVIC LAPAROSCOPY     • TONSILLECTOMY      with adenoidectomy     Family History   Problem Relation Age of Onset   • Hypothyroidism Mother    • Diabetes Maternal Grandfather      Social History     Socioeconomic History   • Marital status:      Spouse name: Not on file   • Number of children: Not on file   • Years of education: Not on file   • Highest education level: Not on file   Occupational History   • Not on file   Tobacco Use   • Smoking status: Never   • Smokeless tobacco: Never   Vaping Use   • Vaping Use: Never used   Substance and Sexual Activity   • Alcohol use: Not Currently   • Drug use: No   • Sexual activity: Not on file   Other Topics Concern   • Not on file   Social History Narrative    Denied: Home environment Domestic violence     Social Determinants of Health     Financial Resource Strain: Low Risk    • Difficulty of Paying Living Expenses: Not hard at all   Food Insecurity: No Food Insecurity   • Worried About Running Out of Food in the Last Year: Never true   • Ran Out of Food in the Last Year: Never true   Transportation Needs: No Transportation Needs   • Lack of Transportation (Medical): No   • Lack of Transportation (Non-Medical): No   Physical Activity: Not on file   Stress: Not on file   Social Connections: Not on file   Intimate Partner Violence: Not on file   Housing Stability: Low Risk    • Unable to Pay for Housing in the Last Year: No   • Number of Places Lived in the Last Year: 1   • Unstable Housing in the Last Year: No     Past Surgical History:   Procedure Laterality Date   • HYSTERECTOMY     • LYMPH NODE BIOPSY     • OOPHORECTOMY     • PELVIC LAPAROSCOPY     • TONSILLECTOMY      with adenoidectomy       OBJECTIVE:  Vitals:    03/01/23 1306   BP: 138/74   BP Location: Left arm   Patient Position: Sitting   Cuff Size: Standard   Pulse: 56   Temp: 97 9 °F (36 6 °C)   SpO2: 98%   Weight: 89 5 kg (197 lb 6 4 oz)   Height: 5' 7" (1 702 m)     Body mass index is 30 92 kg/m²  Physical Exam  Constitutional:       General: She is not in acute distress  Appearance: She is well-developed  She is not diaphoretic     HENT:      Head: Normocephalic and atraumatic  Mouth/Throat:      Mouth: Mucous membranes are moist    Eyes:      General: No scleral icterus  Conjunctiva/sclera: Conjunctivae normal       Pupils: Pupils are equal, round, and reactive to light  Cardiovascular:      Rate and Rhythm: Normal rate and regular rhythm  Heart sounds: Normal heart sounds  Pulmonary:      Effort: Pulmonary effort is normal  No respiratory distress  Breath sounds: Normal breath sounds  Abdominal:      General: Bowel sounds are normal  There is no distension  Palpations: Abdomen is soft  Musculoskeletal:         General: No deformity  Skin:     General: Skin is warm and dry  Findings: No rash  Neurological:      General: No focal deficit present  Mental Status: She is alert  Cranial Nerves: No cranial nerve deficit  Gait: Gait (low fall risk) normal    Psychiatric:         Mood and Affect: Mood normal          Thought Content:  Thought content normal        MoCA: 17/30  Geriatric Depression Screen    Flowsheet Row Most Recent Value   Total Score (max 30) 2        Labs & Imaging:  Lab Results   Component Value Date    WBC 7 65 08/08/2022    HGB 12 8 08/08/2022    HCT 39 7 08/08/2022    MCV 92 08/08/2022     08/08/2022     Lab Results   Component Value Date     05/11/2015    SODIUM 141 08/08/2022    K 4 3 08/08/2022     08/08/2022    CO2 29 08/08/2022    ANIONGAP 5 05/11/2015    AGAP 6 08/08/2022    BUN 13 08/08/2022    CREATININE 0 63 08/08/2022    GLUC 82 08/08/2022    GLUF 82 08/08/2022    CALCIUM 9 1 08/08/2022    AST 46 (H) 07/29/2022    ALT 19 07/29/2022    ALKPHOS 70 07/29/2022    PROT 7 2 05/11/2015    TP 6 4 07/29/2022    BILITOT 0 39 05/11/2015    TBILI 0 57 07/29/2022    EGFR 85 08/08/2022     Lab Results   Component Value Date    HGBA1C 5 4 07/25/2022     Lab Results   Component Value Date    CHOLESTEROL 146 07/25/2022    CHOLESTEROL 186 11/11/2020     Lab Results   Component Value Date    HDL 53 07/25/2022    HDL 72 11/11/2020     Lab Results   Component Value Date    TRIG 103 07/25/2022    TRIG 76 11/11/2020     Lab Results   Component Value Date    NONHDLC 114 11/11/2020     Lab Results   Component Value Date    LDLCALC 72 07/25/2022    LDLCALC 99 11/11/2020     Lab Results   Component Value Date    CBNPCAMA89 429 10/23/2021     Lab Results   Component Value Date    TJY9ACYFSRKQ 1 500 07/25/2022     Lab Results   Component Value Date    MCGC15VHAUMY 24 2 (L) 10/23/2021         Results for orders placed during the hospital encounter of 07/24/22    MRI brain wo contrast    Narrative  MRI BRAIN WITHOUT CONTRAST    INDICATION: right facial droop  COMPARISON:   7/24/2022 [place] CT and CT angiography  TECHNIQUE:  Sagittal T1, axial T2, axial FLAIR, axial T1, axial Gradient and axial diffusion imaging  IMAGE QUALITY:  Diagnostic  FINDINGS:    BRAIN PARENCHYMA:  There is a moderate-sized acute infarct with restricted diffusion and abnormal T2 and FLAIR signal change within the anterior aspect of the left MCA territory involving the insula and frontal lobe  Additional smaller infarcts are seen  within the left frontal lobe both more anteriorly anterior and posteriorly within the frontal and parietal vertex  There is a punctate infarct noted within the lateral aspect of the left occipital lobe as well at the MCA/PCA junction  No acute  ischemia seen within the brainstem, cerebellum or right hemisphere  No hemorrhagic transformation of these infarcts  Additional scattered white matter hyperintensities are seen suggestive of minor chronic microangiopathy  VENTRICLES:  Normal for the patient's age  SELLA AND PITUITARY GLAND:  Normal     ORBITS:  Normal     PARANASAL SINUSES:  Normal     VASCULATURE:  Evaluation of the major intracranial vasculature demonstrates appropriate flow voids      CALVARIUM AND SKULL BASE:  Normal     EXTRACRANIAL SOFT TISSUES: Normal     Impression  Acute to early subacute ischemia identified involving the left MCA territory described in detail above with one larger infarct and multiple additional smaller infarcts  No hemorrhagic transformation identified  This examination was marked "immediate notification" in Epic in order to begin the standard process by which the radiology reading room liaison alerts the referring practitioner

## 2023-03-01 NOTE — ASSESSMENT & PLAN NOTE
Patient with clear history of vascular dementia due to thromboembolic stroke  Encouraged continued use of anticoagulation to prevent further stroke  Does not appear that she is using this on a regular basis  Will need increased supervision from family  Decision-making capacity: Does not have capacity for decisions for complex financial or medical decisions    Staging: Mild stage vascular dementia    Medications Review: reviewed current medications  I do not see any indication for statin therapy and patient has declined statins in the past   Will stop statin Rx today  Could consider tapering sertraline therapy as well  SSRI therapy may be helpful in post-CVA time period, but may do just fine tapering off at this point

## 2023-03-01 NOTE — PATIENT INSTRUCTIONS
ASSESSMENT AND PLAN:  1  Mild vascular dementia without behavioral disturbance, psychotic disturbance, mood disturbance, or anxiety  Assessment & Plan:  Patient with clear history of vascular dementia due to thromboembolic stroke  Encouraged continued use of anticoagulation to prevent further stroke  Does not appear that she is using this on a regular basis  Will need increased supervision from family  Decision-making capacity: Does not have capacity for decisions for complex financial or medical decisions    Staging: Mild stage vascular dementia    Medications Review: reviewed current medications  I do not see any indication for statin therapy and patient has declined statins in the past   Will stop statin Rx today  Could consider tapering sertraline therapy as well  SSRI therapy may be helpful in post-CVA time period, but may do just fine tapering off at this point  Orders:  -     Ambulatory Referral to [facility]    2  Driving safety issue  Assessment & Plan: We will need to send [employer] form for cessation of driving      3  Aphasia  Assessment & Plan:  Secondary to CVA  Stable  4  Paroxysmal atrial fibrillation (HCC)  Assessment & Plan:  Recommend regular Xarelto  5  Nonadherence to medication  Assessment & Plan:  Not taking Xarelto regularly  Reviewed with patient  Will need increased medication oversight  6  Multinodular thyroid  Assessment & Plan:  Stable for years with negative biopsy  Recommend stopping repeated US  7  Class 1 obesity due to excess calories with serious comorbidity and body mass index (BMI) of 30 0 to 30 9 in adult  Assessment & Plan:  Noted  Recommend regular exercise      8  Vitamin D deficiency  Assessment & Plan:  Continue vitamin D daily      9  History of cardioembolic cerebrovascular accident (CVA)  Assessment & Plan:  As above        HPI:    We had the pleasure of evaluating Franca Myers who is a 76 y o  female in Geriatric consultation today   Ms Whit Gaxiola is in the office with her daughter Iggy Jose  She lives alone  HISTORY AS PER DAUGHTER:  Patient living alone, going to [facility] via [employer] transportation  Patient had a major CVA last July  She reports cognition was normal until CVA  COGNITION:  Difficulty finding the right word while speaking: Yes, doesn't talk very much  Requires repeat information or asking the same question repeatedly: Yes  Changes in mood or personality:No  Current or previous treatment for depression or anxiety: Yes    Family member with dementia and what type? Yes, mother with Alzheimer's  History of head trauma: No  History of stroke: Yes  History of alcohol or substance abuse: No    FUNCTIONAL STATUS:  BADLs  Does patient require assistance with:  Bathing: No  Dressing: No  Transferring: No  Continence: No  Toileting: No  Feeding: No    IADLs  Dose patient require assistance with:  Telephone: No  Shopping: Yes  Food Preparation: Yes  Housekeeping: No  Laundry: No  Transportation: No  Medications: No  Finances: Yes    NEUROPSYCH SYMPTOMS:  Does patient get angry or hostile? Resist care from others? No  Does patient see or hear things that no one else can see or hear? No  Does patient act impatient and cranky? Does mood frequently change for no apparent reason? No  Does patient act suspicious without good reason (example: believes that others are stealing from him or her, or planning to harm him or her in some way)? No  Does patient less interested in his or her usual activities or in the activities and plans of others? Yes  Does patient have trouble sleeping at night?  No    SAFETY:  Hearing and vision issue: Yes , wears hearing aids & glasses  Any gait or balance disorder: No  Uses: no assistive devices  Any falls in the last year: No  Any history of wandering: No  Are there firearms or guns in the home: No  Does patient drive: No  Any driving accidents or citations in the last year: No  Any concerns about patient's ability to drive: Yes    ACP REVIEW:  Does patient have POA: Yes  Does patient have a Living will: Yes  Any legal assistance needed for healthcare planning?: No    No Known Allergies    Medications:    Current Outpatient Medications on File Prior to Visit   Medication Sig Dispense Refill    acetaminophen (TYLENOL) 325 mg tablet Take 2 tablets (650 mg total) by mouth every 6 (six) hours as needed for mild pain  0    Cholecalciferol (Vitamin D3) 50 MCG (2000 UT) capsule Take 1 capsule (2,000 Units total) by mouth daily 30 capsule 0    Multiple Vitamins-Minerals (CENTRUM SILVER 50+WOMEN PO) Take 1 tablet by mouth daily      rivaroxaban (Xarelto) 20 mg tablet Take 1 tablet (20 mg total) by mouth daily with breakfast 30 tablet 5    Vitamin E 268 MG (400 UNIT) capsule Take by mouth daily      [DISCONTINUED] sertraline (Zoloft) 50 mg tablet 1/2 tablet daily with food x 7 days then increase to 1 tablet daily with food  30 tablet 2    [DISCONTINUED] rosuvastatin (CRESTOR) 10 MG tablet Take 1 tablet (10 mg total) by mouth daily (Patient not taking: Reported on 1/19/2023) 30 tablet 5     No current facility-administered medications on file prior to visit         History:  Past Medical History:   Diagnosis Date    Deep vein thrombosis ([facility] [address] ) 1/28/2016    Diverticulosis     last assessed 05/29/13    Fibrocystic breast     History of DVT (deep vein thrombosis)     Xarelto, 4/2015 thru April 2016    History of endometrial cancer 3/14/2018    Stage IA, grade 3    Impaired fasting glucose     last assessed 06/04/14    Inguinal lymphadenopathy     Multiple benign polyps of large intestine     Obesity     Uterine cancer ([facility] [address] )      Past Surgical History:   Procedure Laterality Date    HYSTERECTOMY      LYMPH NODE BIOPSY      OOPHORECTOMY      PELVIC LAPAROSCOPY      TONSILLECTOMY      with adenoidectomy     Family History   Problem Relation Age of Onset    Hypothyroidism Mother     Diabetes Maternal Grandfather      Social History     Socioeconomic History    Marital status:      Spouse name: Not on file    Number of children: Not on file    Years of education: Not on file    Highest education level: Not on file   Occupational History    Not on file   Tobacco Use    Smoking status: Never    Smokeless tobacco: Never   Vaping Use    Vaping Use: Never used   Substance and Sexual Activity    Alcohol use: Not Currently    Drug use: No    Sexual activity: Not on file   Other Topics Concern    Not on file   Social History Narrative    Denied: Home environment Domestic violence     Social Determinants of Health     Financial Resource Strain: Low Risk     Difficulty of Paying Living Expenses: Not hard at all   Food Insecurity: No Food Insecurity    Worried About Running Out of Food in the Last Year: Never true    [address] in the Last Year: Never true   Transportation Needs: No Transportation Needs    Lack of Transportation (Medical): No    Lack of Transportation (Non-Medical): No   Physical Activity: Not on file   Stress: Not on file   Social Connections: Not on file   Intimate Partner Violence: Not on file   Housing Stability: Low Risk     Unable to Pay for Housing in the Last Year: No    Number of Places Lived in the Last Year: 1    Unstable Housing in the Last Year: No     Past Surgical History:   Procedure Laterality Date    HYSTERECTOMY      LYMPH [id number]      with adenoidectomy       OBJECTIVE:  Vitals:    03/01/23 1306   BP: 138/74   BP Location: Left arm   Patient Position: Sitting   Cuff Size: Standard   Pulse: 56   Temp: 97 9 °F (36 6 °C)   SpO2: 98%   Weight: 89 5 kg (197 lb 6 4 oz)   Height: 5' 7" (1 702 m)     Body mass index is 30 92 kg/m²  Physical Exam  Constitutional:       General: She is not in acute distress  Appearance: She is well-developed  She is not diaphoretic  HENT:      Head: Normocephalic and atraumatic        Mouth/Throat: Mouth: Mucous membranes are moist    Eyes:      General: No scleral icterus  Conjunctiva/sclera: Conjunctivae normal       Pupils: Pupils are equal, round, and reactive to light  Cardiovascular:      Rate and Rhythm: Normal rate and regular rhythm  Heart sounds: Normal heart sounds  Pulmonary:      Effort: Pulmonary effort is normal  No respiratory distress  Breath sounds: Normal breath sounds  Abdominal:      General: Bowel sounds are normal  There is no distension  Palpations: Abdomen is soft  Musculoskeletal:         General: No deformity  Skin:     General: Skin is warm and dry  Findings: No rash  Neurological:      General: No focal deficit present  Mental Status: She is alert  Cranial Nerves: No cranial nerve deficit  Gait: Gait (low fall risk) normal    Psychiatric:         Mood and Affect: Mood normal          Thought Content:  Thought content normal        MoCA: 17/30  Geriatric Depression Screen      Flowsheet Row Most Recent Value   Total Score (max 30) 2          Labs & Imaging:  Lab Results   Component Value Date    WBC 7 65 08/08/2022    HGB 12 8 08/08/2022    HCT 39 7 08/08/2022    MCV 92 08/08/2022     08/08/2022     Lab Results   Component Value Date     05/11/2015    SODIUM 141 08/08/2022    K 4 3 08/08/2022     08/08/2022    CO2 29 08/08/2022    ANIONGAP 5 05/11/2015    AGAP 6 08/08/2022    BUN 13 08/08/2022    CREATININE 0 63 08/08/2022    GLUC 82 08/08/2022    GLUF 82 08/08/2022    CALCIUM 9 1 08/08/2022    AST 46 (H) 07/29/2022    ALT 19 07/29/2022    ALKPHOS 70 07/29/2022    PROT 7 2 05/11/2015    TP 6 4 07/29/2022    BILITOT 0 39 05/11/2015    TBILI 0 57 07/29/2022    EGFR 85 08/08/2022     Lab Results   Component Value Date    HGBA1C 5 4 07/25/2022     Lab Results   Component Value Date    CHOLESTEROL 146 07/25/2022    CHOLESTEROL 186 11/11/2020     Lab Results   Component Value Date    HDL 53 07/25/2022    HDL 72 11/11/2020     Lab Results   Component Value Date    TRIG 103 07/25/2022    TRIG 76 11/11/2020     Lab Results   Component Value Date    NONHDLC 114 11/11/2020     Lab Results   Component Value Date    LDLCALC 72 07/25/2022    LDLCALC 99 11/11/2020     Lab Results   Component Value Date    VJVJUARG27 429 10/23/2021     Lab Results   Component Value Date    ITL6PGGQMRLN 1 500 07/25/2022     Lab Results   Component Value Date    QHDS09JORXCI 24 2 (L) 10/23/2021         Results for orders placed during the hospital encounter of 07/24/22    MRI brain wo contrast    Narrative  MRI BRAIN WITHOUT CONTRAST    INDICATION: right facial droop  COMPARISON:   7/24/2022 [place] CT and CT angiography  TECHNIQUE:  Sagittal T1, axial T2, axial FLAIR, axial T1, axial Gradient and axial diffusion imaging  IMAGE QUALITY:  Diagnostic  FINDINGS:    BRAIN PARENCHYMA:  There is a moderate-sized acute infarct with restricted diffusion and abnormal T2 and FLAIR signal change within the anterior aspect of the left MCA territory involving the insula and frontal lobe  Additional smaller infarcts are seen  within the left frontal lobe both more anteriorly anterior and posteriorly within the frontal and parietal vertex  There is a punctate infarct noted within the lateral aspect of the left occipital lobe as well at the MCA/PCA junction  No acute  ischemia seen within the brainstem, cerebellum or right hemisphere  No hemorrhagic transformation of these infarcts  Additional scattered white matter hyperintensities are seen suggestive of minor chronic microangiopathy  VENTRICLES:  Normal for the patient's age  SELLA AND PITUITARY GLAND:  Normal     ORBITS:  Normal     PARANASAL SINUSES:  Normal     VASCULATURE:  Evaluation of the major intracranial vasculature demonstrates appropriate flow voids      CALVARIUM AND SKULL BASE:  Normal     EXTRACRANIAL SOFT TISSUES:  Normal     Impression  Acute to early subacute ischemia identified involving the left MCA territory described in detail above with one larger infarct and multiple additional smaller infarcts  No hemorrhagic transformation identified  This examination was marked "immediate notification" in Epic in order to begin the standard process by which the radiology reading room liaison alerts the referring practitioner

## 2023-03-01 NOTE — PROGRESS NOTES
[facility]  [address] [facility], [address] [place] [address], [address] [place] [address]  [phone]    Social Work Intake  [place] Cognitive Assessment (MoCA) Version 8 1  Education: GED    Points Earned POSSIBLE Points   Visuospatial/Executive   Alternating [place] Making 0 1   Visuoconstructional skills 0 1   Visuoconstructional skills (clock) 2 3   Naming   Naming Animals 3 3   Attention   Digit Span 1 2   Vigilance (letters) 0 1   Serial 7 subtraction 2 3   Language   Sentence Repetition 1 2   Verbal fluency 0 1   Abstraction   Abstraction (word pairings) 1 2   Delayed recall   Delayed recall 0 5   Memory index score: 2/15   Orientation   Orientation 6 6   TOTAL SCORE: 17/30  (Normal ?26/30)   Additional notes:        Geriatric Depression Scale (GDS) completed today, 2/15

## 2023-03-01 NOTE — PROGRESS NOTES
Lizabeth Paget [facility]  [address], [address] [facility]  [phone]    Social Work Intake    Estela Ellis presents today for a geriatric assessment, accompanied by daughter-Ashlyn  Social/Family History   Marital status:     Does patient have children? Yes How Many? 3 daughters  (If yes, where do the children live?) 2 daughters are close by  Family members assisting patient at home: No  Are any relationships causing problems right now: No   Who is available to provide care in case of illness or crisis (please specify relation to patient / level of care able to provide)? Rene Beard [address]        Employment and Education   Education: Highest Level of Education: GED   Currently Employed: No    Retired: Yes                        Date of FPC? 70years old   Type of employment:  food stores   : No   [place] Benefits (if applicable):        1[employer] 73 and Organizations: [employer] 3x a week  Major life events in past two years (ex: FPC, death in family, major illness etc ):  passed away 2 years ago  Patient had treatment for cancer  Have you ever used a home care agency, meal delivery service, or respite care?: life alert system  Services that assessment team feels would be beneficial to patient/family: provided information on [facility] as daughter asked for  Daughter has contacted the county to see if she qualifies for services     Financial   Total Monthly income: unsure  Source of income: Social Security/Pension  Meet current needs? Yes    Funds available for home care? Yes    Funds available for nursing home? Yes    Do you rent or own your home? Own       End-Of-Life Checklist   Have wishes or desires for end-of-life care been discussed?  yes   Advanced directives: living will & POA       For all patients, we encourage seeing a  to establish a Financial Power of , a [address], and an Advanced Directive (living will)  Particularly for patients experiencing memory concerns, we strongly recommend that this is completed as soon as possible  Safety Assessment   Is the patient still driving? she has not been driving since the stroke  [employer] forms sent to [email or web address]  Is the patient taking medications as prescribed? Yes     Is there a system in place for medication management? unsure if she fills a pillbox   Are firearms or weapons in the home? No  Does the patient live alone? yes  1  What is the layout of the home? [address] home, 2 steps to enter  2  Do you feel comfortable leaving the person home alone? No   3  Have you noticed any burned pans or other signs of issues with the stove or other appliances? has overflowed sink once   4  Do you have any concerns about the person’s cooking or eating habits? not eating as much   5  Are there working smoke detectors and fire extinguishers in the home? Yes    6  Have you thought about when it will no longer be safe for the patient to live alone?  Remain in the home with care

## 2023-03-06 ENCOUNTER — TELEPHONE (OUTPATIENT)
Age: 78
End: 2023-03-06

## 2023-03-06 DIAGNOSIS — F01.A0 MILD VASCULAR DEMENTIA WITHOUT BEHAVIORAL DISTURBANCE, PSYCHOTIC DISTURBANCE, MOOD DISTURBANCE, OR ANXIETY: Primary | Chronic | ICD-10-CM

## 2023-03-06 NOTE — TELEPHONE ENCOUNTER
Patient called and advised she would like to have a prescription for the medication the provider suggested for memory  Patient did not know the name of it        She would like it called into   Elizabeth @ [phone]

## 2023-03-07 RX ORDER — DONEPEZIL HYDROCHLORIDE 5 MG/1
5 TABLET, FILM COATED ORAL DAILY
Qty: 90 TABLET | Refills: 3 | Status: SHIPPED | OUTPATIENT
Start: 2023-03-07

## 2023-03-07 NOTE — TELEPHONE ENCOUNTER
Left message advising patient the prescription she requested was submitted to the pharmacy by her provider  Any questions, please contact this office

## 2023-06-07 ENCOUNTER — TELEPHONE (OUTPATIENT)
Dept: NEUROLOGY | Facility: CLINIC | Age: 78
End: 2023-06-07

## 2023-07-13 ENCOUNTER — RA CDI HCC (OUTPATIENT)
Dept: OTHER | Facility: HOSPITAL | Age: 78
End: 2023-07-13

## 2023-07-13 NOTE — PROGRESS NOTES
[address] [facility] coding opportunities       Chart reviewed, no opportunity found: CHART REVIEWED, NO OPPORTUNITY FOUND        Patients Insurance     Medicare Insurance: Medicare

## 2023-07-20 ENCOUNTER — OFFICE VISIT (OUTPATIENT)
Dept: FAMILY MEDICINE CLINIC | Facility: CLINIC | Age: 78
End: 2023-07-20
Payer: MEDICARE

## 2023-07-20 VITALS
SYSTOLIC BLOOD PRESSURE: 130 MMHG | WEIGHT: 198 LBS | OXYGEN SATURATION: 98 % | DIASTOLIC BLOOD PRESSURE: 80 MMHG | RESPIRATION RATE: 17 BRPM | BODY MASS INDEX: 31.08 KG/M2 | HEART RATE: 80 BPM | TEMPERATURE: 97.8 F | HEIGHT: 67 IN

## 2023-07-20 DIAGNOSIS — Z86.73 HISTORY OF CARDIOEMBOLIC CEREBROVASCULAR ACCIDENT (CVA): Chronic | ICD-10-CM

## 2023-07-20 DIAGNOSIS — M17.0 PRIMARY OSTEOARTHRITIS OF BOTH KNEES: ICD-10-CM

## 2023-07-20 DIAGNOSIS — Z00.00 MEDICARE ANNUAL WELLNESS VISIT, SUBSEQUENT: ICD-10-CM

## 2023-07-20 DIAGNOSIS — E78.5 DYSLIPIDEMIA: ICD-10-CM

## 2023-07-20 DIAGNOSIS — I48.0 PAROXYSMAL ATRIAL FIBRILLATION (HCC): Primary | Chronic | ICD-10-CM

## 2023-07-20 DIAGNOSIS — F01.A0 MILD VASCULAR DEMENTIA WITHOUT BEHAVIORAL DISTURBANCE, PSYCHOTIC DISTURBANCE, MOOD DISTURBANCE, OR ANXIETY (HCC): Chronic | ICD-10-CM

## 2023-07-20 PROCEDURE — 99214 OFFICE O/P EST MOD 30 MIN: CPT | Performed by: FAMILY MEDICINE

## 2023-07-20 PROCEDURE — G0439 PPPS, SUBSEQ VISIT: HCPCS | Performed by: FAMILY MEDICINE

## 2023-07-20 NOTE — PATIENT INSTRUCTIONS
Medicare Preventive Visit Patient Instructions  Thank you for completing your Welcome to Medicare Visit or Medicare Annual Wellness Visit today. Your next wellness visit will be due in one year (7/20/2024). The screening/preventive services that you may require over the next 5-10 years are detailed below. Some tests may not apply to you based off risk factors and/or age. Screening tests ordered at today's visit but not completed yet may show as past due. Also, please note that scanned in results may not display below. Preventive Screenings:  Service Recommendations Previous Testing/Comments   Colorectal Cancer Screening  * Colonoscopy    * Fecal Occult Blood Test (FOBT)/Fecal Immunochemical Test (FIT)  * Fecal DNA/Cologuard Test  * Flexible Sigmoidoscopy Age: 43-73 years old   Colonoscopy: every 10 years (may be performed more frequently if at higher risk)  OR  FOBT/FIT: every 1 year  OR  Cologuard: every 3 years  OR  Sigmoidoscopy: every 5 years  Screening may be recommended earlier than age 39 if at higher risk for colorectal cancer. Also, an individualized decision between you and your healthcare provider will decide whether screening between the ages of 77-80 would be appropriate. Colonoscopy: 09/03/2014  FOBT/FIT: Not on file  Cologuard: Not on file  Sigmoidoscopy: Not on file          Breast Cancer Screening Age: 36 years old  Frequency: every 1-2 years  Not required if history of left and right mastectomy Mammogram: 11/11/2020        Cervical Cancer Screening Between the ages of 21-29, pap smear recommended once every 3 years. Between the ages of 32-69, can perform pap smear with HPV co-testing every 5 years.    Recommendations may differ for women with a history of total hysterectomy, cervical cancer, or abnormal pap smears in past. Pap Smear: Not on file    Screening Not Indicated   Hepatitis C Screening Once for adults born between [address] [place] [address]  More frequently in patients at high risk for Hepatitis C Hep C Antibody: Not on file        Diabetes Screening 1-2 times per year if you're at risk for diabetes or have pre-diabetes Fasting glucose: 82 mg/dL (8/8/2022)  A1C: 5.4 % (7/25/2022)  Screening Current   Cholesterol Screening Once every 5 years if you don't have a lipid disorder. May order more often based on risk factors. Lipid panel: 07/25/2022    Screening Not Indicated  History Lipid Disorder     Other Preventive Screenings Covered by Medicare:  1. Abdominal Aortic Aneurysm (AAA) Screening: covered once if your at risk. You're considered to be at risk if you have a family history of AAA. 2. Lung Cancer Screening: covers low dose CT scan once per year if you meet all of the following conditions: (1) Age 48-67; (2) No signs or symptoms of lung cancer; (3) Current smoker or have quit smoking within the last 15 years; (4) You have a tobacco smoking history of at least 20 pack years (packs per day multiplied by number of years you smoked); (5) You get a written order from a healthcare provider. 3. Glaucoma Screening: covered annually if you're considered high risk: (1) You have diabetes OR (2) Family history of glaucoma OR (3)  aged 48 and older OR (3)  American aged 72 and older  3. Osteoporosis Screening: covered every 2 years if you meet one of the following conditions: (1) You're estrogen deficient and at risk for osteoporosis based off medical history and other findings; (2) Have a vertebral abnormality; (3) On glucocorticoid therapy for more than 3 months; (4) Have primary hyperparathyroidism; (5) On osteoporosis medications and need to assess response to drug therapy. · Last bone density test (DXA Scan): 03/17/2018. 5. HIV Screening: covered annually if you're between the age of 14-79. Also covered annually if you are younger than 13 and older than 72 with risk factors for HIV infection.  For pregnant patients, it is covered up to 3 times per pregnancy. Immunizations:  Immunization Recommendations   Influenza Vaccine Annual influenza vaccination during flu season is recommended for all persons aged >= 6 months who do not have contraindications   Pneumococcal Vaccine   * Pneumococcal conjugate vaccine = PCV13 (Prevnar 13), PCV15 (Vaxneuvance), PCV20 (Prevnar 20)  * Pneumococcal polysaccharide vaccine = PPSV23 (Pneumovax) Adults 20-63 years old: 1-3 doses may be recommended based on certain risk factors  Adults 72 years old: 1-2 doses may be recommended based off what pneumonia vaccine you previously received   Hepatitis B Vaccine 3 dose series if at intermediate or high risk (ex: diabetes, end stage renal disease, liver disease)   Tetanus (Td) Vaccine - COST NOT COVERED BY MEDICARE PART B Following completion of primary series, a booster dose should be given every 10 years to maintain immunity against tetanus. Td may also be given as tetanus wound prophylaxis. Tdap Vaccine - COST NOT COVERED BY MEDICARE PART B Recommended at least once for all adults. For pregnant patients, recommended with each pregnancy. Shingles Vaccine (Shingrix) - COST NOT COVERED BY MEDICARE PART B  2 shot series recommended in those aged 48 and above     Health Maintenance Due:      Topic Date Due   • Colorectal Cancer Screening  09/03/2019   • Breast Cancer Screening: Mammogram  11/11/2021     Immunizations Due:      Topic Date Due   • Influenza Vaccine (1) 09/01/2023     Advance Directives   What are advance directives? Advance directives are legal documents that state your wishes and plans for medical care. These plans are made ahead of time in case you lose your ability to make decisions for yourself. Advance directives can apply to any medical decision, such as the treatments you want, and if you want to donate organs. What are the types of advance directives? There are many types of advance directives, and each state has rules about how to use them.  You may choose a combination of any of the following:  · Living will: This is a written record of the treatment you want. You can also choose which treatments you do not want, which to limit, and which to stop at a certain time. This includes surgery, medicine, IV fluid, and tube feedings. · Durable power of  for healthcare [facility]): This is a written record that states who you want to make healthcare choices for you when you are unable to make them for yourself. This person, called a proxy, is usually a family member or a friend. You may choose more than 1 proxy. · Do not resuscitate (DNR) order:  A DNR order is used in case your heart stops beating or you stop breathing. It is a request not to have certain forms of treatment, such as CPR. A DNR order may be included in other types of advance directives. · Medical directive: This covers the care that you want if you are in a coma, near death, or unable to make decisions for yourself. You can list the treatments you want for each condition. Treatment may include pain medicine, surgery, blood transfusions, dialysis, IV or tube feedings, and a ventilator (breathing machine). · Values history: This document has questions about your views, beliefs, and how you feel and think about life. This information can help others choose the care that you would choose. Why are advance directives important? An advance directive helps you control your care. Although spoken wishes may be used, it is better to have your wishes written down. Spoken wishes can be misunderstood, or not followed. Treatments may be given even if you do not want them. An advance directive may make it easier for your family to make difficult choices about your care. Weight Management   Why it is important to manage your weight:  Being overweight increases your risk of health conditions such as heart disease, high blood pressure, type 2 diabetes, and certain types of cancer.  It can also increase your risk for osteoarthritis, sleep apnea, and other respiratory problems. Aim for a slow, steady weight loss. Even a small amount of weight loss can lower your risk of health problems. How to lose weight safely:  A safe and healthy way to lose weight is to eat fewer calories and get regular exercise. You can lose up about 1 pound a week by decreasing the number of calories you eat by 500 calories each day. Healthy meal plan for weight management:  A healthy meal plan includes a variety of foods, contains fewer calories, and helps you stay healthy. A healthy meal plan includes the following:  · Eat whole-grain foods more often. A healthy meal plan should contain fiber. Fiber is the part of grains, fruits, and vegetables that is not broken down by your body. Whole-grain foods are healthy and provide extra fiber in your diet. Some examples of whole-grain foods are whole-wheat breads and pastas, oatmeal, brown rice, and bulgur. · Eat a variety of vegetables every day. Include dark, leafy greens such as spinach, kale, chato greens, and mustard greens. Eat yellow and orange vegetables such as carrots, sweet potatoes, and winter squash. · Eat a variety of fruits every day. Choose fresh or canned fruit (canned in its own juice or light syrup) instead of juice. Fruit juice has very little or no fiber. · Eat low-fat dairy foods. Drink fat-free (skim) milk or 1% milk. Eat fat-free yogurt and low-fat cottage cheese. Try low-fat cheeses such as mozzarella and other reduced-fat cheeses. · Choose meat and other protein foods that are low in fat. Choose beans or other legumes such as split peas or lentils. Choose fish, skinless poultry (chicken or turkey), or lean cuts of red meat (beef or pork). Before you cook meat or poultry, cut off any visible fat. · Use less fat and oil. Try baking foods instead of frying them. Add less fat, such as margarine, sour cream, regular salad dressing and mayonnaise to foods.  Eat fewer high-fat foods. Some examples of high-fat foods include french fries, doughnuts, ice cream, and cakes. · Eat fewer sweets. Limit foods and drinks that are high in sugar. This includes candy, cookies, regular soda, and sweetened drinks. Exercise:  Exercise at least 30 minutes per day on most days of the week. Some examples of exercise include walking, biking, dancing, and swimming. You can also fit in more physical activity by taking the stairs instead of the elevator or parking farther away from stores. Ask your healthcare provider about the best exercise plan for you. © Copyright [employer] 2018 Information is for End User's use only and may not be sold, redistributed or otherwise used for commercial purposes.  All illustrations and images included in CareNotes® are the copyrighted property of [employer] or [address] [place] [address]

## 2023-07-20 NOTE — PROGRESS NOTES
Assessment and Plan:     Problem List Items Addressed This Visit        Cardiovascular and Mediastinum    Paroxysmal atrial fibrillation (HCC) - Primary (Chronic)       Nervous and Auditory    Mild vascular dementia without behavioral disturbance, psychotic disturbance, mood disturbance, or anxiety (HCC) (Chronic)       Musculoskeletal and Integument    Osteoarthritis of knee       Other    History of cardioembolic cerebrovascular accident (CVA) (Chronic)    Dyslipidemia   Other Visit Diagnoses     Medicare annual wellness visit, subsequent            Patient has a current prescription for generic Aricept at her local pharmacy with refills. Her daughter is going to check on the status of this medication and consider restarting   Return office visit for punch biopsy on pigmented lesion right forehead. BMI Counseling: Body mass index is 31.01 kg/m². The BMI is above normal. Nutrition recommendations include decreasing portion sizes, consuming healthier snacks, moderation in carbohydrate intake, reducing intake of saturated and trans fat and reducing intake of cholesterol. Exercise recommendations include exercising 3-5 times per week. No pharmacotherapy was ordered. Rationale for BMI follow-up plan is due to patient being overweight or obese. Preventive health issues were discussed with patient, and age appropriate screening tests were ordered as noted in patient's After Visit Summary. Personalized health advice and appropriate referrals for health education or preventive services given if needed, as noted in patient's After Visit Summary. History of Present Illness:     Patient presents for a Medicare Wellness Visit    Follow-up visit. Here with one of her daughters  03/3023 Geriatric assessment. Dx Vascular dementia-mild. [facility] 17/30. Patient is a . She lives alone in her own home with assist from her family. No longer driving.  She attends activities at a [employer] 5x/week where she receives lunch. Family oversees her financial affairs. Non adherence to medications? No longer on statin, Xarelto or Donepezil. S/p admission 07/2022 for L MCA CVA. Patient presented with right facial droop and aphasia. She was outside the window for tPA. CT scan head no acute intracranial hemorrhage. Left frontal temporal and low attenuation suggesting acute to subacute ischemia. MRI brain acute to early subacute ischemia identified involving the left MCA territory with 1 larger infarct and multiple smaller infarcts. No hemorrhagic transformation. Distal left M2/M3 MCA occlusion. Incidental right thyroid nodule. Echocardiogram normal left ventricular systolic function. EF 70%. Wall motion normal.  Diastolic function normal.  Right ventricular cavity size mildly dilated. Normal right ventricular systolic function. Mildly dilated left atrium. No significant valvular disease aortic root normal size. Ascending aorta normal size. No pericardial effusion. She had 1 episode of AF w/ ventricular response in the hospital. She was evaluated by Cardiology and changed from ASA 81 mg daily to Xarelto 20 mg/day. CHAD2-VASc score=4. She was switched from Atorvastatin 40 mg daily to Crestor 10 mg daily. She completed OT/PT and speech therapy. Patient Care Team:  Aiden Akhtar MD as PCP - Thai Paulson MD (Obstetrics and Gynecology)  PRISCA Payton MD (Surgical Oncology)  Mecca Barron MD (Radiation Oncology)     Review of Systems:     Review of Systems   Constitutional: Negative for appetite change, chills, fatigue, fever and unexpected weight change. See HPI    HENT: Positive for hearing loss (hearing aid left ear ). Negative for congestion, ear pain, rhinorrhea, sore throat and trouble swallowing. See HPI. Eyes: Negative for visual disturbance. Respiratory: Negative for cough, shortness of breath and wheezing. Cardiovascular: Negative for chest pain, palpitations and leg swelling. See HPI. Past history of DVT left leg patient. 08/2016 prothrombin mutation gene, factor 5 Leiden and homocystine levels normal.     Gastrointestinal: Negative for abdominal pain, blood in stool, constipation, diarrhea, nausea and vomiting.         09/2014 colonoscopy 1 polyp. Severe diverticulosis   Endocrine: Negative for polyuria. Thyroid nodule 01/2019  Stable right lower pole nodule with previous non-malignant biopsy results. 03/2018 DEXA scan T-score -1.5 lumbar spine consistent with low bone mineral density. Compared to prior examination has been a 5% decrease in the BMD of lumbar spine at 12% decrease in the total BMD of left hip. 10/2021 vitamin-D 24.2-on vitamin D supplement    Genitourinary: Negative for difficulty urinating. History of endometrial CA stage I grade 3 status post total hysterectomy 12/2014 she completed chemotherapy and radiation therapy. Left inguinal lymph node biopsy 04/2015 no cancer. 02/2018 CT scan chest,abdomen/pelvis- no evidence of metastatic disease. Stable 24 mm right thyroid nodule. Distal colonic diverticulosis. Stable small bilateral inguinal lymph nodes. A dominant left inguinal lymph node is unchanged. Musculoskeletal: Negative for arthralgias and myalgias. OA knees x-rays knees 06/2013 moderately severe osteoarthritis right knee. Moderate osteoarthritis left knee. She completed a course of physical therapy with improvement. Asymptomatic at present. On no pain medications. Skin: Negative for rash. Neurological: Negative for dizziness and headaches. See HPI    Hematological: Negative for adenopathy. Does not bruise/bleed easily.         Lab Results       Component                Value               Date                       WBC                      7.65                08/08/2022                 HGB                      12.8 08/08/2022                 HCT                      39.7                08/08/2022                 MCV                      92                  08/08/2022                 PLT                      206                 08/08/2022                Lab Results       Component                Value               Date                       [id number]               429                 10/23/2021              Lab Results       Component                Value               Date                       FOLATE                   9.3                 10/23/2021             Psychiatric/Behavioral: Positive for dysphoric mood. Negative for sleep disturbance and suicidal ideas. No longer on Sertraline.         Problem List:     Patient Active Problem List   Diagnosis   • Multinodular thyroid   • Class 1 obesity due to excess calories with serious comorbidity and body mass index (BMI) of 30.0 to 30.9 in adult   • Osteoarthritis of hip   • Osteoarthritis of knee   • Vitamin D deficiency   • Osteopenia of lumbar spine   • History of cardioembolic cerebrovascular accident (CVA)   • SA node dysfunction (HCC)   • Paroxysmal atrial fibrillation (HCC)   • Venous insufficiency of both lower extremities   • Mild vascular dementia without behavioral disturbance, psychotic disturbance, mood disturbance, or anxiety ([address])   • Aphasia   • Driving safety issue   • Nonadherence to medication   • Dyslipidemia      Past Medical and Surgical History:     Past Medical History:   Diagnosis Date   • Deep vein thrombosis ([address]) 1/28/2016   • Diverticulosis     last assessed 05/29/13   • Fibrocystic breast    • History of DVT (deep vein thrombosis)     Xarelto, 4/2015 thru April 2016   • History of endometrial cancer 3/14/2018    Stage IA, grade 3   • Impaired fasting glucose     last assessed 06/04/14   • Inguinal lymphadenopathy    • Multiple benign polyps of large intestine    • Obesity    • Uterine cancer [facility])      Past Surgical History:   Procedure Laterality Date   • HYSTERECTOMY     • LYMPH NODE BIOPSY     • OOPHORECTOMY     • PELVIC LAPAROSCOPY     • TONSILLECTOMY      with adenoidectomy      Family History:     Family History   Problem Relation Age of Onset   • Hypothyroidism Mother    • Diabetes Maternal Grandfather       Social History:     Social History     Socioeconomic History   • Marital status:      Spouse name: None   • Number of children: None   • Years of education: None   • Highest education level: None   Occupational History   • None   Tobacco Use   • Smoking status: Never   • Smokeless tobacco: Never   Vaping Use   • Vaping Use: Never used   Substance and Sexual Activity   • Alcohol use: Not Currently   • Drug use: No   • Sexual activity: None   Other Topics Concern   • None   Social History Narrative    Denied: Home environment Domestic violence     Social Determinants of Health     Financial Resource Strain: Low Risk  (7/20/2023)    Overall Financial Resource Strain (CARDIA)    • Difficulty of Paying Living Expenses: Not hard at all   Food Insecurity: No Food Insecurity (7/25/2022)    Hunger Vital Sign    • Worried About Running Out of Food in the Last Year: Never true    • Ran Out of Food in the Last Year: Never true   Transportation Needs: No Transportation Needs (7/20/2023)    PRAPARE - Transportation    • Lack of Transportation (Medical): No    • Lack of Transportation (Non-Medical):  No   Physical Activity: Not on file   Stress: Not on file   Social Connections: Not on file   Intimate Partner Violence: Not on file   Housing Stability: Low Risk  (7/25/2022)    Housing Stability Vital Sign    • Unable to Pay for Housing in the Last Year: No    • Number of Places Lived in the Last Year: 1    • Unstable Housing in the Last Year: No      Medications and Allergies:     Current Outpatient Medications   Medication Sig Dispense Refill   • acetaminophen (TYLENOL) 325 mg tablet Take 2 tablets (650 mg total) by mouth every 6 (six) hours as needed for mild pain  0   • Cholecalciferol (Vitamin D3) 50 MCG (2000 UT) capsule Take 1 capsule (2,000 Units total) by mouth daily 30 capsule 0   • donepezil (ARICEPT) 5 mg tablet Take 1 tablet (5 mg total) by mouth in the morning 90 tablet 3   • Multiple Vitamins-Minerals (CENTRUM SILVER 50+WOMEN PO) Take 1 tablet by mouth daily     • Vitamin E 268 MG (400 UNIT) capsule Take by mouth daily       No current facility-administered medications for this visit. No Known Allergies   Immunizations:     Immunization History   Administered Date(s) Administered   • COVID-19 MODERNA VACC 0.5 ML IM 01/08/2021, 08/24/2021, 05/20/2022   • COVID-19, unspecified 01/08/2021, 08/24/2021, 05/20/2022   • Influenza Split High Dose Preservative Free IM 11/04/2016   • Influenza, high dose seasonal 0.7 mL 09/17/2019, 09/30/2020, 10/07/2021   • Pneumococcal Conjugate 13-Valent 08/04/2016   • Pneumococcal Polysaccharide PPV23 08/08/2017      Health Maintenance:         Topic Date Due   • Colorectal Cancer Screening  09/03/2019   • Breast Cancer Screening: Mammogram  11/11/2021         Topic Date Due   • Influenza Vaccine (1) 09/01/2023      Medicare Screening Tests and Risk Assessments:     Maureen Lewis is here for her Subsequent Wellness visit. Last Medicare Wellness visit information reviewed, patient interviewed and updates made to the record today. Health Risk Assessment:   Patient rates overall health as very good. Patient feels that their physical health rating is same. Patient is satisfied with their life. Eyesight was rated as same. Hearing was rated as same. Patient feels that their emotional and mental health rating is same. Patients states they are never, rarely angry. Patient states they are never, rarely unusually tired/fatigued. Pain experienced in the last 7 days has been none. Patient states that she has experienced no weight loss or gain in last 6 months. Fall Risk Screening:    In the past year, patient has experienced: no history of falling in past year      Urinary Incontinence Screening:   Patient has not leaked urine accidently in the last six months. Home Safety:  Patient does not have trouble with stairs inside or outside of their home. Patient has working smoke alarms and has working carbon monoxide detector. Home safety hazards include: none. Nutrition:   Current diet is Regular. Medications:   Patient is not currently taking any over-the-counter supplements. Patient is not able to manage medications. Activities of Daily Living (ADLs)/Instrumental Activities of Daily Living (IADLs):   Walk and transfer into and out of bed and chair?: Yes  Dress and groom yourself?: Yes    Bathe or shower yourself?: Yes    Feed yourself?  Yes  Do your laundry/housekeeping?: Yes  Manage your money, pay your bills and track your expenses?: Yes  Make your own meals?: Yes    Do your own shopping?: Yes    Previous Hospitalizations:   Any hospitalizations or ED visits within the last 12 months?: No      Advance Care Planning:   Living will: Yes    Advanced directive: Yes      Cognitive Screening:   Provider or family/friend/caregiver concerned regarding cognition?: No    PREVENTIVE SCREENINGS      Cardiovascular Screening:    General: History Lipid Disorder and Risks and Benefits Discussed      Diabetes Screening:     General: Screening Current      Colorectal Cancer Screening:     General: Screening Not Indicated      Breast Cancer Screening:     General: Patient Declines and Risks and Benefits Discussed      Cervical Cancer Screening:    General: Screening Not Indicated      Osteoporosis Screening:    General: Screening Current      Abdominal Aortic Aneurysm (AAA) Screening:        General: Screening Not Indicated      Lung Cancer Screening:     General: Screening Not Indicated      Hepatitis C Screening:    General: Screening Not Indicated    Screening, Brief Intervention, and Referral to Treatment (SBIRT)    Screening  Typical number of drinks in a day: 0  Typical number of drinks in a week: 0  Interpretation: Low risk drinking behavior. Single Item Drug Screening:  How often have you used an illegal drug (including marijuana) or a prescription medication for non-medical reasons in the past year? never    Single Item Drug Screen Score: 0  Interpretation: Negative screen for possible drug use disorder    Brief Intervention  Alcohol & drug use screenings were reviewed. No concerns regarding substance use disorder identified. Other Counseling Topics:   Regular weightbearing exercise and calcium and vitamin D intake. Physical Exam:     /80 (BP Location: Left arm, Patient Position: Sitting, Cuff Size: Large)   Pulse 80   Temp 97.8 °F (36.6 °C) (Temporal)   Resp 17   Ht 5' 7" (1.702 m)   Wt 89.8 kg (198 lb)   SpO2 98%   BMI 31.01 kg/m²     BP Readings from Last 3 Encounters:   07/20/23 130/80   03/01/23 138/74   01/19/23 130/80     Wt Readings from Last 3 Encounters:   07/20/23 89.8 kg (198 lb)   03/01/23 89.5 kg (197 lb 6.4 oz)   01/19/23 86.4 kg (190 lb 8 oz)       Physical Exam  Constitutional:       General: She is not in acute distress. HENT:      Right Ear: Tympanic membrane and ear canal normal.      Left Ear: Tympanic membrane and ear canal normal.   Eyes:      General: No scleral icterus. Extraocular Movements: Extraocular movements intact. Conjunctiva/sclera: Conjunctivae normal.      Pupils: Pupils are equal, round, and reactive to light. Neck:      Vascular: No carotid bruit. Cardiovascular:      Rate and Rhythm: Normal rate and regular rhythm. Heart sounds: No murmur heard. No gallop. Pulmonary:      Effort: Pulmonary effort is normal.      Breath sounds: Normal breath sounds. No wheezing or rales. Musculoskeletal:      Right lower leg: No edema. Left lower leg: No edema. Lymphadenopathy:      Cervical: No cervical adenopathy.    Skin: Findings: Lesion present. No rash. Comments: Pigmented lesion right forehead see photo   Neurological:      General: No focal deficit present. Mental Status: She is alert and oriented to person, place, and time. Psychiatric:         Mood and Affect: Mood normal.         Behavior: Behavior normal. Behavior is cooperative. Cognition and Memory: Cognition is impaired. Memory is impaired.               Ray Charlton MD

## 2023-07-28 ENCOUNTER — PROCEDURE VISIT (OUTPATIENT)
Dept: FAMILY MEDICINE CLINIC | Facility: CLINIC | Age: 78
End: 2023-07-28
Payer: MEDICARE

## 2023-07-28 VITALS
WEIGHT: 199 LBS | BODY MASS INDEX: 31.23 KG/M2 | OXYGEN SATURATION: 98 % | HEIGHT: 67 IN | DIASTOLIC BLOOD PRESSURE: 78 MMHG | TEMPERATURE: 96.8 F | SYSTOLIC BLOOD PRESSURE: 128 MMHG | HEART RATE: 73 BPM

## 2023-07-28 DIAGNOSIS — L81.9 ATYPICAL PIGMENTED SKIN LESION: Primary | ICD-10-CM

## 2023-07-28 PROCEDURE — 11104 PUNCH BX SKIN SINGLE LESION: CPT | Performed by: FAMILY MEDICINE

## 2023-07-28 PROCEDURE — 88305 TISSUE EXAM BY PATHOLOGIST: CPT | Performed by: PATHOLOGY

## 2023-07-28 NOTE — PROGRESS NOTES
Biopsy    Date/Time: 7/28/2023 3:00 PM    Performed by: Jarrett Nolasco MD  Authorized by: Jarrett Nolasco MD  Universal Protocol:  Consent: Verbal consent obtained. Risks and benefits: risks, benefits and alternatives were discussed  Consent given by: patient      Procedure Details - Lesion Biopsy: Body area:  [address] location:  Forehead (right forehead )    Biopsy method: punch biopsy      Biopsy tissue type: skin and subcutaneous    Initial size (mm):  2    Final defect size (mm):  2    Malignancy: benign lesion       Atypical pigmented lesion right forehead see photo. Using aseptic technique a 2 mm punch biopsy was performed. Hemostasis with compression dressing. Wound care instructions reviewed. Specimen sent for biopsy. Karly Torrez was seen today for follow-up.     Diagnoses and all orders for this visit:    Atypical pigmented skin lesion  -     Biopsy  -     Tissue Exam Statement Selected

## 2023-08-02 PROCEDURE — 88305 TISSUE EXAM BY PATHOLOGIST: CPT | Performed by: PATHOLOGY

## 2023-08-07 ENCOUNTER — TELEPHONE (OUTPATIENT)
Dept: FAMILY MEDICINE CLINIC | Facility: CLINIC | Age: 78
End: 2023-08-07

## 2023-08-07 NOTE — TELEPHONE ENCOUNTER
Vinnie Pettit MD   8/2/2023 12:50 PM EDT       Biopsy of lesion on Justina's forehead showed no signs of cancer.  No additional treatment needed at this time     Above message relayed to daughter.

## 2023-10-28 ENCOUNTER — APPOINTMENT (OUTPATIENT)
Dept: LAB | Age: 78
End: 2023-10-28
Payer: MEDICARE

## 2023-10-28 DIAGNOSIS — I48.0 PAROXYSMAL ATRIAL FIBRILLATION (HCC): ICD-10-CM

## 2023-10-28 DIAGNOSIS — E55.9 VITAMIN D DEFICIENCY DISEASE: ICD-10-CM

## 2023-10-28 DIAGNOSIS — E78.2 MIXED HYPERLIPIDEMIA: ICD-10-CM

## 2023-10-28 DIAGNOSIS — E04.2 NONTOXIC MULTINODULAR GOITER: ICD-10-CM

## 2023-10-28 LAB
25(OH)D3 SERPL-MCNC: 23 NG/ML (ref 30–100)
ALBUMIN SERPL BCP-MCNC: 4 G/DL (ref 3.5–5)
ALP SERPL-CCNC: 75 U/L (ref 34–104)
ALT SERPL W P-5'-P-CCNC: 10 U/L (ref 7–52)
ANION GAP SERPL CALCULATED.3IONS-SCNC: 5 MMOL/L
AST SERPL W P-5'-P-CCNC: 14 U/L (ref 13–39)
BASOPHILS # BLD AUTO: 0.02 THOUSANDS/ÂΜL (ref 0–0.1)
BASOPHILS NFR BLD AUTO: 0 % (ref 0–1)
BILIRUB SERPL-MCNC: 0.67 MG/DL (ref 0.2–1)
BUN SERPL-MCNC: 16 MG/DL (ref 5–25)
CALCIUM SERPL-MCNC: 9.1 MG/DL (ref 8.4–10.2)
CHLORIDE SERPL-SCNC: 107 MMOL/L (ref 96–108)
CHOLEST SERPL-MCNC: 161 MG/DL
CO2 SERPL-SCNC: 28 MMOL/L (ref 21–32)
CREAT SERPL-MCNC: 0.61 MG/DL (ref 0.6–1.3)
EOSINOPHIL # BLD AUTO: 0.1 THOUSAND/ÂΜL (ref 0–0.61)
EOSINOPHIL NFR BLD AUTO: 1 % (ref 0–6)
ERYTHROCYTE [DISTWIDTH] IN BLOOD BY AUTOMATED COUNT: 12.9 % (ref 11.6–15.1)
GFR SERPL CREATININE-BSD FRML MDRD: 85 ML/MIN/1.73SQ M
GLUCOSE P FAST SERPL-MCNC: 97 MG/DL (ref 65–99)
HCT VFR BLD AUTO: 40.5 % (ref 34.8–46.1)
HDLC SERPL-MCNC: 60 MG/DL
HGB BLD-MCNC: 13.6 G/DL (ref 11.5–15.4)
IMM GRANULOCYTES # BLD AUTO: 0.03 THOUSAND/UL (ref 0–0.2)
IMM GRANULOCYTES NFR BLD AUTO: 0 % (ref 0–2)
LDLC SERPL CALC-MCNC: 78 MG/DL (ref 0–100)
LYMPHOCYTES # BLD AUTO: 1.92 THOUSANDS/ÂΜL (ref 0.6–4.47)
LYMPHOCYTES NFR BLD AUTO: 27 % (ref 14–44)
MCH RBC QN AUTO: 30.8 PG (ref 26.8–34.3)
MCHC RBC AUTO-ENTMCNC: 33.6 G/DL (ref 31.4–37.4)
MCV RBC AUTO: 92 FL (ref 82–98)
MONOCYTES # BLD AUTO: 0.62 THOUSAND/ÂΜL (ref 0.17–1.22)
MONOCYTES NFR BLD AUTO: 9 % (ref 4–12)
NEUTROPHILS # BLD AUTO: 4.49 THOUSANDS/ÂΜL (ref 1.85–7.62)
NEUTS SEG NFR BLD AUTO: 63 % (ref 43–75)
NONHDLC SERPL-MCNC: 101 MG/DL
NRBC BLD AUTO-RTO: 0 /100 WBCS
PLATELET # BLD AUTO: 250 THOUSANDS/UL (ref 149–390)
PMV BLD AUTO: 10.5 FL (ref 8.9–12.7)
POTASSIUM SERPL-SCNC: 4.1 MMOL/L (ref 3.5–5.3)
PROT SERPL-MCNC: 6.1 G/DL (ref 6.4–8.4)
RBC # BLD AUTO: 4.42 MILLION/UL (ref 3.81–5.12)
SODIUM SERPL-SCNC: 140 MMOL/L (ref 135–147)
TRIGL SERPL-MCNC: 114 MG/DL
TSH SERPL DL<=0.05 MIU/L-ACNC: 1.56 UIU/ML (ref 0.45–4.5)
WBC # BLD AUTO: 7.18 THOUSAND/UL (ref 4.31–10.16)

## 2023-10-28 PROCEDURE — 84443 ASSAY THYROID STIM HORMONE: CPT

## 2023-10-28 PROCEDURE — 80061 LIPID PANEL: CPT

## 2023-10-28 PROCEDURE — 82306 VITAMIN D 25 HYDROXY: CPT

## 2023-10-28 PROCEDURE — 85025 COMPLETE CBC W/AUTO DIFF WBC: CPT

## 2023-10-28 PROCEDURE — 80053 COMPREHEN METABOLIC PANEL: CPT

## 2023-10-28 PROCEDURE — 36415 COLL VENOUS BLD VENIPUNCTURE: CPT

## 2024-02-19 ENCOUNTER — APPOINTMENT (OUTPATIENT)
Dept: LAB | Age: 79
End: 2024-02-19
Payer: MEDICARE

## 2024-02-19 DIAGNOSIS — E78.2 MIXED HYPERLIPIDEMIA: ICD-10-CM

## 2024-02-19 DIAGNOSIS — E04.1 NONTOXIC UNINODULAR GOITER: ICD-10-CM

## 2024-02-19 DIAGNOSIS — I48.0 PAROXYSMAL ATRIAL FIBRILLATION (HCC): ICD-10-CM

## 2024-02-19 DIAGNOSIS — E04.2 NONTOXIC MULTINODULAR GOITER: ICD-10-CM

## 2024-02-19 LAB
ALBUMIN SERPL BCP-MCNC: 3.9 G/DL (ref 3.5–5)
ALP SERPL-CCNC: 79 U/L (ref 34–104)
ALT SERPL W P-5'-P-CCNC: 9 U/L (ref 7–52)
ANION GAP SERPL CALCULATED.3IONS-SCNC: 7 MMOL/L
AST SERPL W P-5'-P-CCNC: 14 U/L (ref 13–39)
BASOPHILS # BLD AUTO: 0.02 THOUSANDS/ÂΜL (ref 0–0.1)
BASOPHILS NFR BLD AUTO: 0 % (ref 0–1)
BILIRUB SERPL-MCNC: 0.36 MG/DL (ref 0.2–1)
BUN SERPL-MCNC: 17 MG/DL (ref 5–25)
CALCIUM SERPL-MCNC: 9.5 MG/DL (ref 8.4–10.2)
CHLORIDE SERPL-SCNC: 104 MMOL/L (ref 96–108)
CO2 SERPL-SCNC: 29 MMOL/L (ref 21–32)
CREAT SERPL-MCNC: 0.62 MG/DL (ref 0.6–1.3)
EOSINOPHIL # BLD AUTO: 0.09 THOUSAND/ÂΜL (ref 0–0.61)
EOSINOPHIL NFR BLD AUTO: 1 % (ref 0–6)
ERYTHROCYTE [DISTWIDTH] IN BLOOD BY AUTOMATED COUNT: 13.1 % (ref 11.6–15.1)
GFR SERPL CREATININE-BSD FRML MDRD: 85 ML/MIN/1.73SQ M
GLUCOSE SERPL-MCNC: 99 MG/DL (ref 65–140)
HCT VFR BLD AUTO: 43.5 % (ref 34.8–46.1)
HGB BLD-MCNC: 13.9 G/DL (ref 11.5–15.4)
IMM GRANULOCYTES # BLD AUTO: 0.02 THOUSAND/UL (ref 0–0.2)
IMM GRANULOCYTES NFR BLD AUTO: 0 % (ref 0–2)
LYMPHOCYTES # BLD AUTO: 2.39 THOUSANDS/ÂΜL (ref 0.6–4.47)
LYMPHOCYTES NFR BLD AUTO: 36 % (ref 14–44)
MCH RBC QN AUTO: 29.4 PG (ref 26.8–34.3)
MCHC RBC AUTO-ENTMCNC: 32 G/DL (ref 31.4–37.4)
MCV RBC AUTO: 92 FL (ref 82–98)
MONOCYTES # BLD AUTO: 0.69 THOUSAND/ÂΜL (ref 0.17–1.22)
MONOCYTES NFR BLD AUTO: 10 % (ref 4–12)
NEUTROPHILS # BLD AUTO: 3.5 THOUSANDS/ÂΜL (ref 1.85–7.62)
NEUTS SEG NFR BLD AUTO: 53 % (ref 43–75)
NRBC BLD AUTO-RTO: 0 /100 WBCS
PLATELET # BLD AUTO: 253 THOUSANDS/UL (ref 149–390)
PMV BLD AUTO: 10.4 FL (ref 8.9–12.7)
POTASSIUM SERPL-SCNC: 4.5 MMOL/L (ref 3.5–5.3)
PROT SERPL-MCNC: 6.5 G/DL (ref 6.4–8.4)
RBC # BLD AUTO: 4.72 MILLION/UL (ref 3.81–5.12)
SODIUM SERPL-SCNC: 140 MMOL/L (ref 135–147)
T4 FREE SERPL-MCNC: 0.86 NG/DL (ref 0.61–1.12)
TSH SERPL DL<=0.05 MIU/L-ACNC: 1.88 UIU/ML (ref 0.45–4.5)
WBC # BLD AUTO: 6.71 THOUSAND/UL (ref 4.31–10.16)

## 2024-02-19 PROCEDURE — 80053 COMPREHEN METABOLIC PANEL: CPT

## 2024-02-19 PROCEDURE — 84439 ASSAY OF FREE THYROXINE: CPT

## 2024-02-19 PROCEDURE — 84443 ASSAY THYROID STIM HORMONE: CPT

## 2024-02-19 PROCEDURE — 36415 COLL VENOUS BLD VENIPUNCTURE: CPT

## 2024-02-19 PROCEDURE — 85025 COMPLETE CBC W/AUTO DIFF WBC: CPT

## 2024-03-05 ENCOUNTER — OFFICE VISIT (OUTPATIENT)
Dept: OCCUPATIONAL THERAPY | Facility: CLINIC | Age: 79
End: 2024-03-05
Payer: MEDICARE

## 2024-03-05 DIAGNOSIS — F01.A0 MILD VASCULAR DEMENTIA WITHOUT BEHAVIORAL DISTURBANCE, PSYCHOTIC DISTURBANCE, MOOD DISTURBANCE, OR ANXIETY (HCC): Primary | ICD-10-CM

## 2024-03-05 PROCEDURE — 96125 COGNITIVE TEST BY HC PRO: CPT

## 2024-03-05 PROCEDURE — 97166 OT EVAL MOD COMPLEX 45 MIN: CPT

## 2024-03-05 NOTE — LETTER
2024    Corrie Peguero DO  [address] [facility] [address]    Patient: Julisa Moreland   YOB: 1945   Date of Visit: 3/5/2024     Encounter Diagnosis     ICD-10-CM    1. Mild vascular dementia without behavioral disturbance, psychotic disturbance, mood disturbance, or anxiety (HCC)  F01.A0           Dear Dr. Peguero:    Thank you for your recent referral of Julisa Moreland. Please review the attached evaluation summary from Julisa's recent visit.     Please verify that you agree with the plan of care by signing the attached order.     If you have any questions or concerns, please do not hesitate to call.     I sincerely appreciate the opportunity to share in the care of one of your patients and hope to have another opportunity to work with you in the near future.     Sincerely,    Kayla Alvarenga, OT      Referring Provider:     I certify that I have read the below Plan of Care and certify the need for these services furnished under this plan of treatment while under my care.                    Corrie Peguero DO  [address] [facility] [address]  Via Fax: [phone]        This note was not shared with the patient due to privacy exception: note includes other individuals      Occupational Therapy Fit to Drive Evaluation:  Attempt #1    Today's Date: 3/5/2024  Patient Name: Julisa Moreland  : 1945  MRN: [id number]  Referring Provider: Corrie Peguero,*  Dx: Mild vascular dementia without behavioral disturbance, psychotic disturbance, mood disturbance, or anxiety (HCC) [F01.A0]      DCAT/FITNESS TO DRIVE OUTCOMES:     PATIENT'S SCORE: 55%                DCAT SCORE RANGES:    Low Risk: 1-39% (Passing Score)     Range of Inquiry: 40-69% (On-road test warranted)    High Risk: 70-99% (Failing Score)     DRIVING IMPLICATIONS PER DCAT: See Assessment Below; pt not recommended for on the road test at this time   ADDITIONAL THERAPY NEEDS: Occupational Therapy for  "memory therapy with treatment focused on memory care, memory strategies and patient/family education.      Assessment/Plan  Occupational Therapy Skilled Analysis Assessment and Plan of Care:  Pt is a 78 y.o. female referred to Occupational Therapy for Fitness to Drive Evaluation to assess his/her cognitive, visual, and motor abilities to drive safely in community environment. The DCAT is a screen that provides objective insight into a person's risk to drive. Pt is scoring in the “Range of Inquiry,\" which indicates s/he may have cognitive deficits that would lead to failing an on-road assessment. Individuals scoring within this range have higher than average on-road error points and a slightly higher risk for potential errors during an on-road performance evaluation. Despite scoring within range of inquiry, below average scoring compared to the driving population was noted in the following areas: Reaction Time, Judgement, Memory and Control. Throughout DCAT assessment, pt demo significant difficulty following verbal directions, requiring repeat of directions 100% of the time and max verbal cueing t/o assessment. On road test is not warranted for this pt at this time due to significant cognitive impairment.     Active Problem List:   Patient Active Problem List   Diagnosis   • Multinodular thyroid   • Class 1 obesity due to excess calories with serious comorbidity and body mass index (BMI) of 30.0 to 30.9 in adult   • Osteoarthritis of hip   • Osteoarthritis of knee   • Vitamin D deficiency   • Osteopenia of lumbar spine   • History of cardioembolic cerebrovascular accident (CVA)   • SA node dysfunction (HCC)   • Paroxysmal atrial fibrillation (HCC)   • Venous insufficiency of both lower extremities   • Mild vascular dementia without behavioral disturbance, psychotic disturbance, mood disturbance, or anxiety (HCC)   • Aphasia   • Driving safety issue   • Nonadherence to medication   • Dyslipidemia     Past Medical Hx: " "  Past Medical History:   Diagnosis Date   • Deep vein thrombosis (HCC) 2016   • Diverticulosis     last assessed 13   • Fibrocystic breast    • History of DVT (deep vein thrombosis)     Xarelto, 2015 thru 2016   • History of endometrial cancer 3/14/2018    Stage IA, grade 3   • Impaired fasting glucose     last assessed 14   • Inguinal lymphadenopathy    • Multiple benign polyps of large intestine    • Obesity    • Uterine cancer (HCC)      Past Surgical Hx:   Past Surgical History:   Procedure Laterality Date   • HYSTERECTOMY     • LYMPH NODE BIOPSY     • OOPHORECTOMY     • PELVIC LAPAROSCOPY     • TONSILLECTOMY      with adenoidectomy        Pain Levels:   Restin    With Activity:  0      TIME:   OT eval: 2:30-3:00  OT DCAT 3:00-3:45    Subjective: \"What am I supposed to do?\" (DCAT)      History of Present Illness:  Pt is a 78 y.o. female seen for OT Fitness to Drive evaluation to assess pt’s cognitive, visual, and motor abilities to drive safely in community environment. Pt referred from Corrie Peguero,* from Internal medicine. Pt states that she stopped driving in  because she couldn't see right. Pt's family member present during eval noted that pt had a stroke. Pt reports she can see better with her glasses. Pt reports she can see everything with them off but it is not clear. Pt denies concerns with her memory. Pt denies concerns with medication management. Pt denies concerns getting back on the road. Below is a summary of patients current or most recent driving history including vehicle type, roads traveled, and recent auto events.    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     Recommend On Road Assessment?:   No     Recommend to [employer] for Adaptive Equipment?: No      DCAT Mobile Battery Cognitive Skills Analysis  These scores are the participant's performance comparatively to the general driving population from a stratified sample of drivers ages  for their cognitive skills required for safe driving. If the participant's score is unusually poor for their age group, it will impact the overall driveable score.     For this analysis, scores in the low percentile range indicate a high risk  while a high percentile range indicate lower risk:    Reaction Time: 3rd Population Percentile  Lower scores indicate decreased ability to respond quickly and accurately when simple cues are given, showing increased driving risk  Indicating significant impairment    Judgement:  3rd  Population Percentile  Lowers scores indicate decreased ability to respond quickly and accurate when provided with complex judgement is rquired and showing increased driving risk  Indicating significant impairment    Memory: 3rd  Population Percentile  Lowers scores indicate decreased working memory in the presence of distractions, showing increased driving risk  Indicating significant impairment    Control:  3rd Population Percentile  Lower scores indicate decreased FMC and executive function, showing increased driving risk  Indicating significant impairment         OPT[employer] vision screen: (see attached)   Contrast sensitivity: Impaired   Far acuity: 20/30    Near acuity: 20/40      Reaction time trials: (see attached)  Average of 3 trials: 1.062  Falling within the  <25th%ile for reaction time. Indicating significant impairment      Physical findings:    Cervical rotation:  R wnl, L wnl   UE and LE AROM:  wnl  UE: 4/5 MMT   LE: 4/5 MMT      [place] making Part A and Part B:   Part A: 59 seconds with 25% VCs   Part B: 5 minutes 14 seconds with >100% VCs for recall of instructions, problem solving,   Indicating deficits: Part A deficit > 55.32 seconds and Part B deficit > 132.15 seconds for age related norms  Times predictive of unsafe driving s/p CVA: Part A deficit > 32 seconds and Part B deficit > 79 seconds

## 2024-03-05 NOTE — PROGRESS NOTES
"This note was not shared with the patient due to privacy exception: note includes other individuals      Occupational Therapy Fit to Drive Evaluation:  Attempt #1    Today's Date: 3/5/2024  Patient Name: Julisa Moreland  : 1945  MRN: [id number]  Referring Provider: Corrie Peguero,*  Dx: Mild vascular dementia without behavioral disturbance, psychotic disturbance, mood disturbance, or anxiety ([facility]) [F01.A0]      DCAT/FITNESS TO DRIVE OUTCOMES:     PATIENT'S SCORE: 55%                DCAT SCORE RANGES:    Low Risk: 1-39% (Passing Score)     Range of Inquiry: 40-69% (On-road test warranted)    High Risk: 70-99% (Failing Score)     DRIVING IMPLICATIONS PER DCAT: See Assessment Below; pt not recommended for on the road test at this time   ADDITIONAL THERAPY NEEDS: Occupational Therapy for memory therapy with treatment focused on memory care, memory strategies and patient/family education.      Assessment/Plan  Occupational Therapy Skilled Analysis Assessment and Plan of Care:  Pt is a 78 y.o. female referred to Occupational Therapy for Fitness to Drive Evaluation to assess his/her cognitive, visual, and motor abilities to drive safely in community environment. The DCAT is a screen that provides objective insight into a person's risk to drive. Pt is scoring in the “Range of Inquiry,\" which indicates s/he may have cognitive deficits that would lead to failing an on-road assessment. Individuals scoring within this range have higher than average on-road error points and a slightly higher risk for potential errors during an on-road performance evaluation. Despite scoring within range of inquiry, below average scoring compared to the driving population was noted in the following areas: Reaction Time, Judgement, Memory and Control. Throughout DCAT assessment, pt demo significant difficulty following verbal directions, requiring repeat of directions 100% of the time and max verbal cueing t/o assessment. On road " "test is not warranted for this pt at this time due to significant cognitive impairment.     Active Problem List:   Patient Active Problem List   Diagnosis    Multinodular thyroid    Class 1 obesity due to excess calories with serious comorbidity and body mass index (BMI) of 30.0 to 30.9 in adult    Osteoarthritis of hip    Osteoarthritis of knee    Vitamin D deficiency    Osteopenia of lumbar spine    History of cardioembolic cerebrovascular accident (CVA)    SA node dysfunction (HCC)    Paroxysmal atrial fibrillation (HCC)    Venous insufficiency of both lower extremities    Mild vascular dementia without behavioral disturbance, psychotic disturbance, mood disturbance, or anxiety (HCC)    Aphasia    Driving safety issue    Nonadherence to medication    Dyslipidemia     Past Medical Hx:   Past Medical History:   Diagnosis Date    Deep vein thrombosis (HCC) 2016    Diverticulosis     last assessed 13    Fibrocystic breast     History of DVT (deep vein thrombosis)     Xarelto, 2015 thru 2016    History of endometrial cancer 3/14/2018    Stage IA, grade 3    Impaired fasting glucose     last assessed 14    Inguinal lymphadenopathy     Multiple benign polyps of large intestine     Obesity     Uterine cancer (HCC)      Past Surgical Hx:   Past Surgical History:   Procedure Laterality Date    HYSTERECTOMY      LYMPH NODE BIOPSY      OOPHORECTOMY      PELVIC LAPAROSCOPY      TONSILLECTOMY      with adenoidectomy        Pain Levels:   Restin    With Activity:  0      TIME:   OT eval: 2:30-3:00  OT DCAT 3:00-3:45    Subjective: \"What am I supposed to do?\" (DCAT)      History of Present Illness:  Pt is a 78 y.o. female seen for OT Fitness to Drive evaluation to assess pt’s cognitive, visual, and motor abilities to drive safely in community environment. Pt referred from Corrie Peguero,* from Internal medicine. Pt states that she stopped driving in  because she couldn't see right. Pt's " family member present during eval noted that pt had a stroke. Pt reports she can see better with her glasses. Pt reports she can see everything with them off but it is not clear. Pt denies concerns with her memory. Pt denies concerns with medication management. Pt denies concerns getting back on the road. Below is a summary of patients current or most recent driving history including vehicle type, roads traveled, and recent auto events.    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     Recommend On Road Assessment?:   No     Recommend to [employer] for Adaptive Equipment?: No      DCAT Mobile Battery Cognitive Skills Analysis  These scores are the participant's performance comparatively to the general driving population from a stratified sample of drivers ages  for their cognitive skills required for safe driving. If the participant's score is unusually poor for their age group, it will impact the overall driveable score.     For this analysis, scores in the low percentile range indicate a high risk  while a high percentile range indicate lower risk:    Reaction Time: 3rd Population Percentile  Lower scores indicate decreased ability to respond quickly and accurately when simple cues are given, showing increased driving risk  Indicating significant impairment    Judgement:  3rd   Population Percentile  Lowers scores indicate decreased ability to respond quickly and accurate when provided with complex judgement is rquired and showing increased driving risk  Indicating significant impairment    Memory: 3rd  Population Percentile  Lowers scores indicate decreased working memory in the presence of distractions, showing increased driving risk  Indicating significant impairment    Control:  3rd Population Percentile  Lower scores indicate decreased FMC and executive function, showing increased driving risk  Indicating significant impairment         OPTEC vision screen: (see attached)   Contrast sensitivity: Impaired   Far acuity: 20/30    Near acuity: 20/40      Reaction time trials: (see attached)  Average of 3 trials: 1.062  Falling within the <25th%ile for reaction time. Indicating significant impairment      Physical findings:    Cervical rotation:  R wnl, L wnl   UE and LE AROM:  wnl  UE: 4/5 MMT   LE: 4/5 MMT      [place] making Part A and Part B:   Part A: 59 seconds with 25% VCs   Part B: 5 minutes 14 seconds with >100% VCs for recall of instructions, problem solving,   Indicating deficits: Part A deficit > 55.32 seconds and Part B deficit > 132.15 seconds for age related norms  Times predictive of unsafe driving s/p CVA: Part A deficit > 32 seconds and Part B deficit > 79 seconds

## 2024-12-20 NOTE — ASSESSMENT & PLAN NOTE
Patient presented with right facial droop and aphasia, unfortunately,  Unfortunately, outside of tPA window  · CT head No evidence of acute intracranial hemorrhage  Left frontal temporal and low-attenuation suggesting acute to subacute ischemia  · CTA head and neck showed distal left M2/M3 MCA occlusion  · MRI brain without contrast Acute to early subacute ischemia identified involving the left MCA territory described in detail above with one larger infarct and multiple additional smaller infarcts  No hemorrhagic transformation identified  · MRI brain with contrast negative for metastasis (in setting of hx of endometrial CA)  · Echo unremarkable  · Continue ASA/statin  · Dysphagia diet  · Neuro checks  · Telemetry, negative upon review  · Remains aphasic, no focal weakness appreciated on exam   Right-sided facial droop  · Neurology now signed off, stable for discharge  · Plan for loop as outpatient  Outpatient follow-up in 4-6 weeks  · Daughter deferred inpatient placement of loop recorder today by EP, they are aware of the long wait for placement and that they will need to follow up with cardiology as outpatient  · PT/OT recommending rehab  Case management following  [facility] for [facility]  English